# Patient Record
Sex: MALE | Race: WHITE | NOT HISPANIC OR LATINO
[De-identification: names, ages, dates, MRNs, and addresses within clinical notes are randomized per-mention and may not be internally consistent; named-entity substitution may affect disease eponyms.]

---

## 2017-01-23 ENCOUNTER — RECORD ABSTRACTING (OUTPATIENT)
Age: 77
End: 2017-01-23

## 2017-01-23 DIAGNOSIS — Z92.89 PERSONAL HISTORY OF OTHER MEDICAL TREATMENT: ICD-10-CM

## 2017-01-23 DIAGNOSIS — Z86.79 PERSONAL HISTORY OF OTHER DISEASES OF THE CIRCULATORY SYSTEM: ICD-10-CM

## 2017-01-23 DIAGNOSIS — Z95.810 PRESENCE OF AUTOMATIC (IMPLANTABLE) CARDIAC DEFIBRILLATOR: ICD-10-CM

## 2017-01-23 RX ORDER — GLIPIZIDE 10 MG/1
10 TABLET, FILM COATED, EXTENDED RELEASE ORAL
Refills: 0 | Status: ACTIVE | COMMUNITY

## 2017-01-23 RX ORDER — FUROSEMIDE 40 MG/1
40 TABLET ORAL
Refills: 0 | Status: ACTIVE | COMMUNITY

## 2017-01-23 RX ORDER — METOPROLOL TARTRATE 50 MG/1
50 TABLET, FILM COATED ORAL
Refills: 0 | Status: ACTIVE | COMMUNITY

## 2017-01-23 RX ORDER — DOFETILIDE 0.5 MG/1
500 CAPSULE ORAL TWICE DAILY
Refills: 0 | Status: ACTIVE | COMMUNITY

## 2017-01-23 RX ORDER — LISINOPRIL AND HYDROCHLOROTHIAZIDE TABLETS 20; 25 MG/1; MG/1
20-25 TABLET ORAL
Refills: 0 | Status: ACTIVE | COMMUNITY

## 2017-03-21 ENCOUNTER — OUTPATIENT (OUTPATIENT)
Dept: OUTPATIENT SERVICES | Facility: HOSPITAL | Age: 77
LOS: 1 days | Discharge: HOME | End: 2017-03-21

## 2017-03-30 ENCOUNTER — APPOINTMENT (OUTPATIENT)
Dept: UROLOGY | Facility: CLINIC | Age: 77
End: 2017-03-30

## 2017-03-31 ENCOUNTER — APPOINTMENT (OUTPATIENT)
Dept: HEMATOLOGY ONCOLOGY | Facility: CLINIC | Age: 77
End: 2017-03-31

## 2017-03-31 VITALS
HEIGHT: 69 IN | SYSTOLIC BLOOD PRESSURE: 195 MMHG | HEART RATE: 61 BPM | DIASTOLIC BLOOD PRESSURE: 81 MMHG | RESPIRATION RATE: 12 BRPM | BODY MASS INDEX: 33.18 KG/M2 | TEMPERATURE: 96.1 F | WEIGHT: 224 LBS

## 2017-03-31 DIAGNOSIS — Z87.891 PERSONAL HISTORY OF NICOTINE DEPENDENCE: ICD-10-CM

## 2017-03-31 DIAGNOSIS — E08.29 DIABETES MELLITUS DUE TO UNDERLYING CONDITION WITH OTHER DIABETIC KIDNEY COMPLICATION: ICD-10-CM

## 2017-03-31 DIAGNOSIS — Z86.39 PERSONAL HISTORY OF OTHER ENDOCRINE, NUTRITIONAL AND METABOLIC DISEASE: ICD-10-CM

## 2017-03-31 DIAGNOSIS — Z83.3 FAMILY HISTORY OF DIABETES MELLITUS: ICD-10-CM

## 2017-03-31 DIAGNOSIS — I48.91 UNSPECIFIED ATRIAL FIBRILLATION: ICD-10-CM

## 2017-03-31 DIAGNOSIS — R80.9 DIABETES MELLITUS DUE TO UNDERLYING CONDITION WITH OTHER DIABETIC KIDNEY COMPLICATION: ICD-10-CM

## 2017-03-31 DIAGNOSIS — N40.0 BENIGN PROSTATIC HYPERPLASIA WITHOUT LOWER URINARY TRACT SYMPMS: ICD-10-CM

## 2017-03-31 DIAGNOSIS — Z80.1 FAMILY HISTORY OF MALIGNANT NEOPLASM OF TRACHEA, BRONCHUS AND LUNG: ICD-10-CM

## 2017-04-04 PROBLEM — Z87.891 FORMER SMOKER: Status: ACTIVE | Noted: 2017-04-04

## 2017-04-04 PROBLEM — Z80.1 FAMILY HISTORY OF LUNG CANCER: Status: ACTIVE | Noted: 2017-04-04

## 2017-04-04 PROBLEM — N40.0 ENLARGED PROSTATE: Status: RESOLVED | Noted: 2017-04-04 | Resolved: 2017-04-04

## 2017-04-04 PROBLEM — I48.91 ATRIAL FIBRILLATION, UNSPECIFIED TYPE: Status: RESOLVED | Noted: 2017-04-04 | Resolved: 2017-04-04

## 2017-04-04 PROBLEM — Z83.3 FAMILY HISTORY OF DIABETES MELLITUS: Status: ACTIVE | Noted: 2017-04-04

## 2017-04-04 PROBLEM — Z86.39 HISTORY OF HYPERLIPIDEMIA: Status: RESOLVED | Noted: 2017-04-04 | Resolved: 2017-04-04

## 2017-04-04 PROBLEM — E08.29 DIABETES MELLITUS DUE TO UNDERLYING CONDITION WITH MICROALBUMINURIA, WITHOUT LONG-TERM CURRENT USE OF INSULIN: Status: RESOLVED | Noted: 2017-04-04 | Resolved: 2017-04-04

## 2017-04-04 RX ORDER — TADALAFIL 5 MG/1
5 TABLET, FILM COATED ORAL
Refills: 0 | Status: COMPLETED | COMMUNITY
End: 2017-04-04

## 2017-04-06 ENCOUNTER — APPOINTMENT (OUTPATIENT)
Dept: HEMATOLOGY ONCOLOGY | Facility: CLINIC | Age: 77
End: 2017-04-06

## 2017-04-06 ENCOUNTER — RECORD ABSTRACTING (OUTPATIENT)
Age: 77
End: 2017-04-06

## 2017-04-11 LAB
FERRITIN SERPL-MCNC: 103 NG/ML
FOLATE SERPL-MCNC: 3.9 NG/ML
VIT B12 SERPL-MCNC: 162 PG/ML

## 2017-04-14 ENCOUNTER — APPOINTMENT (OUTPATIENT)
Dept: INFUSION THERAPY | Facility: CLINIC | Age: 77
End: 2017-04-14

## 2017-04-14 ENCOUNTER — APPOINTMENT (OUTPATIENT)
Dept: HEMATOLOGY ONCOLOGY | Facility: CLINIC | Age: 77
End: 2017-04-14

## 2017-04-14 VITALS
HEIGHT: 69 IN | DIASTOLIC BLOOD PRESSURE: 69 MMHG | WEIGHT: 224 LBS | HEART RATE: 60 BPM | SYSTOLIC BLOOD PRESSURE: 154 MMHG | TEMPERATURE: 97.1 F | BODY MASS INDEX: 33.18 KG/M2 | RESPIRATION RATE: 12 BRPM

## 2017-04-17 LAB — HCYS SERPL-MCNC: 22.9 UMOL/L

## 2017-04-19 LAB
IF AB SER QL: POSITIVE
METHYLMALONATE SERPL-SCNC: 483 NMOL/L
PCA AB SER QL: NEGATIVE

## 2017-04-24 ENCOUNTER — APPOINTMENT (OUTPATIENT)
Dept: INFUSION THERAPY | Facility: CLINIC | Age: 77
End: 2017-04-24

## 2017-04-28 ENCOUNTER — APPOINTMENT (OUTPATIENT)
Dept: INFUSION THERAPY | Facility: CLINIC | Age: 77
End: 2017-04-28

## 2017-04-28 ENCOUNTER — OUTPATIENT (OUTPATIENT)
Dept: OUTPATIENT SERVICES | Facility: HOSPITAL | Age: 77
LOS: 1 days | Discharge: HOME | End: 2017-04-28

## 2017-05-01 ENCOUNTER — APPOINTMENT (OUTPATIENT)
Dept: UROLOGY | Facility: HOSPITAL | Age: 77
End: 2017-05-01

## 2017-05-02 ENCOUNTER — APPOINTMENT (OUTPATIENT)
Dept: UROLOGY | Facility: CLINIC | Age: 77
End: 2017-05-02

## 2017-05-02 LAB — SEND OUT TEST (NORTH): NORMAL

## 2017-05-09 ENCOUNTER — APPOINTMENT (OUTPATIENT)
Dept: UROLOGY | Facility: CLINIC | Age: 77
End: 2017-05-09

## 2017-05-12 ENCOUNTER — APPOINTMENT (OUTPATIENT)
Dept: INFUSION THERAPY | Facility: CLINIC | Age: 77
End: 2017-05-12

## 2017-05-12 ENCOUNTER — APPOINTMENT (OUTPATIENT)
Dept: HEMATOLOGY ONCOLOGY | Facility: CLINIC | Age: 77
End: 2017-05-12

## 2017-05-16 ENCOUNTER — APPOINTMENT (OUTPATIENT)
Dept: UROLOGY | Facility: CLINIC | Age: 77
End: 2017-05-16

## 2017-05-16 VITALS
HEART RATE: 53 BPM | BODY MASS INDEX: 29.62 KG/M2 | HEIGHT: 69 IN | WEIGHT: 200 LBS | DIASTOLIC BLOOD PRESSURE: 52 MMHG | SYSTOLIC BLOOD PRESSURE: 93 MMHG

## 2017-05-20 ENCOUNTER — INPATIENT (INPATIENT)
Facility: HOSPITAL | Age: 77
LOS: 4 days | Discharge: HOME HEALTH | End: 2017-05-25
Attending: HOSPITALIST

## 2017-05-30 ENCOUNTER — APPOINTMENT (OUTPATIENT)
Dept: UROLOGY | Facility: CLINIC | Age: 77
End: 2017-05-30

## 2017-05-30 VITALS
BODY MASS INDEX: 29.62 KG/M2 | WEIGHT: 200 LBS | HEIGHT: 69 IN | DIASTOLIC BLOOD PRESSURE: 58 MMHG | SYSTOLIC BLOOD PRESSURE: 129 MMHG | HEART RATE: 62 BPM

## 2017-05-30 DIAGNOSIS — R79.1 ABNORMAL COAGULATION PROFILE: ICD-10-CM

## 2017-05-30 DIAGNOSIS — D52.9 FOLATE DEFICIENCY ANEMIA, UNSPECIFIED: ICD-10-CM

## 2017-06-13 ENCOUNTER — APPOINTMENT (OUTPATIENT)
Dept: UROLOGY | Facility: CLINIC | Age: 77
End: 2017-06-13

## 2017-06-13 VITALS
DIASTOLIC BLOOD PRESSURE: 57 MMHG | HEIGHT: 69 IN | BODY MASS INDEX: 30.36 KG/M2 | WEIGHT: 205 LBS | HEART RATE: 54 BPM | SYSTOLIC BLOOD PRESSURE: 110 MMHG

## 2017-06-27 ENCOUNTER — APPOINTMENT (OUTPATIENT)
Dept: UROLOGY | Facility: CLINIC | Age: 77
End: 2017-06-27

## 2017-06-27 VITALS
BODY MASS INDEX: 30.36 KG/M2 | HEIGHT: 69 IN | WEIGHT: 205 LBS | SYSTOLIC BLOOD PRESSURE: 147 MMHG | DIASTOLIC BLOOD PRESSURE: 67 MMHG | HEART RATE: 56 BPM

## 2017-06-28 DIAGNOSIS — H40.9 UNSPECIFIED GLAUCOMA: ICD-10-CM

## 2017-06-28 DIAGNOSIS — E11.9 TYPE 2 DIABETES MELLITUS WITHOUT COMPLICATIONS: ICD-10-CM

## 2017-06-28 DIAGNOSIS — R31.0 GROSS HEMATURIA: ICD-10-CM

## 2017-06-28 DIAGNOSIS — N28.9 DISORDER OF KIDNEY AND URETER, UNSPECIFIED: ICD-10-CM

## 2017-06-28 DIAGNOSIS — I11.0 HYPERTENSIVE HEART DISEASE WITH HEART FAILURE: ICD-10-CM

## 2017-06-28 DIAGNOSIS — R32 UNSPECIFIED URINARY INCONTINENCE: ICD-10-CM

## 2017-06-28 DIAGNOSIS — Z79.84 LONG TERM (CURRENT) USE OF ORAL HYPOGLYCEMIC DRUGS: ICD-10-CM

## 2017-06-28 DIAGNOSIS — R31.9 HEMATURIA, UNSPECIFIED: ICD-10-CM

## 2017-06-28 DIAGNOSIS — D68.32 HEMORRHAGIC DISORDER DUE TO EXTRINSIC CIRCULATING ANTICOAGULANTS: ICD-10-CM

## 2017-06-28 DIAGNOSIS — I50.9 HEART FAILURE, UNSPECIFIED: ICD-10-CM

## 2017-06-28 DIAGNOSIS — N13.9 OBSTRUCTIVE AND REFLUX UROPATHY, UNSPECIFIED: ICD-10-CM
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
## 2017-06-28 DIAGNOSIS — Z95.0 PRESENCE OF CARDIAC PACEMAKER: ICD-10-CM

## 2017-06-28 DIAGNOSIS — Z90.79 ACQUIRED ABSENCE OF OTHER GENITAL ORGAN(S): ICD-10-CM

## 2017-06-28 DIAGNOSIS — T81.4XXA INFECTION FOLLOWING A PROCEDURE, INITIAL ENCOUNTER: ICD-10-CM

## 2017-06-28 DIAGNOSIS — E83.42 HYPOMAGNESEMIA: ICD-10-CM
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
## 2017-06-28 DIAGNOSIS — T45.515A ADVERSE EFFECT OF ANTICOAGULANTS, INITIAL ENCOUNTER: ICD-10-CM

## 2017-06-28 DIAGNOSIS — I48.91 UNSPECIFIED ATRIAL FIBRILLATION: ICD-10-CM

## 2017-06-28 DIAGNOSIS — D64.9 ANEMIA, UNSPECIFIED: ICD-10-CM

## 2017-07-07 DIAGNOSIS — N40.1 BENIGN PROSTATIC HYPERPLASIA WITH LOWER URINARY TRACT SYMPTOMS: ICD-10-CM

## 2017-07-07 DIAGNOSIS — Z01.818 ENCOUNTER FOR OTHER PREPROCEDURAL EXAMINATION: ICD-10-CM

## 2017-07-07 DIAGNOSIS — R33.9 RETENTION OF URINE, UNSPECIFIED: ICD-10-CM

## 2017-07-12 ENCOUNTER — APPOINTMENT (OUTPATIENT)
Dept: UROLOGY | Facility: CLINIC | Age: 77
End: 2017-07-12

## 2017-07-12 VITALS
HEART RATE: 57 BPM | DIASTOLIC BLOOD PRESSURE: 59 MMHG | BODY MASS INDEX: 30.81 KG/M2 | SYSTOLIC BLOOD PRESSURE: 113 MMHG | WEIGHT: 208 LBS | HEIGHT: 69 IN

## 2017-07-12 DIAGNOSIS — T81.4XXD INFECTION FOLLOWING A PROCEDURE, SUBSEQUENT ENCOUNTER: ICD-10-CM

## 2017-08-24 ENCOUNTER — APPOINTMENT (OUTPATIENT)
Dept: UROLOGY | Facility: CLINIC | Age: 77
End: 2017-08-24
Payer: MEDICARE

## 2017-08-24 VITALS
HEART RATE: 51 BPM | BODY MASS INDEX: 31.84 KG/M2 | WEIGHT: 215 LBS | HEIGHT: 69 IN | DIASTOLIC BLOOD PRESSURE: 60 MMHG | SYSTOLIC BLOOD PRESSURE: 138 MMHG

## 2017-08-24 DIAGNOSIS — Z87.898 PERSONAL HISTORY OF OTHER SPECIFIED CONDITIONS: ICD-10-CM

## 2017-08-24 LAB
BILIRUB UR QL STRIP: NORMAL
CLARITY UR: CLEAR
COLLECTION METHOD: NORMAL
GLUCOSE UR-MCNC: 500
HCG UR QL: NORMAL EU/DL
HGB UR QL STRIP.AUTO: 10
KETONES UR-MCNC: NORMAL
LEUKOCYTE ESTERASE UR QL STRIP: 75
NITRITE UR QL STRIP: NORMAL
PH UR STRIP: 5
PROT UR STRIP-MCNC: 30
SP GR UR STRIP: 1.01

## 2017-08-24 PROCEDURE — 81003 URINALYSIS AUTO W/O SCOPE: CPT | Mod: QW

## 2017-08-24 PROCEDURE — 99213 OFFICE O/P EST LOW 20 MIN: CPT

## 2017-08-29 ENCOUNTER — MESSAGE (OUTPATIENT)
Age: 77
End: 2017-08-29

## 2017-11-02 ENCOUNTER — RESULT REVIEW (OUTPATIENT)
Age: 77
End: 2017-11-02

## 2017-11-02 ENCOUNTER — OUTPATIENT (OUTPATIENT)
Dept: OUTPATIENT SERVICES | Facility: HOSPITAL | Age: 77
LOS: 1 days | Discharge: HOME | End: 2017-11-02

## 2017-11-02 ENCOUNTER — APPOINTMENT (OUTPATIENT)
Dept: UROLOGY | Facility: CLINIC | Age: 77
End: 2017-11-02
Payer: MEDICARE

## 2017-11-02 VITALS
HEART RATE: 96 BPM | BODY MASS INDEX: 31.84 KG/M2 | HEIGHT: 69 IN | SYSTOLIC BLOOD PRESSURE: 129 MMHG | DIASTOLIC BLOOD PRESSURE: 73 MMHG | WEIGHT: 215 LBS

## 2017-11-02 DIAGNOSIS — R33.9 RETENTION OF URINE, UNSPECIFIED: ICD-10-CM

## 2017-11-02 DIAGNOSIS — N39.0 URINARY TRACT INFECTION, SITE NOT SPECIFIED: ICD-10-CM

## 2017-11-02 DIAGNOSIS — N40.0 BENIGN PROSTATIC HYPERPLASIA WITHOUT LOWER URINARY TRACT SYMPTOMS: ICD-10-CM

## 2017-11-02 DIAGNOSIS — R31.9 HEMATURIA, UNSPECIFIED: ICD-10-CM

## 2017-11-02 DIAGNOSIS — N17.9 ACUTE KIDNEY FAILURE, UNSPECIFIED: ICD-10-CM

## 2017-11-02 DIAGNOSIS — N40.1 BENIGN PROSTATIC HYPERPLASIA WITH LOWER URINARY TRACT SYMPTOMS: ICD-10-CM

## 2017-11-02 LAB
BILIRUB UR QL STRIP: NORMAL
CLARITY UR: CLEAR
COLLECTION METHOD: NORMAL
GLUCOSE UR-MCNC: 500
HCG UR QL: NORMAL EU/DL
HGB UR QL STRIP.AUTO: 250
KETONES UR-MCNC: NORMAL
LEUKOCYTE ESTERASE UR QL STRIP: 500
NITRITE UR QL STRIP: NORMAL
PH UR STRIP: 6
PROT UR STRIP-MCNC: 100
SP GR UR STRIP: 1.01

## 2017-11-02 PROCEDURE — 99213 OFFICE O/P EST LOW 20 MIN: CPT

## 2017-11-02 PROCEDURE — 81003 URINALYSIS AUTO W/O SCOPE: CPT | Mod: QW

## 2017-11-03 LAB
APPEARANCE UR: NORMAL
BACTERIA URNS QL MICRO: ABNORMAL
BILIRUB UR QL STRIP: NEGATIVE
COLOR UR: YELLOW
GLUCOSE UR STRIP-MCNC: >=1000 MG/DL
HGB UR QL STRIP: ABNORMAL
KETONES UR STRIP-MCNC: NEGATIVE MG/DL
NITRITE UR QL STRIP: POSITIVE
PH UR STRIP: 6
PROT UR STRIP-MCNC: >=300 MG/DL
RBC #/AREA URNS HPF: ABNORMAL P/HPF
SP GR UR STRIP: >= 1.03
UROBILINOGEN UR STRIP-MCNC: 0.2 MG/DL
WBC URNS QL MICRO: ABNORMAL
WBC URNS QL MICRO: ABNORMAL P/HPF
YEAST URNS QL MICRO: ABNORMAL

## 2017-11-07 ENCOUNTER — APPOINTMENT (OUTPATIENT)
Dept: UROLOGY | Facility: CLINIC | Age: 77
End: 2017-11-07

## 2017-11-07 ENCOUNTER — MESSAGE (OUTPATIENT)
Age: 77
End: 2017-11-07

## 2017-11-08 LAB — BACTERIA UR CULT: NORMAL

## 2017-11-22 ENCOUNTER — RX RENEWAL (OUTPATIENT)
Age: 77
End: 2017-11-22

## 2018-01-11 ENCOUNTER — APPOINTMENT (OUTPATIENT)
Dept: UROLOGY | Facility: CLINIC | Age: 78
End: 2018-01-11
Payer: MEDICARE

## 2018-01-11 ENCOUNTER — RESULT CHARGE (OUTPATIENT)
Age: 78
End: 2018-01-11

## 2018-01-11 LAB
BILIRUB UR QL STRIP: NORMAL
CLARITY UR: NORMAL
COLLECTION METHOD: NORMAL
GLUCOSE UR-MCNC: NORMAL
HCG UR QL: NORMAL EU/DL
HGB UR QL STRIP.AUTO: 250
KETONES UR-MCNC: NORMAL
LEUKOCYTE ESTERASE UR QL STRIP: 500
NITRITE UR QL STRIP: NORMAL
PH UR STRIP: 6
PROT UR STRIP-MCNC: 100
SP GR UR STRIP: 1.01

## 2018-01-11 PROCEDURE — 99213 OFFICE O/P EST LOW 20 MIN: CPT

## 2018-01-11 RX ORDER — NYSTATIN 100000 [USP'U]/G
100000 POWDER TOPICAL
Qty: 60 | Refills: 1 | Status: COMPLETED | COMMUNITY
Start: 2017-11-22 | End: 2018-01-11

## 2018-01-11 RX ORDER — DABIGATRAN ETEXILATE MESYLATE 150 MG/1
150 CAPSULE ORAL TWICE DAILY
Refills: 0 | Status: COMPLETED | COMMUNITY
End: 2018-01-11

## 2018-01-11 RX ORDER — OXYBUTYNIN CHLORIDE 10 MG/1
10 TABLET, EXTENDED RELEASE ORAL
Qty: 3 | Refills: 5 | Status: COMPLETED | COMMUNITY
Start: 2017-08-29 | End: 2018-01-11

## 2018-01-11 RX ORDER — SULFAMETHOXAZOLE AND TRIMETHOPRIM 800; 160 MG/1; MG/1
800-160 TABLET ORAL
Qty: 14 | Refills: 0 | Status: COMPLETED | COMMUNITY
Start: 2017-11-07 | End: 2018-01-11

## 2018-01-18 ENCOUNTER — INPATIENT (INPATIENT)
Facility: HOSPITAL | Age: 78
LOS: 8 days | Discharge: HOME HEALTH | End: 2018-01-27
Attending: HOSPITALIST

## 2018-01-30 DIAGNOSIS — B96.20 UNSPECIFIED ESCHERICHIA COLI [E. COLI] AS THE CAUSE OF DISEASES CLASSIFIED ELSEWHERE: ICD-10-CM

## 2018-01-30 DIAGNOSIS — A41.9 SEPSIS, UNSPECIFIED ORGANISM: ICD-10-CM

## 2018-01-30 DIAGNOSIS — R65.20 SEVERE SEPSIS WITHOUT SEPTIC SHOCK: ICD-10-CM

## 2018-01-30 DIAGNOSIS — N18.3 CHRONIC KIDNEY DISEASE, STAGE 3 (MODERATE): ICD-10-CM

## 2018-01-30 DIAGNOSIS — E11.10 TYPE 2 DIABETES MELLITUS WITH KETOACIDOSIS WITHOUT COMA: ICD-10-CM

## 2018-01-30 DIAGNOSIS — E83.42 HYPOMAGNESEMIA: ICD-10-CM

## 2018-01-30 DIAGNOSIS — Z91.013 ALLERGY TO SEAFOOD: ICD-10-CM

## 2018-01-30 DIAGNOSIS — E87.2 ACIDOSIS: ICD-10-CM

## 2018-01-30 DIAGNOSIS — E78.5 HYPERLIPIDEMIA, UNSPECIFIED: ICD-10-CM

## 2018-01-30 DIAGNOSIS — Z79.01 LONG TERM (CURRENT) USE OF ANTICOAGULANTS: ICD-10-CM

## 2018-01-30 DIAGNOSIS — F03.90 UNSPECIFIED DEMENTIA WITHOUT BEHAVIORAL DISTURBANCE: ICD-10-CM

## 2018-01-30 DIAGNOSIS — J96.01 ACUTE RESPIRATORY FAILURE WITH HYPOXIA: ICD-10-CM

## 2018-01-30 DIAGNOSIS — N39.0 URINARY TRACT INFECTION, SITE NOT SPECIFIED: ICD-10-CM

## 2018-01-30 DIAGNOSIS — N40.0 BENIGN PROSTATIC HYPERPLASIA WITHOUT LOWER URINARY TRACT SYMPTOMS: ICD-10-CM

## 2018-01-30 DIAGNOSIS — I13.0 HYPERTENSIVE HEART AND CHRONIC KIDNEY DISEASE WITH HEART FAILURE AND STAGE 1 THROUGH STAGE 4 CHRONIC KIDNEY DISEASE, OR UNSPECIFIED CHRONIC KIDNEY DISEASE: ICD-10-CM

## 2018-01-30 DIAGNOSIS — E11.59 TYPE 2 DIABETES MELLITUS WITH OTHER CIRCULATORY COMPLICATIONS: ICD-10-CM

## 2018-01-30 DIAGNOSIS — I21.A1 MYOCARDIAL INFARCTION TYPE 2: ICD-10-CM

## 2018-01-30 DIAGNOSIS — Z95.810 PRESENCE OF AUTOMATIC (IMPLANTABLE) CARDIAC DEFIBRILLATOR: ICD-10-CM

## 2018-01-30 DIAGNOSIS — B95.2 ENTEROCOCCUS AS THE CAUSE OF DISEASES CLASSIFIED ELSEWHERE: ICD-10-CM

## 2018-01-30 DIAGNOSIS — R13.10 DYSPHAGIA, UNSPECIFIED: ICD-10-CM

## 2018-01-30 DIAGNOSIS — I48.91 UNSPECIFIED ATRIAL FIBRILLATION: ICD-10-CM

## 2018-01-30 DIAGNOSIS — E87.6 HYPOKALEMIA: ICD-10-CM

## 2018-01-30 DIAGNOSIS — K81.0 ACUTE CHOLECYSTITIS: ICD-10-CM

## 2018-01-30 DIAGNOSIS — I50.22 CHRONIC SYSTOLIC (CONGESTIVE) HEART FAILURE: ICD-10-CM

## 2018-01-30 DIAGNOSIS — R91.1 SOLITARY PULMONARY NODULE: ICD-10-CM

## 2018-01-30 DIAGNOSIS — Z79.84 LONG TERM (CURRENT) USE OF ORAL HYPOGLYCEMIC DRUGS: ICD-10-CM

## 2018-01-30 DIAGNOSIS — E87.1 HYPO-OSMOLALITY AND HYPONATREMIA: ICD-10-CM

## 2018-01-30 DIAGNOSIS — E11.22 TYPE 2 DIABETES MELLITUS WITH DIABETIC CHRONIC KIDNEY DISEASE: ICD-10-CM

## 2018-01-30 DIAGNOSIS — K81.9 CHOLECYSTITIS, UNSPECIFIED: ICD-10-CM

## 2018-02-04 DIAGNOSIS — E78.5 HYPERLIPIDEMIA, UNSPECIFIED: ICD-10-CM

## 2018-02-04 DIAGNOSIS — H40.9 UNSPECIFIED GLAUCOMA: ICD-10-CM

## 2018-02-04 DIAGNOSIS — N40.0 BENIGN PROSTATIC HYPERPLASIA WITHOUT LOWER URINARY TRACT SYMPTOMS: ICD-10-CM

## 2018-02-04 DIAGNOSIS — K81.0 ACUTE CHOLECYSTITIS: ICD-10-CM

## 2018-02-04 DIAGNOSIS — E11.9 TYPE 2 DIABETES MELLITUS WITHOUT COMPLICATIONS: ICD-10-CM

## 2018-02-04 DIAGNOSIS — I48.91 UNSPECIFIED ATRIAL FIBRILLATION: ICD-10-CM

## 2018-02-04 DIAGNOSIS — I10 ESSENTIAL (PRIMARY) HYPERTENSION: ICD-10-CM

## 2018-02-04 DIAGNOSIS — E83.42 HYPOMAGNESEMIA: ICD-10-CM

## 2018-02-10 ENCOUNTER — EMERGENCY (EMERGENCY)
Facility: HOSPITAL | Age: 78
LOS: 0 days | Discharge: LEFT BEFORE DISCHARGE | End: 2018-02-10
Attending: EMERGENCY MEDICINE | Admitting: INTERNAL MEDICINE

## 2018-02-10 VITALS
OXYGEN SATURATION: 98 % | RESPIRATION RATE: 18 BRPM | TEMPERATURE: 98 F | DIASTOLIC BLOOD PRESSURE: 70 MMHG | SYSTOLIC BLOOD PRESSURE: 155 MMHG | HEART RATE: 55 BPM

## 2018-02-10 DIAGNOSIS — Z03.89 ENCOUNTER FOR OBSERVATION FOR OTHER SUSPECTED DISEASES AND CONDITIONS RULED OUT: ICD-10-CM

## 2018-02-10 NOTE — ED PROVIDER NOTE - PROGRESS NOTE DETAILS
unable to locate pt in assigned ED location nurse also has been unable to locate pt in assigned ED location.  another pt says pt had left.  will call contact info left a voicemail with the contact number.

## 2018-02-26 ENCOUNTER — APPOINTMENT (OUTPATIENT)
Dept: SURGERY | Facility: CLINIC | Age: 78
End: 2018-02-26
Payer: MEDICARE

## 2018-02-26 VITALS
BODY MASS INDEX: 31.1 KG/M2 | HEIGHT: 69 IN | WEIGHT: 210 LBS | SYSTOLIC BLOOD PRESSURE: 110 MMHG | DIASTOLIC BLOOD PRESSURE: 70 MMHG

## 2018-02-26 DIAGNOSIS — Z78.9 OTHER SPECIFIED HEALTH STATUS: ICD-10-CM

## 2018-02-26 PROCEDURE — 99213 OFFICE O/P EST LOW 20 MIN: CPT

## 2018-02-26 RX ORDER — CANAGLIFLOZIN 300 MG/1
300 TABLET, FILM COATED ORAL
Refills: 0 | Status: COMPLETED | COMMUNITY
End: 2018-02-26

## 2018-02-26 RX ORDER — METFORMIN HYDROCHLORIDE 500 MG/1
500 TABLET, COATED ORAL TWICE DAILY
Refills: 0 | Status: COMPLETED | COMMUNITY
End: 2018-02-26

## 2018-02-26 RX ORDER — DILTIAZEM HYDROCHLORIDE 120 MG/1
120 CAPSULE, COATED, EXTENDED RELEASE ORAL
Refills: 0 | Status: COMPLETED | COMMUNITY
End: 2018-02-26

## 2018-02-26 RX ORDER — SIMVASTATIN 40 MG/1
40 TABLET, FILM COATED ORAL
Refills: 0 | Status: COMPLETED | COMMUNITY
End: 2018-02-26

## 2018-02-26 RX ORDER — OXYBUTYNIN CHLORIDE 15 MG/1
15 TABLET, EXTENDED RELEASE ORAL
Qty: 30 | Refills: 5 | Status: COMPLETED | COMMUNITY
Start: 2017-11-02 | End: 2018-02-26

## 2018-02-26 RX ORDER — POTASSIUM CHLORIDE 1500 MG/1
TABLET, EXTENDED RELEASE ORAL TWICE DAILY
Refills: 0 | Status: COMPLETED | COMMUNITY
End: 2018-02-26

## 2018-02-26 RX ORDER — MIRABEGRON 50 MG/1
50 TABLET, FILM COATED, EXTENDED RELEASE ORAL
Qty: 30 | Refills: 5 | Status: COMPLETED | COMMUNITY
Start: 2017-11-02 | End: 2018-02-26

## 2018-02-26 RX ORDER — NYSTATIN 100000 1/G
100000 POWDER TOPICAL
Qty: 15 | Refills: 1 | Status: COMPLETED | COMMUNITY
Start: 2017-11-02 | End: 2018-02-26

## 2018-02-26 RX ORDER — SOLIFENACIN SUCCINATE 5 MG/1
5 TABLET, FILM COATED ORAL
Qty: 30 | Refills: 5 | Status: COMPLETED | COMMUNITY
Start: 2017-08-24 | End: 2018-02-26

## 2018-03-12 ENCOUNTER — OUTPATIENT (OUTPATIENT)
Dept: OUTPATIENT SERVICES | Facility: HOSPITAL | Age: 78
LOS: 1 days | Discharge: HOME | End: 2018-03-12

## 2018-03-19 ENCOUNTER — APPOINTMENT (OUTPATIENT)
Dept: SURGERY | Facility: CLINIC | Age: 78
End: 2018-03-19
Payer: MEDICARE

## 2018-03-19 VITALS
HEIGHT: 69 IN | WEIGHT: 211 LBS | SYSTOLIC BLOOD PRESSURE: 160 MMHG | BODY MASS INDEX: 31.25 KG/M2 | DIASTOLIC BLOOD PRESSURE: 80 MMHG

## 2018-03-19 DIAGNOSIS — T85.518D: ICD-10-CM

## 2018-03-19 PROCEDURE — 99213 OFFICE O/P EST LOW 20 MIN: CPT

## 2018-03-20 DIAGNOSIS — Z48.03 ENCOUNTER FOR CHANGE OR REMOVAL OF DRAINS: ICD-10-CM

## 2018-04-11 ENCOUNTER — INPATIENT (INPATIENT)
Facility: HOSPITAL | Age: 78
LOS: 2 days | Discharge: HOME | End: 2018-04-14
Attending: INTERNAL MEDICINE

## 2018-04-11 VITALS
DIASTOLIC BLOOD PRESSURE: 96 MMHG | OXYGEN SATURATION: 96 % | SYSTOLIC BLOOD PRESSURE: 194 MMHG | RESPIRATION RATE: 24 BRPM | HEIGHT: 69 IN | HEART RATE: 92 BPM | WEIGHT: 211.64 LBS

## 2018-04-11 RX ORDER — SODIUM CHLORIDE 9 MG/ML
1000 INJECTION, SOLUTION INTRAVENOUS
Qty: 0 | Refills: 0 | Status: DISCONTINUED | OUTPATIENT
Start: 2018-04-11 | End: 2018-04-14

## 2018-04-11 RX ORDER — DEXTROSE 50 % IN WATER 50 %
12.5 SYRINGE (ML) INTRAVENOUS ONCE
Qty: 0 | Refills: 0 | Status: DISCONTINUED | OUTPATIENT
Start: 2018-04-11 | End: 2018-04-14

## 2018-04-11 RX ORDER — HEPARIN SODIUM 5000 [USP'U]/ML
800 INJECTION INTRAVENOUS; SUBCUTANEOUS
Qty: 25000 | Refills: 0 | Status: DISCONTINUED | OUTPATIENT
Start: 2018-04-11 | End: 2018-04-11

## 2018-04-11 RX ORDER — DEXTROSE 50 % IN WATER 50 %
25 SYRINGE (ML) INTRAVENOUS ONCE
Qty: 0 | Refills: 0 | Status: DISCONTINUED | OUTPATIENT
Start: 2018-04-11 | End: 2018-04-14

## 2018-04-11 RX ORDER — METOPROLOL TARTRATE 50 MG
50 TABLET ORAL
Qty: 0 | Refills: 0 | Status: DISCONTINUED | OUTPATIENT
Start: 2018-04-11 | End: 2018-04-13

## 2018-04-11 RX ORDER — DOFETILIDE 0.25 MG/1
500 CAPSULE ORAL EVERY 12 HOURS
Qty: 0 | Refills: 0 | Status: DISCONTINUED | OUTPATIENT
Start: 2018-04-11 | End: 2018-04-14

## 2018-04-11 RX ORDER — FUROSEMIDE 40 MG
40 TABLET ORAL DAILY
Qty: 0 | Refills: 0 | Status: DISCONTINUED | OUTPATIENT
Start: 2018-04-11 | End: 2018-04-14

## 2018-04-11 RX ORDER — NITROGLYCERIN 6.5 MG
10 CAPSULE, EXTENDED RELEASE ORAL
Qty: 50 | Refills: 0 | Status: DISCONTINUED | OUTPATIENT
Start: 2018-04-11 | End: 2018-04-14

## 2018-04-11 RX ORDER — LISINOPRIL 2.5 MG/1
10 TABLET ORAL DAILY
Qty: 0 | Refills: 0 | Status: DISCONTINUED | OUTPATIENT
Start: 2018-04-11 | End: 2018-04-13

## 2018-04-11 RX ORDER — INSULIN LISPRO 100/ML
VIAL (ML) SUBCUTANEOUS
Qty: 0 | Refills: 0 | Status: DISCONTINUED | OUTPATIENT
Start: 2018-04-11 | End: 2018-04-14

## 2018-04-11 RX ORDER — POTASSIUM CHLORIDE 20 MEQ
20 PACKET (EA) ORAL DAILY
Qty: 0 | Refills: 0 | Status: DISCONTINUED | OUTPATIENT
Start: 2018-04-11 | End: 2018-04-14

## 2018-04-11 RX ORDER — ATORVASTATIN CALCIUM 80 MG/1
40 TABLET, FILM COATED ORAL AT BEDTIME
Qty: 0 | Refills: 0 | Status: DISCONTINUED | OUTPATIENT
Start: 2018-04-11 | End: 2018-04-14

## 2018-04-11 RX ORDER — ASPIRIN/CALCIUM CARB/MAGNESIUM 324 MG
81 TABLET ORAL DAILY
Qty: 0 | Refills: 0 | Status: DISCONTINUED | OUTPATIENT
Start: 2018-04-11 | End: 2018-04-14

## 2018-04-11 RX ORDER — METOPROLOL TARTRATE 50 MG
5 TABLET ORAL ONCE
Qty: 0 | Refills: 0 | Status: COMPLETED | OUTPATIENT
Start: 2018-04-11 | End: 2018-04-11

## 2018-04-11 RX ORDER — GLUCAGON INJECTION, SOLUTION 0.5 MG/.1ML
1 INJECTION, SOLUTION SUBCUTANEOUS ONCE
Qty: 0 | Refills: 0 | Status: DISCONTINUED | OUTPATIENT
Start: 2018-04-11 | End: 2018-04-14

## 2018-04-11 RX ORDER — DEXTROSE 50 % IN WATER 50 %
1 SYRINGE (ML) INTRAVENOUS ONCE
Qty: 0 | Refills: 0 | Status: DISCONTINUED | OUTPATIENT
Start: 2018-04-11 | End: 2018-04-14

## 2018-04-11 RX ORDER — HEPARIN SODIUM 5000 [USP'U]/ML
800 INJECTION INTRAVENOUS; SUBCUTANEOUS
Qty: 25000 | Refills: 0 | Status: DISCONTINUED | OUTPATIENT
Start: 2018-04-11 | End: 2018-04-12

## 2018-04-11 RX ORDER — TEMAZEPAM 15 MG/1
15 CAPSULE ORAL ONCE
Qty: 0 | Refills: 0 | Status: DISCONTINUED | OUTPATIENT
Start: 2018-04-11 | End: 2018-04-11

## 2018-04-11 RX ADMIN — Medication 4: at 23:13

## 2018-04-11 RX ADMIN — HEPARIN SODIUM 8 UNIT(S)/HR: 5000 INJECTION INTRAVENOUS; SUBCUTANEOUS at 20:05

## 2018-04-11 RX ADMIN — Medication 5 MILLIGRAM(S): at 19:08

## 2018-04-11 RX ADMIN — ATORVASTATIN CALCIUM 40 MILLIGRAM(S): 80 TABLET, FILM COATED ORAL at 22:30

## 2018-04-11 RX ADMIN — DOFETILIDE 500 MICROGRAM(S): 0.25 CAPSULE ORAL at 22:29

## 2018-04-11 RX ADMIN — Medication 3 MICROGRAM(S)/MIN: at 20:01

## 2018-04-11 RX ADMIN — TEMAZEPAM 15 MILLIGRAM(S): 15 CAPSULE ORAL at 23:01

## 2018-04-11 NOTE — CONSULT NOTE ADULT - SUBJECTIVE AND OBJECTIVE BOX
Surgeon: /Perry/ Kiko    Consult requesting by: Dr. Carr  Cardiologist: Dr. Arteaga  PMD: Dr. Cabrera  Urology: Dr. Agrawal  Gen Surgery: Dr. Montoya    HISTORY OF PRESENT ILLNESS:  HPI:  78y Male here for elective cardiac catheterization. The patient had an abnormal stress test, recently diagnosed of new CHF.  EF on TTE in cardiologist office is said to be 20%.  Subsequent elective cardiac catheterization revealed significant 3vCAD.    PMHx:  A-fib on Eliquis and s/p PPM/ICD (Vizibility)  DM on orals  HTN  Dyslipidemia  Cataract  Glaucoma  Dementia  Cholelithiasis - recent admission for sepsis secondary to cholecystitis    PSxHx:   Prostate Surgery      Social history:  [ x ]ex- smoker quit 50 years ago    Physical Exam:    General: NAD  Neurology: A&Ox3, nonfocal, BLANK x 4  Eyes: PERRLA/ EOMI.  ENT/Neck: No JVD.  Respiratory: CTA B/L, No wheezing, rales, rhonchi  CV: S1S2, no added sounds   Abdominal: Soft, NT, ND +BS.  Extremities: Mild LE edema b/l, + peripheral pulses B/L regular pulse. Modified Haile's test of right radial artery was wnl  Skin: No Rashes, Hematoma, Ecchymosis    Labs: Reviewed,  no significant abnormalities noted (11 Apr 2018 13:50)      NYHA functional class    [ ] Class I (no limitation) [ ] Class II (slight limitation) [ x] Class III (marked limitation) [ ] Class IV (symptoms at rest)      MEDICATIONS  :  Home Medications:  aspirin 81 mg oral tablet: 1 tab(s) orally once a day (11 Apr 2018 13:22)  atorvastatin 40 mg oral tablet: 1 tab(s) orally once a day (11 Apr 2018 13:19)  Eliquis 5 mg oral tablet: 1 tab(s) orally 2 times a day (11 Apr 2018 13:21)  glipiZIDE 10 mg oral tablet, extended release: 1 tab(s) orally once a day (11 Apr 2018 13:17)  Invokana 100 mg oral tablet: 1 tab(s) orally once a day (11 Apr 2018 13:22)  Klor-Con M20 oral tablet, extended release: 1 tab(s) orally 2 times a day (11 Apr 2018 13:21)  Lasix 40 mg oral tablet: 1 tab(s) orally once a day (11 Apr 2018 13:20)  lisinopril 10 mg oral tablet: 1 tab(s) orally once a day (11 Apr 2018 13:17)  metoprolol tartrate 50 mg oral tablet: 1 tab(s) orally 2 times a day (11 Apr 2018 13:15)  simvastatin 40 mg oral tablet: 1 tab(s) orally once a day (at bedtime) (11 Apr 2018 13:18)  Tikosyn 500 mcg oral capsule: 1 cap(s) orally 2 times a day (11 Apr 2018 13:19)        Allergies    Drug Allergies Not Recorded  shellfish (Other (U))    SOCIAL HISTORY:  Smoker: [x ] Yes  [ ] No        PACK YEARS: 1 pack per week                        WHEN QUIT? 50 years ago  ETOH use: [ ] Yes  [ x]No              FREQUENCY / QUANTITY:  Ilicit Drug use:  [ ] Yes  [ x] No  Occupation: retired banker  Lives with: wife  Assisted device use: cane  5 meter walk test: unable to complete at this time        Review of Systems  CONSTITUTIONAL:  Fevers[ ] chills[ ] sweats[ ] fatigue[ ] weight loss[ ] weight gain [ ]                                    NEGATIVE [X ]   NEURO:  parathesias[ ] seizures [ ]  syncope [ ]  confusion [ ]                                                                      NEGATIVE[ X]   EYES: glasses[ ]  blurry vision[ ]  discharge[ ] pain[ ] glaucoma [ ]                                                                NEGATIVE[X ]   ENMT:  difficulty hearing [ ]  vertigo[ ]  dysphagia[ ] epistaxis[ ] recent dental work [ ]                                   NEGATIVE[ X]   CV:  chest pain[ ] palpitations[ ] GREENE [ x] diaphoresis [ ]                                                                                  RESPIRATORY:  wheezing[ ] SOB[x ] cough [ ] sputum[ ] hemoptysis[ ]                                                             GI:  nausea[ ]  vommiting [ ]  diarrhea[ ] constipation [ ] melena [ ]                                                              NEGATIVE[ X]   : hematuria[ ]  dysuria[ ] urgency[ ] incontinence[ ]                                                                                 NEGATIVE[ X]   MUSKULOSKELETAL:  arthritis[ ]  joint swelling [ ] muscle weakness [ ] Hx vein stripping [ ]                             NEGATIVE[X ]   SKIN/BREAST:  rash[ ] itching [ ]  hair loss[ ] masses[ ]                                                                                 NEGATIVE[ X]   PSYCH:  dementia [ ] depresion [ ] anxiety[ ]                                                                                               NEGATIVE[X ]   HEME/LYMPH:  bruises easily[ ] enlarged lymph nodes[ ] tender lymph nodes[ ]                                          NEGATIVE[ X]   ENDOCRINE:  cold intolerance[ ] heat intolerance[ ] polydipsia[ ]                                                                NEGATIVE[ X]     PHYSICAL EXAM  Right arm bp: Left arm bp; unable to assess at this time due to radial cath  VS: WNL  CONSTITUTIONAL:                                                                          WNL[ x]   Neuro: WNL[x ] Normal exam oriented to person/place & time with no focal motor or sensory  deficits. Other                     Eyes: WNL[x ]   Normal exam of conjunctiva & lids, pupils equally reactive. Other     ENT: WNL[x ]    Normal exam of nasal/oral mucosa with absence of cyanosis. Other  Neck: WNL[x ]  Normal exam of jugular veins, trachea & thyroid. Other  Chest: WNL[x ] Normal lung exam with good air movement absence of wheezes, rales, or rhonchi: Other                                                                                CV:  Auscultation: normal [x ] S3[ ] S4[ ] Irregular [ ] Rub[ ] Clicks[ ]    Murmurs none:[x ]systolic [ ]  diastolic [ ] holosystolic [ ]  Carotids: No Bruits[x ] Other                 Abdominal Aorta: normal [ ] nonpalpable[ ]Other                                                                                      GI:           WNL[x ] Normal exam of abdomen, liver & spleen with no noted masses or tenderness. Other                                                                                                        Extremities: WNL[ ] Normal no evidence of cyanosis or deformity Edema: none[ ]trace[x ]1+[ ]2+[ ]3+[ ]4+[ ]  Lower Extremity Pulses: Right[x ] Left[x ]Varicosities[ ]  SKIN :WNL[x ] Normal exam to inspection & palation. Other:                                                          LABS:    CBC: W - 8;   H/H - 13.6/41.6;    P - 307  BMP: Na/K - 143/3.7;    Cl/CO2 - 104/24;    BUN/Cr - 17/0.8;    G - 135  INR/PTT - 1.0/29      Cardiac Cath: 3vCAD      Recommendation: (Procedures/Evaluations)  CT HEAD Nonn-Contrast:[  ]  CT Chest with /without contrast [ ]  Carotid Duplex :[ x]  TTE: [ x]  NELLY/PVR: [ ]  PFT : Simple PFT [ x]  Full [ ]  Renal Consult [ ]  Pulmonary Consult: [ ]   Vascular Consult [ ]    Dental Consult [ ]   Hem-Onc Consult [ ]   GI Consult [ ]   Other Consultations :    STS Score:   Procedure: CAB Only    Risk of Mortality: 1.9%    Morbidity or Mortality: 15.933%    Long Length of Stay: 7.062%    Short Length of Stay: 32.438%    Permanent Stroke: 1.482%    Prolonged Ventilation: 10.504%    DSW Infection: 0.541%    Renal Failure: 2.795%    Reoperation: 6.175%    Impression:    CAD [x ]  Valvular  disease [ ]   Aortic Disease [ ]   ISRRAEL: Yes[ ] No [ ]   CKD Stage I [ ] , Stage II [ ] , Stage III [ ], Stage IV [ ]   Anemia: Yes [ ], No [ ]  Diabetes :Yes [ x], No [ ]  Acute MI: Yes [ ], No [ ]   Heart Failure: Yes [x ] , No [ ] HFpEF [ ], HFrEF [x ]        Assessment/ Plan:    1. Admit to CTU  2. Pre-op workup for possible CABG

## 2018-04-11 NOTE — PROGRESS NOTE ADULT - SUBJECTIVE AND OBJECTIVE BOX
I have personally seen and examined the patient.  I agree with the history and physical which I have reviewed and noted any changes below.  04-11-18 @ 13:55    PRE-OP DIAGNOSIS: cad    PROCEDURE:    Physician: chidi  Assistant:    ANESTHESIA TYPE:  [  ]General Anesthesia  [  ] Sedation  [  ] Local/Regional    ESTIMATED BLOOD LOSS:       mL    CONDITION  [  ] Critical  [  ] Serious  [  ]Fair  [  ]Good      SPECIMENS REMOVED (IF APPLICABLE):      IV CONTRAST:             mL      IMPLANTS (IF APPLICABLE)      FINDINGS    Left Heart Catheterization:  LVEF%:  LVEDP:  [ ] Normal Coronary Arteries  [ ] Luminal Irregularities  [ ] Non-obstructive CAD      LEFT HEART CATHERIZATION                                    Left main     LAD:                        Diag:    Left Circumflex:  OM:    Right Cornary Artery:  RPDA  RPL    Ramus Intermed:    INTERVENTION  IMPLANTS:    RIGHT HEART CATHERIZATION  PA:  PCW:  CO/CI:      POST-OP DIAGNOSIS          PLAN OF CARE  [ ] D/C Home today  [ ]  D/C in AM  [ ] Return to In-patient bed  [ ] Admit for observation  [ ] Return for staged procedure:  [ ] CT Surgery consult called  [ ]  Continue DAPT, B-blocker & Statin therapy I have personally seen and examined the patient.  I agree with the history and physical which I have reviewed and noted any changes below.  04-11-18 @ 13:55    PRE-OP DIAGNOSIS: cad    PROCEDURE: lhc/sca    Physician: chidi  Assistant: leonid    ANESTHESIA TYPE:  [  ]General Anesthesia  [x  ] Sedation  [ x ] Local/Regional    ESTIMATED BLOOD LOSS: 10       mL    CONDITION  [  ] Critical  [  ] Serious  [x  ]Fair  [  ]Good      SPECIMENS REMOVED (IF APPLICABLE):      IV CONTRAST:      50       mL      IMPLANTS (IF APPLICABLE)      FINDINGS    Left Heart Catheterization:  LVEF%: 20  LVEDP: 10 mmHg  [ ] Normal Coronary Arteries  [ ] Luminal Irregularities  [x ] 3v CAD          POST-OP DIAGNOSIS    cad      PLAN OF CARE  [ ] D/C Home today  [ ]  D/C in AM  [ ] Return to In-patient bed  [ ] Admit for observation  [ ] Return for staged procedure:  [ x] CT Surgery consult called  [x ]  Continue DAPT, B-blocker & Statin therapy

## 2018-04-11 NOTE — PROGRESS NOTE ADULT - SUBJECTIVE AND OBJECTIVE BOX
Preliminary Cardiac Catherization Post-Procedure Report:04-11-18 @ 13:57    Procedure Performed:  [x ] Left Heart Catheterization  [ ] Right Heart Catheterization  [ ] Percutaneous Coronary Intervention    Primary Physician: Dr. Arteaga  Assistant(s): Dr. Kelly    Preliminary Procedure Summary (Official full report to follow)  Left Heart Catheterization:  EF%:  [ ] Normal Coronary Arteries  [ ] Luminal Irregularities  [ ] ** vessel coronary artery disease                                                                           Intervention  CATH SUMMARY                            LM:       LAD:                        Diag:   Cx:  OM:  RCA:  RPL:  R/LPDA:    [ ] LHC w/ grafts  LIMA to LAD patent  SVG to  SVG to  Other    Right Heart Catheterization:  PA:  PCW:  CO/CI:    [ ] AS  [ ] AI  [ ] MR  [ ] MS    Post Procedure Diagnosis/Impression:    Estimated Blood Loss:   [ ] Less than 10 cc [ ] Less than 50 cc    Complications:   [ ] None    Specimens Removed:  [ ] None    Plan of Care:  [ ] D/C Home today  [ ] Return to In-patient bed  [ ] Admit to:  [ ] Return for staged procedure:  [ ] CT Surgery consult called  [ ] ASA dose:  [ ] Plavix:             [ ] Effient:              [ ] Brillinta: Preliminary Cardiac Catherization Post-Procedure Report:04-11-18 @ 13:57    Procedure Performed:  [x ] Left Heart Catheterization  [ ] Right Heart Catheterization  [ ] Percutaneous Coronary Intervention    Primary Physician: Dr. Carr  Assistant(s): Dr. Kelyl    Preliminary Procedure Summary (Official full report to follow)  Left Heart Catheterization:    [ ] Normal Coronary Arteries  [ ] Luminal Irregularities  [x ] 3 vessel coronary artery disease                                                                           Intervention  CATH SUMMARY                            LM:     wbl  LAD:        lesion in proximal LAD                Diag:   Cx:   OM: lesion in OM  RCA: multiple lesions in disease  RPL:  R/LPDA:    Post Procedure Diagnosis/Impression:    Estimated Blood Loss:   [x ] Less than 10 cc [ ] Less than 50 cc    Complications:   [x ] None    Specimens Removed:  [x ] None    Plan of Care:  [ ] D/C Home today  [ ] Return to In-patient bed  [ ] Admit to:  [ ] Return for staged procedure:  [ x] CT Surgery consult called Preliminary Cardiac Catherization Post-Procedure Report:04-11-18 @ 13:57    Procedure Performed:  [x ] Left Heart Catheterization  [ ] Right Heart Catheterization  [ ] Percutaneous Coronary Intervention    Primary Physician: Dr. Carr  Assistant(s): Dr. Kelly    Preliminary Procedure Summary (Official full report to follow)  Left Heart Catheterization:    [ ] Normal Coronary Arteries  [ ] Luminal Irregularities  [x ] 3 vessel coronary artery disease                                                                           Intervention  CATH SUMMARY                            LM:     wbl  LAD:       severe lesion in proximal LAD                Diag:   Cx:   OM: mod to severe lesion in OM  RCA: severe lesions in prox/mid segments  RPL:  R/LPDA:    Post Procedure Diagnosis/Impression:    Estimated Blood Loss:   [x ] Less than 10 cc [ ] Less than 50 cc    Complications:   [x ] None    Specimens Removed:  [x ] None    Plan of Care:  [ ] D/C Home today  [ ] Return to In-patient bed  [ ] Admit to:  [ ] Return for staged procedure:  [ x] CT Surgery consult called

## 2018-04-11 NOTE — H&P PST ADULT - HISTORY OF PRESENT ILLNESS
78y Male here for elective cardiac catheterization. The patient had an abnormal stress test, recently diagnosed of new CHF  Patient has had no prior cardiac catheterization 04-11-18 @ 13:51    relevant PMHx:  a.fib  DM on orals  HTN    Social history:  [  ] smoker  [x ] non-smoker  [  ] Etoh  [  ] recreational drug usage    Physical Exam:    General: NAD  Neurology: A&Ox3, nonfocal, BLANK x 4  Eyes: PERRLA/ EOMI.  ENT/Neck: No JVD.  Respiratory: CTA B/L, No wheezing, rales, rhonchi  CV: S1S2, no added sounds   Abdominal: Soft, NT, ND +BS.  Extremities: No edema, + peripheral pulses B/L regular pulse. Modified Haile's test of right radial artery was wnl  Skin: No Rashes, Hematoma, Ecchymosis    Labs: Reviewed,  no significant abnormalities noted

## 2018-04-11 NOTE — CONSULT NOTE ADULT - ATTENDING COMMENTS
78y Male with history of afib (on eliquis), s/p PPM/AICD, DM, htn, hld, cataract, early dementia, presented for elective cath 2' to abnormal stress test and dropped EF. [facility] revealed TVDa  CT surgery was asked to evaluate patient for surgical revascularization  patient was evaluated and appears to have poor functional status (walks only around the house, with walker)  After extensive discussion with pt's primary cardiologist we decided to proceed with PCI as a better option for this frail patient.    I appreciate the opportunity to participate in this patient's care  if you have any questions feel free to contact CT surgery

## 2018-04-12 LAB
ALBUMIN SERPL ELPH-MCNC: 3 G/DL — LOW (ref 3.5–5.2)
ALP SERPL-CCNC: 63 U/L — SIGNIFICANT CHANGE UP (ref 30–115)
ALT FLD-CCNC: 8 U/L — SIGNIFICANT CHANGE UP (ref 0–41)
ANION GAP SERPL CALC-SCNC: 15 MMOL/L — HIGH (ref 7–14)
APTT BLD: 35.3 SEC — SIGNIFICANT CHANGE UP (ref 27–39.2)
APTT BLD: 36.2 SEC — SIGNIFICANT CHANGE UP (ref 27–39.2)
APTT BLD: 36.9 SEC — SIGNIFICANT CHANGE UP (ref 27–39.2)
AST SERPL-CCNC: 10 U/L — SIGNIFICANT CHANGE UP (ref 0–41)
BILIRUB DIRECT SERPL-MCNC: <0.2 MG/DL — SIGNIFICANT CHANGE UP (ref 0–0.2)
BILIRUB INDIRECT FLD-MCNC: >0.1 MG/DL — LOW (ref 0.2–1.2)
BILIRUB SERPL-MCNC: 0.3 MG/DL — SIGNIFICANT CHANGE UP (ref 0.2–1.2)
BUN SERPL-MCNC: 19 MG/DL — SIGNIFICANT CHANGE UP (ref 10–20)
CALCIUM SERPL-MCNC: 8.3 MG/DL — LOW (ref 8.5–10.1)
CHLORIDE SERPL-SCNC: 106 MMOL/L — SIGNIFICANT CHANGE UP (ref 98–110)
CHOLEST SERPL-MCNC: 212 MG/DL — HIGH (ref 100–200)
CO2 SERPL-SCNC: 21 MMOL/L — SIGNIFICANT CHANGE UP (ref 17–32)
CREAT SERPL-MCNC: 1 MG/DL — SIGNIFICANT CHANGE UP (ref 0.7–1.5)
ESTIMATED AVERAGE GLUCOSE: 140 MG/DL — HIGH (ref 68–114)
GLUCOSE SERPL-MCNC: 123 MG/DL — HIGH (ref 70–99)
HBA1C BLD-MCNC: 6.5 % — HIGH (ref 4–5.6)
HCT VFR BLD CALC: 34.3 % — LOW (ref 42–52)
HDLC SERPL-MCNC: 33 MG/DL — LOW (ref 40–125)
HGB BLD-MCNC: 11.5 G/DL — LOW (ref 14–18)
LIPID PNL WITH DIRECT LDL SERPL: 119 MG/DL — SIGNIFICANT CHANGE UP (ref 4–129)
MCHC RBC-ENTMCNC: 31.6 PG — HIGH (ref 27–31)
MCHC RBC-ENTMCNC: 33.5 G/DL — SIGNIFICANT CHANGE UP (ref 32–37)
MCV RBC AUTO: 94.2 FL — HIGH (ref 80–94)
NRBC # BLD: 0 /100 WBCS — SIGNIFICANT CHANGE UP (ref 0–0)
NT-PROBNP SERPL-SCNC: 1391 PG/ML — HIGH (ref 0–300)
PLATELET # BLD AUTO: 210 K/UL — SIGNIFICANT CHANGE UP (ref 130–400)
POTASSIUM SERPL-MCNC: 3.7 MMOL/L — SIGNIFICANT CHANGE UP (ref 3.5–5)
POTASSIUM SERPL-SCNC: 3.7 MMOL/L — SIGNIFICANT CHANGE UP (ref 3.5–5)
PROT SERPL-MCNC: 5.5 G/DL — LOW (ref 6–8)
RBC # BLD: 3.64 M/UL — LOW (ref 4.7–6.1)
RBC # FLD: 13.6 % — SIGNIFICANT CHANGE UP (ref 11.5–14.5)
SODIUM SERPL-SCNC: 142 MMOL/L — SIGNIFICANT CHANGE UP (ref 135–146)
TOTAL CHOLESTEROL/HDL RATIO MEASUREMENT: 6.4 RATIO — HIGH (ref 4–5.5)
TRIGL SERPL-MCNC: 391 MG/DL — HIGH (ref 10–149)
WBC # BLD: 8.4 K/UL — SIGNIFICANT CHANGE UP (ref 4.8–10.8)
WBC # FLD AUTO: 8.4 K/UL — SIGNIFICANT CHANGE UP (ref 4.8–10.8)

## 2018-04-12 RX ORDER — FAMOTIDINE 10 MG/ML
20 INJECTION INTRAVENOUS
Qty: 0 | Refills: 0 | Status: DISCONTINUED | OUTPATIENT
Start: 2018-04-12 | End: 2018-04-14

## 2018-04-12 RX ORDER — HEPARIN SODIUM 5000 [USP'U]/ML
1000 INJECTION INTRAVENOUS; SUBCUTANEOUS
Qty: 25000 | Refills: 0 | Status: DISCONTINUED | OUTPATIENT
Start: 2018-04-12 | End: 2018-04-12

## 2018-04-12 RX ORDER — LANOLIN ALCOHOL/MO/W.PET/CERES
3 CREAM (GRAM) TOPICAL ONCE
Qty: 0 | Refills: 0 | Status: COMPLETED | OUTPATIENT
Start: 2018-04-12 | End: 2018-04-12

## 2018-04-12 RX ORDER — IPRATROPIUM/ALBUTEROL SULFATE 18-103MCG
3 AEROSOL WITH ADAPTER (GRAM) INHALATION EVERY 6 HOURS
Qty: 0 | Refills: 0 | Status: DISCONTINUED | OUTPATIENT
Start: 2018-04-12 | End: 2018-04-13

## 2018-04-12 RX ORDER — HEPARIN SODIUM 5000 [USP'U]/ML
1100 INJECTION INTRAVENOUS; SUBCUTANEOUS
Qty: 25000 | Refills: 0 | Status: DISCONTINUED | OUTPATIENT
Start: 2018-04-12 | End: 2018-04-13

## 2018-04-12 RX ADMIN — DOFETILIDE 500 MICROGRAM(S): 0.25 CAPSULE ORAL at 18:12

## 2018-04-12 RX ADMIN — DOFETILIDE 500 MICROGRAM(S): 0.25 CAPSULE ORAL at 06:04

## 2018-04-12 RX ADMIN — Medication 600 MILLIGRAM(S): at 18:13

## 2018-04-12 RX ADMIN — Medication 3 MILLIGRAM(S): at 22:03

## 2018-04-12 RX ADMIN — Medication 20 MILLIEQUIVALENT(S): at 11:47

## 2018-04-12 RX ADMIN — Medication 50 MILLIGRAM(S): at 18:39

## 2018-04-12 RX ADMIN — Medication 4: at 08:13

## 2018-04-12 RX ADMIN — Medication 40 MILLIGRAM(S): at 06:04

## 2018-04-12 RX ADMIN — Medication 4: at 11:44

## 2018-04-12 RX ADMIN — Medication 50 MILLIGRAM(S): at 06:04

## 2018-04-12 RX ADMIN — ATORVASTATIN CALCIUM 40 MILLIGRAM(S): 80 TABLET, FILM COATED ORAL at 21:04

## 2018-04-12 RX ADMIN — Medication 3 MICROGRAM(S)/MIN: at 21:07

## 2018-04-12 RX ADMIN — Medication 3 MICROGRAM(S)/MIN: at 08:13

## 2018-04-12 RX ADMIN — Medication 4: at 17:29

## 2018-04-12 RX ADMIN — Medication 81 MILLIGRAM(S): at 11:45

## 2018-04-12 RX ADMIN — FAMOTIDINE 20 MILLIGRAM(S): 10 INJECTION INTRAVENOUS at 18:39

## 2018-04-12 RX ADMIN — Medication 10 MILLIGRAM(S): at 08:24

## 2018-04-12 RX ADMIN — LISINOPRIL 10 MILLIGRAM(S): 2.5 TABLET ORAL at 06:04

## 2018-04-12 NOTE — PROGRESS NOTE ADULT - SUBJECTIVE AND OBJECTIVE BOX
OPERATIVE PROCEDURE(s):                POD #                       78yMale  SURGEON(s): DOV Arteaga  SUBJECTIVE ASSESSMENT:   Vital Signs Last 24 Hrs  T(F): 97.5 (12 Apr 2018 04:00), Max: 98.4 (11 Apr 2018 19:30)  HR: 72 (12 Apr 2018 07:00) (68 - 92)  BP: 148/58 (12 Apr 2018 07:00) (126/53 - 194/96)  BP(mean): 91 (12 Apr 2018 07:00) (73 - 152)  ABP: --  ABP(mean): --  RR: 11 (12 Apr 2018 07:00) (10 - 34)  SpO2: 95% (12 Apr 2018 07:00) (93% - 97%)  CVP(mm Hg): --  CVP(cm H2O): --  CO: --  CI: --  PA: --  SVR: --    I&O's Detail    11 Apr 2018 07:01  -  12 Apr 2018 07:00  --------------------------------------------------------  IN:    heparin Infusion: 90 mL    nitroglycerin  Infusion: 430 mL    Oral Fluid: 600 mL  Total IN: 1120 mL    OUT:    Voided: 750 mL  Total OUT: 750 mL        Net:   I&O's Detail    11 Apr 2018 07:01  -  12 Apr 2018 07:00  --------------------------------------------------------  Total NET: 370 mL        CAPILLARY BLOOD GLUCOSE        Physical Exam:  General: NAD; A&Ox3/Patient is intubated and sedated  Cardiac: S1/S2, RRR, no murmur, no rubs  Lungs: unlabored respirations, CTA b/l, no wheeze, no rales, no crackles  Abdomen: Soft/NT/ND; positive bowel sounds x 4  Sternum: Intact, no click, incision healing well with no drainage  Incisions: Incisions clean/dry/intact  Extremities: No edema b/l lower extremities; good capillary refill; no cyanosis; palpable 1+ pedal pulses b/l    Central Venous Catheter: Yes[]  No[] , If Yes indication:           Day #  Mccarthy Catheter: Yes  [] , No  [] , If yes indication:                      Day #  NGT: Yes [] No [] ,    If Yes Placement:                                     Day #  EPICARDIAL WIRES:  [] YES [] NO                                              Day #  BOWEL MOVEMENT:  [] YES [] NO, If No, Timing since last BM:      Day #  CHEST TUBE(Left/Right):  [] YES [] NO, If yes -  AIR LEAKS:  [] YES [] NO        LABS:                        11.5<L>  8.40  )-----------( 210      ( 12 Apr 2018 04:03 )             34.3<L>    04-12    142  |  106  |  19  ----------------------------<  123<H>  3.7   |  21  |  1.0    Ca    8.3<L>      12 Apr 2018 04:03    TPro  5.5<L> [6.0 - 8.0]  /  Alb  3.0<L> [3.5 - 5.2]  /  TBili  0.3 [0.2 - 1.2]  /  DBili  <0.2 [0.0 - 0.2]  /  AST  10 [0 - 41]  /  ALT  8 [0 - 41]  /  AlkPhos  63 [30 - 115]  04-12    PTT - ( 12 Apr 2018 04:03 )  PTT:36.2 sec      RADIOLOGY & ADDITIONAL TESTS:  CXR:  EKG:  MEDICATIONS  (STANDING):  aspirin  chewable 81 milliGRAM(s) Oral daily  atorvastatin 40 milliGRAM(s) Oral at bedtime  dextrose 5%. 1000 milliLiter(s) (50 mL/Hr) IV Continuous <Continuous>  dextrose 50% Injectable 12.5 Gram(s) IV Push once  dextrose 50% Injectable 25 Gram(s) IV Push once  dextrose 50% Injectable 25 Gram(s) IV Push once  dofetilide 500 MICROGram(s) Oral every 12 hours  furosemide    Tablet 40 milliGRAM(s) Oral daily  glipiZIDE XL. 10 milliGRAM(s) Oral with breakfast  heparin  Infusion 800 Unit(s)/Hr (8 mL/Hr) IV Continuous <Continuous>  insulin lispro (HumaLOG) corrective regimen sliding scale   SubCutaneous three times a day before meals  lisinopril 10 milliGRAM(s) Oral daily  metoprolol tartrate 50 milliGRAM(s) Oral two times a day  nitroglycerin  Infusion 10 MICROgram(s)/Min (3 mL/Hr) IV Continuous <Continuous>  potassium chloride    Tablet ER 20 milliEquivalent(s) Oral daily    MEDICATIONS  (PRN):  dextrose Gel 1 Dose(s) Oral once PRN Blood Glucose LESS THAN 70 milliGRAM(s)/deciliter  glucagon  Injectable 1 milliGRAM(s) IntraMuscular once PRN Glucose LESS THAN 70 milligrams/deciliter    HEPARIN:  [] YES [] NO  Dose: XX UNITS/HR UNITS Q8H  LOVENOX:[] YES [] NO  Dose: XX mg Q24H  COUMADIN: []  YES [] NO  Dose: XX mg  Q24H  SCD's: YES b/l  GI Prophylaxis: Protonix [], Pepcid [], None [], (Contra-indication:.....)    Post-Op Beta-Blockers: Yes [], No[], If No, then contraindication:  Post-Op Aspirin: Yes [],  No [], If No, then contraindication:  Post-Op Statin: Yes [], No[], If No, then contraindication:  Allergies    No Known Drug Allergies  shellfish (Other (U))    Intolerances      Ambulation/Activity Status:    Assessment/Plan:  78y Male status-post .....  - Case and plan discussed with CTU Intensivist and CT Surgeon - Dr. Lewis/Kiko/Perry   - Continue CTU supportive care    - Continue DVT/GI prophylaxis  - Incentive Spirometry 10 times an hour  - Continue to advance physical activity as tolerated and continue PT/OT as directed  1. CAD: Continue ASA, statin, BB  2. HTN:   3. A. Fib:   4. COPD/Hypoxia:   5. DM/Glucose Control:     Social Service Disposition: OPERATIVE PROCEDURE(s):                POD #                       78yMale  SURGEON(s): DOV Arteaga  SUBJECTIVE ASSESSMENT:   Vital Signs Last 24 Hrs  T(F): 97.5 (12 Apr 2018 04:00), Max: 98.4 (11 Apr 2018 19:30)  HR: 72 (12 Apr 2018 07:00) (68 - 92)  BP: 148/58 (12 Apr 2018 07:00) (126/53 - 194/96)  BP(mean): 91 (12 Apr 2018 07:00) (73 - 152)  RR: 11 (12 Apr 2018 07:00) (10 - 34)  SpO2: 95% (12 Apr 2018 07:00) (93% - 97%)      I&O's Detail    11 Apr 2018 07:01  -  12 Apr 2018 07:00  --------------------------------------------------------  IN:    heparin Infusion: 90 mL    nitroglycerin  Infusion: 430 mL    Oral Fluid: 600 mL  Total IN: 1120 mL    OUT:    Voided: 750 mL  Total OUT: 750 mL        Net:   I&O's Detail    11 Apr 2018 07:01  -  12 Apr 2018 07:00  --------------------------------------------------------  Total NET: 370 mL        CAPILLARY BLOOD GLUCOSE        Physical Exam:  General: NAD; A&Ox3  Cardiac: S1/S2, RRR, no murmur, no rubs  Lungs: unlabored respirations, CTA b/l, no wheeze, no rales, no crackles  Abdomen: Soft/NT/ND; positive bowel sounds x 4  Sternum: Intact, no click, incision healing well with no drainage  Incisions: Incisions clean/dry/intact  Extremities: No edema b/l lower extremities; good capillary refill; no cyanosis; palpable 1+ pedal pulses b/l    Central Venous Catheter: Yes[]  No[] , If Yes indication:           Day #  Mccarthy Catheter: Yes  [] , No  [] , If yes indication:                      Day #  NGT: Yes [] No [] ,    If Yes Placement:                                     Day #  EPICARDIAL WIRES:  [] YES [] NO                                              Day #  BOWEL MOVEMENT:  [] YES [] NO, If No, Timing since last BM:      Day #  CHEST TUBE(Left/Right):  [] YES [] NO, If yes -  AIR LEAKS:  [] YES [] NO        LABS:                        11.5<L>  8.40  )-----------( 210      ( 12 Apr 2018 04:03 )             34.3<L>    04-12    142  |  106  |  19  ----------------------------<  123<H>  3.7   |  21  |  1.0    Ca    8.3<L>      12 Apr 2018 04:03    TPro  5.5<L> [6.0 - 8.0]  /  Alb  3.0<L> [3.5 - 5.2]  /  TBili  0.3 [0.2 - 1.2]  /  DBili  <0.2 [0.0 - 0.2]  /  AST  10 [0 - 41]  /  ALT  8 [0 - 41]  /  AlkPhos  63 [30 - 115]  04-12    PTT - ( 12 Apr 2018 04:03 )  PTT:36.2 sec      RADIOLOGY & ADDITIONAL TESTS:  CXR:  EKG:  MEDICATIONS  (STANDING):  aspirin  chewable 81 milliGRAM(s) Oral daily  atorvastatin 40 milliGRAM(s) Oral at bedtime  dextrose 5%. 1000 milliLiter(s) (50 mL/Hr) IV Continuous <Continuous>  dextrose 50% Injectable 12.5 Gram(s) IV Push once  dextrose 50% Injectable 25 Gram(s) IV Push once  dextrose 50% Injectable 25 Gram(s) IV Push once  dofetilide 500 MICROGram(s) Oral every 12 hours  furosemide    Tablet 40 milliGRAM(s) Oral daily  glipiZIDE XL. 10 milliGRAM(s) Oral with breakfast  heparin  Infusion 800 Unit(s)/Hr (8 mL/Hr) IV Continuous <Continuous>  insulin lispro (HumaLOG) corrective regimen sliding scale   SubCutaneous three times a day before meals  lisinopril 10 milliGRAM(s) Oral daily  metoprolol tartrate 50 milliGRAM(s) Oral two times a day  nitroglycerin  Infusion 10 MICROgram(s)/Min (3 mL/Hr) IV Continuous <Continuous>  potassium chloride    Tablet ER 20 milliEquivalent(s) Oral daily    MEDICATIONS  (PRN):  dextrose Gel 1 Dose(s) Oral once PRN Blood Glucose LESS THAN 70 milliGRAM(s)/deciliter  glucagon  Injectable 1 milliGRAM(s) IntraMuscular once PRN Glucose LESS THAN 70 milligrams/deciliter    HEPARIN:  [] YES [] NO  Dose: XX UNITS/HR UNITS Q8H  LOVENOX:[] YES [] NO  Dose: XX mg Q24H  COUMADIN: []  YES [] NO  Dose: XX mg  Q24H  SCD's: YES b/l  GI Prophylaxis: Protonix [], Pepcid [], None [], (Contra-indication:.....)    Post-Op Beta-Blockers: Yes [], No[], If No, then contraindication:  Post-Op Aspirin: Yes [],  No [], If No, then contraindication:  Post-Op Statin: Yes [], No[], If No, then contraindication:  Allergies    No Known Drug Allergies  shellfish (Other (U))    Intolerances      Ambulation/Activity Status:    Assessment/Plan:  78y Male status-post .....  - Case and plan discussed with CTU Intensivist and CT Surgeon - Dr. Lewis/Kiko/Perry   - Continue CTU supportive care    - Continue DVT/GI prophylaxis  - Incentive Spirometry 10 times an hour  - Continue to advance physical activity as tolerated and continue PT/OT as directed  1. CAD: Continue ASA, statin, BB  2. HTN:   3. A. Fib:   4. COPD/Hypoxia:   5. DM/Glucose Control:     Social Service Disposition: OPERATIVE PROCEDURE(s):   pre-op cabg             POD #                       78yMale  SURGEON(s): DOV Arteaga  SUBJECTIVE ASSESSMENT: pt seen and examined. no acute complaints   Vital Signs Last 24 Hrs  T(F): 97.5 (12 Apr 2018 04:00), Max: 98.4 (11 Apr 2018 19:30)  HR: 72 (12 Apr 2018 07:00) (68 - 92)  BP: 148/58 (12 Apr 2018 07:00) (126/53 - 194/96)  BP(mean): 91 (12 Apr 2018 07:00) (73 - 152)  RR: 11 (12 Apr 2018 07:00) (10 - 34)  SpO2: 95% (12 Apr 2018 07:00) (93% - 97%)      I&O's Detail    11 Apr 2018 07:01  -  12 Apr 2018 07:00  --------------------------------------------------------  IN:    heparin Infusion: 90 mL    nitroglycerin  Infusion: 430 mL    Oral Fluid: 600 mL  Total IN: 1120 mL    OUT:    Voided: 750 mL  Total OUT: 750 mL        Net:   I&O's Detail    11 Apr 2018 07:01  -  12 Apr 2018 07:00  --------------------------------------------------------  Total NET: 370 mL    CAPILLARY BLOOD GLUCOSE    Physical Exam:  General: NAD; A&Ox3  Cardiac: S1/S2, RRR, no murmur, no rubs  Lungs: unlabored respirations, CTA b/l, no wheeze, no rales, no crackles  Abdomen: Soft/NT/ND; positive bowel sounds x 4  Extremities: No edema b/l lower extremities; good capillary refill; no cyanosis; palpable 1+ pedal pulses b/l    Central Venous Catheter: Yes[]  No[x] , If Yes indication:           Day #  Mccarthy Catheter: Yes  [] , No  [x] , If yes indication:                      Day #  NGT: Yes [] No [x] ,    If Yes Placement:                                     Day #  EPICARDIAL WIRES:  [] YES [x] NO                                              Day #  BOWEL MOVEMENT:  [] YES [x] NO, If No, Timing since last BM:      Day #  CHEST TUBE(Left/Right):  [] YES [x] NO, If yes -  AIR LEAKS:  [] YES [] NO        LABS:                        11.5<L>  8.40  )-----------( 210      ( 12 Apr 2018 04:03 )             34.3<L>    04-12    142  |  106  |  19  ----------------------------<  123<H>  3.7   |  21  |  1.0    Ca    8.3<L>      12 Apr 2018 04:03    TPro  5.5<L> [6.0 - 8.0]  /  Alb  3.0<L> [3.5 - 5.2]  /  TBili  0.3 [0.2 - 1.2]  /  DBili  <0.2 [0.0 - 0.2]  /  AST  10 [0 - 41]  /  ALT  8 [0 - 41]  /  AlkPhos  63 [30 - 115]  04-12    PTT - ( 12 Apr 2018 04:03 )  PTT:36.2 sec      RADIOLOGY & ADDITIONAL TESTS:  CXR:   EKG:  MEDICATIONS  (STANDING):  aspirin  chewable 81 milliGRAM(s) Oral daily  atorvastatin 40 milliGRAM(s) Oral at bedtime  dextrose 5%. 1000 milliLiter(s) (50 mL/Hr) IV Continuous <Continuous>  dextrose 50% Injectable 12.5 Gram(s) IV Push once  dextrose 50% Injectable 25 Gram(s) IV Push once  dextrose 50% Injectable 25 Gram(s) IV Push once  dofetilide 500 MICROGram(s) Oral every 12 hours  furosemide    Tablet 40 milliGRAM(s) Oral daily  glipiZIDE XL. 10 milliGRAM(s) Oral with breakfast  heparin  Infusion 800 Unit(s)/Hr (8 mL/Hr) IV Continuous <Continuous>  insulin lispro (HumaLOG) corrective regimen sliding scale   SubCutaneous three times a day before meals  lisinopril 10 milliGRAM(s) Oral daily  metoprolol tartrate 50 milliGRAM(s) Oral two times a day  nitroglycerin  Infusion 10 MICROgram(s)/Min (3 mL/Hr) IV Continuous <Continuous>  potassium chloride    Tablet ER 20 milliEquivalent(s) Oral daily    MEDICATIONS  (PRN):  dextrose Gel 1 Dose(s) Oral once PRN Blood Glucose LESS THAN 70 milliGRAM(s)/deciliter  glucagon  Injectable 1 milliGRAM(s) IntraMuscular once PRN Glucose LESS THAN 70 milligrams/deciliter    HEPARIN:  [x] YES [] NO  Dose: 1000 UNITS/HR   LOVENOX:[] YES [x] NO  Dose: XX mg Q24H  COUMADIN: []  YES [x] NO  Dose: XX mg  Q24H  SCD's: YES b/l  GI Prophylaxis: Protonix [], Pepcid [x], None [], (Contra-indication:.....)      Allergies    No Known Drug Allergies  shellfish (Other (U))    Intolerances      Ambulation/Activity Status:oob/ambulate     Assessment/Plan:  78y Male pre-op cabg  - Case and plan discussed with CTU Intensivist and CT Surgeon - Dr. Lewis/Kiko/Perry   - Continue CTU supportive care    - Continue DVT/GI prophylaxis  - Incentive Spirometry 10 times an hour  - Continue to advance physical activity as tolerated and continue PT/OT as directed  1. CAD: Continue ASA, statin, BB, heparin gtt  2. HTN: norvasc, bb, nitro gtt  3. A. Fib: hep gtt  -pre-op work in progress

## 2018-04-12 NOTE — PATIENT PROFILE ADULT. - VISION (WITH CORRECTIVE LENSES IF THE PATIENT USUALLY WEARS THEM):
Hx of R Eye w/drooping eyelid & poor vision/Partially impaired: cannot see medication labels or newsprint, but can see obstacles in path, and the surrounding layout; can count fingers at arm's length

## 2018-04-13 LAB
ANION GAP SERPL CALC-SCNC: 15 MMOL/L — HIGH (ref 7–14)
APTT BLD: 43.2 SEC — HIGH (ref 27–39.2)
BASOPHILS # BLD AUTO: 0.03 K/UL — SIGNIFICANT CHANGE UP (ref 0–0.2)
BASOPHILS NFR BLD AUTO: 0.4 % — SIGNIFICANT CHANGE UP (ref 0–1)
BUN SERPL-MCNC: 15 MG/DL — SIGNIFICANT CHANGE UP (ref 10–20)
CALCIUM SERPL-MCNC: 8.2 MG/DL — LOW (ref 8.5–10.1)
CHLORIDE SERPL-SCNC: 103 MMOL/L — SIGNIFICANT CHANGE UP (ref 98–110)
CO2 SERPL-SCNC: 22 MMOL/L — SIGNIFICANT CHANGE UP (ref 17–32)
CREAT SERPL-MCNC: 1 MG/DL — SIGNIFICANT CHANGE UP (ref 0.7–1.5)
EOSINOPHIL # BLD AUTO: 0.32 K/UL — SIGNIFICANT CHANGE UP (ref 0–0.7)
EOSINOPHIL NFR BLD AUTO: 4.2 % — SIGNIFICANT CHANGE UP (ref 0–8)
GLUCOSE SERPL-MCNC: 181 MG/DL — HIGH (ref 70–99)
HCT VFR BLD CALC: 34 % — LOW (ref 42–52)
HGB BLD-MCNC: 11.6 G/DL — LOW (ref 14–18)
IMM GRANULOCYTES NFR BLD AUTO: 0.4 % — HIGH (ref 0.1–0.3)
INR BLD: 1.04 RATIO — SIGNIFICANT CHANGE UP (ref 0.65–1.3)
LYMPHOCYTES # BLD AUTO: 1.62 K/UL — SIGNIFICANT CHANGE UP (ref 1.2–3.4)
LYMPHOCYTES # BLD AUTO: 21.2 % — SIGNIFICANT CHANGE UP (ref 20.5–51.1)
MAGNESIUM SERPL-MCNC: 1.8 MG/DL — SIGNIFICANT CHANGE UP (ref 1.8–2.4)
MCHC RBC-ENTMCNC: 31.8 PG — HIGH (ref 27–31)
MCHC RBC-ENTMCNC: 34.1 G/DL — SIGNIFICANT CHANGE UP (ref 32–37)
MCV RBC AUTO: 93.2 FL — SIGNIFICANT CHANGE UP (ref 80–94)
MONOCYTES # BLD AUTO: 0.83 K/UL — HIGH (ref 0.1–0.6)
MONOCYTES NFR BLD AUTO: 10.8 % — HIGH (ref 1.7–9.3)
NEUTROPHILS # BLD AUTO: 4.82 K/UL — SIGNIFICANT CHANGE UP (ref 1.4–6.5)
NEUTROPHILS NFR BLD AUTO: 63 % — SIGNIFICANT CHANGE UP (ref 42.2–75.2)
NRBC # BLD: 0 /100 WBCS — SIGNIFICANT CHANGE UP (ref 0–0)
PLATELET # BLD AUTO: 184 K/UL — SIGNIFICANT CHANGE UP (ref 130–400)
POTASSIUM SERPL-MCNC: 3.5 MMOL/L — SIGNIFICANT CHANGE UP (ref 3.5–5)
POTASSIUM SERPL-SCNC: 3.5 MMOL/L — SIGNIFICANT CHANGE UP (ref 3.5–5)
PROTHROM AB SERPL-ACNC: 11.2 SEC — SIGNIFICANT CHANGE UP (ref 9.95–12.87)
RBC # BLD: 3.65 M/UL — LOW (ref 4.7–6.1)
RBC # FLD: 13.5 % — SIGNIFICANT CHANGE UP (ref 11.5–14.5)
SODIUM SERPL-SCNC: 140 MMOL/L — SIGNIFICANT CHANGE UP (ref 135–146)
WBC # BLD: 7.65 K/UL — SIGNIFICANT CHANGE UP (ref 4.8–10.8)
WBC # FLD AUTO: 7.65 K/UL — SIGNIFICANT CHANGE UP (ref 4.8–10.8)

## 2018-04-13 RX ORDER — CLOPIDOGREL BISULFATE 75 MG/1
75 TABLET, FILM COATED ORAL DAILY
Qty: 0 | Refills: 0 | Status: DISCONTINUED | OUTPATIENT
Start: 2018-04-13 | End: 2018-04-14

## 2018-04-13 RX ORDER — HEPARIN SODIUM 5000 [USP'U]/ML
1300 INJECTION INTRAVENOUS; SUBCUTANEOUS
Qty: 25000 | Refills: 0 | Status: DISCONTINUED | OUTPATIENT
Start: 2018-04-13 | End: 2018-04-13

## 2018-04-13 RX ORDER — AMLODIPINE BESYLATE 2.5 MG/1
5 TABLET ORAL DAILY
Qty: 0 | Refills: 0 | Status: DISCONTINUED | OUTPATIENT
Start: 2018-04-13 | End: 2018-04-13

## 2018-04-13 RX ORDER — NITROGLYCERIN 6.5 MG
50 CAPSULE, EXTENDED RELEASE ORAL
Qty: 50 | Refills: 0 | Status: DISCONTINUED | OUTPATIENT
Start: 2018-04-13 | End: 2018-04-14

## 2018-04-13 RX ORDER — LISINOPRIL 2.5 MG/1
20 TABLET ORAL DAILY
Qty: 0 | Refills: 0 | Status: DISCONTINUED | OUTPATIENT
Start: 2018-04-14 | End: 2018-04-14

## 2018-04-13 RX ORDER — CLOPIDOGREL BISULFATE 75 MG/1
300 TABLET, FILM COATED ORAL ONCE
Qty: 0 | Refills: 0 | Status: COMPLETED | OUTPATIENT
Start: 2018-04-13 | End: 2018-04-13

## 2018-04-13 RX ORDER — NIFEDIPINE 30 MG
60 TABLET, EXTENDED RELEASE 24 HR ORAL EVERY 24 HOURS
Qty: 0 | Refills: 0 | Status: DISCONTINUED | OUTPATIENT
Start: 2018-04-13 | End: 2018-04-14

## 2018-04-13 RX ORDER — APIXABAN 2.5 MG/1
2.5 TABLET, FILM COATED ORAL EVERY 12 HOURS
Qty: 0 | Refills: 0 | Status: DISCONTINUED | OUTPATIENT
Start: 2018-04-14 | End: 2018-04-14

## 2018-04-13 RX ORDER — LISINOPRIL 2.5 MG/1
10 TABLET ORAL ONCE
Qty: 0 | Refills: 0 | Status: COMPLETED | OUTPATIENT
Start: 2018-04-13 | End: 2018-04-13

## 2018-04-13 RX ORDER — METOPROLOL TARTRATE 50 MG
75 TABLET ORAL
Qty: 0 | Refills: 0 | Status: DISCONTINUED | OUTPATIENT
Start: 2018-04-13 | End: 2018-04-14

## 2018-04-13 RX ORDER — SODIUM CHLORIDE 9 MG/ML
600 INJECTION INTRAMUSCULAR; INTRAVENOUS; SUBCUTANEOUS
Qty: 0 | Refills: 0 | Status: DISCONTINUED | OUTPATIENT
Start: 2018-04-13 | End: 2018-04-14

## 2018-04-13 RX ORDER — HEPARIN SODIUM 5000 [USP'U]/ML
1400 INJECTION INTRAVENOUS; SUBCUTANEOUS
Qty: 25000 | Refills: 0 | Status: DISCONTINUED | OUTPATIENT
Start: 2018-04-13 | End: 2018-04-13

## 2018-04-13 RX ORDER — HEPARIN SODIUM 5000 [USP'U]/ML
14 INJECTION INTRAVENOUS; SUBCUTANEOUS
Qty: 25000 | Refills: 0 | Status: DISCONTINUED | OUTPATIENT
Start: 2018-04-13 | End: 2018-04-13

## 2018-04-13 RX ADMIN — CLOPIDOGREL BISULFATE 75 MILLIGRAM(S): 75 TABLET, FILM COATED ORAL at 22:47

## 2018-04-13 RX ADMIN — AMLODIPINE BESYLATE 5 MILLIGRAM(S): 2.5 TABLET ORAL at 09:06

## 2018-04-13 RX ADMIN — HEPARIN SODIUM 13 UNIT(S)/HR: 5000 INJECTION INTRAVENOUS; SUBCUTANEOUS at 02:21

## 2018-04-13 RX ADMIN — Medication 50 MILLIGRAM(S): at 06:06

## 2018-04-13 RX ADMIN — FAMOTIDINE 20 MILLIGRAM(S): 10 INJECTION INTRAVENOUS at 18:53

## 2018-04-13 RX ADMIN — DOFETILIDE 500 MICROGRAM(S): 0.25 CAPSULE ORAL at 18:53

## 2018-04-13 RX ADMIN — LISINOPRIL 10 MILLIGRAM(S): 2.5 TABLET ORAL at 06:07

## 2018-04-13 RX ADMIN — DOFETILIDE 500 MICROGRAM(S): 0.25 CAPSULE ORAL at 06:06

## 2018-04-13 RX ADMIN — LISINOPRIL 10 MILLIGRAM(S): 2.5 TABLET ORAL at 09:13

## 2018-04-13 RX ADMIN — FAMOTIDINE 20 MILLIGRAM(S): 10 INJECTION INTRAVENOUS at 06:07

## 2018-04-13 RX ADMIN — ATORVASTATIN CALCIUM 40 MILLIGRAM(S): 80 TABLET, FILM COATED ORAL at 22:47

## 2018-04-13 RX ADMIN — Medication 15 MICROGRAM(S)/MIN: at 19:35

## 2018-04-13 RX ADMIN — Medication 20 MILLIEQUIVALENT(S): at 11:46

## 2018-04-13 RX ADMIN — SODIUM CHLORIDE 75 MILLILITER(S): 9 INJECTION INTRAMUSCULAR; INTRAVENOUS; SUBCUTANEOUS at 16:56

## 2018-04-13 RX ADMIN — CLOPIDOGREL BISULFATE 300 MILLIGRAM(S): 75 TABLET, FILM COATED ORAL at 09:07

## 2018-04-13 RX ADMIN — Medication 600 MILLIGRAM(S): at 06:07

## 2018-04-13 RX ADMIN — Medication 81 MILLIGRAM(S): at 11:46

## 2018-04-13 RX ADMIN — Medication 75 MILLIGRAM(S): at 22:46

## 2018-04-13 NOTE — PROGRESS NOTE ADULT - SUBJECTIVE AND OBJECTIVE BOX
Hospital Day:  2d    Last 24hr Events & Subjective:  Patient transferred to CCU from CTU for monitoring prior to PCI, no events overnight, today doing well denies pain, SOB     Past Medical Hx:   a.fib  cardiomyopathy  AICD placement  DM  HTN    Past Sx:    Allergies:  No Known Allergies    Current Meds:   Standng Meds:  aspirin  chewable 81 milliGRAM(s) Oral daily  atorvastatin 40 milliGRAM(s) Oral at bedtime  clopidogrel Tablet 75 milliGRAM(s) Oral daily  dextrose 5%. 1000 milliLiter(s) (50 mL/Hr) IV Continuous <Continuous>  dextrose 50% Injectable 12.5 Gram(s) IV Push once  dextrose 50% Injectable 25 Gram(s) IV Push once  dextrose 50% Injectable 25 Gram(s) IV Push once  dofetilide 500 MICROGram(s) Oral every 12 hours  famotidine    Tablet 20 milliGRAM(s) Oral two times a day  furosemide    Tablet 40 milliGRAM(s) Oral daily  glipiZIDE XL. 10 milliGRAM(s) Oral with breakfast  guaiFENesin  milliGRAM(s) Oral every 12 hours  insulin lispro (HumaLOG) corrective regimen sliding scale   SubCutaneous three times a day before meals  metoprolol tartrate 75 milliGRAM(s) Oral two times a day  NIFEdipine XL 60 milliGRAM(s) Oral every 24 hours  nitroglycerin  Infusion 10 MICROgram(s)/Min (3 mL/Hr) IV Continuous <Continuous>  potassium chloride    Tablet ER 20 milliEquivalent(s) Oral daily  sodium chloride 0.9%. 600 milliLiter(s) (75 mL/Hr) IV Continuous <Continuous>    PRN Meds:  dextrose Gel 1 Dose(s) Oral once PRN Blood Glucose LESS THAN 70 milliGRAM(s)/deciliter  glucagon  Injectable 1 milliGRAM(s) IntraMuscular once PRN Glucose LESS THAN 70 milligrams/deciliter    Vital Signs:   T(F): 98 (04-13-18 @ 00:00), Max: 98.6 (04-12-18 @ 20:00)  HR: 66 (04-13-18 @ 10:38) (64 - 84)  BP: 176/79 (04-13-18 @ 10:38) (140/83 - 176/79)  RR: 16 (04-13-18 @ 10:38) (15 - 22)  SpO2: 96% (04-13-18 @ 10:38) (92% - 96%)      04-12-18 @ 07:01  -  04-13-18 @ 07:00  --------------------------------------------------------  IN: 1381 mL / OUT: 2900 mL / NET: -1519 mL    04-13-18 @ 07:01  -  04-13-18 @ 16:59  --------------------------------------------------------  IN: 144 mL / OUT: 400 mL / NET: -256 mL      Physical Exam:   GENERAL: NAD  HEENT: NCAT  CHEST/LUNG: CTAB  HEART: Regular rate and rhythm; s1 s2 appreciated, No murmurs, rubs, or gallops  ABDOMEN: Soft, Nontender, Nondistended; Bowel sounds present  EXTREMITIES: No LE edema b/l  NERVOUS SYSTEM:  Alert & Oriented X3      Labs:                         11.6   7.65  )-----------( 184      ( 13 Apr 2018 04:59 )             34.0     Neutophil% 63.0, Lymphocyte% 21.2, Monocyte% 10.8, Bands%  0.4 04-13-18 @ 04:59    13 Apr 2018 04:59    140    |  103    |  15     ----------------------------<  181    3.5     |  22     |  1.0      Ca    8.2        13 Apr 2018 04:59  Mg     1.8       13 Apr 2018 04:59    TPro  5.5    /  Alb  3.0    /  TBili  0.3    /  DBili  <0.2   /  AST  10     /  ALT  8      /  AlkPhos  63     12 Apr 2018 04:03       pTT    43.2             ----< 1.04 INR  (04-13-18 @ 04:59)    11.20        PT,    pTT    35.3             ----< -- INR  (04-12-18 @ 20:51)    --        PT    Hemoglobin A1C, Whole Blood: 6.5 % (04-12-18 @ 04:03)    Imaging and EKG:   EKG 4/13/18- Atrial-sensed ventricular-paced rhythm Biventricular pacemaker detected    CKR 4/12/18- Left-sided ICD with additional ventricular pacing wire, stable position, No consolidation, effusion or pneumothorax.      Assessment and Plan:   77 y/o male admitted for elective cardiac cath found to have triple vessel disease    IMPRESSION: Triple vessel CAD, Chronic CHF, Afib, HTN, DM    NEURO:   -no sedation    PULMONARY:   -stable SpO2 mid 90s on RA    CARDIOVASCULAR: # CAD triple vessel disease, # CHF, # Afib  #s/p cath 4/13/18 with stents placed in proximal LAD and proximal LCX  -c/w ASA, plavix    #c/w lasix, lipitor,     #c/w metoprolol, tikosyn  -restart eliquis tomorrow     #c/w nifedipine, nitroglycerin    GASTROENTEROLOGY:   -c/w pepcid    INFECTIOUS DISEASES:     HEMATOLOGY/ONCOLOGY: # DVT ppx  -SCDs for now,   -restart eliquis tomorrow    GENITOURINARY/RENAL:   -monitor electrolytes  -c/w daily potassium    ENDOCRINE: #DM  -monitor FS  -c/w insulin, glipizide  -check HgbA1c    MUSCULOSKELETAL:     DISPO:   -keep in CCU for now    CODE STATUS:  -Full Code Hospital Day:  2d    Last 24hr Events & Subjective:  Patient transferred to CCU from CTU for monitoring prior to PCI, no events overnight, today doing well denies pain, SOB     Past Medical Hx:   a.fib  cardiomyopathy  AICD placement  DM  HTN    Past Sx:    Allergies:  No Known Allergies    Current Meds:   Standng Meds:  aspirin  chewable 81 milliGRAM(s) Oral daily  atorvastatin 40 milliGRAM(s) Oral at bedtime  clopidogrel Tablet 75 milliGRAM(s) Oral daily  dextrose 5%. 1000 milliLiter(s) (50 mL/Hr) IV Continuous <Continuous>  dextrose 50% Injectable 12.5 Gram(s) IV Push once  dextrose 50% Injectable 25 Gram(s) IV Push once  dextrose 50% Injectable 25 Gram(s) IV Push once  dofetilide 500 MICROGram(s) Oral every 12 hours  famotidine    Tablet 20 milliGRAM(s) Oral two times a day  furosemide    Tablet 40 milliGRAM(s) Oral daily  glipiZIDE XL. 10 milliGRAM(s) Oral with breakfast  guaiFENesin  milliGRAM(s) Oral every 12 hours  insulin lispro (HumaLOG) corrective regimen sliding scale   SubCutaneous three times a day before meals  metoprolol tartrate 75 milliGRAM(s) Oral two times a day  NIFEdipine XL 60 milliGRAM(s) Oral every 24 hours  nitroglycerin  Infusion 10 MICROgram(s)/Min (3 mL/Hr) IV Continuous <Continuous>  potassium chloride    Tablet ER 20 milliEquivalent(s) Oral daily  sodium chloride 0.9%. 600 milliLiter(s) (75 mL/Hr) IV Continuous <Continuous>    PRN Meds:  dextrose Gel 1 Dose(s) Oral once PRN Blood Glucose LESS THAN 70 milliGRAM(s)/deciliter  glucagon  Injectable 1 milliGRAM(s) IntraMuscular once PRN Glucose LESS THAN 70 milligrams/deciliter    Vital Signs:   T(F): 98 (04-13-18 @ 00:00), Max: 98.6 (04-12-18 @ 20:00)  HR: 66 (04-13-18 @ 10:38) (64 - 84)  BP: 176/79 (04-13-18 @ 10:38) (140/83 - 176/79)  RR: 16 (04-13-18 @ 10:38) (15 - 22)  SpO2: 96% (04-13-18 @ 10:38) (92% - 96%)      04-12-18 @ 07:01  -  04-13-18 @ 07:00  --------------------------------------------------------  IN: 1381 mL / OUT: 2900 mL / NET: -1519 mL    04-13-18 @ 07:01  -  04-13-18 @ 16:59  --------------------------------------------------------  IN: 144 mL / OUT: 400 mL / NET: -256 mL      Physical Exam:   GENERAL: NAD  HEENT: NCAT  CHEST/LUNG: CTAB  HEART: Regular rate and rhythm; s1 s2 appreciated, No murmur  ABDOMEN: Obese, Soft, Nontender, Nondistended  EXTREMITIES: No LE edema b/l  NERVOUS SYSTEM:  Alert & Oriented X3      Labs:                         11.6   7.65  )-----------( 184      ( 13 Apr 2018 04:59 )             34.0     Neutophil% 63.0, Lymphocyte% 21.2, Monocyte% 10.8, Bands%  0.4 04-13-18 @ 04:59    13 Apr 2018 04:59    140    |  103    |  15     ----------------------------<  181    3.5     |  22     |  1.0      Ca    8.2        13 Apr 2018 04:59  Mg     1.8       13 Apr 2018 04:59    TPro  5.5    /  Alb  3.0    /  TBili  0.3    /  DBili  <0.2   /  AST  10     /  ALT  8      /  AlkPhos  63     12 Apr 2018 04:03       pTT    43.2             ----< 1.04 INR  (04-13-18 @ 04:59)    11.20        PT,    pTT    35.3             ----< -- INR  (04-12-18 @ 20:51)    --        PT    Hemoglobin A1C, Whole Blood: 6.5 % (04-12-18 @ 04:03)    Imaging and EKG:   EKG 4/13/18- Atrial-sensed ventricular-paced rhythm Biventricular pacemaker detected    CKR 4/12/18- Left-sided ICD with additional ventricular pacing wire, stable position, No consolidation, effusion or pneumothorax.      Assessment and Plan:   79 y/o male admitted for elective cardiac cath found to have triple vessel disease    IMPRESSION: Triple vessel CAD, Chronic CHF, Afib, HTN, DM    NEURO:   -no sedation    PULMONARY:   -stable SpO2 mid 90s on RA    CARDIOVASCULAR: # CAD triple vessel disease, # CHF, # Afib  #s/p cath 4/13/18 with stents placed in proximal LAD and proximal LCX  -c/w ASA, plavix    #c/w lasix, lipitor,     #c/w metoprolol, tikosyn  -restart eliquis tomorrow     #c/w nifedipine  -start lisinopril in AM  -restart nitroglycerin if BP>180    GASTROENTEROLOGY:   -c/w pepcid    INFECTIOUS DISEASES:     HEMATOLOGY/ONCOLOGY: # DVT ppx  -SCDs for now,   -restart eliquis tomorrow    GENITOURINARY/RENAL:   -monitor electrolytes  -c/w daily potassium    ENDOCRINE: #DM  -monitor FS  -c/w insulin, glipizide  -check HgbA1c    MUSCULOSKELETAL:     DISPO:   -keep in CCU for now    CODE STATUS:  -Full Code

## 2018-04-13 NOTE — PROGRESS NOTE ADULT - ASSESSMENT
a/p:  #CAD- severe triple vessel  -initially admitted to CTU with plan for possible CABG And decision made by family due to limited functional status for multivessel pci  -transferred to CCU---s/p cath today 4/13/18 with prox lad and prox lcx stent x2  -continue monitoring in CCU  -cardiology following  -cont asa, statin, plavix, bblocker    #CHF- chronic  -cont lasix 40 mg daily  -monitor i/o, daily weights    #AF--  -rate controlled  -restart eliquis 2.5 mg bid tomorrow    #DM- monitor fs- cont insulin   -check hba1c    #HTN- cont bp control    #DVT/GI ppx    full code---PT eval for dispo planning

## 2018-04-13 NOTE — PROGRESS NOTE ADULT - SUBJECTIVE AND OBJECTIVE BOX
patient seen and examined independently on rounds in pre-op cath area, chart reviewed and discussed with the medicine resident        Patient is a 78y old  Male who presents with a chief complaint of Pre-Op CABG (12 Apr 2018 05:18)    HPI:  78y Male here for elective cardiac catheterization. The patient had an abnormal stress test, recently diagnosed of new CHF  Patient has had no prior cardiac catheterization 04-11-18 @ 13:51    relevant PMHx:  a.fib  DM on orals  HTN    Social history:  [  ] smoker  [x ] non-smoker  [  ] Etoh  [  ] recreational drug usage      Labs: Reviewed,  no significant abnormalities noted (11 Apr 2018 13:50)      PE:  GEN-NAD, AAOx3  HEENT: NCAT, EOMI  CHEST-  fair air entry with decreased bs bilateral bases  CVS- +s1/s2 RRR no murmurs  ABD- soft NT ND +bs obese + umbilical reducible hernia  EXT- +edema      Vital Signs Last 24 Hrs  T(C): 36.7 (13 Apr 2018 00:00), Max: 37 (12 Apr 2018 20:00)  T(F): 98 (13 Apr 2018 00:00), Max: 98.6 (12 Apr 2018 20:00)  HR: 66 (13 Apr 2018 10:38) (64 - 84)  BP: 176/79 (13 Apr 2018 10:38) (140/83 - 176/79)  BP(mean): 121 (13 Apr 2018 10:38) (80 - 121)  RR: 16 (13 Apr 2018 10:38) (15 - 22)  SpO2: 96% (13 Apr 2018 10:38) (92% - 96%)                        11.6   7.65  )-----------( 184      ( 13 Apr 2018 04:59 )             34.0     04-13    140  |  103  |  15  ----------------------------<  181<H>  3.5   |  22  |  1.0    Ca    8.2<L>      13 Apr 2018 04:59  Mg     1.8     04-13    TPro  5.5<L>  /  Alb  3.0<L>  /  TBili  0.3  /  DBili  <0.2  /  AST  10  /  ALT  8   /  AlkPhos  63  04-12              MEDICATIONS  (STANDING):  aspirin  chewable 81 milliGRAM(s) Oral daily  atorvastatin 40 milliGRAM(s) Oral at bedtime  clopidogrel Tablet 75 milliGRAM(s) Oral daily  dextrose 5%. 1000 milliLiter(s) (50 mL/Hr) IV Continuous <Continuous>  dextrose 50% Injectable 12.5 Gram(s) IV Push once  dextrose 50% Injectable 25 Gram(s) IV Push once  dextrose 50% Injectable 25 Gram(s) IV Push once  dofetilide 500 MICROGram(s) Oral every 12 hours  famotidine    Tablet 20 milliGRAM(s) Oral two times a day  furosemide    Tablet 40 milliGRAM(s) Oral daily  glipiZIDE XL. 10 milliGRAM(s) Oral with breakfast  guaiFENesin  milliGRAM(s) Oral every 12 hours  insulin lispro (HumaLOG) corrective regimen sliding scale   SubCutaneous three times a day before meals  metoprolol tartrate 75 milliGRAM(s) Oral two times a day  NIFEdipine XL 60 milliGRAM(s) Oral every 24 hours  nitroglycerin  Infusion 10 MICROgram(s)/Min (3 mL/Hr) IV Continuous <Continuous>  potassium chloride    Tablet ER 20 milliEquivalent(s) Oral daily  sodium chloride 0.9%. 600 milliLiter(s) (75 mL/Hr) IV Continuous <Continuous>

## 2018-04-13 NOTE — PROGRESS NOTE ADULT - SUBJECTIVE AND OBJECTIVE BOX
I have personally seen and examined the patient.  I agree with the history and physical which I have reviewed and noted any changes below.  04-13-18 @ 08:41    PRE-OP DIAGNOSIS: cad, s/p cath - 3v cad with preserved LV fnx, heart team d/w the pt and his family - to proceed with multivessel PCI in the view of poor functional status and dementia     PROCEDURE:    Physician: chidi  Assistant:    ANESTHESIA TYPE:  [  ]General Anesthesia  [ x ] Sedation  [x  ] Local/Regional    ESTIMATED BLOOD LOSS:       mL    CONDITION  [  ] Critical  [  ] Serious  [  ]Fair  [  ]Good      SPECIMENS REMOVED (IF APPLICABLE):      IV CONTRAST:             mL      IMPLANTS (IF APPLICABLE)      FINDINGS    Left Heart Catheterization:  LVEF%:  LVEDP:  [ ] Normal Coronary Arteries  [ ] Luminal Irregularities  [ ] Non-obstructive CAD      LEFT HEART CATHERIZATION                                    Left main     LAD:                        Diag:    Left Circumflex:  OM:    Right Cornary Artery:  RPDA  RPL    Ramus Intermed:    INTERVENTION  IMPLANTS:    RIGHT HEART CATHERIZATION  PA:  PCW:  CO/CI:      POST-OP DIAGNOSIS          PLAN OF CARE  [ ] D/C Home today  [ ]  D/C in AM  [ ] Return to In-patient bed  [ ] Admit for observation  [ ] Return for staged procedure:  [ ] CT Surgery consult called  [ ]  Continue DAPT, B-blocker & Statin therapy I have personally seen and examined the patient.  I agree with the history and physical which I have reviewed and noted any changes below.  04-13-18 @ 08:41    PRE-OP DIAGNOSIS: cad, s/p cath - 3v cad with preserved LV fnx, heart team d/w the pt and his family - to proceed with multivessel PCI in the view of poor functional status and dementia     PROCEDURE: PCI of LAD/LCX    Physician: chidi  Assistant: leonid    ANESTHESIA TYPE:  [  ]General Anesthesia  [ x ] Sedation  [x  ] Local/Regional    ESTIMATED BLOOD LOSS:       mL    CONDITION  [  ] Critical  [  ] Serious  [x  ]Fair  [  ]Good      SPECIMENS REMOVED (IF APPLICABLE):      IV CONTRAST:       150      mL      IMPLANTS (IF APPLICABLE)  stents X2    FINDINGS    Left Heart Catheterization:  LVEF%:  LVEDP:  [ ] Normal Coronary Arteries  [ ] Luminal Irregularities  [x ] 3v CAD        INTERVENTION: Prox lad and Prox lcx  IMPLANTS: stents X2      POST-OP DIAGNOSIS  cad        PLAN OF CARE  [ ] D/C Home today  [ ]  D/C in AM  [x ] Return to In-patient bed  [ ] Admit for observation  [ ] Return for staged procedure:  [ ] CT Surgery consult called  [x ]  Continue DAPT, B-blocker & Statin therapy, resume eliquis 2.5 bid tomorrow, bp mx

## 2018-04-14 ENCOUNTER — TRANSCRIPTION ENCOUNTER (OUTPATIENT)
Age: 78
End: 2018-04-14

## 2018-04-14 VITALS — WEIGHT: 201.28 LBS

## 2018-04-14 LAB
ANION GAP SERPL CALC-SCNC: 13 MMOL/L — SIGNIFICANT CHANGE UP (ref 7–14)
ANION GAP SERPL CALC-SCNC: 17 MMOL/L — HIGH (ref 7–14)
BASOPHILS # BLD AUTO: 0.02 K/UL — SIGNIFICANT CHANGE UP (ref 0–0.2)
BASOPHILS # BLD AUTO: 0.02 K/UL — SIGNIFICANT CHANGE UP (ref 0–0.2)
BASOPHILS NFR BLD AUTO: 0.2 % — SIGNIFICANT CHANGE UP (ref 0–1)
BASOPHILS NFR BLD AUTO: 0.3 % — SIGNIFICANT CHANGE UP (ref 0–1)
BUN SERPL-MCNC: 12 MG/DL — SIGNIFICANT CHANGE UP (ref 10–20)
BUN SERPL-MCNC: 15 MG/DL — SIGNIFICANT CHANGE UP (ref 10–20)
CALCIUM SERPL-MCNC: 8.3 MG/DL — LOW (ref 8.5–10.1)
CALCIUM SERPL-MCNC: 8.6 MG/DL — SIGNIFICANT CHANGE UP (ref 8.5–10.1)
CHLORIDE SERPL-SCNC: 103 MMOL/L — SIGNIFICANT CHANGE UP (ref 98–110)
CHLORIDE SERPL-SCNC: 107 MMOL/L — SIGNIFICANT CHANGE UP (ref 98–110)
CO2 SERPL-SCNC: 20 MMOL/L — SIGNIFICANT CHANGE UP (ref 17–32)
CO2 SERPL-SCNC: 22 MMOL/L — SIGNIFICANT CHANGE UP (ref 17–32)
CREAT SERPL-MCNC: 1 MG/DL — SIGNIFICANT CHANGE UP (ref 0.7–1.5)
CREAT SERPL-MCNC: 1.2 MG/DL — SIGNIFICANT CHANGE UP (ref 0.7–1.5)
EOSINOPHIL # BLD AUTO: 0.4 K/UL — SIGNIFICANT CHANGE UP (ref 0–0.7)
EOSINOPHIL # BLD AUTO: 0.48 K/UL — SIGNIFICANT CHANGE UP (ref 0–0.7)
EOSINOPHIL NFR BLD AUTO: 5.8 % — SIGNIFICANT CHANGE UP (ref 0–8)
EOSINOPHIL NFR BLD AUTO: 6.1 % — SIGNIFICANT CHANGE UP (ref 0–8)
GLUCOSE SERPL-MCNC: 165 MG/DL — HIGH (ref 70–99)
GLUCOSE SERPL-MCNC: 219 MG/DL — HIGH (ref 70–99)
HCT VFR BLD CALC: 37.9 % — LOW (ref 42–52)
HCT VFR BLD CALC: 39.6 % — LOW (ref 42–52)
HGB BLD-MCNC: 12.7 G/DL — LOW (ref 14–18)
HGB BLD-MCNC: 13.2 G/DL — LOW (ref 14–18)
IMM GRANULOCYTES NFR BLD AUTO: 0.3 % — SIGNIFICANT CHANGE UP (ref 0.1–0.3)
IMM GRANULOCYTES NFR BLD AUTO: 0.4 % — HIGH (ref 0.1–0.3)
LYMPHOCYTES # BLD AUTO: 1.08 K/UL — LOW (ref 1.2–3.4)
LYMPHOCYTES # BLD AUTO: 1.51 K/UL — SIGNIFICANT CHANGE UP (ref 1.2–3.4)
LYMPHOCYTES # BLD AUTO: 16.3 % — LOW (ref 20.5–51.1)
LYMPHOCYTES # BLD AUTO: 18.2 % — LOW (ref 20.5–51.1)
MAGNESIUM SERPL-MCNC: 1.9 MG/DL — SIGNIFICANT CHANGE UP (ref 1.8–2.4)
MCHC RBC-ENTMCNC: 31.1 PG — HIGH (ref 27–31)
MCHC RBC-ENTMCNC: 31.2 PG — HIGH (ref 27–31)
MCHC RBC-ENTMCNC: 33.3 G/DL — SIGNIFICANT CHANGE UP (ref 32–37)
MCHC RBC-ENTMCNC: 33.5 G/DL — SIGNIFICANT CHANGE UP (ref 32–37)
MCV RBC AUTO: 93.1 FL — SIGNIFICANT CHANGE UP (ref 80–94)
MCV RBC AUTO: 93.4 FL — SIGNIFICANT CHANGE UP (ref 80–94)
MONOCYTES # BLD AUTO: 0.64 K/UL — HIGH (ref 0.1–0.6)
MONOCYTES # BLD AUTO: 0.9 K/UL — HIGH (ref 0.1–0.6)
MONOCYTES NFR BLD AUTO: 10.8 % — HIGH (ref 1.7–9.3)
MONOCYTES NFR BLD AUTO: 9.7 % — HIGH (ref 1.7–9.3)
NEUTROPHILS # BLD AUTO: 4.45 K/UL — SIGNIFICANT CHANGE UP (ref 1.4–6.5)
NEUTROPHILS # BLD AUTO: 5.37 K/UL — SIGNIFICANT CHANGE UP (ref 1.4–6.5)
NEUTROPHILS NFR BLD AUTO: 64.6 % — SIGNIFICANT CHANGE UP (ref 42.2–75.2)
NEUTROPHILS NFR BLD AUTO: 67.3 % — SIGNIFICANT CHANGE UP (ref 42.2–75.2)
NRBC # BLD: 0 /100 WBCS — SIGNIFICANT CHANGE UP (ref 0–0)
PLATELET # BLD AUTO: 195 K/UL — SIGNIFICANT CHANGE UP (ref 130–400)
PLATELET # BLD AUTO: 216 K/UL — SIGNIFICANT CHANGE UP (ref 130–400)
POTASSIUM SERPL-MCNC: 4 MMOL/L — SIGNIFICANT CHANGE UP (ref 3.5–5)
POTASSIUM SERPL-MCNC: 4.1 MMOL/L — SIGNIFICANT CHANGE UP (ref 3.5–5)
POTASSIUM SERPL-SCNC: 4 MMOL/L — SIGNIFICANT CHANGE UP (ref 3.5–5)
POTASSIUM SERPL-SCNC: 4.1 MMOL/L — SIGNIFICANT CHANGE UP (ref 3.5–5)
RBC # BLD: 4.07 M/UL — LOW (ref 4.7–6.1)
RBC # BLD: 4.24 M/UL — LOW (ref 4.7–6.1)
RBC # FLD: 13.6 % — SIGNIFICANT CHANGE UP (ref 11.5–14.5)
RBC # FLD: 13.8 % — SIGNIFICANT CHANGE UP (ref 11.5–14.5)
SODIUM SERPL-SCNC: 140 MMOL/L — SIGNIFICANT CHANGE UP (ref 135–146)
SODIUM SERPL-SCNC: 142 MMOL/L — SIGNIFICANT CHANGE UP (ref 135–146)
WBC # BLD: 6.61 K/UL — SIGNIFICANT CHANGE UP (ref 4.8–10.8)
WBC # BLD: 8.31 K/UL — SIGNIFICANT CHANGE UP (ref 4.8–10.8)
WBC # FLD AUTO: 6.61 K/UL — SIGNIFICANT CHANGE UP (ref 4.8–10.8)
WBC # FLD AUTO: 8.31 K/UL — SIGNIFICANT CHANGE UP (ref 4.8–10.8)

## 2018-04-14 RX ORDER — SIMVASTATIN 20 MG/1
1 TABLET, FILM COATED ORAL
Qty: 0 | Refills: 0 | COMMUNITY

## 2018-04-14 RX ORDER — NIFEDIPINE 30 MG
30 TABLET, EXTENDED RELEASE 24 HR ORAL ONCE
Qty: 0 | Refills: 0 | Status: COMPLETED | OUTPATIENT
Start: 2018-04-14 | End: 2018-04-14

## 2018-04-14 RX ORDER — APIXABAN 2.5 MG/1
1 TABLET, FILM COATED ORAL
Qty: 0 | Refills: 0 | COMMUNITY

## 2018-04-14 RX ORDER — POTASSIUM CHLORIDE 20 MEQ
1 PACKET (EA) ORAL
Qty: 0 | Refills: 0 | COMMUNITY

## 2018-04-14 RX ORDER — METOPROLOL TARTRATE 50 MG
1 TABLET ORAL
Qty: 60 | Refills: 0
Start: 2018-04-14

## 2018-04-14 RX ORDER — NIFEDIPINE 30 MG
1 TABLET, EXTENDED RELEASE 24 HR ORAL
Qty: 0 | Refills: 0 | COMMUNITY
Start: 2018-04-14

## 2018-04-14 RX ORDER — LISINOPRIL 2.5 MG/1
1 TABLET ORAL
Qty: 0 | Refills: 0 | COMMUNITY

## 2018-04-14 RX ORDER — LISINOPRIL 2.5 MG/1
1 TABLET ORAL
Qty: 30 | Refills: 0 | OUTPATIENT
Start: 2018-04-14

## 2018-04-14 RX ORDER — CLOPIDOGREL BISULFATE 75 MG/1
1 TABLET, FILM COATED ORAL
Qty: 30 | Refills: 0
Start: 2018-04-14

## 2018-04-14 RX ORDER — APIXABAN 2.5 MG/1
1 TABLET, FILM COATED ORAL
Qty: 60 | Refills: 0 | OUTPATIENT
Start: 2018-04-14

## 2018-04-14 RX ORDER — NIFEDIPINE 30 MG
1 TABLET, EXTENDED RELEASE 24 HR ORAL
Qty: 30 | Refills: 0 | OUTPATIENT
Start: 2018-04-14

## 2018-04-14 RX ORDER — METOPROLOL TARTRATE 50 MG
1 TABLET ORAL
Qty: 0 | Refills: 0 | COMMUNITY

## 2018-04-14 RX ADMIN — Medication 3 MICROGRAM(S)/MIN: at 15:58

## 2018-04-14 RX ADMIN — Medication 75 MILLIGRAM(S): at 06:10

## 2018-04-14 RX ADMIN — Medication 40 MILLIGRAM(S): at 06:11

## 2018-04-14 RX ADMIN — APIXABAN 2.5 MILLIGRAM(S): 2.5 TABLET, FILM COATED ORAL at 10:23

## 2018-04-14 RX ADMIN — Medication 81 MILLIGRAM(S): at 11:41

## 2018-04-14 RX ADMIN — Medication 4: at 12:04

## 2018-04-14 RX ADMIN — DOFETILIDE 500 MICROGRAM(S): 0.25 CAPSULE ORAL at 06:10

## 2018-04-14 RX ADMIN — LISINOPRIL 20 MILLIGRAM(S): 2.5 TABLET ORAL at 06:10

## 2018-04-14 RX ADMIN — Medication 600 MILLIGRAM(S): at 06:09

## 2018-04-14 RX ADMIN — Medication 60 MILLIGRAM(S): at 08:11

## 2018-04-14 RX ADMIN — Medication 20 MILLIEQUIVALENT(S): at 11:41

## 2018-04-14 RX ADMIN — FAMOTIDINE 20 MILLIGRAM(S): 10 INJECTION INTRAVENOUS at 06:09

## 2018-04-14 RX ADMIN — Medication 2: at 08:13

## 2018-04-14 RX ADMIN — Medication 10 MILLIGRAM(S): at 08:03

## 2018-04-14 RX ADMIN — Medication 30 MILLIGRAM(S): at 10:49

## 2018-04-14 RX ADMIN — CLOPIDOGREL BISULFATE 75 MILLIGRAM(S): 75 TABLET, FILM COATED ORAL at 11:41

## 2018-04-14 NOTE — DISCHARGE NOTE ADULT - CARE PROVIDERS DIRECT ADDRESSES
,DirectAddress_Unknown,DirectAddress_Unknown,[email or web address]

## 2018-04-14 NOTE — DISCHARGE NOTE ADULT - MEDICATION SUMMARY - MEDICATIONS TO TAKE
I will START or STAY ON the medications listed below when I get home from the hospital:    aspirin 81 mg oral tablet  -- 1 tab(s) by mouth once a day  -- Indication: For cad    lisinopril 20 mg oral tablet  -- 1 tab(s) by mouth once a day  -- Indication: For htn    Tikosyn 500 mcg oral capsule  -- 1 cap(s) by mouth 2 times a day  -- Indication: For cad    apixaban 2.5 mg oral tablet  -- 1 tab(s) by mouth every 12 hours  -- Indication: For afib    Invokana 100 mg oral tablet  -- 1 tab(s) by mouth once a day  -- Indication: For dm    glipiZIDE 10 mg oral tablet, extended release  -- 1 tab(s) by mouth once a day  -- Indication: For dm    atorvastatin 40 mg oral tablet  -- 1 tab(s) by mouth once a day  -- Indication: For dld     clopidogrel 75 mg oral tablet  -- 1 tab(s) by mouth once a day  -- Indication: For cad    metoprolol tartrate 75 mg oral tablet  -- 1 tab(s) by mouth 2 times a day  -- Indication: For afib    NIFEdipine 90 mg oral tablet, extended release  -- 1 tab(s) by mouth once a day  -- Indication: For htn    Lasix 40 mg oral tablet  -- 1 tab(s) by mouth once a day  -- Indication: For htn    guaiFENesin 600 mg oral tablet, extended release  -- 1 tab(s) by mouth every 12 hours  -- Indication: For cough

## 2018-04-14 NOTE — DISCHARGE NOTE ADULT - CARE PROVIDER_API CALL
Nimesh Moran), Internal Medicine  [address] [place] [address]  Phone: [phone]  Fax: [phone]    Howard Arteaga), Cardiovascular Disease  [address] [place] [address]  Phone: [phone]  Fax: [phone]    Bala Carr), Cardiovascular Disease; Internal Medicine; Interventional Cardiology; Nuclear Cardiology  [address] [place] [address]  Phone: [phone]  Fax: [phone]

## 2018-04-14 NOTE — DISCHARGE NOTE ADULT - MEDICATION SUMMARY - MEDICATIONS TO CHANGE
I will SWITCH the dose or number of times a day I take the medications listed below when I get home from the hospital:    metoprolol tartrate 50 mg oral tablet  -- 1 tab(s) by mouth 2 times a day    lisinopril 10 mg oral tablet  -- 1 tab(s) by mouth once a day    Eliquis 5 mg oral tablet  -- 1 tab(s) by mouth 2 times a day

## 2018-04-14 NOTE — DISCHARGE NOTE ADULT - PLAN OF CARE
medical therapy c/w DUAPT statin BB f/u with cardiology in 1 week c/w oral meds f/uy with PMD c/w eliquis 2.5 BID f.u with cardiology and c/w BB c/w old meds and new meds f/u with cardiology

## 2018-04-14 NOTE — PROGRESS NOTE ADULT - ASSESSMENT
a/p:  #CAD- severe triple vessel  -initially admitted to CTU with plan for possible CABG And decision made by family due to limited functional status for multivessel pci  -transferred to CCU---s/p cath 4/13/18 with prox lad and prox lcx stent x2  -continue monitoring in CCU  -cardiology following  -cont asa, statin, plavix, bblocker (also restarted on eliquis 2.5 mg bid---concern for bleeding risk patient on DAPT and NOAC---f/u with cardiology consideration for transitioning off triple therapy)    #HTN- uncontrolled (hypertensive urgency overnight)  -was started on NTG drip which has since been stopped  -bp now 125/80  -cont current managment (nifedipine 90 mg daily, toprol xl 150 mg daily and lisinopril 20 mg daily)  -f/u with cardiology--    #CHF- chronic  -cont lasix 40 mg daily  -monitor i/o, daily weights    #AF--  -rate controlled  -restarted on eliquis 2.5 mg bid today  -concern for bleeding risk    #DM- monitor fs- cont insulin     #DVT/GI ppx    full code---he lives at home alone with wife---will get PT eval for dispo planning (patient says he was ambulating at home with cane pta but has only been oob in chair and high bleeding risk with fall (on dual antiplatelet and eliquis)---patient very eager to be discharged home today- if bp remains stable plan for discharge home---case managment to see about VNS/Home care as patient started on new bp meds and dose adjusted    plan for f/u with pcp and cardiology within 1-2 weeks

## 2018-04-14 NOTE — DISCHARGE NOTE ADULT - MEDICATION SUMMARY - MEDICATIONS TO STOP TAKING
I will STOP taking the medications listed below when I get home from the hospital:    atorvastatin 40 mg oral tablet  -- 1 tab(s) by mouth once a day

## 2018-04-14 NOTE — DISCHARGE NOTE ADULT - VISION (WITH CORRECTIVE LENSES IF THE PATIENT USUALLY WEARS THEM):
Partially impaired: cannot see medication labels or newsprint, but can see obstacles in path, and the surrounding layout; can count fingers at arm's length/Hx of R Eye w/drooping eyelid & poor vision

## 2018-04-14 NOTE — DISCHARGE NOTE ADULT - PATIENT PORTAL LINK FT
You can access the [facility] Portal, offered by [facility], by registering with the following website: [email or web address]

## 2018-04-14 NOTE — PROGRESS NOTE ADULT - ASSESSMENT
cad: post cath pci stents, stable  cardiomyopathy, chronic systolic lv dysfunction, euvolemic  htn: stable    current therapy  d/c home today, out patient f/u with dr wise at  in 2 weeks  patient is on asa, plavix and Eliquis needs close observation for bleeding and ASAP one of the 3 anti coagulant be stopped as out patient

## 2018-04-14 NOTE — PROGRESS NOTE ADULT - SUBJECTIVE AND OBJECTIVE BOX
patient seen and examined independently on rounds in ccu, chart reviewed and discussed with the medicine ccu resident        Patient is a 78y old  Male who presents with a chief complaint of Pre-Op CABG (12 Apr 2018 05:18)    HPI:  78y Male here for elective cardiac catheterization. The patient had an abnormal stress test, recently diagnosed of new CHF  Patient has had no prior cardiac catheterization 04-11-18 @ 13:51    relevant PMHx:  a.fib  DM on orals  HTN    Social history:  [  ] smoker  [x ] non-smoker  [  ] Etoh  [  ] recreational drug usage      Labs: Reviewed,  no significant abnormalities noted (11 Apr 2018 13:50)      PE:  GEN-NAD, AAOx3, OOB in chair- family bedside  HEENT: NCAT, EOMI  CHEST-  fair air entry with decreased bs bilateral bases  CVS- +s1/s2 irreg irreg   ABD- soft NT ND +bs obese + umbilical reducible hernia  EXT- +edema (trace)                  MEDICATIONS  (STANDING):  aspirin  chewable 81 milliGRAM(s) Oral daily  atorvastatin 40 milliGRAM(s) Oral at bedtime  clopidogrel Tablet 75 milliGRAM(s) Oral daily  dextrose 5%. 1000 milliLiter(s) (50 mL/Hr) IV Continuous <Continuous>  dextrose 50% Injectable 12.5 Gram(s) IV Push once  dextrose 50% Injectable 25 Gram(s) IV Push once  dextrose 50% Injectable 25 Gram(s) IV Push once  dofetilide 500 MICROGram(s) Oral every 12 hours  famotidine    Tablet 20 milliGRAM(s) Oral two times a day  furosemide    Tablet 40 milliGRAM(s) Oral daily  glipiZIDE XL. 10 milliGRAM(s) Oral with breakfast  guaiFENesin  milliGRAM(s) Oral every 12 hours  insulin lispro (HumaLOG) corrective regimen sliding scale   SubCutaneous three times a day before meals  metoprolol tartrate 75 milliGRAM(s) Oral two times a day  NIFEdipine XL 60 milliGRAM(s) Oral every 24 hours  nitroglycerin  Infusion 10 MICROgram(s)/Min (3 mL/Hr) IV Continuous <Continuous>  potassium chloride    Tablet ER 20 milliEquivalent(s) Oral daily  sodium chloride 0.9%. 600 milliLiter(s) (75 mL/Hr) IV Continuous <Continuous>

## 2018-04-14 NOTE — DISCHARGE NOTE ADULT - CARE PLAN
Principal Discharge DX:	Ischemic heart disease due to coronary artery obstruction  Goal:	medical therapy  Assessment and plan of treatment:	c/w DUAPT statin BB f/u with cardiology in 1 week  Secondary Diagnosis:	Type 2 diabetes mellitus without complication, unspecified long term insulin use status  Goal:	medical therapy  Assessment and plan of treatment:	c/w oral meds f/uy with PMD  Secondary Diagnosis:	Atrial fibrillation, unspecified type  Goal:	medical therapy  Assessment and plan of treatment:	c/w eliquis 2.5 BID f.u with cardiology and c/w BB  Secondary Diagnosis:	Essential hypertension  Goal:	medical therapy  Assessment and plan of treatment:	c/w old meds and new meds f/u with cardiology

## 2018-04-14 NOTE — DISCHARGE NOTE ADULT - SECONDARY DIAGNOSIS.
Type 2 diabetes mellitus without complication, unspecified long term insulin use status Atrial fibrillation, unspecified type Essential hypertension

## 2018-04-22 DIAGNOSIS — I25.2 OLD MYOCARDIAL INFARCTION: ICD-10-CM

## 2018-04-22 DIAGNOSIS — Z87.891 PERSONAL HISTORY OF NICOTINE DEPENDENCE: ICD-10-CM

## 2018-04-22 DIAGNOSIS — H40.9 UNSPECIFIED GLAUCOMA: ICD-10-CM

## 2018-04-22 DIAGNOSIS — Z79.82 LONG TERM (CURRENT) USE OF ASPIRIN: ICD-10-CM

## 2018-04-22 DIAGNOSIS — Z79.01 LONG TERM (CURRENT) USE OF ANTICOAGULANTS: ICD-10-CM

## 2018-04-22 DIAGNOSIS — E78.00 PURE HYPERCHOLESTEROLEMIA, UNSPECIFIED: ICD-10-CM

## 2018-04-22 DIAGNOSIS — E11.9 TYPE 2 DIABETES MELLITUS WITHOUT COMPLICATIONS: ICD-10-CM

## 2018-04-22 DIAGNOSIS — I50.9 HEART FAILURE, UNSPECIFIED: ICD-10-CM

## 2018-04-22 DIAGNOSIS — I11.0 HYPERTENSIVE HEART DISEASE WITH HEART FAILURE: ICD-10-CM

## 2018-04-22 DIAGNOSIS — I10 ESSENTIAL (PRIMARY) HYPERTENSION: ICD-10-CM

## 2018-04-22 DIAGNOSIS — I42.9 CARDIOMYOPATHY, UNSPECIFIED: ICD-10-CM

## 2018-04-22 DIAGNOSIS — R07.89 OTHER CHEST PAIN: ICD-10-CM

## 2018-04-22 DIAGNOSIS — Z79.84 LONG TERM (CURRENT) USE OF ORAL HYPOGLYCEMIC DRUGS: ICD-10-CM

## 2018-04-22 DIAGNOSIS — Z95.810 PRESENCE OF AUTOMATIC (IMPLANTABLE) CARDIAC DEFIBRILLATOR: ICD-10-CM

## 2018-04-22 DIAGNOSIS — F03.90 UNSPECIFIED DEMENTIA WITHOUT BEHAVIORAL DISTURBANCE: ICD-10-CM

## 2018-04-22 DIAGNOSIS — I48.91 UNSPECIFIED ATRIAL FIBRILLATION: ICD-10-CM

## 2018-04-22 DIAGNOSIS — I25.110 ATHEROSCLEROTIC HEART DISEASE OF NATIVE CORONARY ARTERY WITH UNSTABLE ANGINA PECTORIS: ICD-10-CM

## 2018-04-30 ENCOUNTER — APPOINTMENT (OUTPATIENT)
Dept: SURGERY | Facility: CLINIC | Age: 78
End: 2018-04-30

## 2018-07-10 ENCOUNTER — OUTPATIENT (OUTPATIENT)
Dept: OUTPATIENT SERVICES | Facility: HOSPITAL | Age: 78
LOS: 1 days | Discharge: HOME | End: 2018-07-10

## 2018-07-10 DIAGNOSIS — I10 ESSENTIAL (PRIMARY) HYPERTENSION: ICD-10-CM

## 2018-07-10 DIAGNOSIS — E11.9 TYPE 2 DIABETES MELLITUS WITHOUT COMPLICATIONS: ICD-10-CM

## 2018-07-17 ENCOUNTER — LABORATORY RESULT (OUTPATIENT)
Age: 78
End: 2018-07-17

## 2018-07-17 ENCOUNTER — APPOINTMENT (OUTPATIENT)
Dept: UROLOGY | Facility: CLINIC | Age: 78
End: 2018-07-17
Payer: MEDICARE

## 2018-07-17 VITALS
WEIGHT: 210 LBS | DIASTOLIC BLOOD PRESSURE: 64 MMHG | BODY MASS INDEX: 30.4 KG/M2 | HEIGHT: 69.5 IN | HEART RATE: 50 BPM | SYSTOLIC BLOOD PRESSURE: 130 MMHG

## 2018-07-17 DIAGNOSIS — B37.2 CANDIDIASIS OF SKIN AND NAIL: ICD-10-CM

## 2018-07-17 LAB
BILIRUB UR QL STRIP: NORMAL
BILIRUB UR QL STRIP: NORMAL
CLARITY UR: CLEAR
CLARITY UR: NORMAL
COLLECTION METHOD: NORMAL
COLLECTION METHOD: NORMAL
GLUCOSE UR-MCNC: 500
GLUCOSE UR-MCNC: >=500
HCG UR QL: NORMAL EU/DL
HCG UR QL: NORMAL EU/DL
HGB UR QL STRIP.AUTO: 250
HGB UR QL STRIP.AUTO: 250
KETONES UR-MCNC: NORMAL
KETONES UR-MCNC: NORMAL
LEUKOCYTE ESTERASE UR QL STRIP: 500
LEUKOCYTE ESTERASE UR QL STRIP: 500
NITRITE UR QL STRIP: NORMAL
NITRITE UR QL STRIP: NORMAL
PH UR STRIP: 6
PH UR STRIP: 6
PROT UR STRIP-MCNC: 500
PROT UR STRIP-MCNC: 500
SP GR UR STRIP: 1.01
SP GR UR STRIP: 1015

## 2018-07-17 PROCEDURE — 81003 URINALYSIS AUTO W/O SCOPE: CPT | Mod: QW

## 2018-07-17 PROCEDURE — 99213 OFFICE O/P EST LOW 20 MIN: CPT

## 2018-07-17 PROCEDURE — 51798 US URINE CAPACITY MEASURE: CPT

## 2018-07-17 RX ORDER — METFORMIN HYDROCHLORIDE 500 MG/1
500 TABLET, COATED ORAL
Refills: 0 | Status: ACTIVE | COMMUNITY

## 2018-07-17 RX ORDER — ASPIRIN 81 MG/1
81 TABLET, CHEWABLE ORAL
Refills: 0 | Status: ACTIVE | COMMUNITY

## 2018-07-17 RX ORDER — OXYBUTYNIN CHLORIDE 15 MG/1
15 TABLET, EXTENDED RELEASE ORAL
Qty: 90 | Refills: 3 | Status: ACTIVE | COMMUNITY
Start: 2018-07-17 | End: 1900-01-01

## 2018-07-17 RX ORDER — BRIMONIDINE TARTRATE 2 MG/MG
0.2 SOLUTION/ DROPS OPHTHALMIC
Refills: 0 | Status: ACTIVE | COMMUNITY

## 2018-07-19 LAB — BACTERIA UR CULT: NORMAL

## 2018-07-20 ENCOUNTER — OUTPATIENT (OUTPATIENT)
Dept: OUTPATIENT SERVICES | Facility: HOSPITAL | Age: 78
LOS: 1 days | Discharge: HOME | End: 2018-07-20

## 2018-07-20 DIAGNOSIS — R80.8 OTHER PROTEINURIA: ICD-10-CM

## 2018-07-27 ENCOUNTER — OUTPATIENT (OUTPATIENT)
Dept: OUTPATIENT SERVICES | Facility: HOSPITAL | Age: 78
LOS: 1 days | Discharge: HOME | End: 2018-07-27

## 2018-07-27 DIAGNOSIS — E11.9 TYPE 2 DIABETES MELLITUS WITHOUT COMPLICATIONS: ICD-10-CM

## 2018-07-27 DIAGNOSIS — G45.9 TRANSIENT CEREBRAL ISCHEMIC ATTACK, UNSPECIFIED: ICD-10-CM

## 2018-08-01 ENCOUNTER — MESSAGE (OUTPATIENT)
Age: 78
End: 2018-08-01

## 2018-08-01 DIAGNOSIS — N28.1 CYST OF KIDNEY, ACQUIRED: ICD-10-CM

## 2018-08-10 ENCOUNTER — OUTPATIENT (OUTPATIENT)
Dept: OUTPATIENT SERVICES | Facility: HOSPITAL | Age: 78
LOS: 1 days | Discharge: HOME | End: 2018-08-10

## 2018-08-10 DIAGNOSIS — N28.1 CYST OF KIDNEY, ACQUIRED: ICD-10-CM

## 2018-08-17 ENCOUNTER — MESSAGE (OUTPATIENT)
Age: 78
End: 2018-08-17

## 2018-08-22 ENCOUNTER — RX RENEWAL (OUTPATIENT)
Age: 78
End: 2018-08-22

## 2018-08-23 ENCOUNTER — APPOINTMENT (OUTPATIENT)
Dept: UROLOGY | Facility: CLINIC | Age: 78
End: 2018-08-23

## 2018-08-23 ENCOUNTER — MESSAGE (OUTPATIENT)
Age: 78
End: 2018-08-23

## 2018-09-05 ENCOUNTER — OUTPATIENT (OUTPATIENT)
Dept: OUTPATIENT SERVICES | Facility: HOSPITAL | Age: 78
LOS: 1 days | Discharge: HOME | End: 2018-09-05

## 2018-09-05 DIAGNOSIS — N45.2 ORCHITIS: ICD-10-CM

## 2018-09-11 ENCOUNTER — APPOINTMENT (OUTPATIENT)
Dept: UROLOGY | Facility: CLINIC | Age: 78
End: 2018-09-11
Payer: MEDICARE

## 2018-09-11 VITALS
DIASTOLIC BLOOD PRESSURE: 62 MMHG | HEIGHT: 69.5 IN | HEART RATE: 49 BPM | WEIGHT: 200 LBS | BODY MASS INDEX: 28.96 KG/M2 | SYSTOLIC BLOOD PRESSURE: 149 MMHG

## 2018-09-11 PROCEDURE — 99214 OFFICE O/P EST MOD 30 MIN: CPT

## 2018-09-11 RX ORDER — CLOPIDOGREL 75 MG/1
75 TABLET, FILM COATED ORAL
Refills: 0 | Status: ACTIVE | COMMUNITY

## 2018-09-11 RX ORDER — APIXABAN 2.5 MG/1
2.5 TABLET, FILM COATED ORAL
Refills: 0 | Status: ACTIVE | COMMUNITY

## 2018-09-11 RX ORDER — ASPIRIN 81 MG/1
81 TABLET, COATED ORAL
Refills: 0 | Status: ACTIVE | COMMUNITY

## 2018-09-13 ENCOUNTER — OUTPATIENT (OUTPATIENT)
Dept: OUTPATIENT SERVICES | Facility: HOSPITAL | Age: 78
LOS: 1 days | Discharge: HOME | End: 2018-09-13

## 2018-09-13 VITALS
DIASTOLIC BLOOD PRESSURE: 65 MMHG | TEMPERATURE: 98 F | SYSTOLIC BLOOD PRESSURE: 140 MMHG | RESPIRATION RATE: 17 BRPM | WEIGHT: 197.53 LBS | HEART RATE: 50 BPM | HEIGHT: 69 IN | OXYGEN SATURATION: 98 %

## 2018-09-13 DIAGNOSIS — Z01.818 ENCOUNTER FOR OTHER PREPROCEDURAL EXAMINATION: ICD-10-CM

## 2018-09-13 DIAGNOSIS — N30.10 INTERSTITIAL CYSTITIS (CHRONIC) WITHOUT HEMATURIA: ICD-10-CM

## 2018-09-13 DIAGNOSIS — Z98.61 CORONARY ANGIOPLASTY STATUS: Chronic | ICD-10-CM

## 2018-09-13 DIAGNOSIS — N32.9 BLADDER DISORDER, UNSPECIFIED: ICD-10-CM

## 2018-09-13 DIAGNOSIS — Z98.41 CATARACT EXTRACTION STATUS, RIGHT EYE: Chronic | ICD-10-CM

## 2018-09-13 DIAGNOSIS — Z98.890 OTHER SPECIFIED POSTPROCEDURAL STATES: Chronic | ICD-10-CM

## 2018-09-13 DIAGNOSIS — Z46.82 ENCOUNTER FOR FITTING AND ADJUSTMENT OF NON-VASCULAR CATHETER: Chronic | ICD-10-CM

## 2018-09-13 DIAGNOSIS — Z95.810 PRESENCE OF AUTOMATIC (IMPLANTABLE) CARDIAC DEFIBRILLATOR: Chronic | ICD-10-CM

## 2018-09-13 LAB
ALBUMIN SERPL ELPH-MCNC: 3.5 G/DL — SIGNIFICANT CHANGE UP (ref 3.5–5.2)
ALP SERPL-CCNC: 177 U/L — HIGH (ref 30–115)
ALT FLD-CCNC: 8 U/L — SIGNIFICANT CHANGE UP (ref 0–41)
ANION GAP SERPL CALC-SCNC: 15 MMOL/L — HIGH (ref 7–14)
APPEARANCE UR: ABNORMAL
APTT BLD: 38.9 SEC — SIGNIFICANT CHANGE UP (ref 27–39.2)
AST SERPL-CCNC: 8 U/L — SIGNIFICANT CHANGE UP (ref 0–41)
BACTERIA # UR AUTO: ABNORMAL /HPF
BASOPHILS # BLD AUTO: 0.04 K/UL — SIGNIFICANT CHANGE UP (ref 0–0.2)
BASOPHILS NFR BLD AUTO: 0.4 % — SIGNIFICANT CHANGE UP (ref 0–1)
BILIRUB SERPL-MCNC: 0.3 MG/DL — SIGNIFICANT CHANGE UP (ref 0.2–1.2)
BILIRUB UR-MCNC: NEGATIVE — SIGNIFICANT CHANGE UP
BUN SERPL-MCNC: 20 MG/DL — SIGNIFICANT CHANGE UP (ref 10–20)
CALCIUM SERPL-MCNC: 9 MG/DL — SIGNIFICANT CHANGE UP (ref 8.5–10.1)
CHLORIDE SERPL-SCNC: 103 MMOL/L — SIGNIFICANT CHANGE UP (ref 98–110)
CO2 SERPL-SCNC: 20 MMOL/L — SIGNIFICANT CHANGE UP (ref 17–32)
COLOR SPEC: ABNORMAL
CREAT SERPL-MCNC: 1.2 MG/DL — SIGNIFICANT CHANGE UP (ref 0.7–1.5)
DIFF PNL FLD: ABNORMAL
EOSINOPHIL # BLD AUTO: 0.86 K/UL — HIGH (ref 0–0.7)
EOSINOPHIL NFR BLD AUTO: 7.6 % — SIGNIFICANT CHANGE UP (ref 0–8)
ESTIMATED AVERAGE GLUCOSE: 163 MG/DL — HIGH (ref 68–114)
GLUCOSE SERPL-MCNC: 133 MG/DL — HIGH (ref 70–99)
GLUCOSE UR QL: 250
HBA1C BLD-MCNC: 7.3 % — HIGH (ref 4–5.6)
HCT VFR BLD CALC: 33.7 % — LOW (ref 42–52)
HGB BLD-MCNC: 10.8 G/DL — LOW (ref 14–18)
IMM GRANULOCYTES NFR BLD AUTO: 0.8 % — HIGH (ref 0.1–0.3)
INR BLD: 1.42 RATIO — HIGH (ref 0.65–1.3)
KETONES UR-MCNC: ABNORMAL
LEUKOCYTE ESTERASE UR-ACNC: ABNORMAL
LYMPHOCYTES # BLD AUTO: 2.36 K/UL — SIGNIFICANT CHANGE UP (ref 1.2–3.4)
LYMPHOCYTES # BLD AUTO: 20.8 % — SIGNIFICANT CHANGE UP (ref 20.5–51.1)
MCHC RBC-ENTMCNC: 30.7 PG — SIGNIFICANT CHANGE UP (ref 27–31)
MCHC RBC-ENTMCNC: 32 G/DL — SIGNIFICANT CHANGE UP (ref 32–37)
MCV RBC AUTO: 95.7 FL — HIGH (ref 80–94)
MONOCYTES # BLD AUTO: 1 K/UL — HIGH (ref 0.1–0.6)
MONOCYTES NFR BLD AUTO: 8.8 % — SIGNIFICANT CHANGE UP (ref 1.7–9.3)
NEUTROPHILS # BLD AUTO: 6.98 K/UL — HIGH (ref 1.4–6.5)
NEUTROPHILS NFR BLD AUTO: 61.6 % — SIGNIFICANT CHANGE UP (ref 42.2–75.2)
NITRITE UR-MCNC: NEGATIVE — SIGNIFICANT CHANGE UP
NRBC # BLD: 0 /100 WBCS — SIGNIFICANT CHANGE UP (ref 0–0)
PH UR: 6 — SIGNIFICANT CHANGE UP (ref 5–8)
PLATELET # BLD AUTO: 417 K/UL — HIGH (ref 130–400)
POTASSIUM SERPL-MCNC: 5.4 MMOL/L — HIGH (ref 3.5–5)
POTASSIUM SERPL-SCNC: 5.4 MMOL/L — HIGH (ref 3.5–5)
PROT SERPL-MCNC: 7.2 G/DL — SIGNIFICANT CHANGE UP (ref 6–8)
PROT UR-MCNC: >=300
PROTHROM AB SERPL-ACNC: 15.3 SEC — HIGH (ref 9.95–12.87)
RBC # BLD: 3.52 M/UL — LOW (ref 4.7–6.1)
RBC # FLD: 13.2 % — SIGNIFICANT CHANGE UP (ref 11.5–14.5)
RBC CASTS # UR COMP ASSIST: >50 /HPF
SODIUM SERPL-SCNC: 138 MMOL/L — SIGNIFICANT CHANGE UP (ref 135–146)
SP GR SPEC: >=1.03 — SIGNIFICANT CHANGE UP (ref 1.01–1.03)
UROBILINOGEN FLD QL: 0.2 — SIGNIFICANT CHANGE UP (ref 0.2–0.2)
WBC # BLD: 11.33 K/UL — HIGH (ref 4.8–10.8)
WBC # FLD AUTO: 11.33 K/UL — HIGH (ref 4.8–10.8)
WBC UR QL: >50 /HPF

## 2018-09-13 RX ORDER — ATORVASTATIN CALCIUM 80 MG/1
1 TABLET, FILM COATED ORAL
Qty: 0 | Refills: 0 | COMMUNITY

## 2018-09-13 NOTE — H&P PST ADULT - VISION (WITH CORRECTIVE LENSES IF THE PATIENT USUALLY WEARS THEM):
DIPLOPIA RIGHT EYE/Partially impaired: cannot see medication labels or newsprint, but can see obstacles in path, and the surrounding layout; can count fingers at arm's length

## 2018-09-13 NOTE — H&P PST ADULT - PMH
Afib    BPH (benign prostatic hyperplasia)    CAD (coronary artery disease)  CARD STENT X2 4/2018  CHF (congestive heart failure)  COMBINED SYSTOLIC & DIASTOLIC  CKD (chronic kidney disease)  UNKNOWN STAGE  Dementia    Diplopia  RIGHT EYE-WEARS PATCH S/P TIA  DM (diabetes mellitus)    Glaucoma    Heart attack  1/2018  HTN (hypertension)    Hydrocele in adult    Hypercholesteremia    Sepsis  1/18 FROM INFECTED GALLBLADDER  TIA (transient ischemic attack)  X2 JULY 2018 Afib    BPH (benign prostatic hyperplasia)    CAD (coronary artery disease)  CARD STENT X2 4/2018  CHF (congestive heart failure)  COMBINED SYSTOLIC & DIASTOLIC  Cholelithiasis    CKD (chronic kidney disease)  UNKNOWN STAGE  Dementia    Diplopia  RIGHT EYE-WEARS PATCH S/P TIA  DM (diabetes mellitus)    Glaucoma    Heart attack  1/2018  HTN (hypertension)    Hydrocele in adult    Hypercholesteremia    Sepsis  1/18 FROM INFECTED GALLBLADDER  TIA (transient ischemic attack)  X2 JULY 2018 Afib    BPH (benign prostatic hyperplasia)    CAD (coronary artery disease)  CARD STENT X2 4/2018  Cardiomyopathy    CHF (congestive heart failure)  COMBINED SYSTOLIC & DIASTOLIC  Cholelithiasis    CKD (chronic kidney disease)  UNKNOWN STAGE  Dementia    Diplopia  RIGHT EYE-WEARS PATCH S/P TIA  DM (diabetes mellitus)    Glaucoma    Heart attack  1/2018  HTN (hypertension)    Hydrocele in adult    Hypercholesteremia    Sepsis  1/18 FROM INFECTED GALLBLADDER  TIA (transient ischemic attack)  X2 JULY 2018 Afib    BPH (benign prostatic hyperplasia)    CAD (coronary artery disease)  CARD STENT X2 4/2018  Cardiomyopathy    CHF (congestive heart failure)  COMBINED SYSTOLIC & DIASTOLIC  Cholelithiasis    Dementia    Diplopia  RIGHT EYE-WEARS PATCH S/P TIA  DM (diabetes mellitus)    Glaucoma    Heart attack  1/2018  HTN (hypertension)    Hydrocele in adult    Hypercholesteremia    Renal insufficiency    Sepsis  1/18 FROM INFECTED GALLBLADDER  TIA (transient ischemic attack)  X2 JULY 2018

## 2018-09-13 NOTE — H&P PST ADULT - NSANTHOSAYNRD_GEN_A_CORE
No. MARCIA screening performed.  STOP BANG Legend: 0-2 = LOW Risk; 3-4 = INTERMEDIATE Risk; 5-8 = HIGH Risk

## 2018-09-13 NOTE — H&P PST ADULT - PSH
AICD (automatic cardioverter/defibrillator) present  [employer]  Encounter for biliary drainage tube placement  1/2018  H/O bilateral cataract extraction  LEFT- 1/18 RIGHT 6 YRS AGO  H/O coronary angioplasty  WITH STENT X2 (4/2018)  History of prostate surgery  5/17

## 2018-09-13 NOTE — H&P PST ADULT - HISTORY OF PRESENT ILLNESS
PATIENT DENIES CHEST PAIN, SHORTNESS OF BREATH, PALPITATIONS, COUGHING, FEVER, DYSURIA.  CAN WALK UP 1 FLIGHT OF STEPS WITHOUT SOB, AND REPORTS GREENE.

## 2018-09-13 NOTE — H&P PST ADULT - REASON FOR ADMISSION
77 Y/O MALE HERE FOR PRE-ADMISSION SURGICAL TESTING WITH WIFE. PATIENT REPORTS H/O BPH. SONOGRAM SHOWED A SUSPICIOUS LESION ON KIDNEY. FURTHER W/ AND CT SCAN REVEALED IT WAS A BLOCKAGE BETWEEN THE KIDNEY AND THE BLADDER.   NOW FOR SCHEDULED PROCEDURE.

## 2018-09-14 LAB
CULTURE RESULTS: NO GROWTH — SIGNIFICANT CHANGE UP
SPECIMEN SOURCE: SIGNIFICANT CHANGE UP

## 2018-09-18 PROBLEM — A41.9 SEPSIS, UNSPECIFIED ORGANISM: Chronic | Status: ACTIVE | Noted: 2018-09-13

## 2018-09-18 PROBLEM — I25.10 ATHEROSCLEROTIC HEART DISEASE OF NATIVE CORONARY ARTERY WITHOUT ANGINA PECTORIS: Chronic | Status: ACTIVE | Noted: 2018-09-13

## 2018-09-18 PROBLEM — G45.9 TRANSIENT CEREBRAL ISCHEMIC ATTACK, UNSPECIFIED: Chronic | Status: ACTIVE | Noted: 2018-09-13

## 2018-09-18 PROBLEM — N43.3 HYDROCELE, UNSPECIFIED: Chronic | Status: ACTIVE | Noted: 2018-09-13

## 2018-09-18 PROBLEM — K80.20 CALCULUS OF GALLBLADDER WITHOUT CHOLECYSTITIS WITHOUT OBSTRUCTION: Chronic | Status: ACTIVE | Noted: 2018-09-13

## 2018-09-18 PROBLEM — E78.00 PURE HYPERCHOLESTEROLEMIA, UNSPECIFIED: Chronic | Status: ACTIVE | Noted: 2018-09-13

## 2018-09-18 PROBLEM — F03.90 UNSPECIFIED DEMENTIA WITHOUT BEHAVIORAL DISTURBANCE: Chronic | Status: ACTIVE | Noted: 2018-09-13

## 2018-09-18 PROBLEM — I21.9 ACUTE MYOCARDIAL INFARCTION, UNSPECIFIED: Chronic | Status: ACTIVE | Noted: 2018-09-13

## 2018-09-18 PROBLEM — E11.9 TYPE 2 DIABETES MELLITUS WITHOUT COMPLICATIONS: Chronic | Status: ACTIVE | Noted: 2018-09-13

## 2018-09-18 PROBLEM — I48.91 UNSPECIFIED ATRIAL FIBRILLATION: Chronic | Status: ACTIVE | Noted: 2018-09-13

## 2018-09-18 PROBLEM — H40.9 UNSPECIFIED GLAUCOMA: Chronic | Status: ACTIVE | Noted: 2018-09-13

## 2018-09-18 PROBLEM — I42.9 CARDIOMYOPATHY, UNSPECIFIED: Chronic | Status: ACTIVE | Noted: 2018-09-13

## 2018-09-18 PROBLEM — I10 ESSENTIAL (PRIMARY) HYPERTENSION: Chronic | Status: ACTIVE | Noted: 2018-09-13

## 2018-09-18 PROBLEM — I50.9 HEART FAILURE, UNSPECIFIED: Chronic | Status: ACTIVE | Noted: 2018-09-13

## 2018-09-18 PROBLEM — N40.0 BENIGN PROSTATIC HYPERPLASIA WITHOUT LOWER URINARY TRACT SYMPTOMS: Chronic | Status: ACTIVE | Noted: 2018-09-13

## 2018-09-21 NOTE — ASU PATIENT PROFILE, ADULT - PSH
AICD (automatic cardioverter/defibrillator) present  [employer]  Encounter for biliary drainage tube placement  1/2018  H/O bilateral cataract extraction  LEFT- 1/18 RIGHT 6 YRS AGO  H/O coronary angioplasty  WITH STENT X2 (4/2018)  H/O flexible sigmoidoscopy  2015  History of prostate surgery  5/17

## 2018-09-21 NOTE — ASU PATIENT PROFILE, ADULT - PMH
Afib    BPH (benign prostatic hyperplasia)    CAD (coronary artery disease)  CARD STENT X2 4/2018  Cardiomyopathy    CHF (congestive heart failure)  COMBINED SYSTOLIC & DIASTOLIC  Cholelithiasis    Dementia    Diabetes    Diplopia  RIGHT EYE-WEARS PATCH S/P TIA  DM (diabetes mellitus)    Glaucoma    Heart attack  1/2018  HTN (hypertension)    Hydrocele in adult    Hypercholesteremia    Renal insufficiency    Sepsis  1/18 FROM INFECTED GALLBLADDER  TIA (transient ischemic attack)  X2 JULY 2018

## 2018-09-24 ENCOUNTER — APPOINTMENT (OUTPATIENT)
Dept: UROLOGY | Facility: HOSPITAL | Age: 78
End: 2018-09-24

## 2018-09-24 ENCOUNTER — INPATIENT (INPATIENT)
Facility: HOSPITAL | Age: 78
LOS: 3 days | Discharge: HOME | End: 2018-09-28
Attending: UROLOGY | Admitting: UROLOGY
Payer: MEDICARE

## 2018-09-24 VITALS
DIASTOLIC BLOOD PRESSURE: 71 MMHG | OXYGEN SATURATION: 97 % | SYSTOLIC BLOOD PRESSURE: 161 MMHG | WEIGHT: 190.04 LBS | TEMPERATURE: 97 F | HEIGHT: 70 IN | HEART RATE: 50 BPM | RESPIRATION RATE: 15 BRPM

## 2018-09-24 DIAGNOSIS — Z98.61 CORONARY ANGIOPLASTY STATUS: Chronic | ICD-10-CM

## 2018-09-24 DIAGNOSIS — Z46.82 ENCOUNTER FOR FITTING AND ADJUSTMENT OF NON-VASCULAR CATHETER: Chronic | ICD-10-CM

## 2018-09-24 DIAGNOSIS — N32.81 OVERACTIVE BLADDER: ICD-10-CM

## 2018-09-24 DIAGNOSIS — N13.30 UNSPECIFIED HYDRONEPHROSIS: ICD-10-CM

## 2018-09-24 DIAGNOSIS — Z98.890 OTHER SPECIFIED POSTPROCEDURAL STATES: Chronic | ICD-10-CM

## 2018-09-24 DIAGNOSIS — Z98.41 CATARACT EXTRACTION STATUS, RIGHT EYE: Chronic | ICD-10-CM

## 2018-09-24 DIAGNOSIS — Z95.810 PRESENCE OF AUTOMATIC (IMPLANTABLE) CARDIAC DEFIBRILLATOR: Chronic | ICD-10-CM

## 2018-09-24 LAB
GLUCOSE BLDC GLUCOMTR-MCNC: 153 MG/DL — HIGH (ref 70–99)
GLUCOSE BLDC GLUCOMTR-MCNC: 327 MG/DL — HIGH (ref 70–99)

## 2018-09-24 PROCEDURE — 52224 CYSTOSCOPY AND TREATMENT: CPT | Mod: 59

## 2018-09-24 PROCEDURE — 52214 CYSTOSCOPY AND TREATMENT: CPT

## 2018-09-24 RX ORDER — LISINOPRIL 2.5 MG/1
20 TABLET ORAL DAILY
Qty: 0 | Refills: 0 | Status: DISCONTINUED | OUTPATIENT
Start: 2018-09-24 | End: 2018-09-28

## 2018-09-24 RX ORDER — OXYBUTYNIN CHLORIDE 5 MG
10 TABLET ORAL
Qty: 0 | Refills: 0 | Status: DISCONTINUED | OUTPATIENT
Start: 2018-09-24 | End: 2018-09-28

## 2018-09-24 RX ORDER — METFORMIN HYDROCHLORIDE 850 MG/1
2000 TABLET ORAL
Qty: 0 | Refills: 0 | Status: DISCONTINUED | OUTPATIENT
Start: 2018-09-24 | End: 2018-09-24

## 2018-09-24 RX ORDER — PHENAZOPYRIDINE HCL 100 MG
100 TABLET ORAL THREE TIMES A DAY
Qty: 0 | Refills: 0 | Status: DISCONTINUED | OUTPATIENT
Start: 2018-09-24 | End: 2018-09-28

## 2018-09-24 RX ORDER — METFORMIN HYDROCHLORIDE 850 MG/1
2000 TABLET ORAL DAILY
Qty: 0 | Refills: 0 | Status: DISCONTINUED | OUTPATIENT
Start: 2018-09-25 | End: 2018-09-25

## 2018-09-24 RX ORDER — NIFEDIPINE 30 MG
90 TABLET, EXTENDED RELEASE 24 HR ORAL DAILY
Qty: 0 | Refills: 0 | Status: DISCONTINUED | OUTPATIENT
Start: 2018-09-24 | End: 2018-09-28

## 2018-09-24 RX ORDER — ONDANSETRON 8 MG/1
4 TABLET, FILM COATED ORAL ONCE
Qty: 0 | Refills: 0 | Status: DISCONTINUED | OUTPATIENT
Start: 2018-09-24 | End: 2018-09-28

## 2018-09-24 RX ORDER — LATANOPROST 0.05 MG/ML
1 SOLUTION/ DROPS OPHTHALMIC; TOPICAL DAILY
Qty: 0 | Refills: 0 | Status: DISCONTINUED | OUTPATIENT
Start: 2018-09-24 | End: 2018-09-28

## 2018-09-24 RX ORDER — POTASSIUM CHLORIDE 20 MEQ
20 PACKET (EA) ORAL DAILY
Qty: 0 | Refills: 0 | Status: DISCONTINUED | OUTPATIENT
Start: 2018-09-24 | End: 2018-09-28

## 2018-09-24 RX ORDER — DOCUSATE SODIUM 100 MG
100 CAPSULE ORAL THREE TIMES A DAY
Qty: 0 | Refills: 0 | Status: DISCONTINUED | OUTPATIENT
Start: 2018-09-24 | End: 2018-09-28

## 2018-09-24 RX ORDER — METFORMIN HYDROCHLORIDE 850 MG/1
2000 TABLET ORAL DAILY
Qty: 0 | Refills: 0 | Status: DISCONTINUED | OUTPATIENT
Start: 2018-09-24 | End: 2018-09-24

## 2018-09-24 RX ORDER — SODIUM CHLORIDE 9 MG/ML
1000 INJECTION, SOLUTION INTRAVENOUS
Qty: 0 | Refills: 0 | Status: DISCONTINUED | OUTPATIENT
Start: 2018-09-24 | End: 2018-09-25

## 2018-09-24 RX ORDER — DOFETILIDE 0.25 MG/1
500 CAPSULE ORAL
Qty: 0 | Refills: 0 | Status: DISCONTINUED | OUTPATIENT
Start: 2018-09-24 | End: 2018-09-28

## 2018-09-24 RX ORDER — MORPHINE SULFATE 50 MG/1
2 CAPSULE, EXTENDED RELEASE ORAL
Qty: 0 | Refills: 0 | Status: DISCONTINUED | OUTPATIENT
Start: 2018-09-24 | End: 2018-09-24

## 2018-09-24 RX ORDER — METOPROLOL TARTRATE 50 MG
50 TABLET ORAL DAILY
Qty: 0 | Refills: 0 | Status: DISCONTINUED | OUTPATIENT
Start: 2018-09-24 | End: 2018-09-28

## 2018-09-24 RX ORDER — ATORVASTATIN CALCIUM 80 MG/1
40 TABLET, FILM COATED ORAL AT BEDTIME
Qty: 0 | Refills: 0 | Status: DISCONTINUED | OUTPATIENT
Start: 2018-09-24 | End: 2018-09-28

## 2018-09-24 RX ORDER — MORPHINE SULFATE 50 MG/1
2 CAPSULE, EXTENDED RELEASE ORAL ONCE
Qty: 0 | Refills: 0 | Status: COMPLETED | OUTPATIENT
Start: 2018-09-24 | End: 2018-09-24

## 2018-09-24 RX ORDER — BRIMONIDINE TARTRATE 2 MG/MG
1 SOLUTION/ DROPS OPHTHALMIC AT BEDTIME
Qty: 0 | Refills: 0 | Status: DISCONTINUED | OUTPATIENT
Start: 2018-09-24 | End: 2018-09-28

## 2018-09-24 RX ORDER — MORPHINE SULFATE 50 MG/1
4 CAPSULE, EXTENDED RELEASE ORAL EVERY 4 HOURS
Qty: 0 | Refills: 0 | Status: DISCONTINUED | OUTPATIENT
Start: 2018-09-24 | End: 2018-09-28

## 2018-09-24 RX ORDER — FUROSEMIDE 40 MG
40 TABLET ORAL DAILY
Qty: 0 | Refills: 0 | Status: DISCONTINUED | OUTPATIENT
Start: 2018-09-24 | End: 2018-09-28

## 2018-09-24 RX ADMIN — MORPHINE SULFATE 4 MILLIGRAM(S): 50 CAPSULE, EXTENDED RELEASE ORAL at 18:25

## 2018-09-24 RX ADMIN — ATORVASTATIN CALCIUM 40 MILLIGRAM(S): 80 TABLET, FILM COATED ORAL at 21:07

## 2018-09-24 RX ADMIN — SODIUM CHLORIDE 75 MILLILITER(S): 9 INJECTION, SOLUTION INTRAVENOUS at 16:23

## 2018-09-24 RX ADMIN — DOFETILIDE 500 MICROGRAM(S): 0.25 CAPSULE ORAL at 18:25

## 2018-09-24 RX ADMIN — MORPHINE SULFATE 2 MILLIGRAM(S): 50 CAPSULE, EXTENDED RELEASE ORAL at 16:22

## 2018-09-24 RX ADMIN — BRIMONIDINE TARTRATE 1 DROP(S): 2 SOLUTION/ DROPS OPHTHALMIC at 21:07

## 2018-09-24 RX ADMIN — Medication 100 MILLIGRAM(S): at 21:07

## 2018-09-24 RX ADMIN — LATANOPROST 1 DROP(S): 0.05 SOLUTION/ DROPS OPHTHALMIC; TOPICAL at 19:01

## 2018-09-24 RX ADMIN — MORPHINE SULFATE 2 MILLIGRAM(S): 50 CAPSULE, EXTENDED RELEASE ORAL at 14:52

## 2018-09-24 RX ADMIN — MORPHINE SULFATE 2 MILLIGRAM(S): 50 CAPSULE, EXTENDED RELEASE ORAL at 14:25

## 2018-09-24 RX ADMIN — SODIUM CHLORIDE 100 MILLILITER(S): 9 INJECTION, SOLUTION INTRAVENOUS at 14:26

## 2018-09-24 RX ADMIN — Medication 10 MILLIGRAM(S): at 18:27

## 2018-09-24 NOTE — BRIEF OPERATIVE NOTE - POST-OP DX
Cystitis with hematuria  09/24/2018    Active  Pablo LUEVANO  Hydronephrosis of left kidney  09/24/2018    Active  Pablo LUEVANO

## 2018-09-24 NOTE — BRIEF OPERATIVE NOTE - PROCEDURE
<<-----Click on this checkbox to enter Procedure Cystoscopy and cauterization of bladder  09/24/2018    Active  BARBARA

## 2018-09-24 NOTE — BRIEF OPERATIVE NOTE - PRE-OP DX
Hematuria, gross  09/24/2018    Active  Pablo LUEVANO  Hydronephrosis of left kidney  09/24/2018  thick distal left ureter  Active  Pablo LUEVANO

## 2018-09-25 PROBLEM — N32.81 DETRUSOR INSTABILITY: Status: ACTIVE | Noted: 2017-08-24

## 2018-09-25 PROBLEM — N13.30 HYDRONEPHROSIS, LEFT: Status: ACTIVE | Noted: 2018-09-11

## 2018-09-25 LAB
ANION GAP SERPL CALC-SCNC: 15 MMOL/L — HIGH (ref 7–14)
BASOPHILS # BLD AUTO: 0.04 K/UL — SIGNIFICANT CHANGE UP (ref 0–0.2)
BASOPHILS NFR BLD AUTO: 0.3 % — SIGNIFICANT CHANGE UP (ref 0–1)
BUN SERPL-MCNC: 26 MG/DL — HIGH (ref 10–20)
CALCIUM SERPL-MCNC: 7.9 MG/DL — LOW (ref 8.5–10.1)
CHLORIDE SERPL-SCNC: 104 MMOL/L — SIGNIFICANT CHANGE UP (ref 98–110)
CO2 SERPL-SCNC: 19 MMOL/L — SIGNIFICANT CHANGE UP (ref 17–32)
CREAT SERPL-MCNC: 1.4 MG/DL — SIGNIFICANT CHANGE UP (ref 0.7–1.5)
EOSINOPHIL # BLD AUTO: 0.26 K/UL — SIGNIFICANT CHANGE UP (ref 0–0.7)
EOSINOPHIL NFR BLD AUTO: 1.8 % — SIGNIFICANT CHANGE UP (ref 0–8)
GLUCOSE BLDC GLUCOMTR-MCNC: 169 MG/DL — HIGH (ref 70–99)
GLUCOSE BLDC GLUCOMTR-MCNC: 202 MG/DL — HIGH (ref 70–99)
GLUCOSE BLDC GLUCOMTR-MCNC: 239 MG/DL — HIGH (ref 70–99)
GLUCOSE BLDC GLUCOMTR-MCNC: 305 MG/DL — HIGH (ref 70–99)
GLUCOSE SERPL-MCNC: 165 MG/DL — HIGH (ref 70–99)
HCT VFR BLD CALC: 27 % — LOW (ref 42–52)
HGB BLD-MCNC: 8.7 G/DL — LOW (ref 14–18)
IMM GRANULOCYTES NFR BLD AUTO: 1 % — HIGH (ref 0.1–0.3)
LYMPHOCYTES # BLD AUTO: 1.9 K/UL — SIGNIFICANT CHANGE UP (ref 1.2–3.4)
LYMPHOCYTES # BLD AUTO: 13.4 % — LOW (ref 20.5–51.1)
MCHC RBC-ENTMCNC: 30.7 PG — SIGNIFICANT CHANGE UP (ref 27–31)
MCHC RBC-ENTMCNC: 32.2 G/DL — SIGNIFICANT CHANGE UP (ref 32–37)
MCV RBC AUTO: 95.4 FL — HIGH (ref 80–94)
MONOCYTES # BLD AUTO: 1.13 K/UL — HIGH (ref 0.1–0.6)
MONOCYTES NFR BLD AUTO: 8 % — SIGNIFICANT CHANGE UP (ref 1.7–9.3)
NEUTROPHILS # BLD AUTO: 10.7 K/UL — HIGH (ref 1.4–6.5)
NEUTROPHILS NFR BLD AUTO: 75.5 % — HIGH (ref 42.2–75.2)
NRBC # BLD: 0 /100 WBCS — SIGNIFICANT CHANGE UP (ref 0–0)
PLATELET # BLD AUTO: 348 K/UL — SIGNIFICANT CHANGE UP (ref 130–400)
POTASSIUM SERPL-MCNC: 5.1 MMOL/L — HIGH (ref 3.5–5)
POTASSIUM SERPL-SCNC: 5.1 MMOL/L — HIGH (ref 3.5–5)
RBC # BLD: 2.83 M/UL — LOW (ref 4.7–6.1)
RBC # FLD: 13.6 % — SIGNIFICANT CHANGE UP (ref 11.5–14.5)
SODIUM SERPL-SCNC: 138 MMOL/L — SIGNIFICANT CHANGE UP (ref 135–146)
WBC # BLD: 14.17 K/UL — HIGH (ref 4.8–10.8)
WBC # FLD AUTO: 14.17 K/UL — HIGH (ref 4.8–10.8)

## 2018-09-25 RX ORDER — ACETAMINOPHEN 500 MG
650 TABLET ORAL EVERY 6 HOURS
Qty: 0 | Refills: 0 | Status: DISCONTINUED | OUTPATIENT
Start: 2018-09-25 | End: 2018-09-25

## 2018-09-25 RX ORDER — ACETAMINOPHEN 500 MG
650 TABLET ORAL EVERY 6 HOURS
Qty: 0 | Refills: 0 | Status: DISCONTINUED | OUTPATIENT
Start: 2018-09-25 | End: 2018-09-28

## 2018-09-25 RX ORDER — METFORMIN HYDROCHLORIDE 850 MG/1
1000 TABLET ORAL
Qty: 0 | Refills: 0 | Status: DISCONTINUED | OUTPATIENT
Start: 2018-09-25 | End: 2018-09-28

## 2018-09-25 RX ORDER — TEMAZEPAM 15 MG/1
15 CAPSULE ORAL AT BEDTIME
Qty: 0 | Refills: 0 | Status: DISCONTINUED | OUTPATIENT
Start: 2018-09-25 | End: 2018-09-28

## 2018-09-25 RX ADMIN — Medication 100 MILLIGRAM(S): at 21:20

## 2018-09-25 RX ADMIN — Medication 10 MILLIGRAM(S): at 05:27

## 2018-09-25 RX ADMIN — DOFETILIDE 500 MICROGRAM(S): 0.25 CAPSULE ORAL at 05:27

## 2018-09-25 RX ADMIN — Medication 10 MILLIGRAM(S): at 11:31

## 2018-09-25 RX ADMIN — Medication 10 MILLIGRAM(S): at 17:02

## 2018-09-25 RX ADMIN — Medication 50 MILLIGRAM(S): at 05:27

## 2018-09-25 RX ADMIN — MORPHINE SULFATE 4 MILLIGRAM(S): 50 CAPSULE, EXTENDED RELEASE ORAL at 14:28

## 2018-09-25 RX ADMIN — Medication 100 MILLIGRAM(S): at 05:27

## 2018-09-25 RX ADMIN — Medication 20 MILLIEQUIVALENT(S): at 11:31

## 2018-09-25 RX ADMIN — Medication 650 MILLIGRAM(S): at 16:26

## 2018-09-25 RX ADMIN — Medication 90 MILLIGRAM(S): at 05:27

## 2018-09-25 RX ADMIN — LISINOPRIL 20 MILLIGRAM(S): 2.5 TABLET ORAL at 05:27

## 2018-09-25 RX ADMIN — DOFETILIDE 500 MICROGRAM(S): 0.25 CAPSULE ORAL at 17:03

## 2018-09-25 RX ADMIN — Medication 40 MILLIGRAM(S): at 05:27

## 2018-09-25 RX ADMIN — METFORMIN HYDROCHLORIDE 2000 MILLIGRAM(S): 850 TABLET ORAL at 11:31

## 2018-09-25 RX ADMIN — TEMAZEPAM 15 MILLIGRAM(S): 15 CAPSULE ORAL at 21:20

## 2018-09-25 RX ADMIN — BRIMONIDINE TARTRATE 1 DROP(S): 2 SOLUTION/ DROPS OPHTHALMIC at 21:20

## 2018-09-25 RX ADMIN — Medication 100 MILLIGRAM(S): at 14:07

## 2018-09-25 RX ADMIN — METFORMIN HYDROCHLORIDE 1000 MILLIGRAM(S): 850 TABLET ORAL at 16:37

## 2018-09-25 RX ADMIN — Medication 650 MILLIGRAM(S): at 14:50

## 2018-09-25 RX ADMIN — ATORVASTATIN CALCIUM 40 MILLIGRAM(S): 80 TABLET, FILM COATED ORAL at 21:20

## 2018-09-25 NOTE — PROGRESS NOTE ADULT - SUBJECTIVE AND OBJECTIVE BOX
Hospital  DAY  #1    Vital Signs Last 24 Hrs  T(C): 37.1 (25 Sep 2018 06:00), Max: 38 (24 Sep 2018 22:10)  T(F): 98.7 (25 Sep 2018 06:00), Max: 100.4 (24 Sep 2018 22:10)  HR: 67 (25 Sep 2018 06:00) (50 - 67)  BP: 140/65 (25 Sep 2018 06:00) (140/65 - 214/97)  BP(mean): --  RR: 18 (25 Sep 2018 06:00) (15 - 18)  SpO2: 98% (24 Sep 2018 15:30) (94% - 100%)      SUBJECTIVE: Pt seen and examined .  77 y/o male admitted for hematuria ---     Alert :  [x ] YES [ ] NO // awake :  [x ] YES [ ] NO // cooperative :  [x ] YES [ ] NO  Pain: [ ] YES x[ ] NO  Pain (0-10):               Appetite [x ] yes  [ ] no/ Nausea: [ ] YES [x ] NO / Vomiting: [ ] YES [x ] NO  Neuro : Oriented  [x ] YES [ ] NO  SOB: [ ]YES [x ] NO  Skin : dry  [x ] YES [ ] NO // moist :  [ ] YES [ ] NO // warm  :[x ] YES [ ] NO  Abdomen : soft [x ]  yes [ ] no / distented : [ ] yes  [x ] no /  global :[x ] yes  [ ] no // flat :  [ ] YES [ ] NO  Back : CVAT  [ ] yes  [x ] no  EXT : edema  [ ] YES [x ] NO  // ROM :  [ ] YES [ ] NO  Urethral cath : 14 F // 16 F // 18 F // 22 F ++  //clear : [x ] YES [ ] NO // bloody  :  [ ] YES [ ] NO  BM :   [ ] YES [x ] NO  Wound : dry  [ ] YES [ ] NO // infection [ ] YES [ ] NO // erythema  [ ] YES [ ] NO          LABS:                        8.7    14.17 )-----------( 348      ( 25 Sep 2018 06:55 )             27.0     09-25    138  |  104  |  26<H>  ----------------------------<  165<H>  5.1<H>   |  19  |  1.4    Ca    7.9<L>      25 Sep 2018 06:55          RADIOLOGY & ADDITIONAL STUDIES:    Assessment :          Plan :

## 2018-09-25 NOTE — CHART NOTE - NSCHARTNOTEFT_GEN_A_CORE
On 9/24 at approx 8:30pm; I was notified by RN for the CBI input backing up with blood tinged fluid however rondon output flowing with deep red output.  I deflated the balloon of its approx 60cc and repositioned rondon catheter by pulling out the tube approx 4cm and advancing it back 4cm.  At this time the input began to flow once again and after a approx 2-3 minutes the output was flowing with occ clots and eventually pinkish output.  Pt tolerated the adjust well.   At 1:15am I visited with pt and the output remains flowing and is pinkisk in color.  Pt is sleeping.

## 2018-09-25 NOTE — CONSULT NOTE ADULT - SUBJECTIVE AND OBJECTIVE BOX
CARDIOLOGY CONSULT NOTE     CHIEF COMPLAINT/REASON FOR CONSULT:    HPI: See below. He had cystoscopy yesterday. No complaints now      PAST MEDICAL & SURGICAL HISTORY:  Diabetes  Renal insufficiency  Cardiomyopathy  Cholelithiasis  Hydrocele in adult  Glaucoma  BPH (benign prostatic hyperplasia)  CAD (coronary artery disease): CARD STENT X2 4/2018  Diplopia: RIGHT EYE-WEARS PATCH S/P TIA  TIA (transient ischemic attack): X2 JULY 2018  Dementia  CHF (congestive heart failure): COMBINED SYSTOLIC &amp; DIASTOLIC  Afib  Heart attack: 1/2018  Sepsis: 1/18 FROM INFECTED GALLBLADDER  Hypercholesteremia  HTN (hypertension)  DM (diabetes mellitus)  H/O flexible sigmoidoscopy: 2015  H/O bilateral cataract extraction: LEFT- 1/18 RIGHT 6 YRS AGO  Encounter for biliary drainage tube placement: 1/2018  H/O coronary angioplasty: WITH STENT X2 (4/2018)  History of prostate surgery: 5/17  AICD (automatic cardioverter/defibrillator) present: [employer]      Cardiac Risks:   [ x]HTN, [x ] DM, [ ] Smoking, [x ] FH,  [x ] Lipids        MEDICATIONS:  MEDICATIONS  (STANDING):  atorvastatin 40 milliGRAM(s) Oral at bedtime  brimonidine 0.2% Ophthalmic Solution 1 Drop(s) Both EYES at bedtime  docusate sodium 100 milliGRAM(s) Oral three times a day  dofetilide 500 MICROGram(s) Oral two times a day  furosemide    Tablet 40 milliGRAM(s) Oral daily  glipiZIDE XL 10 milliGRAM(s) Oral daily  INVOKANA 100 milliGRAM(s) 100 milliGRAM(s) Oral daily  latanoprost 0.005% Ophthalmic Solution 1 Drop(s) Both EYES daily  lisinopril 20 milliGRAM(s) Oral daily  metFORMIN Extended-Release 2000 milliGRAM(s) Oral daily  metoprolol succinate ER 50 milliGRAM(s) Oral daily  NIFEdipine XL 90 milliGRAM(s) Oral daily  oxybutynin 10 milliGRAM(s) Oral two times a day  potassium chloride    Tablet ER 20 milliEquivalent(s) Oral daily      FAMILY HISTORY:      SOCIAL HISTORY:      [ ] Marital status    Allergies    sulfa drugs (Other)    Intolerances    	    REVIEW OF SYSTEMS:  CONSTITUTIONAL: No fever, weight loss, or fatigue  EYES: No eye pain, visual disturbances, or discharge  ENMT:  No difficulty hearing, tinnitus, vertigo; No sinus or throat pain  NECK: No pain or stiffness  RESPIRATORY: No cough, wheezing, chills or hemoptysis; No Shortness of Breath  CARDIOVASCULAR: No chest pain, palpitations, passing out, dizziness, or leg swelling  GASTROINTESTINAL: No abdominal or epigastric pain. No nausea, vomiting, or hematemesis; No diarrhea or constipation. No melena or hematochezia.  GENITOURINARY: No dysuria, frequency, hematuria, or incontinence  NEUROLOGICAL: No headaches, memory loss, loss of strength, numbness, or tremors  SKIN: No itching, burning, rashes, or lesions   	    [ ] All others negative	  [ ] Unable to obtain    PHYSICAL EXAM:  T(C): 37.1 (09-25-18 @ 06:00), Max: 38 (09-24-18 @ 22:10)  HR: 67 (09-25-18 @ 06:00) (50 - 67)  BP: 140/65 (09-25-18 @ 06:00) (140/65 - 214/97)  RR: 18 (09-25-18 @ 06:00) (15 - 18)  SpO2: 98% (09-24-18 @ 15:30) (94% - 100%)  Wt(kg): --  I&O's Summary    24 Sep 2018 07:01  -  25 Sep 2018 07:00  --------------------------------------------------------  IN: 54115 mL / OUT: 84861 mL / NET: -740 mL    25 Sep 2018 07:01  -  25 Sep 2018 10:07  --------------------------------------------------------  IN: 0 mL / OUT: 3500 mL / NET: -3500 mL        Appearance: Normal	  Psychiatry: A & O x 3, Mood & affect appropriate  HEENT:   Normal oral mucosa, PERRL, EOMI	  Lymphatic: No lymphadenopathy  Cardiovascular: Normal S1 S2,RRR, No JVD, No murmurs  Respiratory: Lungs clear to auscultation	  Gastrointestinal:  Soft, Non-tender, + BS	  Skin: No rashes, No ecchymoses, No cyanosis	  Neurologic: Non-focal  Extremities: Normal range of motion, No clubbing, cyanosis or edema  Vascular: Peripheral pulses palpable 2+ bilaterally      ECG:  	< from: 12 Lead ECG (09.13.18 @ 07:17) >  Diagnosis Line AV dual-paced rhythm  Biventricular pacemaker detected  Abnormal ECG    Confirmed by Bala Carr ([id number]) on 9/13/2018 3:44:36 PM    < end of copied text >      	  LABS:	 	    CARDIAC MARKERS:                                    8.7    14.17 )-----------( 348      ( 25 Sep 2018 06:55 )             27.0     09-25    138  |  104  |  26<H>  ----------------------------<  165<H>  5.1<H>   |  19  |  1.4    Ca    7.9<L>      25 Sep 2018 06:55

## 2018-09-26 LAB
GLUCOSE BLDC GLUCOMTR-MCNC: 133 MG/DL — HIGH (ref 70–99)
GLUCOSE BLDC GLUCOMTR-MCNC: 152 MG/DL — HIGH (ref 70–99)
GLUCOSE BLDC GLUCOMTR-MCNC: 193 MG/DL — HIGH (ref 70–99)
GLUCOSE BLDC GLUCOMTR-MCNC: 230 MG/DL — HIGH (ref 70–99)
GLUCOSE BLDC GLUCOMTR-MCNC: 236 MG/DL — HIGH (ref 70–99)
HCT VFR BLD CALC: 24.9 % — LOW (ref 42–52)
HGB BLD-MCNC: 8.1 G/DL — LOW (ref 14–18)
MCHC RBC-ENTMCNC: 30.2 PG — SIGNIFICANT CHANGE UP (ref 27–31)
MCHC RBC-ENTMCNC: 32.5 G/DL — SIGNIFICANT CHANGE UP (ref 32–37)
MCV RBC AUTO: 92.9 FL — SIGNIFICANT CHANGE UP (ref 80–94)
NRBC # BLD: 0 /100 WBCS — SIGNIFICANT CHANGE UP (ref 0–0)
PLATELET # BLD AUTO: 308 K/UL — SIGNIFICANT CHANGE UP (ref 130–400)
RBC # BLD: 2.68 M/UL — LOW (ref 4.7–6.1)
RBC # FLD: 13.8 % — SIGNIFICANT CHANGE UP (ref 11.5–14.5)
WBC # BLD: 13.8 K/UL — HIGH (ref 4.8–10.8)
WBC # FLD AUTO: 13.8 K/UL — HIGH (ref 4.8–10.8)

## 2018-09-26 RX ADMIN — ATORVASTATIN CALCIUM 40 MILLIGRAM(S): 80 TABLET, FILM COATED ORAL at 21:31

## 2018-09-26 RX ADMIN — METFORMIN HYDROCHLORIDE 1000 MILLIGRAM(S): 850 TABLET ORAL at 17:29

## 2018-09-26 RX ADMIN — Medication 100 MILLIGRAM(S): at 13:53

## 2018-09-26 RX ADMIN — Medication 10 MILLIGRAM(S): at 17:29

## 2018-09-26 RX ADMIN — METFORMIN HYDROCHLORIDE 1000 MILLIGRAM(S): 850 TABLET ORAL at 08:48

## 2018-09-26 RX ADMIN — Medication 40 MILLIGRAM(S): at 05:20

## 2018-09-26 RX ADMIN — Medication 10 MILLIGRAM(S): at 05:20

## 2018-09-26 RX ADMIN — Medication 50 MILLIGRAM(S): at 05:20

## 2018-09-26 RX ADMIN — Medication 10 MILLIGRAM(S): at 11:09

## 2018-09-26 RX ADMIN — BRIMONIDINE TARTRATE 1 DROP(S): 2 SOLUTION/ DROPS OPHTHALMIC at 21:31

## 2018-09-26 RX ADMIN — DOFETILIDE 500 MICROGRAM(S): 0.25 CAPSULE ORAL at 05:20

## 2018-09-26 RX ADMIN — LISINOPRIL 20 MILLIGRAM(S): 2.5 TABLET ORAL at 05:20

## 2018-09-26 RX ADMIN — Medication 20 MILLIEQUIVALENT(S): at 11:09

## 2018-09-26 RX ADMIN — Medication 90 MILLIGRAM(S): at 05:20

## 2018-09-26 RX ADMIN — Medication 100 MILLIGRAM(S): at 05:20

## 2018-09-26 RX ADMIN — DOFETILIDE 500 MICROGRAM(S): 0.25 CAPSULE ORAL at 17:29

## 2018-09-26 RX ADMIN — LATANOPROST 1 DROP(S): 0.05 SOLUTION/ DROPS OPHTHALMIC; TOPICAL at 11:09

## 2018-09-26 NOTE — CHART NOTE - NSCHARTNOTEFT_GEN_A_CORE
Case D/W Dr. Agrawal earlier: pt may restart his anticoagulants on 9/27 (eliquis & plavix) . Will confirm doses with patient in AM and order accordingly

## 2018-09-26 NOTE — CHART NOTE - NSCHARTNOTEFT_GEN_A_CORE
Labs today:                           8.1    13.80 )-----------( 308      ( 26 Sep 2018 11:47 )             24.9       Hemoglobin: 8.1 g/dL (09-26 @ 11:47)  Hemoglobin: 8.7 g/dL (09-25 @ 06:55)    09-25    138  |  104  |  26<H>  ----------------------------<  165<H>  5.1<H>   |  19  |  1.4    Ca    7.9<L>      25 Sep 2018 06:55        Rondon patent all day, translucent red in tubing. Pt reports less pain/spasms today.  Case D/W Dr. Agrawal: recommend to D/C rondon today and restart aspirin and plavix in AM. (pt has some element of chronic hematuria due to these meds).  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 Pt informed about rondon removal - he is concerned about his incontinence, he would rather have rondon removed just prior to discharge.  Dr. Agrawal informed - Ok to leave rondon for now and to be further evaluated in AM for discharge.  Will also order rehab consult for help with ambulation.

## 2018-09-26 NOTE — PROGRESS NOTE ADULT - SUBJECTIVE AND OBJECTIVE BOX
S; Pt doing OK, only having occasional bladder spasms, still  with mild suprapubic tenderness. CBI has been turned off since 6 pm yesterday  O; Vital Signs Last 24 Hrs  T(C): 36.6 (26 Sep 2018 06:07), Max: 37 (25 Sep 2018 22:25)  T(F): 97.9 (26 Sep 2018 06:07), Max: 98.6 (25 Sep 2018 22:25)  HR: 102 (26 Sep 2018 06:07) (59 - 102)  BP: 128/76 (26 Sep 2018 06:07) (128/76 - 164/67)  BP(mean): --  RR: 16 (26 Sep 2018 06:07) (16 - 18)      I&O's Detail    25 Sep 2018 07:01  -  26 Sep 2018 07:00  --------------------------------------------------------  IN:    Continuous Bladder Irrigation: 1250 mL    Oral Fluid: 600 mL  Total IN: 1850 mL    OUT:    Continuous Bladder Irrigation: 5550 mL    Voided: 2175 mL  Total OUT: 7725 mL    Total NET: -5875 mL      EXAM:  lungs: CTA  CVS: s1s2  abd: soft, mild tenderness lower abdomen, no fullness appreciated. Mccarthy in place (translucent red urine in tubing)      Labs:  PENDING

## 2018-09-27 LAB
ANION GAP SERPL CALC-SCNC: 16 MMOL/L — HIGH (ref 7–14)
BUN SERPL-MCNC: 20 MG/DL — SIGNIFICANT CHANGE UP (ref 10–20)
CALCIUM SERPL-MCNC: 8.3 MG/DL — LOW (ref 8.5–10.1)
CHLORIDE SERPL-SCNC: 103 MMOL/L — SIGNIFICANT CHANGE UP (ref 98–110)
CO2 SERPL-SCNC: 20 MMOL/L — SIGNIFICANT CHANGE UP (ref 17–32)
CREAT SERPL-MCNC: 1.3 MG/DL — SIGNIFICANT CHANGE UP (ref 0.7–1.5)
GLUCOSE BLDC GLUCOMTR-MCNC: 149 MG/DL — HIGH (ref 70–99)
GLUCOSE BLDC GLUCOMTR-MCNC: 231 MG/DL — HIGH (ref 70–99)
GLUCOSE BLDC GLUCOMTR-MCNC: 235 MG/DL — HIGH (ref 70–99)
GLUCOSE BLDC GLUCOMTR-MCNC: 246 MG/DL — HIGH (ref 70–99)
GLUCOSE SERPL-MCNC: 146 MG/DL — HIGH (ref 70–99)
HCT VFR BLD CALC: 28.4 % — LOW (ref 42–52)
HGB BLD-MCNC: 9 G/DL — LOW (ref 14–18)
MCHC RBC-ENTMCNC: 30.3 PG — SIGNIFICANT CHANGE UP (ref 27–31)
MCHC RBC-ENTMCNC: 31.7 G/DL — LOW (ref 32–37)
MCV RBC AUTO: 95.6 FL — HIGH (ref 80–94)
NRBC # BLD: 0 /100 WBCS — SIGNIFICANT CHANGE UP (ref 0–0)
PLATELET # BLD AUTO: 355 K/UL — SIGNIFICANT CHANGE UP (ref 130–400)
POTASSIUM SERPL-MCNC: 4.6 MMOL/L — SIGNIFICANT CHANGE UP (ref 3.5–5)
POTASSIUM SERPL-SCNC: 4.6 MMOL/L — SIGNIFICANT CHANGE UP (ref 3.5–5)
RBC # BLD: 2.97 M/UL — LOW (ref 4.7–6.1)
RBC # FLD: 13.6 % — SIGNIFICANT CHANGE UP (ref 11.5–14.5)
SODIUM SERPL-SCNC: 139 MMOL/L — SIGNIFICANT CHANGE UP (ref 135–146)
TYPE + AB SCN PNL BLD: SIGNIFICANT CHANGE UP
WBC # BLD: 12.46 K/UL — HIGH (ref 4.8–10.8)
WBC # FLD AUTO: 12.46 K/UL — HIGH (ref 4.8–10.8)

## 2018-09-27 RX ADMIN — Medication 10 MILLIGRAM(S): at 05:27

## 2018-09-27 RX ADMIN — TEMAZEPAM 15 MILLIGRAM(S): 15 CAPSULE ORAL at 00:35

## 2018-09-27 RX ADMIN — Medication 650 MILLIGRAM(S): at 22:37

## 2018-09-27 RX ADMIN — Medication 90 MILLIGRAM(S): at 05:27

## 2018-09-27 RX ADMIN — Medication 10 MILLIGRAM(S): at 11:05

## 2018-09-27 RX ADMIN — Medication 650 MILLIGRAM(S): at 23:10

## 2018-09-27 RX ADMIN — LATANOPROST 1 DROP(S): 0.05 SOLUTION/ DROPS OPHTHALMIC; TOPICAL at 11:06

## 2018-09-27 RX ADMIN — LISINOPRIL 20 MILLIGRAM(S): 2.5 TABLET ORAL at 05:27

## 2018-09-27 RX ADMIN — DOFETILIDE 500 MICROGRAM(S): 0.25 CAPSULE ORAL at 05:29

## 2018-09-27 RX ADMIN — Medication 650 MILLIGRAM(S): at 08:54

## 2018-09-27 RX ADMIN — METFORMIN HYDROCHLORIDE 1000 MILLIGRAM(S): 850 TABLET ORAL at 08:50

## 2018-09-27 RX ADMIN — TEMAZEPAM 15 MILLIGRAM(S): 15 CAPSULE ORAL at 22:37

## 2018-09-27 RX ADMIN — Medication 40 MILLIGRAM(S): at 05:27

## 2018-09-27 RX ADMIN — Medication 10 MILLIGRAM(S): at 17:23

## 2018-09-27 RX ADMIN — Medication 650 MILLIGRAM(S): at 16:16

## 2018-09-27 RX ADMIN — Medication 650 MILLIGRAM(S): at 09:50

## 2018-09-27 RX ADMIN — Medication 650 MILLIGRAM(S): at 00:35

## 2018-09-27 RX ADMIN — Medication 650 MILLIGRAM(S): at 01:13

## 2018-09-27 RX ADMIN — METFORMIN HYDROCHLORIDE 1000 MILLIGRAM(S): 850 TABLET ORAL at 17:22

## 2018-09-27 RX ADMIN — DOFETILIDE 500 MICROGRAM(S): 0.25 CAPSULE ORAL at 17:22

## 2018-09-27 RX ADMIN — ATORVASTATIN CALCIUM 40 MILLIGRAM(S): 80 TABLET, FILM COATED ORAL at 21:11

## 2018-09-27 RX ADMIN — Medication 50 MILLIGRAM(S): at 05:27

## 2018-09-27 RX ADMIN — Medication 20 MILLIEQUIVALENT(S): at 11:06

## 2018-09-27 RX ADMIN — BRIMONIDINE TARTRATE 1 DROP(S): 2 SOLUTION/ DROPS OPHTHALMIC at 21:11

## 2018-09-27 NOTE — PROGRESS NOTE ADULT - SUBJECTIVE AND OBJECTIVE BOX
Hospital  DAY #3    Vital Signs Last 24 Hrs  T(C): 35.6 (27 Sep 2018 06:12), Max: 38.2 (26 Sep 2018 22:25)  T(F): 96.1 (27 Sep 2018 06:12), Max: 100.7 (26 Sep 2018 22:25)  HR: 72 (27 Sep 2018 06:12) (72 - 91)  BP: 126/60 (27 Sep 2018 06:12) (126/60 - 163/70)  BP(mean): --  RR: 16 (27 Sep 2018 06:12) (16 - 16)  SpO2: --      SUBJECTIVE: Pt seen and examined . 77 y/o male admitted for hematuria post cystoscopy    Alert :  [x ] YES [ ] NO // awake :  [x ] YES [ ] NO // cooperative :  [x ] YES [ ] NO  Pain: [ ] YES [x ] NO  Pain (0-10):               Apetite x[ ] yes  [ ] no/ Nausea: [ ] YES [ ] NO / Vomiting: [ ] YES [x ] NO  Neuro : Oriented  [x ] YES [ ] NO  SOB: [ ]YES [x ] NO  Skin : dry  [x ] YES [ ] NO // moist :  [ ] YES [ ] NO // warm  :[x ] YES [ ] NO  Abdomen : soft [x ]  yes [ ] no / distented : [ ] yes  [ ] no /  global :[x ] yes  [ ] no // flat :  [ ] YES [ ] NO  Back : CVAT  [ ] yes  [ x] no  EXT : edema  [ ] YES [x ] NO  // ROM :  [x ] YES [ ] NO  Urethral cath : 14 F // 16 F // 18 F // 22 F  //clear : [ ] YES [ ] NO // bloody  :  [x ] YES [ ] NO  BM :   [ ] YES [ ] NO  Wound : dry  [ ] YES [ ] NO // infection [ ] YES [ ] NO // erythema  [ ] YES [ ] NO      09-26-18 @ 07:01  -  09-27-18 @ 07:00  --------------------------------------------------------  OUT: 4550 mL        LABS:                        8.1    13.80 )-----------( 308      ( 26 Sep 2018 11:47 )             24.9               RADIOLOGY & ADDITIONAL STUDIES:    Assessment :          Plan :

## 2018-09-27 NOTE — CONSULT NOTE ADULT - SUBJECTIVE AND OBJECTIVE BOX
HPI:  79 y/o  m  ptn admitted  to [facility]  for  gu  eval and  small  urology  pocedur  ptn referred to rehab   for eval and tx   PTN  REFERRED TO ACUTE  REHAB  FOR  EVAL AND  TX   PAST MEDICAL & SURGICAL HISTORY:  Diabetes  Renal insufficiency  Cardiomyopathy  Cholelithiasis  Hydrocele in adult  Glaucoma  BPH (benign prostatic hyperplasia)  CAD (coronary artery disease): CARD STENT X2 4/2018  Diplopia: RIGHT EYE-WEARS PATCH S/P TIA  TIA (transient ischemic attack): X2 JULY 2018  Dementia  CHF (congestive heart failure): COMBINED SYSTOLIC &amp; DIASTOLIC  Afib  Heart attack: 1/2018  Sepsis: 1/18 FROM INFECTED GALLBLADDER  Hypercholesteremia  HTN (hypertension)  DM (diabetes mellitus)  H/O flexible sigmoidoscopy: 2015  H/O bilateral cataract extraction: LEFT- 1/18 RIGHT 6 YRS AGO  Encounter for biliary drainage tube placement: 1/2018  H/O coronary angioplasty: WITH STENT X2 (4/2018)  History of prostate surgery: 5/17  AICD (automatic cardioverter/defibrillator) present: [facility] Course:    TODAY'S SUBJECTIVE & REVIEW OF SYMPTOMS:     Constitutional WNL   Cardio WNL   Resp WNL   GI WNL  Heme WNL  Endo WNL  Skin WNL  MSK WNL  Neuro WNL  Cognitive WNL  Psych WNL      MEDICATIONS  (STANDING):  atorvastatin 40 milliGRAM(s) Oral at bedtime  brimonidine 0.2% Ophthalmic Solution 1 Drop(s) Both EYES at bedtime  docusate sodium 100 milliGRAM(s) Oral three times a day  dofetilide 500 MICROGram(s) Oral two times a day  furosemide    Tablet 40 milliGRAM(s) Oral daily  glipiZIDE XL 10 milliGRAM(s) Oral daily  INVOKANA 100 milliGRAM(s) 100 milliGRAM(s) Oral daily  latanoprost 0.005% Ophthalmic Solution 1 Drop(s) Both EYES daily  lisinopril 20 milliGRAM(s) Oral daily  metFORMIN Extended-Release 1000 milliGRAM(s) Oral <User Schedule>  metoprolol succinate ER 50 milliGRAM(s) Oral daily  NIFEdipine XL 90 milliGRAM(s) Oral daily  oxybutynin 10 milliGRAM(s) Oral two times a day  potassium chloride    Tablet ER 20 milliEquivalent(s) Oral daily    MEDICATIONS  (PRN):  acetaminophen   Tablet .. 650 milliGRAM(s) Oral every 6 hours PRN Mild Pain (1 - 3)  morphine  - Injectable 4 milliGRAM(s) IV Push every 4 hours PRN Moderate Pain (4 - 6)  ondansetron Injectable 4 milliGRAM(s) IV Push once PRN Nausea and/or Vomiting  phenazopyridine 100 milliGRAM(s) Oral three times a day PRN dysuria  temazepam 15 milliGRAM(s) Oral at bedtime PRN Insomnia      FAMILY HISTORY:      Allergies    sulfa drugs (Other)    Intolerances        SOCIAL HISTORY:    [  ] Etoh  [  ] Smoking  [  ] Substance abuse     Home Environment:  [  ] Home Alone  [  xx] Lives with Family wife   [  ] Home Health Aid    Dwelling:  [  ] Apartment  [ x ] Private House  [  ] Adult Home  [  ] Skilled Nursing Facility      [  ] Short Term  [  ] Long Term  [ x ] Stairs   chair  lifting      Elevator [  ]    FUNCTIONAL STATUS PTA: (Check all that apply)  Ambulation: [  x ]Independent    [  ] Dependent     [  ] Non-Ambulatory  Assistive Device: [  ] SA Cane  [  ]  Q Cane  [ x ] Walker  [  ]  Wheelchair  ADL : [ x ] Independent  [  ]  Dependent       Vital Signs Last 24 Hrs  T(C): 35.6 (27 Sep 2018 06:12), Max: 38.2 (26 Sep 2018 22:25)  T(F): 96.1 (27 Sep 2018 06:12), Max: 100.7 (26 Sep 2018 22:25)  HR: 72 (27 Sep 2018 06:12) (72 - 91)  BP: 126/60 (27 Sep 2018 06:12) (126/60 - 163/70)  BP(mean): --  RR: 16 (27 Sep 2018 06:12) (16 - 16)  SpO2: --      PHYSICAL EXAM: Alert & Oriented X3  GENERAL: NAD, well-groomed, well-developed  HEAD:  Atraumatic, Normocephalic  EYES: EOMI, PERRLA, conjunctiva and sclera clear  NECK: Supple, No JVD, Normal thyroid  CHEST/LUNG: Clear to percussion bilaterally; No rales, rhonchi, wheezing, or rubs  HEART: Regular rate and rhythm; No murmurs, rubs, or gallops  ABDOMEN: Soft, Nontender, Nondistended; Bowel sounds present  EXTREMITIES:  2+ Peripheral Pulses, No clubbing, cyanosis, or edema    NERVOUS SYSTEM:  Cranial Nerves 2-12 intact [  ] Abnormal  [  ]  ROM: WFL all extremities [  ]  Abnormal [  ]  Motor Strength: WFL all extremities  [  ]  Abnormal [  ]  Sensation: intact to light touch [  ] Abnormal [  ]  Reflexes: Symmetric [  ]  Abnormal [  ]    FUNCTIONAL STATUS:  Bed Mobility: Independent [  ]  Supervision [ x ]  Needs Assistance [  ]  N/A [  ]  Transfers: Independent [  ]  Supervision [ x ]  Needs Assistance [  ]  N/A [  ]   Ambulation: Independent [  ]  Supervision [x  ]  Needs Assistance [  ]  N/A [  ]  ADL: Independent [ x ] Requires Assistance [  ] N/A [  ]  see  PT IE NOTES    LABS:                        9.0    12.46 )-----------( 355      ( 27 Sep 2018 09:55 )             28.4     09-27    139  |  103  |  20  ----------------------------<  146<H>  4.6   |  20  |  1.3    Ca    8.3<L>      27 Sep 2018 09:55            RADIOLOGY & ADDITIONAL STUDIES:    Assesment:

## 2018-09-27 NOTE — CONSULT NOTE ADULT - ASSESSMENT
IMPRESSION: Rehab of 77 y/o  m rehab  for debility      PRECAUTIONS: [  ] Cardiac  [  ] Respiratory  [  ] Seizures [  ] Contact Isolation  [  ] Droplet Isolation  [ rondon  fall  ] Other    Weight Bearing Status:     RECOMMENDATION:    Out of Bed to Chair     DVT/Decubiti Prophylaxis    REHAB PLAN:     [ x  ] Bedside P/T 3-5 times a week   [   ]   Bedside O/T  2-3 times a week             [   ] No Rehab Therapy Indicated                   [   ]  Speech Therapy   Conditioning/ROM                                    ADL  Bed Mobility                                               Conditioning/ROM  Transfers                                                     Bed Mobility  Sitting /Standing Balance                         Transfers                                        Gait Training                                               Sitting/Standing Balance  Stair Training [   ]Applicable                    Home equipment Eval                                                                        Splinting  [   ] Only      GOALS:   ADL   [ x  ]   Independent                    Transfers  [ x  ] Independent                          Ambulation  [ x ] Independent     [x    ] With device                            [   ]  CG                                                         [   ]  CG                                                                  [   ] CG                            [    ] Min A                                                   [   ] Min A                                                              [   ] Min  A          DISCHARGE PLAN:   [   ]  Good candidate for Intensive Rehabilitation/Hospital based-[facility]A [facility]                                             Will tolerate 3hrs Intensive Rehab Daily                                       [    ]  Short Term Rehab in Skilled Nursing Facility                                       [  xx  ]  Home with Outpatient or VN services                                         [    ]  Possible Candidate for Intensive Hospital based Rehab

## 2018-09-28 ENCOUNTER — TRANSCRIPTION ENCOUNTER (OUTPATIENT)
Age: 78
End: 2018-09-28

## 2018-09-28 VITALS — WEIGHT: 199.3 LBS

## 2018-09-28 LAB
GLUCOSE BLDC GLUCOMTR-MCNC: 111 MG/DL — HIGH (ref 70–99)
GLUCOSE BLDC GLUCOMTR-MCNC: 220 MG/DL — HIGH (ref 70–99)
HCT VFR BLD CALC: 30.4 % — LOW (ref 42–52)
HGB BLD-MCNC: 10.1 G/DL — LOW (ref 14–18)
MCHC RBC-ENTMCNC: 29.8 PG — SIGNIFICANT CHANGE UP (ref 27–31)
MCHC RBC-ENTMCNC: 33.2 G/DL — SIGNIFICANT CHANGE UP (ref 32–37)
MCV RBC AUTO: 89.7 FL — SIGNIFICANT CHANGE UP (ref 80–94)
NRBC # BLD: 0 /100 WBCS — SIGNIFICANT CHANGE UP (ref 0–0)
PLATELET # BLD AUTO: 401 K/UL — HIGH (ref 130–400)
RBC # BLD: 3.39 M/UL — LOW (ref 4.7–6.1)
RBC # FLD: 14 % — SIGNIFICANT CHANGE UP (ref 11.5–14.5)
WBC # BLD: 13.8 K/UL — HIGH (ref 4.8–10.8)
WBC # FLD AUTO: 13.8 K/UL — HIGH (ref 4.8–10.8)

## 2018-09-28 RX ORDER — POTASSIUM CHLORIDE 20 MEQ
1 PACKET (EA) ORAL
Qty: 0 | Refills: 0 | COMMUNITY

## 2018-09-28 RX ORDER — METFORMIN HYDROCHLORIDE 850 MG/1
1 TABLET ORAL
Qty: 0 | Refills: 0 | COMMUNITY
Start: 2018-09-28

## 2018-09-28 RX ORDER — CANAGLIFLOZIN 100 MG/1
1 TABLET, FILM COATED ORAL
Qty: 0 | Refills: 0 | COMMUNITY

## 2018-09-28 RX ORDER — LISINOPRIL 2.5 MG/1
1 TABLET ORAL
Qty: 0 | Refills: 0 | COMMUNITY
Start: 2018-09-28

## 2018-09-28 RX ORDER — FUROSEMIDE 40 MG
1 TABLET ORAL
Qty: 0 | Refills: 0 | COMMUNITY

## 2018-09-28 RX ORDER — DOFETILIDE 0.25 MG/1
1 CAPSULE ORAL
Qty: 0 | Refills: 0 | DISCHARGE
Start: 2018-09-28

## 2018-09-28 RX ORDER — METFORMIN HYDROCHLORIDE 850 MG/1
4 TABLET ORAL
Qty: 0 | Refills: 0 | COMMUNITY

## 2018-09-28 RX ORDER — DOFETILIDE 0.25 MG/1
1 CAPSULE ORAL
Qty: 0 | Refills: 0 | COMMUNITY

## 2018-09-28 RX ORDER — FUROSEMIDE 40 MG
1 TABLET ORAL
Qty: 0 | Refills: 0 | COMMUNITY
Start: 2018-09-28

## 2018-09-28 RX ORDER — ATORVASTATIN CALCIUM 80 MG/1
1 TABLET, FILM COATED ORAL
Qty: 0 | Refills: 0 | COMMUNITY

## 2018-09-28 RX ORDER — NIFEDIPINE 30 MG
1 TABLET, EXTENDED RELEASE 24 HR ORAL
Qty: 0 | Refills: 0 | DISCHARGE
Start: 2018-09-28

## 2018-09-28 RX ORDER — ACETAMINOPHEN 500 MG
2 TABLET ORAL
Qty: 0 | Refills: 0 | COMMUNITY

## 2018-09-28 RX ORDER — ASPIRIN/CALCIUM CARB/MAGNESIUM 324 MG
1 TABLET ORAL
Qty: 0 | Refills: 0 | COMMUNITY

## 2018-09-28 RX ORDER — POTASSIUM CHLORIDE 20 MEQ
1 PACKET (EA) ORAL
Qty: 0 | Refills: 0 | COMMUNITY
Start: 2018-09-28

## 2018-09-28 RX ORDER — ATORVASTATIN CALCIUM 80 MG/1
1 TABLET, FILM COATED ORAL
Qty: 0 | Refills: 0 | DISCHARGE
Start: 2018-09-28

## 2018-09-28 RX ADMIN — LATANOPROST 1 DROP(S): 0.05 SOLUTION/ DROPS OPHTHALMIC; TOPICAL at 11:26

## 2018-09-28 RX ADMIN — Medication 650 MILLIGRAM(S): at 05:20

## 2018-09-28 RX ADMIN — DOFETILIDE 500 MICROGRAM(S): 0.25 CAPSULE ORAL at 05:20

## 2018-09-28 RX ADMIN — Medication 10 MILLIGRAM(S): at 11:27

## 2018-09-28 RX ADMIN — Medication 40 MILLIGRAM(S): at 05:22

## 2018-09-28 RX ADMIN — Medication 20 MILLIEQUIVALENT(S): at 11:26

## 2018-09-28 RX ADMIN — Medication 10 MILLIGRAM(S): at 05:22

## 2018-09-28 RX ADMIN — LISINOPRIL 20 MILLIGRAM(S): 2.5 TABLET ORAL at 05:22

## 2018-09-28 RX ADMIN — Medication 50 MILLIGRAM(S): at 05:22

## 2018-09-28 RX ADMIN — Medication 90 MILLIGRAM(S): at 05:22

## 2018-09-28 NOTE — PROGRESS NOTE ADULT - ATTENDING COMMENTS
I saw , examined , evaluated patient ,and agree with PA findings /  plan
case discussed and reviewed  with PA  and plans organized . Spoke with PA  multiple times :   hold cbi today   Will remove cath tomorrow for discharge . Bladder spasms will cease when cath is removed -- pt is well aware -- from previous dealings with catheters
see above
I saw , examined , evaluated patient ,and agree with PA findings /  plan

## 2018-09-28 NOTE — DISCHARGE NOTE ADULT - MEDICATION SUMMARY - MEDICATIONS TO TAKE
I will START or STAY ON the medications listed below when I get home from the hospital:    freetext medication  -  -- 100 milligram(s) by mouth once a day  -- Indication: For Diabetes    lisinopril 20 mg oral tablet  -- 1 tab(s) by mouth once a day  -- Indication: For Htn    dofetilide 500 mcg oral capsule  -- 1 cap(s) by mouth 2 times a day  -- Indication: For arrythmia    apixaban 2.5 mg oral tablet  -- 1 tab(s) by mouth every 12 hours  -- Indication: For arrythmia    glipiZIDE 10 mg oral tablet, extended release  -- 1 tab(s) by mouth once a day  -- Indication: For Diabetes    metFORMIN 1000 mg oral tablet, extended release  -- 1 tab(s) by mouth   -- Indication: For Diabetes    atorvastatin 40 mg oral tablet  -- 1 tab(s) by mouth once a day (at bedtime)  -- Indication: For Diabetes    clopidogrel 75 mg oral tablet  -- 1 tab(s) by mouth once a day  -- Indication: For cad    metoprolol succinate 50 mg oral tablet, extended release  -- 1 tab(s) by mouth once a day  -- Indication: For Htn    NIFEdipine 90 mg oral tablet, extended release  -- 1 tab(s) by mouth once a day  -- Indication: For Htn    ciclopirox 0.77% topical cream  -- Apply on skin to affected area 2 times a day  -- Indication: For rash    furosemide 40 mg oral tablet  -- 1 tab(s) by mouth once a day  -- Indication: For Htn    Cranberry oral capsule  -- 1 cap(s) by mouth once a day  -- Indication: For urine    potassium chloride 20 mEq oral tablet, extended release  -- 1 tab(s) by mouth once a day  -- Indication: For Htn    Alphagan P 0.1% ophthalmic solution  -- 1 drop(s) to each affected eye once a day (at bedtime)  -- Indication: For glaucoma    latanoprost 0.005% ophthalmic solution  -- 1 drop(s) to each affected eye once a day (in the evening) ou  -- Indication: For glaucoma    oxybutynin 15 mg/24 hr oral tablet, extended release  -- 1 tab(s) by mouth once a day  -- Indication: For incontinence

## 2018-09-28 NOTE — PROGRESS NOTE ADULT - ASSESSMENT
Assessment : Hematuria -- clear now -- stable                    : Anemia , post procedure                     :Hemorrhagic  Cystitis                    : BPH     Plan  stop cbi today  ambulate-- walking   repeat   cbc -- if medical team ok w/ HCT/Hb , no need for  transfusion.   anticipate discharge tomorrow  if labs ok
Assessment : hematuria post cystoscopy -- resolved -- urine color denotes old blood                    :Hemorrhagic cystitis related to anticoagulates                    : BPH                     : post op anemia    plan :   consider trans fusion // consider discharge with outpt f/u  d/c cath   please provide incontinence pads  Pt aware
POD #2 s/p cystoscopy/cauterization of bladder     - COntinue rondon to gravity   - F/U CBC today   - OOB & ambulating   - - Will D/W attending re: discharge plan
1. Hematuria post cysto -- clear   2. Anemia -- corrected -- received1 unit rbc   3. BN incompetence    plan :   check cbc  discharge todaY  continue Oxybutynin XL 15 daily   continue all other meds   no ABT  f/u  appt 2 weeks

## 2018-09-28 NOTE — DISCHARGE NOTE ADULT - CARE PROVIDER_API CALL
Pablo Agrawal), Urology  [address] [place] [address]  Phone: [phone]  Fax: [phone]

## 2018-09-28 NOTE — PROGRESS NOTE ADULT - SUBJECTIVE AND OBJECTIVE BOX
Hospital  DAY  # 4    Vital Signs Last 24 Hrs  T(C): 35.7 (28 Sep 2018 06:12), Max: 36.2 (27 Sep 2018 14:34)  T(F): 96.3 (28 Sep 2018 06:12), Max: 97.2 (27 Sep 2018 14:34)  HR: 86 (28 Sep 2018 06:12) (61 - 86)  BP: 141/63 (28 Sep 2018 06:12) (125/60 - 142/65)  BP(mean): --  RR: 16 (28 Sep 2018 06:12) (16 - 16)  SpO2: --      SUBJECTIVE: Pt seen and examined .  79 y/o male post cysto hematuria .Wife at bedside . Had bout of  diarrhea.    Alert :  [x ] YES [ ] NO // awake :  [x ] YES [ ] NO // cooperative :  x[ ] YES [ ] NO  Pain: [ ] YES [x ] NO  Pain (0-10):               Appetite x[ ] yes  [ ] no/ Nausea: [ ] YES [x ] NO / Vomiting: [ ] YES [ ] NO  Neuro : Oriented  [ ] YES [ ] NO  SOB: [ ]YES [x ] NO  Skin : dry  [x ] YES [ ] NO // moist :  [ ] YES [ ] NO // warm  :[x ] YES [ ] NO  Abdomen : soft [x ]  yes [ ] no / distented : [ ] yes  [x ] no /  global :[x ] yes  [ ] no // flat :  [ ] YES [ ] NO  Back : CVAT  [ ] yes  [ x] no  EXT : edema  [ ] YES x[ ] NO  // ROM :  [ ] YES [ ] NO  Urethral cath : 14 F // 16 F // 18 F // 22 F  //clear : [ ] YES [x ] NO // bloody  :  [ ] YES [ ] NO  BM :   x[ ] YES [ ] NO  Wound : dry  [ ] YES [ ] NO // infection [ ] YES [ ] NO // erythema  [ ] YES [ ] NO      09-27-18 @ 07:01  -  09-28-18 @ 07:00  --------------------------------------------------------  OUT: 300 mL        LABS:                        10.1   13.80 )-----------( 401      ( 28 Sep 2018 09:17 )             30.4     09-27    139  |  103  |  20  ----------------------------<  146<H>  4.6   |  20  |  1.3    Ca    8.3<L>      27 Sep 2018 09:55          RADIOLOGY & ADDITIONAL STUDIES

## 2018-09-28 NOTE — DISCHARGE NOTE ADULT - CARE PLAN
Principal Discharge DX:	S/P cystoscopy  Goal:	stop bleeding and monitor for further bleeding  Assessment and plan of treatment:	follow up with DR Agrawal in 2 weeks  follow up with primary care doctor in 1 week  call doctor if excessive blood in urine or abdominal pain or fevers

## 2018-09-28 NOTE — DISCHARGE NOTE ADULT - INSTRUCTIONS
ada diet Rectal temp 103. No localizing signs or symptoms of infection. No leukocytosis. Received 1st dose of Chemo today. Likely adverse effect of Chemo  - Check urine and blood cultures  - Monitor for signs or symptoms of infection Rectal temp 103. No localizing signs or symptoms of infection. No leukocytosis. Received 1st dose of Chemo today. Likely adverse effect of Chemo. UA benign  - Check urine and blood cultures  - Monitor for signs or symptoms of infection  - Monitor off antibiotics for now Rectal temp 103. No localizing signs or symptoms of infection. No leukocytosis. Received 1st dose of Chemo today. Likely adverse effect of Chemo. UA benign  - Check urine and blood cultures  - Monitor for signs or symptoms of infection  - Initially to monitor off antibiotics, but since patient is immunocompromised, will start Zosyn and Vancomycin (renally dosed)  - consider ID consult in the [facility]

## 2018-09-28 NOTE — DISCHARGE NOTE ADULT - NS AS ACTIVITY OBS
Walking-Indoors allowed/No Heavy lifting/straining/Walking-Outdoors allowed/Showering allowed/Stairs allowed

## 2018-09-28 NOTE — DISCHARGE NOTE ADULT - PATIENT PORTAL LINK FT
You can access the [facility] Portal, offered by [facility], by registering with the following website: [email or web address]

## 2018-09-28 NOTE — DISCHARGE NOTE ADULT - HOSPITAL COURSE
pt admitted underwent cysto and fulguration with complications, anticoagulation was held, hematuria improved, pt recieved 1 unit of prbc d/c hgb was 10.3 and so pt dc home

## 2018-09-28 NOTE — DISCHARGE NOTE ADULT - PLAN OF CARE
stop bleeding and monitor for further bleeding follow up with DR Agrawal in 2 weeks  follow up with primary care doctor in 1 week  call doctor if excessive blood in urine or abdominal pain or fevers

## 2018-09-30 ENCOUNTER — INPATIENT (INPATIENT)
Facility: HOSPITAL | Age: 78
LOS: 3 days | Discharge: NURSING, REHAB OR LONG-TERM CARE | End: 2018-10-04
Attending: HOSPITALIST | Admitting: HOSPITALIST
Payer: MEDICARE

## 2018-09-30 VITALS
DIASTOLIC BLOOD PRESSURE: 91 MMHG | RESPIRATION RATE: 20 BRPM | HEART RATE: 109 BPM | WEIGHT: 167.99 LBS | TEMPERATURE: 101 F | SYSTOLIC BLOOD PRESSURE: 196 MMHG | HEIGHT: 69 IN | OXYGEN SATURATION: 98 %
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
## 2018-09-30 DIAGNOSIS — E11.22 TYPE 2 DIABETES MELLITUS WITH DIABETIC CHRONIC KIDNEY DISEASE: ICD-10-CM

## 2018-09-30 DIAGNOSIS — I13.0 HYPERTENSIVE HEART AND CHRONIC KIDNEY DISEASE WITH HEART FAILURE AND STAGE 1 THROUGH STAGE 4 CHRONIC KIDNEY DISEASE, OR UNSPECIFIED CHRONIC KIDNEY DISEASE: ICD-10-CM

## 2018-09-30 DIAGNOSIS — F03.90 UNSPECIFIED DEMENTIA WITHOUT BEHAVIORAL DISTURBANCE: ICD-10-CM

## 2018-09-30 DIAGNOSIS — N30.01 ACUTE CYSTITIS WITH HEMATURIA: ICD-10-CM

## 2018-09-30 DIAGNOSIS — H40.9 UNSPECIFIED GLAUCOMA: ICD-10-CM

## 2018-09-30 DIAGNOSIS — S22.43XA MULTIPLE FRACTURES OF RIBS, BILATERAL, INITIAL ENCOUNTER FOR CLOSED FRACTURE: ICD-10-CM

## 2018-09-30 DIAGNOSIS — R53.1 WEAKNESS: ICD-10-CM

## 2018-09-30 DIAGNOSIS — N17.9 ACUTE KIDNEY FAILURE, UNSPECIFIED: ICD-10-CM

## 2018-09-30 DIAGNOSIS — Z98.890 OTHER SPECIFIED POSTPROCEDURAL STATES: Chronic | ICD-10-CM

## 2018-09-30 DIAGNOSIS — Z86.74 PERSONAL HISTORY OF SUDDEN CARDIAC ARREST: ICD-10-CM

## 2018-09-30 DIAGNOSIS — Z79.02 LONG TERM (CURRENT) USE OF ANTITHROMBOTICS/ANTIPLATELETS: ICD-10-CM

## 2018-09-30 DIAGNOSIS — N39.0 URINARY TRACT INFECTION, SITE NOT SPECIFIED: ICD-10-CM

## 2018-09-30 DIAGNOSIS — E87.2 ACIDOSIS: ICD-10-CM

## 2018-09-30 DIAGNOSIS — I25.10 ATHEROSCLEROTIC HEART DISEASE OF NATIVE CORONARY ARTERY WITHOUT ANGINA PECTORIS: ICD-10-CM

## 2018-09-30 DIAGNOSIS — Z95.810 PRESENCE OF AUTOMATIC (IMPLANTABLE) CARDIAC DEFIBRILLATOR: Chronic | ICD-10-CM

## 2018-09-30 DIAGNOSIS — Z95.5 PRESENCE OF CORONARY ANGIOPLASTY IMPLANT AND GRAFT: ICD-10-CM

## 2018-09-30 DIAGNOSIS — Z88.2 ALLERGY STATUS TO SULFONAMIDES: ICD-10-CM

## 2018-09-30 DIAGNOSIS — Z79.84 LONG TERM (CURRENT) USE OF ORAL HYPOGLYCEMIC DRUGS: ICD-10-CM

## 2018-09-30 DIAGNOSIS — N99.89 OTHER POSTPROCEDURAL COMPLICATIONS AND DISORDERS OF GENITOURINARY SYSTEM: ICD-10-CM

## 2018-09-30 DIAGNOSIS — Z98.61 CORONARY ANGIOPLASTY STATUS: Chronic | ICD-10-CM

## 2018-09-30 DIAGNOSIS — Z46.82 ENCOUNTER FOR FITTING AND ADJUSTMENT OF NON-VASCULAR CATHETER: Chronic | ICD-10-CM

## 2018-09-30 DIAGNOSIS — Z86.73 PERSONAL HISTORY OF TRANSIENT ISCHEMIC ATTACK (TIA), AND CEREBRAL INFARCTION WITHOUT RESIDUAL DEFICITS: ICD-10-CM

## 2018-09-30 DIAGNOSIS — I42.9 CARDIOMYOPATHY, UNSPECIFIED: ICD-10-CM

## 2018-09-30 DIAGNOSIS — Z95.810 PRESENCE OF AUTOMATIC (IMPLANTABLE) CARDIAC DEFIBRILLATOR: ICD-10-CM
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
## 2018-09-30 DIAGNOSIS — R32 UNSPECIFIED URINARY INCONTINENCE: ICD-10-CM

## 2018-09-30 DIAGNOSIS — E11.40 TYPE 2 DIABETES MELLITUS WITH DIABETIC NEUROPATHY, UNSPECIFIED: ICD-10-CM

## 2018-09-30 DIAGNOSIS — Z79.01 LONG TERM (CURRENT) USE OF ANTICOAGULANTS: ICD-10-CM

## 2018-09-30 DIAGNOSIS — E78.00 PURE HYPERCHOLESTEROLEMIA, UNSPECIFIED: ICD-10-CM

## 2018-09-30 DIAGNOSIS — N13.6 PYONEPHROSIS: ICD-10-CM

## 2018-09-30 DIAGNOSIS — I50.40 UNSPECIFIED COMBINED SYSTOLIC (CONGESTIVE) AND DIASTOLIC (CONGESTIVE) HEART FAILURE: ICD-10-CM

## 2018-09-30 DIAGNOSIS — N40.0 BENIGN PROSTATIC HYPERPLASIA WITHOUT LOWER URINARY TRACT SYMPTOMS: ICD-10-CM

## 2018-09-30 DIAGNOSIS — I48.2 CHRONIC ATRIAL FIBRILLATION: ICD-10-CM

## 2018-09-30 DIAGNOSIS — H53.2 DIPLOPIA: ICD-10-CM

## 2018-09-30 DIAGNOSIS — Z98.41 CATARACT EXTRACTION STATUS, RIGHT EYE: Chronic | ICD-10-CM

## 2018-09-30 LAB
ALBUMIN SERPL ELPH-MCNC: 2.8 G/DL — LOW (ref 3.5–5.2)
ALP SERPL-CCNC: 127 U/L — HIGH (ref 30–115)
ALT FLD-CCNC: 7 U/L — SIGNIFICANT CHANGE UP (ref 0–41)
ANION GAP SERPL CALC-SCNC: 23 MMOL/L — HIGH (ref 7–14)
APPEARANCE UR: ABNORMAL
APTT BLD: 32.6 SEC — SIGNIFICANT CHANGE UP (ref 27–39.2)
AST SERPL-CCNC: 11 U/L — SIGNIFICANT CHANGE UP (ref 0–41)
BACTERIA # UR AUTO: ABNORMAL
BASE EXCESS BLDV CALC-SCNC: -5.1 MMOL/L — LOW (ref -2–2)
BASOPHILS # BLD AUTO: 0.01 K/UL — SIGNIFICANT CHANGE UP (ref 0–0.2)
BASOPHILS NFR BLD AUTO: 0.1 % — SIGNIFICANT CHANGE UP (ref 0–1)
BILIRUB SERPL-MCNC: <0.2 MG/DL — SIGNIFICANT CHANGE UP (ref 0.2–1.2)
BILIRUB UR-MCNC: ABNORMAL
BUN SERPL-MCNC: 39 MG/DL — HIGH (ref 10–20)
CA-I SERPL-SCNC: 1.25 MMOL/L — SIGNIFICANT CHANGE UP (ref 1.12–1.3)
CALCIUM SERPL-MCNC: 9.4 MG/DL — SIGNIFICANT CHANGE UP (ref 8.5–10.1)
CHLORIDE SERPL-SCNC: 93 MMOL/L — LOW (ref 98–110)
CO2 SERPL-SCNC: 15 MMOL/L — LOW (ref 17–32)
COD CRY URNS QL: NEGATIVE — SIGNIFICANT CHANGE UP
COLOR SPEC: ABNORMAL
CREAT SERPL-MCNC: 2.5 MG/DL — HIGH (ref 0.7–1.5)
DIFF PNL FLD: ABNORMAL
EOSINOPHIL # BLD AUTO: 0 K/UL — SIGNIFICANT CHANGE UP (ref 0–0.7)
EOSINOPHIL NFR BLD AUTO: 0 % — SIGNIFICANT CHANGE UP (ref 0–8)
EPI CELLS # UR: NEGATIVE — SIGNIFICANT CHANGE UP
GAS PNL BLDV: 129 MMOL/L — LOW (ref 136–145)
GAS PNL BLDV: SIGNIFICANT CHANGE UP
GLUCOSE SERPL-MCNC: 244 MG/DL — HIGH (ref 70–99)
GLUCOSE UR QL: 500 MG/DL
GRAN CASTS # UR COMP ASSIST: NEGATIVE — SIGNIFICANT CHANGE UP
HCO3 BLDV-SCNC: 18 MMOL/L — LOW (ref 22–29)
HCT VFR BLD CALC: 30.6 % — LOW (ref 42–52)
HCT VFR BLDA CALC: 50.6 % — HIGH (ref 34–44)
HGB BLD CALC-MCNC: 16.5 G/DL — SIGNIFICANT CHANGE UP (ref 14–18)
HGB BLD-MCNC: 10 G/DL — LOW (ref 14–18)
HOROWITZ INDEX BLDV+IHG-RTO: 21 — SIGNIFICANT CHANGE UP
HYALINE CASTS # UR AUTO: NEGATIVE — SIGNIFICANT CHANGE UP
IMM GRANULOCYTES NFR BLD AUTO: 0.7 % — HIGH (ref 0.1–0.3)
INR BLD: 1.23 RATIO — SIGNIFICANT CHANGE UP (ref 0.65–1.3)
KETONES UR-MCNC: 40
LACTATE BLDV-MCNC: 0.8 MMOL/L — SIGNIFICANT CHANGE UP (ref 0.5–1.6)
LACTATE SERPL-SCNC: 0.9 MMOL/L — SIGNIFICANT CHANGE UP (ref 0.5–2.2)
LEUKOCYTE ESTERASE UR-ACNC: SIGNIFICANT CHANGE UP
LYMPHOCYTES # BLD AUTO: 0.91 K/UL — LOW (ref 1.2–3.4)
LYMPHOCYTES # BLD AUTO: 12 % — LOW (ref 20.5–51.1)
MCHC RBC-ENTMCNC: 29.8 PG — SIGNIFICANT CHANGE UP (ref 27–31)
MCHC RBC-ENTMCNC: 32.7 G/DL — SIGNIFICANT CHANGE UP (ref 32–37)
MCV RBC AUTO: 91.1 FL — SIGNIFICANT CHANGE UP (ref 80–94)
MONOCYTES # BLD AUTO: 0.61 K/UL — HIGH (ref 0.1–0.6)
MONOCYTES NFR BLD AUTO: 8 % — SIGNIFICANT CHANGE UP (ref 1.7–9.3)
NEUTROPHILS # BLD AUTO: 6 K/UL — SIGNIFICANT CHANGE UP (ref 1.4–6.5)
NEUTROPHILS NFR BLD AUTO: 79.2 % — HIGH (ref 42.2–75.2)
NITRITE UR-MCNC: NEGATIVE — SIGNIFICANT CHANGE UP
NRBC # BLD: 0 /100 WBCS — SIGNIFICANT CHANGE UP (ref 0–0)
PCO2 BLDV: 28 MMHG — LOW (ref 41–51)
PH BLDV: 7.41 — SIGNIFICANT CHANGE UP (ref 7.26–7.43)
PH UR: 6.5 — SIGNIFICANT CHANGE UP (ref 5–8)
PLATELET # BLD AUTO: 400 K/UL — SIGNIFICANT CHANGE UP (ref 130–400)
PO2 BLDV: 47 MMHG — HIGH (ref 20–40)
POTASSIUM BLDV-SCNC: 5.2 MMOL/L — SIGNIFICANT CHANGE UP (ref 3.3–5.6)
POTASSIUM SERPL-MCNC: 5.1 MMOL/L — HIGH (ref 3.5–5)
POTASSIUM SERPL-SCNC: 5.1 MMOL/L — HIGH (ref 3.5–5)
PROT SERPL-MCNC: 6.6 G/DL — SIGNIFICANT CHANGE UP (ref 6–8)
PROT UR-MCNC: >=300 MG/DL — SIGNIFICANT CHANGE UP
PROTHROM AB SERPL-ACNC: 13.3 SEC — HIGH (ref 9.95–12.87)
RBC # BLD: 3.36 M/UL — LOW (ref 4.7–6.1)
RBC # FLD: 13.8 % — SIGNIFICANT CHANGE UP (ref 11.5–14.5)
RBC CASTS # UR COMP ASSIST: ABNORMAL /HPF
SAO2 % BLDV: 83 % — SIGNIFICANT CHANGE UP
SODIUM SERPL-SCNC: 131 MMOL/L — LOW (ref 135–146)
SP GR SPEC: 1.02 — SIGNIFICANT CHANGE UP (ref 1.01–1.03)
TRI-PHOS CRY UR QL COMP ASSIST: NEGATIVE — SIGNIFICANT CHANGE UP
URATE CRY FLD QL MICRO: NEGATIVE — SIGNIFICANT CHANGE UP
UROBILINOGEN FLD QL: 0.2 — SIGNIFICANT CHANGE UP (ref 0.2–0.2)
WBC # BLD: 7.58 K/UL — SIGNIFICANT CHANGE UP (ref 4.8–10.8)
WBC # FLD AUTO: 7.58 K/UL — SIGNIFICANT CHANGE UP (ref 4.8–10.8)
WBC UR QL: ABNORMAL /HPF

## 2018-09-30 RX ORDER — CEFEPIME 1 G/1
1000 INJECTION, POWDER, FOR SOLUTION INTRAMUSCULAR; INTRAVENOUS ONCE
Qty: 0 | Refills: 0 | Status: COMPLETED | OUTPATIENT
Start: 2018-09-30 | End: 2018-09-30

## 2018-09-30 RX ADMIN — CEFEPIME 100 MILLIGRAM(S): 1 INJECTION, POWDER, FOR SOLUTION INTRAMUSCULAR; INTRAVENOUS at 22:55

## 2018-09-30 NOTE — ED PROVIDER NOTE - MEDICAL DECISION MAKING DETAILS
patient found to have uti, tx with cefepmie, found to be in cristino, held off on fluid, rondon in place. urology consulted and dr. tejada will fu.

## 2018-09-30 NOTE — ED PROVIDER NOTE - CARE PLAN
Principal Discharge DX:	UTI (urinary tract infection)  Secondary Diagnosis:	Sepsis  Secondary Diagnosis:	Acute kidney injury

## 2018-09-30 NOTE — ED PROVIDER NOTE - OBJECTIVE STATEMENT
79 yo with afib, cad on eliqius and plavix who presents with weakness that is worsening for 2 day. he had cystoscopy done inpatient 2 days ago and found to be anemic needing blood transfusion. he was so weak today that he fell on his left hand and suffered abrasion. he dnies fever but found to be febrile here. he is incontinent. he is coughing. nov omiting and no abdominal pain. 79 yo with afib, cad on eliqius and plavix who presents with weakness that is worsening for 2 day. he had cystoscopy done inpatient 2 days ago and found to be anemic needing blood transfusion. he was so weak today that he fell on his left hand and suffered abrasion. he dnies fever but found to be febrile here. he is incontinent. he is coughing. nov omiting and no abdominal pain. patient had a witness fall on to bed after trying to get up this monring without any head trauma or injury.

## 2018-09-30 NOTE — H&P ADULT - NSHPLABSRESULTS_GEN_ALL_CORE
10.0   7.58  )-----------( 400      ( 30 Sep 2018 17:32 )             30.6         131<L>  |  93<L>  |  39<H>  ----------------------------<  244<H>  5.1<H>   |  15<L>  |  2.5<H>    Ca    9.4      30 Sep 2018 17:32    TPro  6.6  /  Alb  2.8<L>  /  TBili  <0.2  /  DBili  x   /  AST  11  /  ALT  7   /  AlkPhos  127<H>            Urinalysis Basic - ( 30 Sep 2018 20:36 )    Color: Red / Appearance: Cloudy / S.020 / pH: x  Gluc: x / Ketone: 40  / Bili: Small / Urobili: 0.2   Blood: x / Protein: >=300 mg/dL / Nitrite: Negative   Leuk Esterase: Large / RBC: 10-25 /HPF / WBC 26-50 /HPF   Sq Epi: x / Non Sq Epi: Negative / Bacteria: Many      PT/INR - ( 30 Sep 2018 17:32 )   PT: 13.30 sec;   INR: 1.23 ratio         PTT - ( 30 Sep 2018 17:32 )  PTT:32.6 sec  Lactate Trend   @ 17:32 Lactate:0.9         CAPILLARY BLOOD GLUCOSE        Culture Results:   No growth ( @ 09:17)

## 2018-09-30 NOTE — ED PROVIDER NOTE - NS ED ROS FT
Constitutional:  + fevers, + chills, + malaise  Eyes:  No visual changes  ENMT: No neck pain or stiffness, no nasal congestion, no ear pain, no throat pain  Cardiac:  No chest pain  Respiratory:  + cough  GI:  No nausea, vomiting, diarrhea or abdominal pain.  :  kendra has dsyuria  MS:  No back pain, no joint pain.  Neuro:  No headache, no dizziness, no change in mental status  Skin:  No skin rash  Except as documented in the HPI,  all other systems are negative

## 2018-09-30 NOTE — H&P ADULT - PSH
AICD (automatic cardioverter/defibrillator) present  StandardNine  Encounter for biliary drainage tube placement  1/2018  H/O bilateral cataract extraction  LEFT- 1/18 RIGHT 6 YRS AGO  H/O coronary angioplasty  WITH STENT X2 (4/2018)  H/O flexible sigmoidoscopy  2015  History of prostate surgery  5/17

## 2018-09-30 NOTE — ED PROVIDER NOTE - PROGRESS NOTE DETAILS
mdm: recent instrumentation/ hx of anemia with fever now, plan is to get UA, evaluate for sepsis, check hgb. will consutl urology.

## 2018-09-30 NOTE — ED ADULT NURSE NOTE - PSH
AICD (automatic cardioverter/defibrillator) present  [employer]  Encounter for biliary drainage tube placement  1/2018  H/O bilateral cataract extraction  LEFT- 1/18 RIGHT 6 YRS AGO  H/O coronary angioplasty  WITH STENT X2 (4/2018)  H/O flexible sigmoidoscopy  2015  History of prostate surgery  5/17

## 2018-10-01 DIAGNOSIS — A41.9 SEPSIS, UNSPECIFIED ORGANISM: ICD-10-CM

## 2018-10-01 DIAGNOSIS — I48.2 CHRONIC ATRIAL FIBRILLATION: ICD-10-CM

## 2018-10-01 DIAGNOSIS — N17.9 ACUTE KIDNEY FAILURE, UNSPECIFIED: ICD-10-CM

## 2018-10-01 DIAGNOSIS — F03.90 UNSPECIFIED DEMENTIA WITHOUT BEHAVIORAL DISTURBANCE: ICD-10-CM

## 2018-10-01 DIAGNOSIS — E11.9 TYPE 2 DIABETES MELLITUS WITHOUT COMPLICATIONS: ICD-10-CM

## 2018-10-01 DIAGNOSIS — I10 ESSENTIAL (PRIMARY) HYPERTENSION: ICD-10-CM

## 2018-10-01 DIAGNOSIS — N13.6 PYONEPHROSIS: ICD-10-CM

## 2018-10-01 DIAGNOSIS — I25.10 ATHEROSCLEROTIC HEART DISEASE OF NATIVE CORONARY ARTERY WITHOUT ANGINA PECTORIS: ICD-10-CM

## 2018-10-01 DIAGNOSIS — E78.00 PURE HYPERCHOLESTEROLEMIA, UNSPECIFIED: ICD-10-CM

## 2018-10-01 DIAGNOSIS — N39.0 URINARY TRACT INFECTION, SITE NOT SPECIFIED: ICD-10-CM

## 2018-10-01 DIAGNOSIS — S22.43XA MULTIPLE FRACTURES OF RIBS, BILATERAL, INITIAL ENCOUNTER FOR CLOSED FRACTURE: ICD-10-CM

## 2018-10-01 PROBLEM — N28.9 DISORDER OF KIDNEY AND URETER, UNSPECIFIED: Chronic | Status: ACTIVE | Noted: 2018-09-20

## 2018-10-01 LAB
ANION GAP SERPL CALC-SCNC: 19 MMOL/L — HIGH (ref 7–14)
BUN SERPL-MCNC: 38 MG/DL — HIGH (ref 10–20)
CALCIUM SERPL-MCNC: 8.7 MG/DL — SIGNIFICANT CHANGE UP (ref 8.5–10.1)
CHLORIDE SERPL-SCNC: 101 MMOL/L — SIGNIFICANT CHANGE UP (ref 98–110)
CO2 SERPL-SCNC: 16 MMOL/L — LOW (ref 17–32)
CREAT SERPL-MCNC: 1.8 MG/DL — HIGH (ref 0.7–1.5)
ESTIMATED AVERAGE GLUCOSE: 140 MG/DL — HIGH (ref 68–114)
GLUCOSE BLDC GLUCOMTR-MCNC: 153 MG/DL — HIGH (ref 70–99)
GLUCOSE BLDC GLUCOMTR-MCNC: 177 MG/DL — HIGH (ref 70–99)
GLUCOSE BLDC GLUCOMTR-MCNC: 89 MG/DL — SIGNIFICANT CHANGE UP (ref 70–99)
GLUCOSE SERPL-MCNC: 92 MG/DL — SIGNIFICANT CHANGE UP (ref 70–99)
HBA1C BLD-MCNC: 6.5 % — HIGH (ref 4–5.6)
HCT VFR BLD CALC: 29.4 % — LOW (ref 42–52)
HGB BLD-MCNC: 9.4 G/DL — LOW (ref 14–18)
MCHC RBC-ENTMCNC: 29.5 PG — SIGNIFICANT CHANGE UP (ref 27–31)
MCHC RBC-ENTMCNC: 32 G/DL — SIGNIFICANT CHANGE UP (ref 32–37)
MCV RBC AUTO: 92.2 FL — SIGNIFICANT CHANGE UP (ref 80–94)
NRBC # BLD: 0 /100 WBCS — SIGNIFICANT CHANGE UP (ref 0–0)
PLATELET # BLD AUTO: 363 K/UL — SIGNIFICANT CHANGE UP (ref 130–400)
POTASSIUM SERPL-MCNC: 4.4 MMOL/L — SIGNIFICANT CHANGE UP (ref 3.5–5)
POTASSIUM SERPL-SCNC: 4.4 MMOL/L — SIGNIFICANT CHANGE UP (ref 3.5–5)
RBC # BLD: 3.19 M/UL — LOW (ref 4.7–6.1)
RBC # FLD: 13.8 % — SIGNIFICANT CHANGE UP (ref 11.5–14.5)
SODIUM SERPL-SCNC: 136 MMOL/L — SIGNIFICANT CHANGE UP (ref 135–146)
WBC # BLD: 7.5 K/UL — SIGNIFICANT CHANGE UP (ref 4.8–10.8)
WBC # FLD AUTO: 7.5 K/UL — SIGNIFICANT CHANGE UP (ref 4.8–10.8)

## 2018-10-01 PROCEDURE — 99231 SBSQ HOSP IP/OBS SF/LOW 25: CPT

## 2018-10-01 RX ORDER — FAMOTIDINE 10 MG/ML
20 INJECTION INTRAVENOUS
Qty: 0 | Refills: 0 | Status: DISCONTINUED | OUTPATIENT
Start: 2018-10-01 | End: 2018-10-01

## 2018-10-01 RX ORDER — BENZOCAINE AND MENTHOL 5; 1 G/100ML; G/100ML
1 LIQUID ORAL
Qty: 0 | Refills: 0 | Status: DISCONTINUED | OUTPATIENT
Start: 2018-10-01 | End: 2018-10-04

## 2018-10-01 RX ORDER — FUROSEMIDE 40 MG
40 TABLET ORAL DAILY
Qty: 0 | Refills: 0 | Status: DISCONTINUED | OUTPATIENT
Start: 2018-10-01 | End: 2018-10-01

## 2018-10-01 RX ORDER — ACETAMINOPHEN 500 MG
650 TABLET ORAL EVERY 6 HOURS
Qty: 0 | Refills: 0 | Status: DISCONTINUED | OUTPATIENT
Start: 2018-10-01 | End: 2018-10-04

## 2018-10-01 RX ORDER — PANTOPRAZOLE SODIUM 20 MG/1
40 TABLET, DELAYED RELEASE ORAL
Qty: 0 | Refills: 0 | Status: DISCONTINUED | OUTPATIENT
Start: 2018-10-01 | End: 2018-10-01

## 2018-10-01 RX ORDER — DOFETILIDE 0.25 MG/1
500 CAPSULE ORAL
Qty: 0 | Refills: 0 | Status: DISCONTINUED | OUTPATIENT
Start: 2018-09-30 | End: 2018-10-04

## 2018-10-01 RX ORDER — LATANOPROST 0.05 MG/ML
1 SOLUTION/ DROPS OPHTHALMIC; TOPICAL AT BEDTIME
Qty: 0 | Refills: 0 | Status: DISCONTINUED | OUTPATIENT
Start: 2018-10-01 | End: 2018-10-04

## 2018-10-01 RX ORDER — SODIUM CHLORIDE 9 MG/ML
1000 INJECTION INTRAMUSCULAR; INTRAVENOUS; SUBCUTANEOUS
Qty: 0 | Refills: 0 | Status: DISCONTINUED | OUTPATIENT
Start: 2018-10-01 | End: 2018-10-01

## 2018-10-01 RX ORDER — BENZOCAINE AND MENTHOL 5; 1 G/100ML; G/100ML
1 LIQUID ORAL
Qty: 0 | Refills: 0 | Status: DISCONTINUED | OUTPATIENT
Start: 2018-10-01 | End: 2018-10-01

## 2018-10-01 RX ORDER — ATORVASTATIN CALCIUM 80 MG/1
40 TABLET, FILM COATED ORAL AT BEDTIME
Qty: 0 | Refills: 0 | Status: DISCONTINUED | OUTPATIENT
Start: 2018-10-01 | End: 2018-10-04

## 2018-10-01 RX ORDER — DOCUSATE SODIUM 100 MG
100 CAPSULE ORAL
Qty: 0 | Refills: 0 | Status: DISCONTINUED | OUTPATIENT
Start: 2018-10-01 | End: 2018-10-04

## 2018-10-01 RX ORDER — LANOLIN ALCOHOL/MO/W.PET/CERES
5 CREAM (GRAM) TOPICAL AT BEDTIME
Qty: 0 | Refills: 0 | Status: DISCONTINUED | OUTPATIENT
Start: 2018-10-01 | End: 2018-10-04

## 2018-10-01 RX ORDER — METFORMIN HYDROCHLORIDE 850 MG/1
1000 TABLET ORAL
Qty: 0 | Refills: 0 | Status: DISCONTINUED | OUTPATIENT
Start: 2018-10-01 | End: 2018-10-01

## 2018-10-01 RX ORDER — SODIUM CHLORIDE 9 MG/ML
1000 INJECTION INTRAMUSCULAR; INTRAVENOUS; SUBCUTANEOUS
Qty: 0 | Refills: 0 | Status: DISCONTINUED | OUTPATIENT
Start: 2018-10-01 | End: 2018-10-04

## 2018-10-01 RX ORDER — FAMOTIDINE 10 MG/ML
20 INJECTION INTRAVENOUS DAILY
Qty: 0 | Refills: 0 | Status: DISCONTINUED | OUTPATIENT
Start: 2018-10-01 | End: 2018-10-03

## 2018-10-01 RX ORDER — BRIMONIDINE TARTRATE 2 MG/MG
1 SOLUTION/ DROPS OPHTHALMIC
Qty: 0 | Refills: 0 | Status: DISCONTINUED | OUTPATIENT
Start: 2018-10-01 | End: 2018-10-04

## 2018-10-01 RX ORDER — DOCUSATE SODIUM 100 MG
100 CAPSULE ORAL
Qty: 0 | Refills: 0 | Status: DISCONTINUED | OUTPATIENT
Start: 2018-10-01 | End: 2018-10-01

## 2018-10-01 RX ORDER — APIXABAN 2.5 MG/1
2.5 TABLET, FILM COATED ORAL EVERY 12 HOURS
Qty: 0 | Refills: 0 | Status: DISCONTINUED | OUTPATIENT
Start: 2018-09-30 | End: 2018-10-03

## 2018-10-01 RX ORDER — CEFEPIME 1 G/1
1000 INJECTION, POWDER, FOR SOLUTION INTRAMUSCULAR; INTRAVENOUS EVERY 12 HOURS
Qty: 0 | Refills: 0 | Status: DISCONTINUED | OUTPATIENT
Start: 2018-10-01 | End: 2018-10-03

## 2018-10-01 RX ORDER — OXYBUTYNIN CHLORIDE 5 MG
15 TABLET ORAL ONCE
Qty: 0 | Refills: 0 | Status: DISCONTINUED | OUTPATIENT
Start: 2018-10-01 | End: 2018-10-02

## 2018-10-01 RX ORDER — LISINOPRIL 2.5 MG/1
20 TABLET ORAL DAILY
Qty: 0 | Refills: 0 | Status: DISCONTINUED | OUTPATIENT
Start: 2018-09-30 | End: 2018-10-01

## 2018-10-01 RX ORDER — POTASSIUM CHLORIDE 20 MEQ
20 PACKET (EA) ORAL DAILY
Qty: 0 | Refills: 0 | Status: DISCONTINUED | OUTPATIENT
Start: 2018-10-01 | End: 2018-10-01

## 2018-10-01 RX ORDER — METOPROLOL TARTRATE 50 MG
50 TABLET ORAL DAILY
Qty: 0 | Refills: 0 | Status: DISCONTINUED | OUTPATIENT
Start: 2018-10-01 | End: 2018-10-04

## 2018-10-01 RX ORDER — CLOPIDOGREL BISULFATE 75 MG/1
75 TABLET, FILM COATED ORAL DAILY
Qty: 0 | Refills: 0 | Status: DISCONTINUED | OUTPATIENT
Start: 2018-10-01 | End: 2018-10-04

## 2018-10-01 RX ORDER — INFLUENZA VIRUS VACCINE 15; 15; 15; 15 UG/.5ML; UG/.5ML; UG/.5ML; UG/.5ML
0.5 SUSPENSION INTRAMUSCULAR ONCE
Qty: 0 | Refills: 0 | Status: COMPLETED | OUTPATIENT
Start: 2018-10-01 | End: 2018-10-01

## 2018-10-01 RX ORDER — NIFEDIPINE 30 MG
90 TABLET, EXTENDED RELEASE 24 HR ORAL DAILY
Qty: 0 | Refills: 0 | Status: DISCONTINUED | OUTPATIENT
Start: 2018-10-01 | End: 2018-10-04

## 2018-10-01 RX ADMIN — ATORVASTATIN CALCIUM 40 MILLIGRAM(S): 80 TABLET, FILM COATED ORAL at 21:43

## 2018-10-01 RX ADMIN — APIXABAN 2.5 MILLIGRAM(S): 2.5 TABLET, FILM COATED ORAL at 06:28

## 2018-10-01 RX ADMIN — BRIMONIDINE TARTRATE 1 DROP(S): 2 SOLUTION/ DROPS OPHTHALMIC at 18:43

## 2018-10-01 RX ADMIN — CLOPIDOGREL BISULFATE 75 MILLIGRAM(S): 75 TABLET, FILM COATED ORAL at 12:58

## 2018-10-01 RX ADMIN — FAMOTIDINE 20 MILLIGRAM(S): 10 INJECTION INTRAVENOUS at 18:42

## 2018-10-01 RX ADMIN — LISINOPRIL 20 MILLIGRAM(S): 2.5 TABLET ORAL at 06:28

## 2018-10-01 RX ADMIN — BENZOCAINE AND MENTHOL 1 LOZENGE: 5; 1 LIQUID ORAL at 13:58

## 2018-10-01 RX ADMIN — Medication 5 MILLIGRAM(S): at 21:54

## 2018-10-01 RX ADMIN — Medication 100 MILLIGRAM(S): at 06:28

## 2018-10-01 RX ADMIN — BENZOCAINE AND MENTHOL 1 LOZENGE: 5; 1 LIQUID ORAL at 16:55

## 2018-10-01 RX ADMIN — Medication 40 MILLIGRAM(S): at 06:28

## 2018-10-01 RX ADMIN — Medication 650 MILLIGRAM(S): at 21:42

## 2018-10-01 RX ADMIN — CEFEPIME 100 MILLIGRAM(S): 1 INJECTION, POWDER, FOR SOLUTION INTRAMUSCULAR; INTRAVENOUS at 06:28

## 2018-10-01 RX ADMIN — Medication 30 MILLILITER(S): at 09:08

## 2018-10-01 RX ADMIN — SODIUM CHLORIDE 100 MILLILITER(S): 9 INJECTION INTRAMUSCULAR; INTRAVENOUS; SUBCUTANEOUS at 06:28

## 2018-10-01 RX ADMIN — BENZOCAINE AND MENTHOL 1 LOZENGE: 5; 1 LIQUID ORAL at 17:53

## 2018-10-01 RX ADMIN — DOFETILIDE 500 MICROGRAM(S): 0.25 CAPSULE ORAL at 06:28

## 2018-10-01 RX ADMIN — BENZOCAINE AND MENTHOL 1 LOZENGE: 5; 1 LIQUID ORAL at 21:43

## 2018-10-01 RX ADMIN — PANTOPRAZOLE SODIUM 40 MILLIGRAM(S): 20 TABLET, DELAYED RELEASE ORAL at 06:28

## 2018-10-01 RX ADMIN — Medication 90 MILLIGRAM(S): at 06:28

## 2018-10-01 RX ADMIN — Medication 50 MILLIGRAM(S): at 06:28

## 2018-10-01 RX ADMIN — APIXABAN 2.5 MILLIGRAM(S): 2.5 TABLET, FILM COATED ORAL at 18:42

## 2018-10-01 RX ADMIN — CEFEPIME 100 MILLIGRAM(S): 1 INJECTION, POWDER, FOR SOLUTION INTRAMUSCULAR; INTRAVENOUS at 18:45

## 2018-10-01 RX ADMIN — SODIUM CHLORIDE 75 MILLILITER(S): 9 INJECTION INTRAMUSCULAR; INTRAVENOUS; SUBCUTANEOUS at 12:59

## 2018-10-01 RX ADMIN — LATANOPROST 1 DROP(S): 0.05 SOLUTION/ DROPS OPHTHALMIC; TOPICAL at 21:44

## 2018-10-01 RX ADMIN — DOFETILIDE 500 MICROGRAM(S): 0.25 CAPSULE ORAL at 21:46

## 2018-10-01 NOTE — PROGRESS NOTE ADULT - SUBJECTIVE AND OBJECTIVE BOX
ROCIO NATION  78y  Male      Patient is a 78y old  Male who presents with a chief complaint of generalized  weakness---progressive  > 48 hrs duration (01 Oct 2018 01:18)      INTERVAL HPI/OVERNIGHT EVENTS:  Mccarthy placed in ER  Pt complains of pain with swallowing      REVIEW OF SYSTEMS:  CONSTITUTIONAL: c/o fever and fatigue  EYES: No eye pain, visual disturbances, or discharge  ENMT:  No difficulty hearing, tinnitus, vertigo; No sinus or throat pain  NECK: No pain or stiffness  BREASTS: No pain, masses, or nipple discharge  RESPIRATORY: No cough, wheezing, chills or hemoptysis; No shortness of breath  CARDIOVASCULAR: No chest pain, palpitations, dizziness, or leg swelling  GASTROINTESTINAL: c/o pain with swallowing  GENITOURINARY: No dysuria, frequency, hematuria, or incontinence  NEUROLOGICAL: No headaches, memory loss, loss of strength, numbness, or tremors  SKIN: No itching, burning, rashes, or lesions   LYMPH NODES: No enlarged glands  ENDOCRINE: No heat or cold intolerance; No hair loss  MUSCULOSKELETAL: No joint pain or swelling; No muscle, back, or extremity pain  PSYCHIATRIC: No depression, anxiety, mood swings, or difficulty sleeping  HEME/LYMPH: No easy bruising, or bleeding gums  ALLERY AND IMMUNOLOGIC: No hives or eczema    Vital Signs Last 24 Hrs  T(C): 37.1 (01 Oct 2018 10:52), Max: 38.1 (30 Sep 2018 16:53)  T(F): 98.7 (01 Oct 2018 10:52), Max: 100.5 (30 Sep 2018 16:53)  HR: 77 (01 Oct 2018 10:52) (77 - 109)  BP: 139/65 (01 Oct 2018 10:52) (139/65 - 196/91)  BP(mean): --  RR: 16 (01 Oct 2018 10:52) (16 - 20)  SpO2: 96% (01 Oct 2018 10:52) (96% - 99%) on room air      PHYSICAL EXAM:  GENERAL: NAD, well-groomed, well-developed  HEAD:  Atraumatic, Normocephalic  EYES: EOMI, PERRLA, conjunctiva and sclera clear  ENMT: No tonsillar erythema, exudates, or enlargement; Moist mucous membranes, Good dentition, No lesions  NECK: Supple, No JVD, Normal thyroid  NERVOUS SYSTEM:  Alert & Oriented X3, Good concentration; Motor Strength 5/5 B/L upper and lower extremities; DTRs 2+ intact and symmetric  CHEST/LUNG: Clear to percussion bilaterally; No rales, rhonchi, wheezing, or rubs  HEART: Regular rate and rhythm; No murmurs, rubs, or gallops  ABDOMEN: Soft, Nontender, Nondistended; Bowel sounds present  EXTREMITIES:  2+ Peripheral Pulses, No clubbing, cyanosis, or edema  LYMPH: No lymphadenopathy noted  SKIN: No rashes or lesions    Consultant(s) Notes Reviewed:  [x ] YES  [ ] NO  Care Discussed with Consultants/Other Providers [ x] YES  [ ] NO    LAB:  CBC Full  -  ( 01 Oct 2018 07:20 )  WBC Count : 7.50 K/uL  Hemoglobin : 9.4 g/dL  Hematocrit : 29.4 %  Platelet Count - Automated : 363 K/uL  Mean Cell Volume : 92.2 fL  Mean Cell Hemoglobin : 29.5 pg  Mean Cell Hemoglobin Concentration : 32.0 g/dL  Auto Neutrophil # : x  Auto Lymphocyte # : x  Auto Monocyte # : x  Auto Eosinophil # : x  Auto Basophil # : x  Auto Neutrophil % : x  Auto Lymphocyte % : x  Auto Monocyte % : x  Auto Eosinophil % : x  Auto Basophil % : x    10-01    136  |  101  |  38<H>  ----------------------------<  92  4.4   |  16<L>  |  1.8<H>    Ca    8.7      01 Oct 2018 07:20    TPro  6.6  /  Alb  2.8<L>  /  TBili  <0.2  /  DBili  x   /  AST  11  /  ALT  7   /  AlkPhos  127<H>  09-30                              9.4    7.50  )-----------( 363      ( 01 Oct 2018 07:20 )             29.4     Daily Height in cm: 177.8 (01 Oct 2018 00:59)    Daily   Drug Dosing Weight  Height (cm): 177.8 (01 Oct 2018 00:59)  Weight (kg): 90 (01 Oct 2018 00:59)  BMI (kg/m2): 28.5 (01 Oct 2018 00:59)  BSA (m2): 2.08 (01 Oct 2018 00:59)  CAPILLARY BLOOD GLUCOSE      POCT Blood Glucose.: 89 mg/dL (01 Oct 2018 07:29)    I&O's Summary    30 Sep 2018 07:  -  01 Oct 2018 07:00  --------------------------------------------------------  IN: 0 mL / OUT: 1400 mL / NET: -1400 mL    01 Oct 2018 07:  -  01 Oct 2018 11:10  --------------------------------------------------------  IN: 240 mL / OUT: 0 mL / NET: 240 mL      Urinalysis Basic - ( 30 Sep 2018 20:36 )    Color: Red / Appearance: Cloudy / S.020 / pH: x  Gluc: x / Ketone: 40  / Bili: Small / Urobili: 0.2   Blood: x / Protein: >=300 mg/dL / Nitrite: Negative   Leuk Esterase: Large / RBC: 10-25 /HPF / WBC 26-50 /HPF   Sq Epi: x / Non Sq Epi: Negative / Bacteria: Many      LIVER FUNCTIONS - ( 30 Sep 2018 17:32 )  Alb: 2.8 g/dL / Pro: 6.6 g/dL / ALK PHOS: 127 U/L / ALT: 7 U/L / AST: 11 U/L / GGT: x               RADIOLOGY & ADDITIONAL TESTS:    Imaging Personally Reviewed:  [ ] YES  [ ] NO    HEALTH ISSUES - PROBLEM Dx:  Urinary tract infection without hematuria, site unspecified: Urinary tract infection without hematuria, site unspecified  Dementia: Dementia  CAD (coronary artery disease): CAD (coronary artery disease)  Diabetes: Diabetes  Sepsis: Sepsis  UTI (urinary tract infection): UTI (urinary tract infection) ROCIO NATION  78y  Male      Patient is a 78y old Male who presents with a chief complaint of generalized weakness and two falls at home  > 48 hrs duration (01 Oct 2018 01:18)      INTERVAL HPI/OVERNIGHT EVENTS:  Rondon placed in ER  Pt complains of pain with swallowing for 24hrs      REVIEW OF SYSTEMS:  CONSTITUTIONAL: c/o fever and fatigue  EYES: No eye pain, visual disturbances, or discharge  ENMT:  No difficulty hearing, tinnitus, vertigo; No sinus or throat pain  NECK: No pain or stiffness  BREASTS: No pain, masses, or nipple discharge  RESPIRATORY: No cough, wheezing, chills or hemoptysis; No shortness of breath  CARDIOVASCULAR: No chest pain, palpitations, dizziness, or leg swelling  GASTROINTESTINAL: c/o pain with swallowing  GENITOURINARY: No dysuria, frequency, hematuria, or incontinence  NEUROLOGICAL: No headaches, memory loss, loss of strength, numbness, or tremors  SKIN: No itching, burning, rashes, or lesions   LYMPH NODES: No enlarged glands  ENDOCRINE: No heat or cold intolerance; No hair loss  MUSCULOSKELETAL: s/p fall at home  PSYCHIATRIC: No depression, anxiety, mood swings, or difficulty sleeping  HEME/LYMPH: No easy bruising, or bleeding gums  ALLERY AND IMMUNOLOGIC: No hives or eczema    Vital Signs Last 24 Hrs  T(C): 37.1 (01 Oct 2018 10:52), Max: 38.1 (30 Sep 2018 16:53)  T(F): 98.7 (01 Oct 2018 10:52), Max: 100.5 (30 Sep 2018 16:53)  HR: 77 (01 Oct 2018 10:52) (77 - 109)  BP: 139/65 (01 Oct 2018 10:52) (139/65 - 196/91)  BP(mean): --  RR: 16 (01 Oct 2018 10:52) (16 - 20)  SpO2: 96% (01 Oct 2018 10:52) (96% - 99%) on room air      PHYSICAL EXAM:  GENERAL: NAD, well-groomed, well-developed  HEAD:  Atraumatic, Normocephalic  EYES: EOMI, PERRLA, conjunctiva and sclera clear  ENMT: No tonsillar erythema, exudates, or enlargement; Moist mucous membranes, Good dentition, No lesions  NECK: Supple, No JVD, Normal thyroid  NERVOUS SYSTEM:  Alert & Oriented X3, Good concentration; Motor Strength 5/5 B/L upper and lower extremities; DTRs 2+ intact and symmetric  CHEST/LUNG: Clear to percussion bilaterally; No rales, rhonchi, wheezing, or rubs  HEART: Regular rate and rhythm; No murmurs, rubs, or gallops  ABDOMEN: Soft, Nontender, Nondistended; Bowel sounds present, rondon present  EXTREMITIES:  2+ Peripheral Pulses, No clubbing, cyanosis, or edema  LYMPH: No lymphadenopathy noted  SKIN: No rashes or lesions    Consultant(s) Notes Reviewed:  [x ] YES  [ ] NO  Care Discussed with Consultants/Other Providers [ x] YES  [ ] NO    LABS:      10-01    136  |  101  |  38<H>  ----------------------------<  92  4.4   |  16<L>  |  1.8<H>    Ca    8.7      01 Oct 2018 07:20    TPro  6.6  /  Alb  2.8<L>  /  TBili  <0.2  /  DBili  x   /  AST  11  /  ALT  7   /  AlkPhos  127<H>                            9.4    7.50  )-----------( 363      ( 01 Oct 2018 07:20 )             29.4     Daily Height in cm: 177.8 (01 Oct 2018 00:59)    Daily   Drug Dosing Weight  Height (cm): 177.8 (01 Oct 2018 00:59)  Weight (kg): 90 (01 Oct 2018 00:59)  BMI (kg/m2): 28.5 (01 Oct 2018 00:59)  BSA (m2): 2.08 (01 Oct 2018 00:59)  CAPILLARY BLOOD GLUCOSE      POCT Blood Glucose.: 89 mg/dL (01 Oct 2018 07:29)    I&O's Summary    30 Sep 2018 07:  -  01 Oct 2018 07:00  --------------------------------------------------------  IN: 0 mL / OUT: 1400 mL / NET: -1400 mL    01 Oct 2018 07:01  -  01 Oct 2018 11:10  --------------------------------------------------------  IN: 240 mL / OUT: 0 mL / NET: 240 mL      Urinalysis Basic - ( 30 Sep 2018 20:36 )    Color: Red / Appearance: Cloudy / S.020 / pH: x  Gluc: x / Ketone: 40  / Bili: Small / Urobili: 0.2   Blood: x / Protein: >=300 mg/dL / Nitrite: Negative   Leuk Esterase: Large / RBC: 10-25 /HPF / WBC 26-50 /HPF   Sq Epi: x / Non Sq Epi: Negative / Bacteria: Many      LIVER FUNCTIONS - ( 30 Sep 2018 17:32 )  Alb: 2.8 g/dL / Pro: 6.6 g/dL / ALK PHOS: 127 U/L / ALT: 7 U/L / AST: 11 U/L / GGT: x               RADIOLOGY & ADDITIONAL TESTS:    Imaging Personally Reviewed:  [x] YES  [ ] NO    HEALTH ISSUES - PROBLEM Dx:  Urinary tract infection without hematuria, site unspecified: Urinary tract infection without hematuria, site unspecified  Dementia: Dementia  CAD (coronary artery disease): CAD (coronary artery disease)  Diabetes: Diabetes  Sepsis: Sepsis  UTI (urinary tract infection): UTI (urinary tract infection)    MEDICATIONS  (STANDING):  apixaban 2.5 milliGRAM(s) Oral every 12 hours  atorvastatin 40 milliGRAM(s) Oral at bedtime  cefepime   IVPB 1000 milliGRAM(s) IV Intermittent every 12 hours  clopidogrel Tablet 75 milliGRAM(s) Oral daily  docusate sodium 100 milliGRAM(s) Oral two times a day  dofetilide 500 MICROGram(s) Oral two times a day  influenza   Vaccine 0.5 milliLiter(s) IntraMuscular once  latanoprost 0.005% Ophthalmic Solution 1 Drop(s) Both EYES at bedtime  metoprolol succinate ER 50 milliGRAM(s) Oral daily  NIFEdipine XL 90 milliGRAM(s) Oral daily  pantoprazole    Tablet 40 milliGRAM(s) Oral before breakfast  sodium chloride 0.9%. 1000 milliLiter(s) (75 mL/Hr) IV Continuous <Continuous>    MEDICATIONS  (PRN):  aluminum hydroxide/magnesium hydroxide/simethicone Suspension 30 milliLiter(s) Oral every 4 hours PRN Dyspepsia

## 2018-10-01 NOTE — CONSULT NOTE ADULT - ASSESSMENT
uti uti- not documented uti- not documented  Generalized Weakness  --etiology  ??  ISRRAEL on CKD  ?  Post 0pen Prostatectomy 2016  Post cysto -- open bladder neck

## 2018-10-01 NOTE — CONSULT NOTE ADULT - REASON FOR ADMISSION
gneral  weakness---progressive  > 48 hrs duration general  weakness---progressive  > 48 hrs duration

## 2018-10-01 NOTE — CONSULT NOTE ADULT - PROBLEM SELECTOR RECOMMENDATION 9
IVF  IV ABX ROCEPHIN  PAIN MANAGEMENT IVF  IV ABX cefepime  PAIN MANAGEMENT IVF  IV ABX cefepime  PAIN MANAGEMENT  uti  not  documented--  no urologic intervention   fu with  Dr Agrawal as  out patient  upon discharge

## 2018-10-01 NOTE — PHARMACOTHERAPY INTERVENTION NOTE - COMMENTS
Order for IR oxybutynin 15mg x 1 dose.  Dr. Beltre states patient is on Extended Release 15mg Oxybutynin at home.   Recommend order be changed to Oxybutyning ER 15mg x 1 dose.
Sr. Cr. 2.5mg/dl; recommend discontinuation of Metformin.
Chg from IVPB to IVP

## 2018-10-01 NOTE — CONSULT NOTE ADULT - SUBJECTIVE AND OBJECTIVE BOX
· Chief Complaint: The patient is a 78y Male complaining of weakness.	  · HPI Objective Statement: 77 yo with afib, cad on eliqius and plavix who presents with weakness that is worsening for 2 day. he had cystoscopy done inpatient 2 days ago , found to be anemic needing blood transfusion. he was so weak today that he fell on his left hand and suffered abrasion. he dnies fever but found to be febrile here. he is incontinent. he is coughing. no vomiting and no abdominal pain.	    PAST MEDICAL/SURGICAL/FAMILY/SOCIAL HISTORY:    Past Medical History:  Afib    BPH (benign prostatic hyperplasia)    CAD (coronary artery disease)  CARD STENT X2 4/2018  Cardiomyopathy    CHF (congestive heart failure)  COMBINED SYSTOLIC & DIASTOLIC  Cholelithiasis    Dementia    Diabetes    Diplopia  RIGHT EYE-WEARS PATCH S/P TIA  DM (diabetes mellitus)    Glaucoma    Heart attack  1/2018  HTN (hypertension)    Hydrocele in adult    Hypercholesteremia    Renal insufficiency    Sepsis  1/18 FROM INFECTED GALLBLADDER  TIA (transient ischemic attack)  X2 JULY 2018.     Past Surgical History:  AICD (automatic cardioverter/defibrillator) present  [employer]  Encounter for biliary drainage tube placement  1/2018  H/O bilateral cataract extraction  LEFT- 1/18 RIGHT 6 YRS AGO  H/O coronary angioplasty  WITH STENT X2 (4/2018)  H/O flexible sigmoidoscopy  2015  History of prostate surgery  5/17.     Family History:  No pertinent family history in first degree relatives.     Tobacco Usage:  · Tobacco Usage	Unknown if ever smoked	    ALLERGIES AND HOME MEDICATIONS:   Allergies:        Allergies:  	sulfa drugs: Drug Category, Other, RENAL FAILURE    Home Medications:   * Patient Currently Takes Medications as of 28-Sep-2018 13:05 documented in Structured Notes  · 	NIFEdipine 90 mg oral tablet, extended release: 1 tab(s) orally once a day  · 	lisinopril 20 mg oral tablet: 1 tab(s) orally once a day  · 	dofetilide 500 mcg oral capsule: 1 cap(s) orally 2 times a day  · 	metFORMIN 1000 mg oral tablet, extended release: 1 tab(s) orally   · 	atorvastatin 40 mg oral tablet: 1 tab(s) orally once a day (at bedtime)  · 	furosemide 40 mg oral tablet: 1 tab(s) orally once a day  · 	potassium chloride 20 mEq oral tablet, extended release: 1 tab(s) orally once a day  · 	freetext medication  -: 100 milligram(s) orally once a day  · 	apixaban 2.5 mg oral tablet: 1 tab(s) orally every 12 hours  · 	clopidogrel 75 mg oral tablet: 1 tab(s) orally once a day  · 	metoprolol succinate 50 mg oral tablet, extended release: 1 tab(s) orally once a day  · 	oxybutynin 15 mg/24 hr oral tablet, extended release: 1 tab(s) orally once a day  · 	latanoprost 0.005% ophthalmic solution: 1 drop(s) to each affected eye once a day (in the evening) ou  · 	Cranberry oral capsule: 1 cap(s) orally once a day  · 	Alphagan P 0.1% ophthalmic solution: 1 drop(s) to each affected eye once a day (at bedtime)  · 	ciclopirox 0.77% topical cream: Apply topically to affected area 2 times a day  · 	glipiZIDE 10 mg oral tablet, extended release: 1 tab(s) orally once a day    REVIEW OF SYSTEMS:    Review of Systems:  PHYSICAL EXAM:   · Physical Examination: CONSTITUTIONAL: Well-developed; well-nourished; in no acute distress, nontoxic appearing  SKIN: skin exam is warm and dry,  HEAD: Normocephalic; atraumatic.  EYES: PERRL, 3 mm bilateral, no nystagmus, EOM intact; conjunctiva and sclera clear.  ENT: MMM, no nasal congestion  NECK: Supple; non tender.  ROM intact.  CARD: S1, S2 normal, no murmur  RESP: No wheezes, rales or rhonchi. Good air movement bilaterally  ABD: soft; non-distended; non-tender. No Rebound, No guarding  EXT: Normal ROM. No cyanosis or edema. Dp Pulses intact.   NEURO: awake, alert, following commands, oriented, grossly unremarkable. No Focal deficits. GCS 15.   PSYCH: Cooperative, appropriate.  SKIN: small skin tear to left wrist.	    CURRENT ORDERS/:   · 	Intake and Output,   Frequency:  every 2 hours, 30-Sep-2018, Active, Standard  · 	Vital Signs,   Frequency:  See Frequency Below  	  Every: 15 minute(s)   For: 1 hour(s)  	  Every: 30 minute(s)   For: 2 hour(s)  	  Every: 1 hour(s)   For: 4 hour(s)  	  Every: 4 hour(s) until discharge, 30-Sep-2018, Active, Standard  · 	Cardiac Monitor Bedside,   Time/Priority:  STAT, 30-Sep-2018, Active, Standard  · 	Indwelling Urethral Catheter,   Connect To:  Leg Bag  	  Indication:  Urologic Procedure,  Surgery  	  Additional Instructions:  STRAIGHT CATHETER, 30-Sep-2018, Active, Standard  · 	Pulse Oximetry,   Frequency: <Continuous>  	  Additional Instructions:  NOTIFY Provider if oxygen saturation is LESS THAN 90%, 30-Sep-2018, Active, Standard    RESULTS:   · EKG Date/Time: 30-Sep-2018 18:21	  · Rate: 108	  · Interpretation: abnormal  sinus tach, paced atrial	        DISPOSITION:    Care Plan - Instructions:  Principal Discharge DX:	UTI (urinary tract infection)  Secondary Diagnosis:	Sepsis  Secondary Diagnosis:	Acute kidney injury. · Chief Complaint: The patient is a 78y Male complaining of weakness.	  · HPI Objective Statement: 77 yo with afib, cad on eliqius and plavix who presents with weakness that is worsening for 2 day. he had cystoscopy done inpatient 2 days ago , found to be anemic needing blood transfusion. he was so weak today that he fell on his left hand and suffered abrasion. he dnies fever but found to be febrile here. he is incontinent. he is coughing. no vomiting and no abdominal pain.	    PAST MEDICAL/SURGICAL/FAMILY/SOCIAL HISTORY:    Past Medical History:  Afib    BPH (benign prostatic hyperplasia)    CAD (coronary artery disease)  CARD STENT X2 2018  Cardiomyopathy    CHF (congestive heart failure)  COMBINED SYSTOLIC & DIASTOLIC  Cholelithiasis    Dementia    Diabetes    Diplopia  RIGHT EYE-WEARS PATCH S/P TIA  DM (diabetes mellitus)    Glaucoma    Heart attack  2018  HTN (hypertension)    Hydrocele in adult    Hypercholesteremia    Renal insufficiency    Sepsis   FROM INFECTED GALLBLADDER  TIA (transient ischemic attack)  X2 2018.     Past Surgical History:  AICD (automatic cardioverter/defibrillator) present  [employer]  Encounter for biliary drainage tube placement  2018  H/O bilateral cataract extraction  LEFT-  RIGHT 6 YRS AGO  H/O coronary angioplasty  WITH STENT X2 (2018)  H/O flexible sigmoidoscopy    History of prostate surgery  .     Family History:  No pertinent family history in first degree relatives.     Tobacco Usage:  · Tobacco Usage	Unknown if ever smoked	    ALLERGIES AND HOME MEDICATIONS:   Allergies:        Allergies:  	sulfa drugs: Drug Category, Other, RENAL FAILURE    Home Medications:   * Patient Currently Takes Medications as of 28-Sep-2018 13:05 documented in Structured Notes  · 	NIFEdipine 90 mg oral tablet, extended release: 1 tab(s) orally once a day  · 	lisinopril 20 mg oral tablet: 1 tab(s) orally once a day  · 	dofetilide 500 mcg oral capsule: 1 cap(s) orally 2 times a day  · 	metFORMIN 1000 mg oral tablet, extended release: 1 tab(s) orally   · 	atorvastatin 40 mg oral tablet: 1 tab(s) orally once a day (at bedtime)  · 	furosemide 40 mg oral tablet: 1 tab(s) orally once a day  · 	potassium chloride 20 mEq oral tablet, extended release: 1 tab(s) orally once a day  · 	freetext medication  -: 100 milligram(s) orally once a day  · 	apixaban 2.5 mg oral tablet: 1 tab(s) orally every 12 hours  · 	clopidogrel 75 mg oral tablet: 1 tab(s) orally once a day  · 	metoprolol succinate 50 mg oral tablet, extended release: 1 tab(s) orally once a day  · 	oxybutynin 15 mg/24 hr oral tablet, extended release: 1 tab(s) orally once a day  · 	latanoprost 0.005% ophthalmic solution: 1 drop(s) to each affected eye once a day (in the evening) ou  · 	Cranberry oral capsule: 1 cap(s) orally once a day  · 	Alphagan P 0.1% ophthalmic solution: 1 drop(s) to each affected eye once a day (at bedtime)  · 	ciclopirox 0.77% topical cream: Apply topically to affected area 2 times a day  · 	glipiZIDE 10 mg oral tablet, extended release: 1 tab(s) orally once a day         PHYSICAL EXAM:   · Physical Examination: CONSTITUTIONAL: Well-developed; well-nourished; in no acute distress, nontoxic appearing  SKIN: skin exam is warm and dry,  HEAD: Normocephalic; atraumatic.  EYES: PERRL, 3 mm bilateral, no nystagmus, EOM intact; conjunctiva and sclera clear.  ENT: MMM, no nasal congestion  NECK: Supple; non tender.  ROM intact.  CARD: S1, S2 normal, no murmur  RESP: No wheezes, rales or rhonchi. Good air movement bilaterally  ABD: soft; non-distended; non-tender. No Rebound, No guarding  EXT: Normal ROM. No cyanosis or edema. Dp Pulses intact.   NEURO: awake, alert, following commands, oriented, grossly unremarkable. No Focal deficits. GCS 15.   PSYCH: Cooperative, appropriate.  SKIN: small skin tear to left wrist.                       10.0  7.58  )-----------( 400      ( 30 Sep 2018 17:32 )            30.6  09-30  131<L>  |  93<L>  |  39<H> ----------------------------<  244<H> 5.1<H>   |  15<L>  |  2.5<H>  Ca    9.4      30 Sep 2018 17:32  TPro  6.6  /  Alb  2.8<L>  /  TBili  <0.2  /  DBili  x   /  AST  11  /  ALT  7   /  AlkPhos  127<H>    Urinalysis Basic - ( 30 Sep 2018 20:36 )  Color: Red / Appearance: Cloudy / S.020 / pH: x Gluc: x / Ketone: 40  / Bili: Small / Urobili: 0.2  Blood: x / Protein: >=300 mg/dL / Nitrite: Negative  Leuk Esterase: Large / RBC: 10-25 /HPF / WBC 26-50 /HPF  Sq Epi: x / Non Sq Epi: Negative / Bacteria: Many   	      · 	Intake and Output,   Frequency:  every 2 hours, 30-Sep-2018, Active, Standard  · 	Vital Signs,   Frequency:  See Frequency Below  	  Every: 15 minute(s)   For: 1 hour(s)  	  Every: 30 minute(s)   For: 2 hour(s)  	  Every: 1 hour(s)   For: 4 hour(s)  	  Every: 4 hour(s) until discharge, 30-Sep-2018, Active, Standard  · 	Cardiac Monitor Bedside,   Time/Priority:  STAT, 30-Sep-2018, Active, Standard  · 	Indwelling Urethral Catheter,   Connect To:  Leg Bag  	  Indication:  Urologic Procedure,  Surgery  	  Additional Instructions:  STRAIGHT CATHETER, 30-Sep-2018, Active, Standard  · 	Pulse Oximetry,   Frequency: <Continuous>  	  Additional Instructions:  NOTIFY Provider if oxygen saturation is LESS THAN 90%, 30-Sep-2018, Active, Standard      RESULTS:   · EKG Date/Time: 30-Sep-2018 18:21	  · Rate: 108	  · Interpretation: abnormal  sinus tach, paced atrial	        DISPOSITION:    Care Plan - Instructions:  Principal Discharge DX:	UTI (urinary tract infection)  Secondary Diagnosis:	Sepsis  Secondary Diagnosis:	Acute kidney injury.

## 2018-10-01 NOTE — CONSULT NOTE ADULT - ATTENDING COMMENTS
I saw , examined , evaluated patient ,and agree with PA findings /  plan  Wife at bedside  d/c cath whenever not needed  no other intervention

## 2018-10-02 DIAGNOSIS — E78.00 PURE HYPERCHOLESTEROLEMIA, UNSPECIFIED: ICD-10-CM

## 2018-10-02 DIAGNOSIS — I25.2 OLD MYOCARDIAL INFARCTION: ICD-10-CM

## 2018-10-02 DIAGNOSIS — H53.2 DIPLOPIA: ICD-10-CM

## 2018-10-02 DIAGNOSIS — N30.81 OTHER CYSTITIS WITH HEMATURIA: ICD-10-CM

## 2018-10-02 DIAGNOSIS — I42.9 CARDIOMYOPATHY, UNSPECIFIED: ICD-10-CM

## 2018-10-02 DIAGNOSIS — I25.10 ATHEROSCLEROTIC HEART DISEASE OF NATIVE CORONARY ARTERY WITHOUT ANGINA PECTORIS: ICD-10-CM

## 2018-10-02 DIAGNOSIS — N28.9 DISORDER OF KIDNEY AND URETER, UNSPECIFIED: ICD-10-CM

## 2018-10-02 DIAGNOSIS — Z95.810 PRESENCE OF AUTOMATIC (IMPLANTABLE) CARDIAC DEFIBRILLATOR: ICD-10-CM

## 2018-10-02 DIAGNOSIS — E11.9 TYPE 2 DIABETES MELLITUS WITHOUT COMPLICATIONS: ICD-10-CM

## 2018-10-02 DIAGNOSIS — H40.9 UNSPECIFIED GLAUCOMA: ICD-10-CM

## 2018-10-02 DIAGNOSIS — Z95.5 PRESENCE OF CORONARY ANGIOPLASTY IMPLANT AND GRAFT: ICD-10-CM

## 2018-10-02 DIAGNOSIS — Z88.2 ALLERGY STATUS TO SULFONAMIDES: ICD-10-CM

## 2018-10-02 DIAGNOSIS — R31.9 HEMATURIA, UNSPECIFIED: ICD-10-CM

## 2018-10-02 DIAGNOSIS — Z86.73 PERSONAL HISTORY OF TRANSIENT ISCHEMIC ATTACK (TIA), AND CEREBRAL INFARCTION WITHOUT RESIDUAL DEFICITS: ICD-10-CM

## 2018-10-02 DIAGNOSIS — I48.91 UNSPECIFIED ATRIAL FIBRILLATION: ICD-10-CM

## 2018-10-02 DIAGNOSIS — F03.90 UNSPECIFIED DEMENTIA WITHOUT BEHAVIORAL DISTURBANCE: ICD-10-CM

## 2018-10-02 DIAGNOSIS — N13.30 UNSPECIFIED HYDRONEPHROSIS: ICD-10-CM

## 2018-10-02 DIAGNOSIS — I50.42 CHRONIC COMBINED SYSTOLIC (CONGESTIVE) AND DIASTOLIC (CONGESTIVE) HEART FAILURE: ICD-10-CM

## 2018-10-02 DIAGNOSIS — N40.1 BENIGN PROSTATIC HYPERPLASIA WITH LOWER URINARY TRACT SYMPTOMS: ICD-10-CM

## 2018-10-02 LAB
ALBUMIN SERPL ELPH-MCNC: 2.4 G/DL — LOW (ref 3.5–5.2)
ALP SERPL-CCNC: 100 U/L — SIGNIFICANT CHANGE UP (ref 30–115)
ALT FLD-CCNC: 9 U/L — SIGNIFICANT CHANGE UP (ref 0–41)
ANION GAP SERPL CALC-SCNC: 17 MMOL/L — HIGH (ref 7–14)
AST SERPL-CCNC: 15 U/L — SIGNIFICANT CHANGE UP (ref 0–41)
BILIRUB SERPL-MCNC: <0.2 MG/DL — SIGNIFICANT CHANGE UP (ref 0.2–1.2)
BUN SERPL-MCNC: 29 MG/DL — HIGH (ref 10–20)
CALCIUM SERPL-MCNC: 8.3 MG/DL — LOW (ref 8.5–10.1)
CHLORIDE SERPL-SCNC: 105 MMOL/L — SIGNIFICANT CHANGE UP (ref 98–110)
CO2 SERPL-SCNC: 19 MMOL/L — SIGNIFICANT CHANGE UP (ref 17–32)
CREAT SERPL-MCNC: 1.4 MG/DL — SIGNIFICANT CHANGE UP (ref 0.7–1.5)
GLUCOSE BLDC GLUCOMTR-MCNC: 134 MG/DL — HIGH (ref 70–99)
GLUCOSE BLDC GLUCOMTR-MCNC: 173 MG/DL — HIGH (ref 70–99)
GLUCOSE BLDC GLUCOMTR-MCNC: 246 MG/DL — HIGH (ref 70–99)
GLUCOSE BLDC GLUCOMTR-MCNC: 249 MG/DL — HIGH (ref 70–99)
GLUCOSE SERPL-MCNC: 129 MG/DL — HIGH (ref 70–99)
HCT VFR BLD CALC: 29.2 % — LOW (ref 42–52)
HGB BLD-MCNC: 9.2 G/DL — LOW (ref 14–18)
INR BLD: 1.38 RATIO — HIGH (ref 0.65–1.3)
MCHC RBC-ENTMCNC: 29.2 PG — SIGNIFICANT CHANGE UP (ref 27–31)
MCHC RBC-ENTMCNC: 31.5 G/DL — LOW (ref 32–37)
MCV RBC AUTO: 92.7 FL — SIGNIFICANT CHANGE UP (ref 80–94)
NRBC # BLD: 0 /100 WBCS — SIGNIFICANT CHANGE UP (ref 0–0)
PLATELET # BLD AUTO: 353 K/UL — SIGNIFICANT CHANGE UP (ref 130–400)
POTASSIUM SERPL-MCNC: 4.4 MMOL/L — SIGNIFICANT CHANGE UP (ref 3.5–5)
POTASSIUM SERPL-SCNC: 4.4 MMOL/L — SIGNIFICANT CHANGE UP (ref 3.5–5)
PROT SERPL-MCNC: 5.7 G/DL — LOW (ref 6–8)
PROTHROM AB SERPL-ACNC: 15 SEC — HIGH (ref 9.95–12.87)
RBC # BLD: 3.15 M/UL — LOW (ref 4.7–6.1)
RBC # FLD: 14 % — SIGNIFICANT CHANGE UP (ref 11.5–14.5)
SODIUM SERPL-SCNC: 141 MMOL/L — SIGNIFICANT CHANGE UP (ref 135–146)
WBC # BLD: 7.26 K/UL — SIGNIFICANT CHANGE UP (ref 4.8–10.8)
WBC # FLD AUTO: 7.26 K/UL — SIGNIFICANT CHANGE UP (ref 4.8–10.8)

## 2018-10-02 RX ORDER — CHLORHEXIDINE GLUCONATE 213 G/1000ML
1 SOLUTION TOPICAL
Qty: 0 | Refills: 0 | Status: DISCONTINUED | OUTPATIENT
Start: 2018-10-02 | End: 2018-10-04

## 2018-10-02 RX ORDER — OXYBUTYNIN CHLORIDE 5 MG
15 TABLET ORAL DAILY
Qty: 0 | Refills: 0 | Status: DISCONTINUED | OUTPATIENT
Start: 2018-10-02 | End: 2018-10-04

## 2018-10-02 RX ADMIN — BRIMONIDINE TARTRATE 1 DROP(S): 2 SOLUTION/ DROPS OPHTHALMIC at 06:08

## 2018-10-02 RX ADMIN — Medication 100 MILLIGRAM(S): at 06:09

## 2018-10-02 RX ADMIN — Medication 15 MILLIGRAM(S): at 22:02

## 2018-10-02 RX ADMIN — APIXABAN 2.5 MILLIGRAM(S): 2.5 TABLET, FILM COATED ORAL at 06:08

## 2018-10-02 RX ADMIN — ATORVASTATIN CALCIUM 40 MILLIGRAM(S): 80 TABLET, FILM COATED ORAL at 22:01

## 2018-10-02 RX ADMIN — DOFETILIDE 500 MICROGRAM(S): 0.25 CAPSULE ORAL at 17:43

## 2018-10-02 RX ADMIN — DOFETILIDE 500 MICROGRAM(S): 0.25 CAPSULE ORAL at 06:09

## 2018-10-02 RX ADMIN — Medication 50 MILLIGRAM(S): at 06:09

## 2018-10-02 RX ADMIN — BRIMONIDINE TARTRATE 1 DROP(S): 2 SOLUTION/ DROPS OPHTHALMIC at 17:44

## 2018-10-02 RX ADMIN — CEFEPIME 100 MILLIGRAM(S): 1 INJECTION, POWDER, FOR SOLUTION INTRAMUSCULAR; INTRAVENOUS at 06:10

## 2018-10-02 RX ADMIN — CLOPIDOGREL BISULFATE 75 MILLIGRAM(S): 75 TABLET, FILM COATED ORAL at 13:32

## 2018-10-02 RX ADMIN — LATANOPROST 1 DROP(S): 0.05 SOLUTION/ DROPS OPHTHALMIC; TOPICAL at 22:08

## 2018-10-02 RX ADMIN — CEFEPIME 100 MILLIGRAM(S): 1 INJECTION, POWDER, FOR SOLUTION INTRAMUSCULAR; INTRAVENOUS at 17:44

## 2018-10-02 RX ADMIN — Medication 90 MILLIGRAM(S): at 06:11

## 2018-10-02 RX ADMIN — APIXABAN 2.5 MILLIGRAM(S): 2.5 TABLET, FILM COATED ORAL at 17:43

## 2018-10-02 RX ADMIN — Medication 650 MILLIGRAM(S): at 02:03

## 2018-10-02 RX ADMIN — FAMOTIDINE 20 MILLIGRAM(S): 10 INJECTION INTRAVENOUS at 13:30

## 2018-10-02 NOTE — PROGRESS NOTE ADULT - SUBJECTIVE AND OBJECTIVE BOX
CHAPIS ROCIO  78y  Male      Patient is a 78y old Male who presents with a chief complaint of generalized weakness and two falls at home  > 48 hrs duration (01 Oct 2018 01:18)      INTERVAL HPI/OVERNIGHT EVENTS:  pt feels better  asking about rondon coming out      REVIEW OF SYSTEMS:  CONSTITUTIONAL: no fever and fatigue  EYES: No eye pain, visual disturbances, or discharge  ENMT:  No difficulty hearing, tinnitus, vertigo; No sinus or throat pain  NECK: No pain or stiffness  BREASTS: No pain, masses, or nipple discharge  RESPIRATORY: No cough, wheezing, chills or hemoptysis; No shortness of breath  CARDIOVASCULAR: No chest pain, palpitations, dizziness, or leg swelling  GASTROINTESTINAL: c/o pain with swallowing  GENITOURINARY: No dysuria, frequency, hematuria, or incontinence  NEUROLOGICAL: No headaches, memory loss, loss of strength, numbness, or tremors  SKIN: No itching, burning, rashes, or lesions   LYMPH NODES: No enlarged glands  ENDOCRINE: No heat or cold intolerance; No hair loss  MUSCULOSKELETAL: s/p fall at home  PSYCHIATRIC: No depression, anxiety, mood swings, or difficulty sleeping  HEME/LYMPH: No easy bruising, or bleeding gums  ALLERY AND IMMUNOLOGIC: No hives or eczema    Vital Signs Last 24 Hrs  T(C): 36.6 (02 Oct 2018 14:03), Max: 37.2 (01 Oct 2018 21:30)  T(F): 97.9 (02 Oct 2018 14:03), Max: 98.9 (01 Oct 2018 21:30)  HR: 72 (02 Oct 2018 14:03) (71 - 73)  BP: 138/65 (02 Oct 2018 14:03) (138/65 - 157/72)  BP(mean): --  RR: 16 (02 Oct 2018 14:03) (16 - 17)    PHYSICAL EXAM:  GENERAL: NAD, well-groomed, well-developed  HEAD:  Atraumatic, Normocephalic  EYES: EOMI, PERRLA, conjunctiva and sclera clear  ENMT: No tonsillar erythema, exudates, or enlargement; Moist mucous membranes, Good dentition, No lesions  NECK: Supple, No JVD, Normal thyroid  NERVOUS SYSTEM:  Alert & Oriented X3, Good concentration; Motor Strength 5/5 B/L upper and lower extremities; DTRs 2+ intact and symmetric  CHEST/LUNG: Clear to percussion bilaterally; No rales, rhonchi, wheezing, or rubs  HEART: Regular rate and rhythm; No murmurs, rubs, or gallops  ABDOMEN: Soft, Nontender, Nondistended; Bowel sounds present, rondon present  EXTREMITIES:  2+ Peripheral Pulses, No clubbing, cyanosis, or edema  LYMPH: No lymphadenopathy noted  SKIN: No rashes or lesions    Consultant(s) Notes Reviewed:  [x ] YES  [ ] NO  Care Discussed with Consultants/Other Providers [ x] YES  [ ] NO    LABS:                          9.2    7.26  )-----------( 353      ( 02 Oct 2018 06:07 )             29.2     10-    141  |  105  |  29<H>  ----------------------------<  129<H>  4.4   |  19  |  1.4    Ca    8.3<L>      02 Oct 2018 06:07    TPro  5.7<L>  /  Alb  2.4<L>  /  TBili  <0.2  /  DBili  x   /  AST  15  /  ALT  9   /  AlkPhos  100  10-02      Daily Height in cm: 177.8 (01 Oct 2018 00:59)    Daily   Drug Dosing Weight  Height (cm): 177.8 (01 Oct 2018 00:59)  Weight (kg): 90 (01 Oct 2018 00:59)  BMI (kg/m2): 28.5 (01 Oct 2018 00:59)  BSA (m2): 2.08 (01 Oct 2018 00:59)  CAPILLARY BLOOD GLUCOSE      POCT Blood Glucose.: 89 mg/dL (01 Oct 2018 07:29)    I&O's Summary    30 Sep 2018 07:  -  01 Oct 2018 07:00  --------------------------------------------------------  IN: 0 mL / OUT: 1400 mL / NET: -1400 mL    01 Oct 2018 07:  -  01 Oct 2018 11:10  --------------------------------------------------------  IN: 240 mL / OUT: 0 mL / NET: 240 mL      Urinalysis Basic - ( 30 Sep 2018 20:36 )    Color: Red / Appearance: Cloudy / S.020 / pH: x  Gluc: x / Ketone: 40  / Bili: Small / Urobili: 0.2   Blood: x / Protein: >=300 mg/dL / Nitrite: Negative   Leuk Esterase: Large / RBC: 10-25 /HPF / WBC 26-50 /HPF   Sq Epi: x / Non Sq Epi: Negative / Bacteria: Many      LIVER FUNCTIONS - ( 30 Sep 2018 17:32 )  Alb: 2.8 g/dL / Pro: 6.6 g/dL / ALK PHOS: 127 U/L / ALT: 7 U/L / AST: 11 U/L / GGT: x               RADIOLOGY & ADDITIONAL TESTS:    Imaging Personally Reviewed:  [x] YES  [ ] NO    HEALTH ISSUES - PROBLEM Dx:  Urinary tract infection without hematuria, site unspecified: Urinary tract infection without hematuria, site unspecified  Dementia: Dementia  CAD (coronary artery disease): CAD (coronary artery disease)  Diabetes: Diabetes  Sepsis: Sepsis  UTI (urinary tract infection): UTI (urinary tract infection)    MEDICATIONS  (STANDING):  apixaban 2.5 milliGRAM(s) Oral every 12 hours  atorvastatin 40 milliGRAM(s) Oral at bedtime  brimonidine 0.2% Ophthalmic Solution 1 Drop(s) Both EYES two times a day  cefepime   IVPB 1000 milliGRAM(s) IV Intermittent every 12 hours  clopidogrel Tablet 75 milliGRAM(s) Oral daily  docusate sodium 100 milliGRAM(s) Oral two times a day  dofetilide 500 MICROGram(s) Oral two times a day  famotidine Injectable 20 milliGRAM(s) IV Push daily  influenza   Vaccine 0.5 milliLiter(s) IntraMuscular once  latanoprost 0.005% Ophthalmic Solution 1 Drop(s) Both EYES at bedtime  metoprolol succinate ER 50 milliGRAM(s) Oral daily  NIFEdipine XL 90 milliGRAM(s) Oral daily  oxybutynin XL 15 milliGRAM(s) Oral daily  sodium chloride 0.9%. 1000 milliLiter(s) (75 mL/Hr) IV Continuous <Continuous>    MEDICATIONS  (PRN):  acetaminophen   Tablet .. 650 milliGRAM(s) Oral every 6 hours PRN Mild Pain (1 - 3)  aluminum hydroxide/magnesium hydroxide/simethicone Suspension 30 milliLiter(s) Oral every 4 hours PRN Dyspepsia  benzocaine 15 mG/menthol 3.6 mG Lozenge 1 Lozenge Oral every 1 hour PRN Sore Throat  chlorhexidine 4% Liquid 1 Application(s) Topical <User Schedule> PRN infection control  melatonin 5 milliGRAM(s) Oral at bedtime PRN Insomnia

## 2018-10-02 NOTE — PHYSICAL THERAPY INITIAL EVALUATION ADULT - SITTING BALANCE: DYNAMIC
After Visit Summary   4/3/2018    Patricia Hudson    MRN: [id number]           Patient Information     Date Of Birth          1965        Visit Information        Provider Department      4/3/2018 12:30 PM Rosalee Latham MD [facility]        Today's Diagnoses     Arthralgia, unspecified joint    -  1    Scalp psoriasis        Psoriasis        Milia        Solar lentiginosis          Care Instructions    Preventive Care:    Breast Cancer Screening: During our visit today, we discussed that it is recommended you receive breast cancer screening. Please call or make an appointment with your primary care provider to discuss this with them. You may also call the [facility] scheduling line ([phone]) to set up a mammography appointment at the Breast Center within the [facility].    Over the counter Differin for millia, may use once daily     See rheumatology for evaluation of psoriatic arthritis, referral placed           Follow-ups after your visit        Additional Services     RHEUMATOLOGY REFERRAL       Your provider has referred you to: [facility]: [facility] [phone]   [email or web address]  [facility]: [facility] [phone]   [email or web address]    Please be aware that coverage of these services is subject to the terms and limitations of your health insurance plan.  Call member services at your health plan with any benefit or coverage questions.      Please bring the following with you to your appointment:    (1) Any X-Rays, CTs or MRIs which have been performed.  Contact the facility where they were done to arrange for  prior to your scheduled appointment.    (2) List of current medications   (3) This referral request   (4) Any documents/labs given to you for this referral                  Follow-up notes from your care team      Return in about 6 weeks (around 5/15/2018) for Psoriasis, Il-K nails.      Who to contact     If you have questions or need follow up information about today's clinic visit or your schedule please contact [facility] directly at [phone].  Normal or non-critical lab and imaging results will be communicated to you by MyChart, letter or phone within 4 business days after the clinic has received the results. If you do not hear from us within 7 days, please contact the clinic through [employer] or phone. If you have a critical or abnormal lab result, we will notify you by phone as soon as possible.  Submit refill requests through [employer] or call your pharmacy and they will forward the refill request to us. Please allow 3 business days for your refill to be completed.          Additional Information About Your Visit        [employer] Information     [employer] gives you secure access to your electronic health record. If you see a primary care provider, you can also send messages to your care team and make appointments. If you have questions, please call your primary care clinic.  If you do not have a primary care provider, please call [phone] and they will assist you.      [employer] is an electronic gateway that provides easy, online access to your medical records. With [employer], you can request a clinic appointment, read your test results, renew a prescription or communicate with your care team.     To access your existing account, please contact your [facility] or call [phone] for assistance.        Care EveryWhere ID     This is your Care EveryWhere ID. This could be used by other organizations to access your [place] medical records  [id number]         Blood Pressure from Last 3 Encounters:   08/17/16 126/86   11/16/10 114/73    Weight from Last 3 Encounters:   08/17/16 104.3 kg (230 lb)   11/16/10 96.2 kg (212 lb)              We Performed the Following      INJECTION INTO SKIN LESIONS >7     RHEUMATOLOGY REFERRAL          Today's Medication Changes          These changes are accurate as of 4/3/18  1:02 PM.  If you have any questions, ask your nurse or doctor.               Stop taking these medicines if you haven't already. Please contact your care team if you have questions.     desonide 0.05 % ointment   Commonly known as:  DESOWEN   Stopped by:  Rosalee Latham MD                Where to get your medicines      These medications were sent to [facility] - [place], [place] - [address]  [address], [place] [address]     Phone:  [phone]     clobetasol 0.05 % external solution    hydrocortisone 2.5 % cream    ketoconazole 2 % shampoo                Primary Care Provider Office Phone # Fax #    Laurel De Paz [phone]       [facility] [address] [place] [address]        Equal Access to Services     [facility]: Hadii aad ku hadasho Soomaali, waaxda luqadaha, qaybta kaalmada adeegyada, waxay lisain hayaan thomas mathews . So [facility] [phone].    ATENCIÓN: Si habla español, tiene a overton disposición servicios gratuitos de asistencia lingüística. Llame al [phone].    We comply with applicable federal civil rights laws and [place] laws. We do not discriminate on the basis of race, color, national origin, age, disability, sex, sexual orientation, or gender identity.            Thank you!     Thank you for choosing [facility]  for your care. Our goal is always to provide you with excellent care. Hearing back from our patients is one way we can continue to improve our services. Please take a few minutes to complete the written survey that you may receive in the mail after your visit with us. Thank you!             Your Updated Medication List - Protect others around you: Learn how to safely use, store and throw away your medicines at www.disposemymeds.org.          This list is accurate as  of 4/3/18  1:02 PM.  Always use your most recent med list.                   Brand Name Dispense Instructions for use Diagnosis    amitriptyline 10 MG tablet    ELAVIL     Take 10 mg by mouth 3 times daily At bedtime        clobetasol 0.05 % external solution    TEMOVATE    150 mL    Use once  daily for up to 2 weeks in a row on the scalp for flare    Scalp psoriasis       hydrocortisone 2.5 % cream     60 g    For eyelids, upply two times daily for up to 1 week in a row for flares. Take breaks from use    Psoriasis       ketoconazole 2 % shampoo    NIZORAL    100 mL    Use every other day in the shower for scalp    Scalp psoriasis       nortriptyline 10 MG capsule    PAMELOR          sertraline 100 MG tablet    ZOLOFT     daily        SUMAtriptan 100 MG tablet    IMITREX    9 tablet    Take 1 tablet (100 mg) by mouth at onset of headache    Migraine without aura and without status migrainosus, not intractable       ZOLMitriptan 5 MG tablet    ZOMIG             fair plus

## 2018-10-02 NOTE — PHYSICAL THERAPY INITIAL EVALUATION ADULT - IMPAIRMENTS FOUND, PT EVAL
aerobic capacity/endurance/ergonomics and body mechanics/posture/gait, locomotion, and balance/muscle strength

## 2018-10-02 NOTE — PHYSICAL THERAPY INITIAL EVALUATION ADULT - GENERAL OBSERVATIONS, REHAB EVAL
9:15 - 9:40.  Chart reviewed. Patient available to be seen for physical therapy, confirmed with nurse. Patient encountered semi-reclined in bed, +rondon, wife at bedside. Patient denies pain at rest, would like to walk with PT now.

## 2018-10-03 LAB
ALBUMIN SERPL ELPH-MCNC: 2.6 G/DL — LOW (ref 3.5–5.2)
ALP SERPL-CCNC: 106 U/L — SIGNIFICANT CHANGE UP (ref 30–115)
ALT FLD-CCNC: 8 U/L — SIGNIFICANT CHANGE UP (ref 0–41)
ANION GAP SERPL CALC-SCNC: 17 MMOL/L — HIGH (ref 7–14)
AST SERPL-CCNC: 15 U/L — SIGNIFICANT CHANGE UP (ref 0–41)
BILIRUB SERPL-MCNC: 0.2 MG/DL — SIGNIFICANT CHANGE UP (ref 0.2–1.2)
BUN SERPL-MCNC: 20 MG/DL — SIGNIFICANT CHANGE UP (ref 10–20)
CALCIUM SERPL-MCNC: 8 MG/DL — LOW (ref 8.5–10.1)
CHLORIDE SERPL-SCNC: 102 MMOL/L — SIGNIFICANT CHANGE UP (ref 98–110)
CO2 SERPL-SCNC: 15 MMOL/L — LOW (ref 17–32)
CREAT SERPL-MCNC: 1.4 MG/DL — SIGNIFICANT CHANGE UP (ref 0.7–1.5)
GLUCOSE BLDC GLUCOMTR-MCNC: 213 MG/DL — HIGH (ref 70–99)
GLUCOSE BLDC GLUCOMTR-MCNC: 227 MG/DL — HIGH (ref 70–99)
GLUCOSE BLDC GLUCOMTR-MCNC: 231 MG/DL — HIGH (ref 70–99)
GLUCOSE BLDC GLUCOMTR-MCNC: 295 MG/DL — HIGH (ref 70–99)
GLUCOSE BLDC GLUCOMTR-MCNC: 355 MG/DL — HIGH (ref 70–99)
GLUCOSE SERPL-MCNC: 369 MG/DL — HIGH (ref 70–99)
HCT VFR BLD CALC: 32 % — LOW (ref 42–52)
HGB BLD-MCNC: 10.5 G/DL — LOW (ref 14–18)
MCHC RBC-ENTMCNC: 30 PG — SIGNIFICANT CHANGE UP (ref 27–31)
MCHC RBC-ENTMCNC: 32.8 G/DL — SIGNIFICANT CHANGE UP (ref 32–37)
MCV RBC AUTO: 91.4 FL — SIGNIFICANT CHANGE UP (ref 80–94)
NRBC # BLD: 0 /100 WBCS — SIGNIFICANT CHANGE UP (ref 0–0)
PLATELET # BLD AUTO: 405 K/UL — HIGH (ref 130–400)
POTASSIUM SERPL-MCNC: 4.6 MMOL/L — SIGNIFICANT CHANGE UP (ref 3.5–5)
POTASSIUM SERPL-SCNC: 4.6 MMOL/L — SIGNIFICANT CHANGE UP (ref 3.5–5)
PROT SERPL-MCNC: 6.1 G/DL — SIGNIFICANT CHANGE UP (ref 6–8)
RBC # BLD: 3.5 M/UL — LOW (ref 4.7–6.1)
RBC # FLD: 13.6 % — SIGNIFICANT CHANGE UP (ref 11.5–14.5)
SODIUM SERPL-SCNC: 134 MMOL/L — LOW (ref 135–146)
WBC # BLD: 9.31 K/UL — SIGNIFICANT CHANGE UP (ref 4.8–10.8)
WBC # FLD AUTO: 9.31 K/UL — SIGNIFICANT CHANGE UP (ref 4.8–10.8)

## 2018-10-03 RX ORDER — PANTOPRAZOLE SODIUM 20 MG/1
40 TABLET, DELAYED RELEASE ORAL
Qty: 0 | Refills: 0 | Status: DISCONTINUED | OUTPATIENT
Start: 2018-10-03 | End: 2018-10-04

## 2018-10-03 RX ORDER — DEXTROSE 50 % IN WATER 50 %
25 SYRINGE (ML) INTRAVENOUS ONCE
Qty: 0 | Refills: 0 | Status: DISCONTINUED | OUTPATIENT
Start: 2018-10-03 | End: 2018-10-04

## 2018-10-03 RX ORDER — DEXTROSE 50 % IN WATER 50 %
12.5 SYRINGE (ML) INTRAVENOUS ONCE
Qty: 0 | Refills: 0 | Status: DISCONTINUED | OUTPATIENT
Start: 2018-10-03 | End: 2018-10-04

## 2018-10-03 RX ORDER — FLUCONAZOLE 150 MG/1
100 TABLET ORAL EVERY 24 HOURS
Qty: 0 | Refills: 0 | Status: DISCONTINUED | OUTPATIENT
Start: 2018-10-04 | End: 2018-10-04

## 2018-10-03 RX ORDER — FLUCONAZOLE 150 MG/1
100 TABLET ORAL ONCE
Qty: 0 | Refills: 0 | Status: COMPLETED | OUTPATIENT
Start: 2018-10-03 | End: 2018-10-03

## 2018-10-03 RX ORDER — DEXTROSE 50 % IN WATER 50 %
15 SYRINGE (ML) INTRAVENOUS ONCE
Qty: 0 | Refills: 0 | Status: DISCONTINUED | OUTPATIENT
Start: 2018-10-03 | End: 2018-10-04

## 2018-10-03 RX ORDER — SODIUM CHLORIDE 9 MG/ML
1000 INJECTION, SOLUTION INTRAVENOUS
Qty: 0 | Refills: 0 | Status: DISCONTINUED | OUTPATIENT
Start: 2018-10-03 | End: 2018-10-04

## 2018-10-03 RX ORDER — SODIUM BICARBONATE 1 MEQ/ML
650 SYRINGE (ML) INTRAVENOUS EVERY 8 HOURS
Qty: 0 | Refills: 0 | Status: DISCONTINUED | OUTPATIENT
Start: 2018-10-03 | End: 2018-10-04

## 2018-10-03 RX ORDER — GLUCAGON INJECTION, SOLUTION 0.5 MG/.1ML
1 INJECTION, SOLUTION SUBCUTANEOUS ONCE
Qty: 0 | Refills: 0 | Status: DISCONTINUED | OUTPATIENT
Start: 2018-10-03 | End: 2018-10-04

## 2018-10-03 RX ORDER — FLUCONAZOLE 150 MG/1
TABLET ORAL
Qty: 0 | Refills: 0 | Status: DISCONTINUED | OUTPATIENT
Start: 2018-10-03 | End: 2018-10-04

## 2018-10-03 RX ORDER — INSULIN LISPRO 100/ML
3 VIAL (ML) SUBCUTANEOUS
Qty: 0 | Refills: 0 | Status: DISCONTINUED | OUTPATIENT
Start: 2018-10-03 | End: 2018-10-04

## 2018-10-03 RX ORDER — INSULIN GLARGINE 100 [IU]/ML
10 INJECTION, SOLUTION SUBCUTANEOUS AT BEDTIME
Qty: 0 | Refills: 0 | Status: DISCONTINUED | OUTPATIENT
Start: 2018-10-03 | End: 2018-10-04

## 2018-10-03 RX ADMIN — FLUCONAZOLE 50 MILLIGRAM(S): 150 TABLET ORAL at 16:56

## 2018-10-03 RX ADMIN — BRIMONIDINE TARTRATE 1 DROP(S): 2 SOLUTION/ DROPS OPHTHALMIC at 17:03

## 2018-10-03 RX ADMIN — Medication 3 UNIT(S): at 12:48

## 2018-10-03 RX ADMIN — DOFETILIDE 500 MICROGRAM(S): 0.25 CAPSULE ORAL at 17:02

## 2018-10-03 RX ADMIN — Medication 50 MILLIGRAM(S): at 05:23

## 2018-10-03 RX ADMIN — Medication 3 UNIT(S): at 16:56

## 2018-10-03 RX ADMIN — INSULIN GLARGINE 10 UNIT(S): 100 INJECTION, SOLUTION SUBCUTANEOUS at 21:58

## 2018-10-03 RX ADMIN — Medication 650 MILLIGRAM(S): at 16:56

## 2018-10-03 RX ADMIN — DOFETILIDE 500 MICROGRAM(S): 0.25 CAPSULE ORAL at 05:24

## 2018-10-03 RX ADMIN — CEFEPIME 100 MILLIGRAM(S): 1 INJECTION, POWDER, FOR SOLUTION INTRAMUSCULAR; INTRAVENOUS at 05:22

## 2018-10-03 RX ADMIN — PANTOPRAZOLE SODIUM 40 MILLIGRAM(S): 20 TABLET, DELAYED RELEASE ORAL at 21:50

## 2018-10-03 RX ADMIN — Medication 15 MILLIGRAM(S): at 12:50

## 2018-10-03 RX ADMIN — Medication 90 MILLIGRAM(S): at 05:22

## 2018-10-03 RX ADMIN — ATORVASTATIN CALCIUM 40 MILLIGRAM(S): 80 TABLET, FILM COATED ORAL at 21:49

## 2018-10-03 RX ADMIN — BRIMONIDINE TARTRATE 1 DROP(S): 2 SOLUTION/ DROPS OPHTHALMIC at 05:23

## 2018-10-03 RX ADMIN — CLOPIDOGREL BISULFATE 75 MILLIGRAM(S): 75 TABLET, FILM COATED ORAL at 12:49

## 2018-10-03 RX ADMIN — APIXABAN 2.5 MILLIGRAM(S): 2.5 TABLET, FILM COATED ORAL at 05:22

## 2018-10-03 RX ADMIN — LATANOPROST 1 DROP(S): 0.05 SOLUTION/ DROPS OPHTHALMIC; TOPICAL at 21:48

## 2018-10-03 NOTE — PROGRESS NOTE ADULT - SUBJECTIVE AND OBJECTIVE BOX
NATION ROCIO  78y  Male      Patient is a 78y old Male who presents with a chief complaint of generalized weakness and two falls at home  > 48 hrs duration (01 Oct 2018 01:18)      INTERVAL HPI/OVERNIGHT EVENTS:  Patient says he doesn't feel well today.  Sitting in chair, eating lunch.  Says he had significant hematuria to day and is asking for urology follow up today.      REVIEW OF SYSTEMS:  CONSTITUTIONAL: no fever or fatigue  EYES: No eye pain, visual disturbances, or discharge  ENMT:  No difficulty hearing, tinnitus, vertigo; No sinus or throat pain  NECK: No pain or stiffness  BREASTS: No pain, masses, or nipple discharge  RESPIRATORY: No cough, wheezing, chills or hemoptysis; No shortness of breath  CARDIOVASCULAR: No chest pain, palpitations, dizziness, or leg swelling  GASTROINTESTINAL: c/o pain with swallowing  GENITOURINARY: c/o hematuria and occasional dysuria  NEUROLOGICAL: No headaches, memory loss, loss of strength, numbness, or tremors  SKIN: No itching, burning, rashes, or lesions   LYMPH NODES: No enlarged glands  ENDOCRINE: No heat or cold intolerance; No hair loss  MUSCULOSKELETAL: s/p fall at home  PSYCHIATRIC: No depression, anxiety, mood swings, or difficulty sleeping  HEME/LYMPH: No easy bruising, or bleeding gums  ALLERY AND IMMUNOLOGIC: No hives or eczema    Vital Signs Last 24 Hrs  T(C): 36.8 (03 Oct 2018 05:30), Max: 37 (02 Oct 2018 21:34)  T(F): 98.2 (03 Oct 2018 05:30), Max: 98.6 (02 Oct 2018 21:34)  HR: 100 (03 Oct 2018 05:30) (72 - 100)  BP: 156/80 (03 Oct 2018 05:30) (138/65 - 159/70)  BP(mean): --  RR: 16 (03 Oct 2018 05:30) (16 - 16)  SpO2: --      PHYSICAL EXAM:  GENERAL: NAD, well-groomed, well-developed  HEAD:  Atraumatic, Normocephalic  EYES: EOMI, PERRLA, conjunctiva and sclera clear  ENMT: No tonsillar erythema, exudates, or enlargement; Moist mucous membranes, Good dentition, No lesions  NECK: Supple, No JVD, Normal thyroid  NERVOUS SYSTEM:  Alert & Oriented X3, Good concentration; Motor Strength 5/5 B/L upper and lower extremities; DTRs 2+ intact and symmetric  CHEST/LUNG: Clear to percussion bilaterally; No rales, rhonchi, wheezing, or rubs  HEART: Regular rate and rhythm; No murmurs, rubs, or gallops  ABDOMEN: Soft, Nontender, Nondistended; Bowel sounds present,   EXTREMITIES:  2+ Peripheral Pulses, No clubbing, cyanosis, or edema  LYMPH: No lymphadenopathy noted  SKIN: No rashes or lesions    Consultant(s) Notes Reviewed:  [x ] YES  [ ] NO  Care Discussed with Consultants/Other Providers [ x] YES  [ ] NO    LABS:                          10.5   9.31  )-----------( 405      ( 03 Oct 2018 11:03 )             32.0     10-03    134<L>  |  102  |  20  ----------------------------<  369<H>  4.6   |  15<L>  |  1.4    Ca    8.0<L>      03 Oct 2018 11:03    TPro  6.1  /  Alb  2.6<L>  /  TBili  0.2  /  DBili  x   /  AST  15  /  ALT  8   /  AlkPhos  106  10-03                 Daily Height in cm: 177.8 (01 Oct 2018 00:59)    Daily   Drug Dosing Weight  Height (cm): 177.8 (01 Oct 2018 00:59)  Weight (kg): 90 (01 Oct 2018 00:59)  BMI (kg/m2): 28.5 (01 Oct 2018 00:59)  BSA (m2): 2.08 (01 Oct 2018 00:59)  CAPILLARY BLOOD GLUCOSE    CAPILLARY BLOOD GLUCOSE  POCT Blood Glucose.: 355 mg/dL (03 Oct 2018 11:17)  POCT Blood Glucose.: 227 mg/dL (03 Oct 2018 07:16)  POCT Blood Glucose.: 231 mg/dL (03 Oct 2018 02:57)  POCT Blood Glucose.: 249 mg/dL (02 Oct 2018 21:05)        Urinalysis Basic - ( 30 Sep 2018 20:36 )    Color: Red / Appearance: Cloudy / S.020 / pH: x  Gluc: x / Ketone: 40  / Bili: Small / Urobili: 0.2   Blood: x / Protein: >=300 mg/dL / Nitrite: Negative   Leuk Esterase: Large / RBC: 10-25 /HPF / WBC 26-50 /HPF   Sq Epi: x / Non Sq Epi: Negative / Bacteria: Many      LIVER FUNCTIONS - ( 30 Sep 2018 17:32 )  Alb: 2.8 g/dL / Pro: 6.6 g/dL / ALK PHOS: 127 U/L / ALT: 7 U/L / AST: 11 U/L / GGT: x               RADIOLOGY & ADDITIONAL TESTS:    Imaging Personally Reviewed:  [x] YES  [ ] NO    HEALTH ISSUES - PROBLEM Dx:  Urinary tract infection without hematuria, site unspecified: Urinary tract infection without hematuria, site unspecified  Dementia: Dementia  CAD (coronary artery disease): CAD (coronary artery disease)  Diabetes: Diabetes  Sepsis: Sepsis  UTI (urinary tract infection): UTI (urinary tract infection)    MEDICATIONS  (STANDING):  apixaban 2.5 milliGRAM(s) Oral every 12 hours  atorvastatin 40 milliGRAM(s) Oral at bedtime  brimonidine 0.2% Ophthalmic Solution 1 Drop(s) Both EYES two times a day  cefepime   IVPB 1000 milliGRAM(s) IV Intermittent every 12 hours  clopidogrel Tablet 75 milliGRAM(s) Oral daily  dextrose 5%. 1000 milliLiter(s) (50 mL/Hr) IV Continuous <Continuous>  dextrose 50% Injectable 12.5 Gram(s) IV Push once  dextrose 50% Injectable 25 Gram(s) IV Push once  dextrose 50% Injectable 25 Gram(s) IV Push once  docusate sodium 100 milliGRAM(s) Oral two times a day  dofetilide 500 MICROGram(s) Oral two times a day  famotidine Injectable 20 milliGRAM(s) IV Push daily  influenza   Vaccine 0.5 milliLiter(s) IntraMuscular once  insulin glargine Injectable (LANTUS) 10 Unit(s) SubCutaneous at bedtime  insulin lispro Injectable (HumaLOG) 3 Unit(s) SubCutaneous before breakfast  insulin lispro Injectable (HumaLOG) 3 Unit(s) SubCutaneous before lunch  insulin lispro Injectable (HumaLOG) 3 Unit(s) SubCutaneous before dinner  latanoprost 0.005% Ophthalmic Solution 1 Drop(s) Both EYES at bedtime  metoprolol succinate ER 50 milliGRAM(s) Oral daily  NIFEdipine XL 90 milliGRAM(s) Oral daily  oxybutynin XL 15 milliGRAM(s) Oral daily  sodium bicarbonate 650 milliGRAM(s) Oral every 8 hours  sodium chloride 0.9%. 1000 milliLiter(s) (75 mL/Hr) IV Continuous <Continuous>    MEDICATIONS  (PRN):  acetaminophen   Tablet .. 650 milliGRAM(s) Oral every 6 hours PRN Mild Pain (1 - 3)  aluminum hydroxide/magnesium hydroxide/simethicone Suspension 30 milliLiter(s) Oral every 4 hours PRN Dyspepsia  benzocaine 15 mG/menthol 3.6 mG Lozenge 1 Lozenge Oral every 1 hour PRN Sore Throat  chlorhexidine 4% Liquid 1 Application(s) Topical <User Schedule> PRN infection control  dextrose 40% Gel 15 Gram(s) Oral once PRN Blood Glucose LESS THAN 70 milliGRAM(s)/deciliter  glucagon  Injectable 1 milliGRAM(s) IntraMuscular once PRN Glucose LESS THAN 70 milligrams/deciliter  melatonin 5 milliGRAM(s) Oral at bedtime PRN Insomnia

## 2018-10-03 NOTE — CHART NOTE - NSCHARTNOTEFT_GEN_A_CORE
Spoke with Dr Agrawal... He is aware of pts hematuria and states it is expected. There is no further  intervention necessary at this time. Treat UTI and follow up OP

## 2018-10-03 NOTE — CONSULT NOTE ADULT - ASSESSMENT
IMPRESSION: Rehab of 77 y/o  m rehab  for GD  neuropathy    PRECAUTIONS: [  ] Cardiac  [  ] Respiratory  [  ] Seizures [  ] Contact Isolation  [  ] Droplet Isolation  [ fall] Other    Weight Bearing Status:     RECOMMENDATION:    Out of Bed to Chair     DVT/Decubiti Prophylaxis    REHAB PLAN:     [  xx ] Bedside P/T 3-5 times a week   [   ]   Bedside O/T  2-3 times a week             [   ] No Rehab Therapy Indicated                   [   ]  Speech Therapy   Conditioning/ROM                                    ADL  Bed Mobility                                               Conditioning/ROM  Transfers                                                     Bed Mobility  Sitting /Standing Balance                         Transfers                                        Gait Training                                               Sitting/Standing Balance  Stair Training [   ]Applicable                    Home equipment Eval                                                                        Splinting  [   ] Only      GOALS:   ADL   [  x ]   Independent                    Transfers  [x   ] Independent                          Ambulation  [  x ] Independent     [x    ] With device                            [   ]  CG                                                         [   ]  CG                                                                  [   ] CG                            [    ] Min A                                                   [   ] Min A                                                              [   ] Min  A          DISCHARGE PLAN:   [   ]  Good candidate for Intensive Rehabilitation/Hospital based-4A [facility]                                             Will tolerate 3hrs Intensive Rehab Daily                                       [  xxx  ]  Short Term Rehab in Skilled Nursing Facility d/w  ptn and wife                                       [    ]  Home with Outpatient or VN services                                         [    ]  Possible Candidate for Intensive Hospital based Rehab

## 2018-10-03 NOTE — CONSULT NOTE ADULT - SUBJECTIVE AND OBJECTIVE BOX
HPI: 79 y/o  m ptn admitted to [facility]  s  sp multi  fall at  home  weakness  of  le  progress  for last  few  days    PTN  REFERRED TO ACUTE  REHAB  FOR  EVAL AND  TX   PAST MEDICAL & SURGICAL HISTORY:  Diabetes  Renal insufficiency  Cardiomyopathy  Cholelithiasis  Hydrocele in adult  Glaucoma  BPH (benign prostatic hyperplasia)  CAD (coronary artery disease): CARD STENT X2 4/2018  Diplopia: RIGHT EYE-WEARS PATCH S/P TIA  TIA (transient ischemic attack): X2 JULY 2018  Dementia  CHF (congestive heart failure): COMBINED SYSTOLIC &amp; DIASTOLIC  Afib  Heart attack: 1/2018  Sepsis: 1/18 FROM INFECTED GALLBLADDER  Hypercholesteremia  HTN (hypertension)  DM (diabetes mellitus)  H/O flexible sigmoidoscopy: 2015  H/O bilateral cataract extraction: LEFT- 1/18 RIGHT 6 YRS AGO  Encounter for biliary drainage tube placement: 1/2018  H/O coronary angioplasty: WITH STENT X2 (4/2018)  History of prostate surgery: 5/17  AICD (automatic cardioverter/defibrillator) present: [facility] Course:    TODAY'S SUBJECTIVE & REVIEW OF SYMPTOMS:     Constitutional WNL   Cardio WNL   Resp WNL   GI WNL  Heme WNL  Endo WNL  Skin WNL  MSK WNL  Neuro WNL  Cognitive WNL  Psych WNL      MEDICATIONS  (STANDING):  atorvastatin 40 milliGRAM(s) Oral at bedtime  brimonidine 0.2% Ophthalmic Solution 1 Drop(s) Both EYES two times a day  clopidogrel Tablet 75 milliGRAM(s) Oral daily  dextrose 5%. 1000 milliLiter(s) (50 mL/Hr) IV Continuous <Continuous>  dextrose 50% Injectable 12.5 Gram(s) IV Push once  dextrose 50% Injectable 25 Gram(s) IV Push once  dextrose 50% Injectable 25 Gram(s) IV Push once  docusate sodium 100 milliGRAM(s) Oral two times a day  dofetilide 500 MICROGram(s) Oral two times a day  fluconAZOLE IVPB      fluconAZOLE IVPB 100 milliGRAM(s) IV Intermittent once  influenza   Vaccine 0.5 milliLiter(s) IntraMuscular once  insulin glargine Injectable (LANTUS) 10 Unit(s) SubCutaneous at bedtime  insulin lispro Injectable (HumaLOG) 3 Unit(s) SubCutaneous before breakfast  insulin lispro Injectable (HumaLOG) 3 Unit(s) SubCutaneous before lunch  insulin lispro Injectable (HumaLOG) 3 Unit(s) SubCutaneous before dinner  latanoprost 0.005% Ophthalmic Solution 1 Drop(s) Both EYES at bedtime  metoprolol succinate ER 50 milliGRAM(s) Oral daily  NIFEdipine XL 90 milliGRAM(s) Oral daily  oxybutynin XL 15 milliGRAM(s) Oral daily  pantoprazole    Tablet 40 milliGRAM(s) Oral before breakfast  sodium bicarbonate 650 milliGRAM(s) Oral every 8 hours  sodium chloride 0.9%. 1000 milliLiter(s) (75 mL/Hr) IV Continuous <Continuous>    MEDICATIONS  (PRN):  acetaminophen   Tablet .. 650 milliGRAM(s) Oral every 6 hours PRN Mild Pain (1 - 3)  aluminum hydroxide/magnesium hydroxide/simethicone Suspension 30 milliLiter(s) Oral every 4 hours PRN Dyspepsia  benzocaine 15 mG/menthol 3.6 mG Lozenge 1 Lozenge Oral every 1 hour PRN Sore Throat  chlorhexidine 4% Liquid 1 Application(s) Topical <User Schedule> PRN infection control  dextrose 40% Gel 15 Gram(s) Oral once PRN Blood Glucose LESS THAN 70 milliGRAM(s)/deciliter  glucagon  Injectable 1 milliGRAM(s) IntraMuscular once PRN Glucose LESS THAN 70 milligrams/deciliter  melatonin 5 milliGRAM(s) Oral at bedtime PRN Insomnia      FAMILY HISTORY:  No pertinent family history in first degree relatives      Allergies    sulfa drugs (Other)    Intolerances        SOCIAL HISTORY:    [  ] Etoh  [  ] Smoking  [  ] Substance abuse     Home Environment:  [  ] Home Alone  [  x] Lives with Family wife   [  ] Home Health Aid    Dwelling:  [  ] Apartment  [ x  ] Private House  [  ] Adult Home  [  ] Skilled Nursing Facility      [  ] Short Term  [  ] Long Term  [ x ] Stairs       Elevator [  ]    FUNCTIONAL STATUS PTA: (Check all that apply)  Ambulation: [ x  ]Independent    [  ] Dependent     [  ] Non-Ambulatory  Assistive Device: [x  ] SA Cane  [  ]  Q Cane  [  ] Walker  [  ]  Wheelchair  ADL : [ x ] Independent  [  ]  Dependent       Vital Signs Last 24 Hrs  T(C): 36.3 (03 Oct 2018 13:28), Max: 37 (02 Oct 2018 21:34)  T(F): 97.3 (03 Oct 2018 13:28), Max: 98.6 (02 Oct 2018 21:34)  HR: 82 (03 Oct 2018 13:28) (74 - 100)  BP: 161/76 (03 Oct 2018 13:28) (156/80 - 161/76)  BP(mean): --  RR: 16 (03 Oct 2018 13:28) (16 - 16)  SpO2: --      PHYSICAL EXAM: Alert & Oriented X3  GENERAL: NAD, well-groomed, well-developed  HEAD:  Atraumatic, Normocephalic  EYES: EOMI, PERRLA, conjunctiva and sclera clear  NECK: Supple, No JVD, Normal thyroid  CHEST/LUNG: Clear to percussion bilaterally; No rales, rhonchi, wheezing, or rubs  HEART: Regular rate and rhythm; No murmurs, rubs, or gallops  ABDOMEN: Soft, Nontender, Nondistended; Bowel sounds present  EXTREMITIES:  2+ Peripheral Pulses, No clubbing, cyanosis, or edema    NERVOUS SYSTEM:  Cranial Nerves 2-12 intact [ x ] Abnormal  [  ]  ROM: WFL all extremities [  ]  Abnormal [ x ]  Motor Strength: WFL all extremities  [  ]  Abnormal [ x ]  Sensation: intact to light touch [  ] Abnormal [x  ]  Reflexes: Symmetric [ x ]  Abnormal [  ]    FUNCTIONAL STATUS:  Bed Mobility: Independent [  ]  Supervision [  ]  Needs Assistance [ x ]  N/A [  ]  Transfers: Independent [  ]  Supervision [  ]  Needs Assistance [ x]  N/A [  ]   Ambulation: Independent [  ]  Supervision [  ]  Needs Assistance [ x ]  N/A [  ]  ADL: Independent [x  ] Requires Assistance [  ] N/A [  ]      LABS:                        10.5   9.31  )-----------( 405      ( 03 Oct 2018 11:03 )             32.0     10-03    134<L>  |  102  |  20  ----------------------------<  369<H>  4.6   |  15<L>  |  1.4    Ca    8.0<L>      03 Oct 2018 11:03    TPro  6.1  /  Alb  2.6<L>  /  TBili  0.2  /  DBili  x   /  AST  15  /  ALT  8   /  AlkPhos  106  10-03    PT/INR - ( 02 Oct 2018 06:07 )   PT: 15.00 sec;   INR: 1.38 ratio               RADIOLOGY & ADDITIONAL STUDIES:    Assesment:

## 2018-10-04 ENCOUNTER — TRANSCRIPTION ENCOUNTER (OUTPATIENT)
Age: 78
End: 2018-10-04

## 2018-10-04 VITALS
SYSTOLIC BLOOD PRESSURE: 126 MMHG | HEART RATE: 72 BPM | TEMPERATURE: 98 F | DIASTOLIC BLOOD PRESSURE: 59 MMHG | RESPIRATION RATE: 16 BRPM

## 2018-10-04 DIAGNOSIS — E87.2 ACIDOSIS: ICD-10-CM

## 2018-10-04 LAB
ANION GAP SERPL CALC-SCNC: 15 MMOL/L — HIGH (ref 7–14)
BUN SERPL-MCNC: 18 MG/DL — SIGNIFICANT CHANGE UP (ref 10–20)
CALCIUM SERPL-MCNC: 8.4 MG/DL — LOW (ref 8.5–10.1)
CHLORIDE SERPL-SCNC: 102 MMOL/L — SIGNIFICANT CHANGE UP (ref 98–110)
CO2 SERPL-SCNC: 20 MMOL/L — SIGNIFICANT CHANGE UP (ref 17–32)
CREAT SERPL-MCNC: 1.1 MG/DL — SIGNIFICANT CHANGE UP (ref 0.7–1.5)
CULTURE RESULTS: SIGNIFICANT CHANGE UP
GLUCOSE BLDC GLUCOMTR-MCNC: 205 MG/DL — HIGH (ref 70–99)
GLUCOSE BLDC GLUCOMTR-MCNC: 225 MG/DL — HIGH (ref 70–99)
GLUCOSE BLDC GLUCOMTR-MCNC: 278 MG/DL — HIGH (ref 70–99)
GLUCOSE SERPL-MCNC: 215 MG/DL — HIGH (ref 70–99)
HCT VFR BLD CALC: 30.4 % — LOW (ref 42–52)
HGB BLD-MCNC: 9.6 G/DL — LOW (ref 14–18)
MCHC RBC-ENTMCNC: 29.4 PG — SIGNIFICANT CHANGE UP (ref 27–31)
MCHC RBC-ENTMCNC: 31.6 G/DL — LOW (ref 32–37)
MCV RBC AUTO: 93.3 FL — SIGNIFICANT CHANGE UP (ref 80–94)
NRBC # BLD: 0 /100 WBCS — SIGNIFICANT CHANGE UP (ref 0–0)
PLATELET # BLD AUTO: 441 K/UL — HIGH (ref 130–400)
POTASSIUM SERPL-MCNC: 4.8 MMOL/L — SIGNIFICANT CHANGE UP (ref 3.5–5)
POTASSIUM SERPL-SCNC: 4.8 MMOL/L — SIGNIFICANT CHANGE UP (ref 3.5–5)
RBC # BLD: 3.26 M/UL — LOW (ref 4.7–6.1)
RBC # FLD: 13.5 % — SIGNIFICANT CHANGE UP (ref 11.5–14.5)
SODIUM SERPL-SCNC: 137 MMOL/L — SIGNIFICANT CHANGE UP (ref 135–146)
SPECIMEN SOURCE: SIGNIFICANT CHANGE UP
WBC # BLD: 8.26 K/UL — SIGNIFICANT CHANGE UP (ref 4.8–10.8)
WBC # FLD AUTO: 8.26 K/UL — SIGNIFICANT CHANGE UP (ref 4.8–10.8)

## 2018-10-04 RX ORDER — PANTOPRAZOLE SODIUM 20 MG/1
1 TABLET, DELAYED RELEASE ORAL
Qty: 0 | Refills: 0 | DISCHARGE
Start: 2018-10-04

## 2018-10-04 RX ORDER — LISINOPRIL 2.5 MG/1
1 TABLET ORAL
Qty: 0 | Refills: 0 | DISCHARGE
Start: 2018-10-04

## 2018-10-04 RX ORDER — LISINOPRIL 2.5 MG/1
5 TABLET ORAL DAILY
Qty: 0 | Refills: 0 | Status: DISCONTINUED | OUTPATIENT
Start: 2018-10-04 | End: 2018-10-04

## 2018-10-04 RX ORDER — INSULIN GLARGINE 100 [IU]/ML
15 INJECTION, SOLUTION SUBCUTANEOUS
Qty: 0 | Refills: 0 | DISCHARGE
Start: 2018-10-04

## 2018-10-04 RX ORDER — LANOLIN ALCOHOL/MO/W.PET/CERES
1 CREAM (GRAM) TOPICAL
Qty: 0 | Refills: 0 | DISCHARGE
Start: 2018-10-04

## 2018-10-04 RX ORDER — APIXABAN 2.5 MG/1
1 TABLET, FILM COATED ORAL
Qty: 0 | Refills: 0 | DISCHARGE
Start: 2018-10-04

## 2018-10-04 RX ORDER — APIXABAN 2.5 MG/1
2.5 TABLET, FILM COATED ORAL EVERY 12 HOURS
Qty: 0 | Refills: 0 | Status: DISCONTINUED | OUTPATIENT
Start: 2018-10-04 | End: 2018-10-04

## 2018-10-04 RX ORDER — SODIUM BICARBONATE 1 MEQ/ML
1 SYRINGE (ML) INTRAVENOUS
Qty: 0 | Refills: 0 | DISCHARGE
Start: 2018-10-04

## 2018-10-04 RX ORDER — BENZOCAINE AND MENTHOL 5; 1 G/100ML; G/100ML
1 LIQUID ORAL
Qty: 0 | Refills: 0 | DISCHARGE
Start: 2018-10-04

## 2018-10-04 RX ORDER — DOCUSATE SODIUM 100 MG
1 CAPSULE ORAL
Qty: 0 | Refills: 0 | DISCHARGE
Start: 2018-10-04

## 2018-10-04 RX ADMIN — Medication 15 MILLIGRAM(S): at 11:08

## 2018-10-04 RX ADMIN — BRIMONIDINE TARTRATE 1 DROP(S): 2 SOLUTION/ DROPS OPHTHALMIC at 05:17

## 2018-10-04 RX ADMIN — Medication 3 UNIT(S): at 11:10

## 2018-10-04 RX ADMIN — CLOPIDOGREL BISULFATE 75 MILLIGRAM(S): 75 TABLET, FILM COATED ORAL at 11:09

## 2018-10-04 RX ADMIN — Medication 3 UNIT(S): at 16:42

## 2018-10-04 RX ADMIN — FLUCONAZOLE 50 MILLIGRAM(S): 150 TABLET ORAL at 12:55

## 2018-10-04 RX ADMIN — Medication 100 MILLIGRAM(S): at 05:18

## 2018-10-04 RX ADMIN — CHLORHEXIDINE GLUCONATE 1 APPLICATION(S): 213 SOLUTION TOPICAL at 13:35

## 2018-10-04 RX ADMIN — Medication 50 MILLIGRAM(S): at 05:17

## 2018-10-04 RX ADMIN — DOFETILIDE 500 MICROGRAM(S): 0.25 CAPSULE ORAL at 05:17

## 2018-10-04 RX ADMIN — Medication 650 MILLIGRAM(S): at 05:18

## 2018-10-04 RX ADMIN — Medication 650 MILLIGRAM(S): at 13:33

## 2018-10-04 RX ADMIN — Medication 90 MILLIGRAM(S): at 05:17

## 2018-10-04 RX ADMIN — PANTOPRAZOLE SODIUM 40 MILLIGRAM(S): 20 TABLET, DELAYED RELEASE ORAL at 06:48

## 2018-10-04 RX ADMIN — Medication 3 UNIT(S): at 07:56

## 2018-10-04 NOTE — PROGRESS NOTE ADULT - SUBJECTIVE AND OBJECTIVE BOX
ROCIO NATION  78y  Male      Patient is a 78y old Male who presents with a chief complaint of generalized weakness and two falls at home  > 48 hrs duration (01 Oct 2018 01:18)      INTERVAL HPI/OVERNIGHT EVENTS:  Patient says he feels tired today.  Denies any specific complaints  Some hematuria last night and this am.  Dr. Agrawal isn't planning any urological intervention      REVIEW OF SYSTEMS:  CONSTITUTIONAL: no fever or fatigue  EYES: No eye pain, visual disturbances, or discharge  ENMT:  No difficulty hearing, tinnitus, vertigo; No sinus or throat pain  NECK: No pain or stiffness  BREASTS: No pain, masses, or nipple discharge  RESPIRATORY: No cough, wheezing, chills or hemoptysis; No shortness of breath  CARDIOVASCULAR: No chest pain, palpitations, dizziness, or leg swelling  GASTROINTESTINAL: c/o pain with swallowing  GENITOURINARY: c/o hematuria  NEUROLOGICAL: No headaches, memory loss, loss of strength, numbness, or tremors  SKIN: No itching, burning, rashes, or lesions   LYMPH NODES: No enlarged glands  ENDOCRINE: No heat or cold intolerance; No hair loss  MUSCULOSKELETAL: s/p fall at home  PSYCHIATRIC: No depression, anxiety, mood swings, or difficulty sleeping  HEME/LYMPH: No easy bruising, or bleeding gums  ALLERY AND IMMUNOLOGIC: No hives or eczema    Vital Signs Last 24 Hrs  T(C): 36.6 (04 Oct 2018 05:14), Max: 36.7 (03 Oct 2018 21:05)  T(F): 97.9 (04 Oct 2018 05:14), Max: 98 (03 Oct 2018 21:05)  HR: 80 (04 Oct 2018 05:14) (74 - 82)  BP: 135/67 (04 Oct 2018 05:14) (135/67 - 161/76)  BP(mean): --  RR: 16 (04 Oct 2018 05:14) (16 - 16)  SpO2: --      PHYSICAL EXAM:  GENERAL: NAD, well-groomed, well-developed  HEAD:  Atraumatic, Normocephalic  EYES: EOMI, PERRLA, conjunctiva and sclera clear  ENMT: No tonsillar erythema, exudates, or enlargement; Moist mucous membranes, Good dentition, No lesions  NECK: Supple, No JVD, Normal thyroid  NERVOUS SYSTEM:  Alert & Oriented X3, Good concentration; Motor Strength 5/5 B/L upper and lower extremities; DTRs 2+ intact and symmetric  CHEST/LUNG: Clear to percussion bilaterally; No rales, rhonchi, wheezing, or rubs  HEART: Regular rate and rhythm; No murmurs, rubs, or gallops  ABDOMEN: Soft, Nontender, Nondistended; Bowel sounds present,   EXTREMITIES:  2+ Peripheral Pulses, No clubbing, cyanosis, or edema  LYMPH: No lymphadenopathy noted  SKIN: No rashes or lesions    Consultant(s) Notes Reviewed:  [x ] YES  [ ] NO  Care Discussed with Consultants/Other Providers [ x] YES  [ ] NO    LABS: CMP is pending for today                          9.6    8.26  )-----------( 441      ( 04 Oct 2018 07:17 )             30.4     10-03    134<L>  |  102  |  20  ----------------------------<  369<H>  4.6   |  15<L>  |  1.4    Ca    8.0<L>      03 Oct 2018 11:03    TPro  6.1  /  Alb  2.6<L>  /  TBili  0.2  /  DBili  x   /  AST  15  /  ALT  8   /  AlkPhos  106  10-03                   Daily Height in cm: 177.8 (01 Oct 2018 00:59)    Daily   Drug Dosing Weight  Height (cm): 177.8 (01 Oct 2018 00:59)  Weight (kg): 90 (01 Oct 2018 00:59)  BMI (kg/m2): 28.5 (01 Oct 2018 00:59)  BSA (m2): 2.08 (01 Oct 2018 00:59)  CAPILLARY BLOOD GLUCOSE    CAPILLARY BLOOD GLUCOSE  POCT Blood Glucose.: 205 mg/dL (04 Oct 2018 07:31)  POCT Blood Glucose.: 213 mg/dL (03 Oct 2018 21:16)  POCT Blood Glucose.: 295 mg/dL (03 Oct 2018 16:05)  POCT Blood Glucose.: 355 mg/dL (03 Oct 2018 11:17)      Urinalysis Basic - ( 30 Sep 2018 20:36 )    Color: Red / Appearance: Cloudy / S.020 / pH: x  Gluc: x / Ketone: 40  / Bili: Small / Urobili: 0.2   Blood: x / Protein: >=300 mg/dL / Nitrite: Negative   Leuk Esterase: Large / RBC: 10-25 /HPF / WBC 26-50 /HPF   Sq Epi: x / Non Sq Epi: Negative / Bacteria: Many      LIVER FUNCTIONS - ( 30 Sep 2018 17:32 )  Alb: 2.8 g/dL / Pro: 6.6 g/dL / ALK PHOS: 127 U/L / ALT: 7 U/L / AST: 11 U/L / GGT: x               RADIOLOGY & ADDITIONAL TESTS:    Imaging Personally Reviewed:  [x] YES  [ ] NO    HEALTH ISSUES - PROBLEM Dx:  Urinary tract infection without hematuria, site unspecified: Urinary tract infection without hematuria, site unspecified  Dementia: Dementia  CAD (coronary artery disease): CAD (coronary artery disease)  Diabetes: Diabetes  Sepsis: Sepsis  UTI (urinary tract infection): UTI (urinary tract infection)    MEDICATIONS  (STANDING):  atorvastatin 40 milliGRAM(s) Oral at bedtime  brimonidine 0.2% Ophthalmic Solution 1 Drop(s) Both EYES two times a day  clopidogrel Tablet 75 milliGRAM(s) Oral daily  dextrose 5%. 1000 milliLiter(s) (50 mL/Hr) IV Continuous <Continuous>  dextrose 50% Injectable 12.5 Gram(s) IV Push once  dextrose 50% Injectable 25 Gram(s) IV Push once  dextrose 50% Injectable 25 Gram(s) IV Push once  docusate sodium 100 milliGRAM(s) Oral two times a day  dofetilide 500 MICROGram(s) Oral two times a day  fluconAZOLE IVPB      fluconAZOLE IVPB 100 milliGRAM(s) IV Intermittent every 24 hours  influenza   Vaccine 0.5 milliLiter(s) IntraMuscular once  insulin glargine Injectable (LANTUS) 10 Unit(s) SubCutaneous at bedtime  insulin lispro Injectable (HumaLOG) 3 Unit(s) SubCutaneous before breakfast  insulin lispro Injectable (HumaLOG) 3 Unit(s) SubCutaneous before lunch  insulin lispro Injectable (HumaLOG) 3 Unit(s) SubCutaneous before dinner  latanoprost 0.005% Ophthalmic Solution 1 Drop(s) Both EYES at bedtime  metoprolol succinate ER 50 milliGRAM(s) Oral daily  NIFEdipine XL 90 milliGRAM(s) Oral daily  oxybutynin XL 15 milliGRAM(s) Oral daily  pantoprazole    Tablet 40 milliGRAM(s) Oral before breakfast  sodium bicarbonate 650 milliGRAM(s) Oral every 8 hours  sodium chloride 0.9%. 1000 milliLiter(s) (75 mL/Hr) IV Continuous <Continuous>    MEDICATIONS  (PRN):  acetaminophen   Tablet .. 650 milliGRAM(s) Oral every 6 hours PRN Mild Pain (1 - 3)  aluminum hydroxide/magnesium hydroxide/simethicone Suspension 30 milliLiter(s) Oral every 4 hours PRN Dyspepsia  benzocaine 15 mG/menthol 3.6 mG Lozenge 1 Lozenge Oral every 1 hour PRN Sore Throat  chlorhexidine 4% Liquid 1 Application(s) Topical <User Schedule> PRN infection control  dextrose 40% Gel 15 Gram(s) Oral once PRN Blood Glucose LESS THAN 70 milliGRAM(s)/deciliter  glucagon  Injectable 1 milliGRAM(s) IntraMuscular once PRN Glucose LESS THAN 70 milligrams/deciliter  melatonin 5 milliGRAM(s) Oral at bedtime PRN Insomnia

## 2018-10-04 NOTE — DISCHARGE NOTE ADULT - PLAN OF CARE
resolution of infection complete course of antifungals resolved  check BMP in 3 days take meds as prescribed

## 2018-10-04 NOTE — DISCHARGE NOTE ADULT - MEDICATION SUMMARY - MEDICATIONS TO STOP TAKING
I will STOP taking the medications listed below when I get home from the hospital:    glipiZIDE 10 mg oral tablet, extended release  -- 1 tab(s) by mouth once a day    potassium chloride 20 mEq oral tablet, extended release  -- 1 tab(s) by mouth once a day    metFORMIN 1000 mg oral tablet, extended release  -- 1 tab(s) by mouth

## 2018-10-04 NOTE — PROGRESS NOTE ADULT - PROBLEM SELECTOR PROBLEM 5
Acute kidney injury superimposed on chronic kidney disease

## 2018-10-04 NOTE — DISCHARGE NOTE ADULT - CARE PLAN
Principal Discharge DX:	Acute cystitis with hematuria  Goal:	resolution of infection  Assessment and plan of treatment:	complete course of antifungals  Secondary Diagnosis:	Acute kidney injury superimposed on chronic kidney disease  Assessment and plan of treatment:	resolved  check BMP in 3 days  Secondary Diagnosis:	AICD (automatic cardioverter/defibrillator) present  Assessment and plan of treatment:	take meds as prescribed  Secondary Diagnosis:	CAD (coronary artery disease)  Assessment and plan of treatment:	take meds as prescribed  Secondary Diagnosis:	Cardiomyopathy  Assessment and plan of treatment:	take meds as prescribed  Secondary Diagnosis:	Chronic atrial fibrillation  Assessment and plan of treatment:	take meds as prescribed  Secondary Diagnosis:	Chronic combined systolic and diastolic congestive heart failure  Assessment and plan of treatment:	take meds as prescribed

## 2018-10-04 NOTE — PROGRESS NOTE ADULT - PROBLEM SELECTOR PLAN 5
d/c lisinopril and lasix  d/c IVF  with b/l hydro  discontinued rondon   avoid nephrotoxics  urology follow up needed - dr tejada
d/c lisinopril and lasix  IVF for 24hrs  with b/l hydro  discontinue rondon   avoid nephrotoxics  urology follow up needed - dr tejada
d/c lisinopril and lasix  d/c IVF  with b/l hydro  discontinued rondon   avoid nephrotoxics  urology follow up needed - dr tejada
d/c lisinopril and lasix  IVF for 24hrs  continue rondon for 24hrs  avoid nephrotoxics  urology follow up needed - dr tejada

## 2018-10-04 NOTE — DISCHARGE NOTE ADULT - PATIENT PORTAL LINK FT
You can access the [email or web address], offered by [facility], by registering with the following website: [email or web address]

## 2018-10-04 NOTE — PROGRESS NOTE ADULT - PROBLEM SELECTOR PLAN 4
d/c lisinopril due to cristino

## 2018-10-04 NOTE — PROGRESS NOTE ADULT - PROBLEM SELECTOR PROBLEM 8
Hydronephrosis with infection

## 2018-10-04 NOTE — PROGRESS NOTE ADULT - PROBLEM SELECTOR PLAN 8
urology follow up needed  d/c rondon
urology follow up needed  continue rondon for now

## 2018-10-04 NOTE — DISCHARGE NOTE ADULT - MEDICATION SUMMARY - MEDICATIONS TO TAKE
I will START or STAY ON the medications listed below when I get home from the hospital:    freetext medication  -  -- 100 milligram(s) by mouth once a day  -- Indication: For Home med    lisinopril 5 mg oral tablet  -- 1 tab(s) by mouth once a day  -- Indication: For Hypertension    sodium bicarbonate 650 mg oral tablet  -- 1 tab(s) by mouth every 8 hours  -- Indication: For Metabolic acidosis    dofetilide 500 mcg oral capsule  -- 1 cap(s) by mouth 2 times a day  -- Indication: For Atrial fib    apixaban 2.5 mg oral tablet  -- 1 tab(s) by mouth every 12 hours  -- Indication: For Afib    insulin glargine  -- 15 unit(s) subcutaneous once a day (at bedtime)  -- Indication: For Diabetes    insulin aspart 100 units/mL subcutaneous solution  -- 5 unit(s) subcutaneous 3 times a day (before meals)  -- Indication: For Diabetes    fluconazole 100 mg oral tablet  -- 1 tab(s) by mouth once a day for 5 days  -- Indication: For UTI (urinary tract infection)    atorvastatin 40 mg oral tablet  -- 1 tab(s) by mouth once a day (at bedtime)  -- Indication: For Hypercholesteremia    clopidogrel 75 mg oral tablet  -- 1 tab(s) by mouth once a day  -- Indication: For CAD (coronary artery disease)    metoprolol succinate 50 mg oral tablet, extended release  -- 1 tab(s) by mouth once a day  -- Indication: For Hypertension    NIFEdipine 90 mg oral tablet, extended release  -- 1 tab(s) by mouth once a day  -- Indication: For Hypertension    ciclopirox 0.77% topical cream  -- Apply on skin to affected area 2 times a day  -- Indication: For Skin     furosemide 20 mg oral tablet  -- 1 tab(s) by mouth once a day  -- Indication: For Chf    Cranberry oral capsule  -- 1 cap(s) by mouth once a day  -- Indication: For vitamin    docusate sodium 100 mg oral capsule  -- 1 cap(s) by mouth 2 times a day  -- Indication: For Constipation    benzocaine-menthol 15 mg-3.6 mg mucous membrane lozenge  -- 1 dose(s) mucous membrane every 1 to 2 hours, As Needed  -- Indication: For Sore throat    melatonin 5 mg oral tablet  -- 1 tab(s) by mouth once a day (at bedtime), As needed, Insomnia  -- Indication: For insomnia    Alphagan P 0.1% ophthalmic solution  -- 1 drop(s) to each affected eye once a day (at bedtime)  -- Indication: For Eye drops    latanoprost 0.005% ophthalmic solution  -- 1 drop(s) to each affected eye once a day (in the evening) ou  -- Indication: For Eye drops    pantoprazole 40 mg oral delayed release tablet  -- 1 tab(s) by mouth once a day (before a meal)  -- Indication: For gerd    oxybutynin 15 mg/24 hr oral tablet, extended release  -- 1 tab(s) by mouth once a day  -- Indication: For bladder

## 2018-10-04 NOTE — PROGRESS NOTE ADULT - PROBLEM SELECTOR PROBLEM 9
Closed fracture of multiple ribs of both sides, initial encounter

## 2018-10-04 NOTE — PROGRESS NOTE ADULT - PROBLEM SELECTOR PLAN 1
secondary to yeast (culture results)  change IV abx to fluconazole  follow up official cultures - yeast  off IVF
continue IV abx  follow up cultures  ivf
secondary to yeast (culture results)  change IV abx to fluconazole  follow up official cultures  off IVF
continue IV abx  follow up cultures

## 2018-10-04 NOTE — PROGRESS NOTE ADULT - PROBLEM SELECTOR PLAN 7
monitor finger sticks qac and qhs  start insulin
monitor finger sticks
monitor finger sticks qac and qhs  start insulin
monitor finger sticks

## 2018-10-04 NOTE — DISCHARGE NOTE ADULT - SECONDARY DIAGNOSIS.
Acute kidney injury superimposed on chronic kidney disease AICD (automatic cardioverter/defibrillator) present CAD (coronary artery disease) Cardiomyopathy Chronic atrial fibrillation Chronic combined systolic and diastolic congestive heart failure

## 2018-10-04 NOTE — DISCHARGE NOTE ADULT - CARE PROVIDERS DIRECT ADDRESSES
,DirectAddress_Unknown,[email or web address],DirectAddress_Unknown

## 2018-10-04 NOTE — DISCHARGE NOTE ADULT - MEDICATION SUMMARY - MEDICATIONS TO CHANGE
I will SWITCH the dose or number of times a day I take the medications listed below when I get home from the hospital:    furosemide 40 mg oral tablet  -- 1 tab(s) by mouth once a day    lisinopril 20 mg oral tablet  -- 1 tab(s) by mouth once a day

## 2018-10-04 NOTE — DISCHARGE NOTE ADULT - PROVIDER TOKENS
FREE:[LAST:[Primary medical doctor],PHONE:[(   )    -],FAX:[(   )    -]],TOKEN:'[id number]',FREE:[LAST:[cardiologist],PHONE:[(   )    -],FAX:[(   )    -]]

## 2018-10-04 NOTE — DISCHARGE NOTE ADULT - HOSPITAL COURSE
8 year old male with PMH of:     Afib    BPH (benign prostatic hyperplasia)    CAD (coronary artery disease)  CARD STENT X2 4/2018  Cardiomyopathy    CHF (congestive heart failure)  COMBINED SYSTOLIC & DIASTOLIC s/p AICD  Cholelithiasis    Dementia    Diabetes    Diplopia  RIGHT EYE-WEARS PATCH S/P TIA  DM (diabetes mellitus)    Glaucoma    Heart attack  1/2018  HTN (hypertension)    Hydrocele in adult    Hypercholesteremia    Renal insufficiency    Sepsis  1/18 FROM INFECTED GALLBLADDER  TIA (transient ischemic attack)  X2 JULY 2018    Presented s/p two mechanical falls at home.  S/P cystoscopy 2 days ago with Dr. Agrawal. Patient is s/p rondon removal yesterday, complains of hematuria this morning and dysuria for several months s/p prostate sx.  Evaluated by  , Dr. Agrawal - outpt follow up.     Dispo: d/c to SNF when bed available        Problem/Plan - 1:  ·  Problem: Urinary tract infection without hematuria, site unspecified.  Plan: secondary to yeast (culture results)  change IV abx to fluconazole  follow up official cultures - yeast  off IVF.      Problem/Plan - 2:  ·  Problem: CAD (coronary artery disease).  Plan: continue home meds.      Problem/Plan - 3:  ·  Problem: Hypercholesteremia.  Plan: continue atorvastatin.      Problem/Plan - 4:  ·  Problem: Essential hypertension.  Plan: d/c lisinopril due to cristino.      Problem/Plan - 5:  ·  Problem: Acute kidney injury superimposed on chronic kidney disease.  Plan: d/c lisinopril and lasix  d/c IVF  with b/l hydro  discontinued rondon   avoid nephrotoxics  urology follow up needed - dr agrawal.      Problem/Plan - 6:  Problem: Chronic atrial fibrillation. Plan: on apixiban - resume today.     Problem/Plan - 7:  ·  Problem: DM (diabetes mellitus).  Plan: monitor finger sticks qac and qhs  start insulin.      Problem/Plan - 8:  ·  Problem: Hydronephrosis with infection.  Plan: urology follow up needed  d/c rondon.      Problem/Plan - 9:  ·  Problem: Closed fracture of multiple ribs of both sides, initial encounter.  Plan: pain control  incentive spirometer.      Problem/Plan - 10:  Problem: Metabolic acidosis. Plan; on oral bicarb  outpt follow up.    d/c to snf today  d/c planning took over 55 min

## 2018-10-04 NOTE — PROGRESS NOTE ADULT - ASSESSMENT
78 year old male with PMH of:     Afib    BPH (benign prostatic hyperplasia)    CAD (coronary artery disease)  CARD STENT X2 4/2018  Cardiomyopathy    CHF (congestive heart failure)  COMBINED SYSTOLIC & DIASTOLIC s/p AICD  Cholelithiasis    Dementia    Diabetes    Diplopia  RIGHT EYE-WEARS PATCH S/P TIA  DM (diabetes mellitus)    Glaucoma    Heart attack  1/2018  HTN (hypertension)    Hydrocele in adult    Hypercholesteremia    Renal insufficiency    Sepsis  1/18 FROM INFECTED GALLBLADDER  TIA (transient ischemic attack)  X2 JULY 2018    Presented s/p two mechanical falls at home.  S/P cystoscopy 2 days ago with Dr. Agrawal.
78 year old male with PMH of:     Afib    BPH (benign prostatic hyperplasia)    CAD (coronary artery disease)  CARD STENT X2 4/2018  Cardiomyopathy    CHF (congestive heart failure)  COMBINED SYSTOLIC & DIASTOLIC s/p AICD  Cholelithiasis    Dementia    Diabetes    Diplopia  RIGHT EYE-WEARS PATCH S/P TIA  DM (diabetes mellitus)    Glaucoma    Heart attack  1/2018  HTN (hypertension)    Hydrocele in adult    Hypercholesteremia    Renal insufficiency    Sepsis  1/18 FROM INFECTED GALLBLADDER  TIA (transient ischemic attack)  X2 JULY 2018    Presented s/p two mechanical falls at home.  S/P cystoscopy 2 days ago with Dr. Agrawal. Patient is s/p rondon removal yesterday, complains of hematuria this morning and dysuria for several months s/p prostate sx.
78 year old male with PMH of:     Afib    BPH (benign prostatic hyperplasia)    CAD (coronary artery disease)  CARD STENT X2 4/2018  Cardiomyopathy    CHF (congestive heart failure)  COMBINED SYSTOLIC & DIASTOLIC s/p AICD  Cholelithiasis    Dementia    Diabetes    Diplopia  RIGHT EYE-WEARS PATCH S/P TIA  DM (diabetes mellitus)    Glaucoma    Heart attack  1/2018  HTN (hypertension)    Hydrocele in adult    Hypercholesteremia    Renal insufficiency    Sepsis  1/18 FROM INFECTED GALLBLADDER  TIA (transient ischemic attack)  X2 JULY 2018    Presented s/p two mechanical falls at home.  S/P cystoscopy 2 days ago with Dr. Agrawal.
78 year old male with PMH of:     Afib    BPH (benign prostatic hyperplasia)    CAD (coronary artery disease)  CARD STENT X2 4/2018  Cardiomyopathy    CHF (congestive heart failure)  COMBINED SYSTOLIC & DIASTOLIC s/p AICD  Cholelithiasis    Dementia    Diabetes    Diplopia  RIGHT EYE-WEARS PATCH S/P TIA  DM (diabetes mellitus)    Glaucoma    Heart attack  1/2018  HTN (hypertension)    Hydrocele in adult    Hypercholesteremia    Renal insufficiency    Sepsis  1/18 FROM INFECTED GALLBLADDER  TIA (transient ischemic attack)  X2 JULY 2018    Presented s/p two mechanical falls at home.  S/P cystoscopy 2 days ago with Dr. Agrawal. Patient is s/p rondon removal yesterday, complains of hematuria this morning and dysuria for several months s/p prostate sx.  Evaluated by  Dr. Agrawal - outpt follow up.     Dispo: d/c to SNF when bed available

## 2018-10-04 NOTE — PROGRESS NOTE ADULT - PROBLEM SELECTOR PROBLEM 1
Urinary tract infection without hematuria, site unspecified

## 2018-10-04 NOTE — PROGRESS NOTE ADULT - PROBLEM SELECTOR PLAN 9
pain control  incentive spirometer

## 2018-10-04 NOTE — DISCHARGE NOTE ADULT - CARE PROVIDER_API CALL
Primary medical doctor,   Phone: (   )    -  Fax: (   )    -    Pablo Agrawal), Urology  [address] [place] [address]  Phone: [phone]  Fax: [phone]    cardiologist,   Phone: (   )    -  Fax: (   )    -

## 2018-10-05 ENCOUNTER — OUTPATIENT (OUTPATIENT)
Dept: OUTPATIENT SERVICES | Facility: HOSPITAL | Age: 78
LOS: 1 days | Discharge: HOME | End: 2018-10-05

## 2018-10-05 DIAGNOSIS — Z95.810 PRESENCE OF AUTOMATIC (IMPLANTABLE) CARDIAC DEFIBRILLATOR: Chronic | ICD-10-CM

## 2018-10-05 DIAGNOSIS — E55.9 VITAMIN D DEFICIENCY, UNSPECIFIED: ICD-10-CM

## 2018-10-05 DIAGNOSIS — Z98.41 CATARACT EXTRACTION STATUS, RIGHT EYE: Chronic | ICD-10-CM

## 2018-10-05 DIAGNOSIS — Z98.890 OTHER SPECIFIED POSTPROCEDURAL STATES: Chronic | ICD-10-CM

## 2018-10-05 DIAGNOSIS — Z46.82 ENCOUNTER FOR FITTING AND ADJUSTMENT OF NON-VASCULAR CATHETER: Chronic | ICD-10-CM

## 2018-10-05 DIAGNOSIS — Z98.61 CORONARY ANGIOPLASTY STATUS: Chronic | ICD-10-CM

## 2018-10-05 LAB
CULTURE RESULTS: SIGNIFICANT CHANGE UP
CULTURE RESULTS: SIGNIFICANT CHANGE UP
SPECIMEN SOURCE: SIGNIFICANT CHANGE UP
SPECIMEN SOURCE: SIGNIFICANT CHANGE UP

## 2018-10-19 ENCOUNTER — OUTPATIENT (OUTPATIENT)
Dept: OUTPATIENT SERVICES | Facility: HOSPITAL | Age: 78
LOS: 1 days | Discharge: HOME | End: 2018-10-19

## 2018-10-19 DIAGNOSIS — Z98.890 OTHER SPECIFIED POSTPROCEDURAL STATES: Chronic | ICD-10-CM

## 2018-10-19 DIAGNOSIS — Z98.61 CORONARY ANGIOPLASTY STATUS: Chronic | ICD-10-CM

## 2018-10-19 DIAGNOSIS — D64.9 ANEMIA, UNSPECIFIED: ICD-10-CM

## 2018-10-19 DIAGNOSIS — Z46.82 ENCOUNTER FOR FITTING AND ADJUSTMENT OF NON-VASCULAR CATHETER: Chronic | ICD-10-CM

## 2018-10-19 DIAGNOSIS — Z98.41 CATARACT EXTRACTION STATUS, RIGHT EYE: Chronic | ICD-10-CM

## 2018-10-19 DIAGNOSIS — Z95.810 PRESENCE OF AUTOMATIC (IMPLANTABLE) CARDIAC DEFIBRILLATOR: Chronic | ICD-10-CM

## 2018-11-28 ENCOUNTER — APPOINTMENT (OUTPATIENT)
Dept: UROLOGY | Facility: CLINIC | Age: 78
End: 2018-11-28

## 2019-01-01 ENCOUNTER — APPOINTMENT (OUTPATIENT)
Dept: HEMATOLOGY ONCOLOGY | Facility: CLINIC | Age: 79
End: 2019-01-01
Payer: MEDICARE

## 2019-01-01 ENCOUNTER — OUTPATIENT (OUTPATIENT)
Dept: OUTPATIENT SERVICES | Facility: HOSPITAL | Age: 79
LOS: 1 days | Discharge: HOME | End: 2019-01-01

## 2019-01-01 ENCOUNTER — APPOINTMENT (OUTPATIENT)
Dept: INFUSION THERAPY | Facility: CLINIC | Age: 79
End: 2019-01-01
Payer: MEDICARE

## 2019-01-01 ENCOUNTER — LABORATORY RESULT (OUTPATIENT)
Age: 79
End: 2019-01-01

## 2019-01-01 ENCOUNTER — APPOINTMENT (OUTPATIENT)
Dept: INFUSION THERAPY | Facility: CLINIC | Age: 79
End: 2019-01-01

## 2019-01-01 ENCOUNTER — OUTPATIENT (OUTPATIENT)
Dept: OUTPATIENT SERVICES | Facility: HOSPITAL | Age: 79
LOS: 1 days | Discharge: HOME | End: 2019-01-01
Payer: MEDICARE

## 2019-01-01 ENCOUNTER — RX RENEWAL (OUTPATIENT)
Age: 79
End: 2019-01-01

## 2019-01-01 ENCOUNTER — APPOINTMENT (OUTPATIENT)
Dept: HEMATOLOGY ONCOLOGY | Facility: CLINIC | Age: 79
End: 2019-01-01

## 2019-01-01 VITALS
SYSTOLIC BLOOD PRESSURE: 131 MMHG | BODY MASS INDEX: 23.99 KG/M2 | HEART RATE: 50 BPM | HEIGHT: 69 IN | DIASTOLIC BLOOD PRESSURE: 58 MMHG | WEIGHT: 162 LBS | TEMPERATURE: 96.7 F

## 2019-01-01 VITALS
DIASTOLIC BLOOD PRESSURE: 55 MMHG | WEIGHT: 160 LBS | TEMPERATURE: 96.8 F | BODY MASS INDEX: 23.7 KG/M2 | HEART RATE: 49 BPM | SYSTOLIC BLOOD PRESSURE: 130 MMHG | RESPIRATION RATE: 14 BRPM | HEIGHT: 69 IN

## 2019-01-01 VITALS
TEMPERATURE: 97 F | HEIGHT: 69 IN | OXYGEN SATURATION: 99 % | SYSTOLIC BLOOD PRESSURE: 154 MMHG | DIASTOLIC BLOOD PRESSURE: 80 MMHG | WEIGHT: 166.67 LBS | RESPIRATION RATE: 15 BRPM | HEART RATE: 54 BPM

## 2019-01-01 VITALS
WEIGHT: 170 LBS | DIASTOLIC BLOOD PRESSURE: 63 MMHG | HEART RATE: 50 BPM | TEMPERATURE: 96.2 F | SYSTOLIC BLOOD PRESSURE: 137 MMHG | BODY MASS INDEX: 25.18 KG/M2 | HEIGHT: 69 IN

## 2019-01-01 DIAGNOSIS — Z95.810 PRESENCE OF AUTOMATIC (IMPLANTABLE) CARDIAC DEFIBRILLATOR: Chronic | ICD-10-CM

## 2019-01-01 DIAGNOSIS — E11.9 TYPE 2 DIABETES MELLITUS WITHOUT COMPLICATIONS: ICD-10-CM

## 2019-01-01 DIAGNOSIS — Z98.890 OTHER SPECIFIED POSTPROCEDURAL STATES: Chronic | ICD-10-CM

## 2019-01-01 DIAGNOSIS — Z88.2 ALLERGY STATUS TO SULFONAMIDES: ICD-10-CM

## 2019-01-01 DIAGNOSIS — Z98.41 CATARACT EXTRACTION STATUS, RIGHT EYE: Chronic | ICD-10-CM

## 2019-01-01 DIAGNOSIS — Z01.818 ENCOUNTER FOR OTHER PREPROCEDURAL EXAMINATION: ICD-10-CM

## 2019-01-01 DIAGNOSIS — F03.90 UNSPECIFIED DEMENTIA WITHOUT BEHAVIORAL DISTURBANCE: ICD-10-CM

## 2019-01-01 DIAGNOSIS — R32 UNSPECIFIED URINARY INCONTINENCE: ICD-10-CM

## 2019-01-01 DIAGNOSIS — R33.9 RETENTION OF URINE, UNSPECIFIED: ICD-10-CM

## 2019-01-01 DIAGNOSIS — E78.5 HYPERLIPIDEMIA, UNSPECIFIED: ICD-10-CM

## 2019-01-01 DIAGNOSIS — Z46.82 ENCOUNTER FOR FITTING AND ADJUSTMENT OF NON-VASCULAR CATHETER: Chronic | ICD-10-CM

## 2019-01-01 DIAGNOSIS — I42.9 CARDIOMYOPATHY, UNSPECIFIED: ICD-10-CM

## 2019-01-01 DIAGNOSIS — D64.9 ANEMIA, UNSPECIFIED: ICD-10-CM

## 2019-01-01 DIAGNOSIS — Z98.61 CORONARY ANGIOPLASTY STATUS: Chronic | ICD-10-CM

## 2019-01-01 DIAGNOSIS — R31.9 HEMATURIA, UNSPECIFIED: ICD-10-CM

## 2019-01-01 DIAGNOSIS — Z86.73 PERSONAL HISTORY OF TRANSIENT ISCHEMIC ATTACK (TIA), AND CEREBRAL INFARCTION WITHOUT RESIDUAL DEFICITS: ICD-10-CM

## 2019-01-01 DIAGNOSIS — E53.8 DEFICIENCY OF OTHER SPECIFIED B GROUP VITAMINS: ICD-10-CM

## 2019-01-01 DIAGNOSIS — I10 ESSENTIAL (PRIMARY) HYPERTENSION: ICD-10-CM

## 2019-01-01 DIAGNOSIS — I25.10 ATHEROSCLEROTIC HEART DISEASE OF NATIVE CORONARY ARTERY WITHOUT ANGINA PECTORIS: ICD-10-CM

## 2019-01-01 DIAGNOSIS — I48.91 UNSPECIFIED ATRIAL FIBRILLATION: ICD-10-CM

## 2019-01-01 DIAGNOSIS — I50.9 HEART FAILURE, UNSPECIFIED: ICD-10-CM

## 2019-01-01 DIAGNOSIS — Z00.00 ENCOUNTER FOR GENERAL ADULT MEDICAL EXAMINATION W/OUT ABNORMAL FINDINGS: ICD-10-CM

## 2019-01-01 DIAGNOSIS — N40.1 BENIGN PROSTATIC HYPERPLASIA WITH LOWER URINARY TRACT SYMPTOMS: ICD-10-CM

## 2019-01-01 DIAGNOSIS — Z95.810 PRESENCE OF AUTOMATIC (IMPLANTABLE) CARDIAC DEFIBRILLATOR: ICD-10-CM

## 2019-01-01 DIAGNOSIS — Z87.440 PERSONAL HISTORY OF URINARY (TRACT) INFECTIONS: ICD-10-CM

## 2019-01-01 DIAGNOSIS — N13.2 HYDRONEPHROSIS WITH RENAL AND URETERAL CALCULOUS OBSTRUCTION: ICD-10-CM

## 2019-01-01 DIAGNOSIS — I48.92 UNSPECIFIED ATRIAL FLUTTER: ICD-10-CM

## 2019-01-01 DIAGNOSIS — Z79.84 LONG TERM (CURRENT) USE OF ORAL HYPOGLYCEMIC DRUGS: ICD-10-CM

## 2019-01-01 DIAGNOSIS — R10.2 PELVIC AND PERINEAL PAIN: ICD-10-CM

## 2019-01-01 DIAGNOSIS — Z98.61 CORONARY ANGIOPLASTY STATUS: ICD-10-CM

## 2019-01-01 DIAGNOSIS — E78.49 OTHER HYPERLIPIDEMIA: ICD-10-CM

## 2019-01-01 DIAGNOSIS — Q60.2 RENAL AGENESIS, UNSPECIFIED: ICD-10-CM

## 2019-01-01 DIAGNOSIS — Z79.01 LONG TERM (CURRENT) USE OF ANTICOAGULANTS: ICD-10-CM

## 2019-01-01 LAB
ALBUMIN SERPL ELPH-MCNC: 3.7 G/DL
ALBUMIN SERPL ELPH-MCNC: 3.7 G/DL
ALBUMIN SERPL ELPH-MCNC: 4.3 G/DL — SIGNIFICANT CHANGE UP (ref 3.5–5.2)
ALP BLD-CCNC: 111 U/L
ALP BLD-CCNC: 85 U/L
ALP SERPL-CCNC: 93 U/L — SIGNIFICANT CHANGE UP (ref 30–115)
ALT FLD-CCNC: 6 U/L — SIGNIFICANT CHANGE UP (ref 0–41)
ALT SERPL-CCNC: 5 U/L
ALT SERPL-CCNC: <5 U/L
ANION GAP SERPL CALC-SCNC: 10 MMOL/L
ANION GAP SERPL CALC-SCNC: 11 MMOL/L — SIGNIFICANT CHANGE UP (ref 7–14)
ANION GAP SERPL CALC-SCNC: 15 MMOL/L
APPEARANCE UR: ABNORMAL
APTT BLD: 40.3 SEC — HIGH (ref 27–39.2)
AST SERPL-CCNC: 6 U/L
AST SERPL-CCNC: 7 U/L — SIGNIFICANT CHANGE UP (ref 0–41)
AST SERPL-CCNC: 8 U/L
BACTERIA # UR AUTO: ABNORMAL
BILIRUB SERPL-MCNC: 0.2 MG/DL
BILIRUB SERPL-MCNC: 0.2 MG/DL
BILIRUB SERPL-MCNC: <0.2 MG/DL — SIGNIFICANT CHANGE UP (ref 0.2–1.2)
BILIRUB UR-MCNC: NEGATIVE — SIGNIFICANT CHANGE UP
BUN SERPL-MCNC: 26 MG/DL
BUN SERPL-MCNC: 32 MG/DL — HIGH (ref 10–20)
BUN SERPL-MCNC: 33 MG/DL
CALCIUM SERPL-MCNC: 8.7 MG/DL
CALCIUM SERPL-MCNC: 8.8 MG/DL
CALCIUM SERPL-MCNC: 9.2 MG/DL — SIGNIFICANT CHANGE UP (ref 8.5–10.1)
CHLORIDE SERPL-SCNC: 107 MMOL/L
CHLORIDE SERPL-SCNC: 108 MMOL/L
CHLORIDE SERPL-SCNC: 111 MMOL/L — HIGH (ref 98–110)
CO2 SERPL-SCNC: 20 MMOL/L
CO2 SERPL-SCNC: 22 MMOL/L — SIGNIFICANT CHANGE UP (ref 17–32)
CO2 SERPL-SCNC: 23 MMOL/L
COLOR SPEC: ABNORMAL
CREAT SERPL-MCNC: 1.4 MG/DL
CREAT SERPL-MCNC: 1.6 MG/DL — HIGH (ref 0.7–1.5)
CREAT SERPL-MCNC: 1.9 MG/DL
CULTURE RESULTS: SIGNIFICANT CHANGE UP
DIFF PNL FLD: ABNORMAL
EPI CELLS # UR: 0 /HPF — SIGNIFICANT CHANGE UP (ref 0–5)
ESTIMATED AVERAGE GLUCOSE: 114 MG/DL — SIGNIFICANT CHANGE UP (ref 68–114)
FERRITIN SERPL-MCNC: 281 NG/ML
FERRITIN SERPL-MCNC: 283 NG/ML
FERRITIN SERPL-MCNC: 446 NG/ML
FOLATE SERPL-MCNC: 12.5 NG/ML
FOLATE SERPL-MCNC: >20 NG/ML
GLUCOSE BLDC GLUCOMTR-MCNC: 89 MG/DL — SIGNIFICANT CHANGE UP (ref 70–99)
GLUCOSE SERPL-MCNC: 66 MG/DL — LOW (ref 70–99)
GLUCOSE SERPL-MCNC: 80 MG/DL
GLUCOSE SERPL-MCNC: 94 MG/DL
GLUCOSE UR QL: NEGATIVE — SIGNIFICANT CHANGE UP
HBA1C BLD-MCNC: 5.6 % — SIGNIFICANT CHANGE UP (ref 4–5.6)
HCT VFR BLD CALC: 28.9 %
HCT VFR BLD CALC: 31.9 % — LOW (ref 42–52)
HCT VFR BLD CALC: 32.1 %
HCT VFR BLD CALC: 35 %
HCT VFR BLD CALC: 35.1 %
HGB BLD-MCNC: 10.1 G/DL — LOW (ref 14–18)
HGB BLD-MCNC: 11.2 G/DL
HGB BLD-MCNC: 11.3 G/DL
HGB BLD-MCNC: 9.4 G/DL
HGB BLD-MCNC: 9.9 G/DL
HYALINE CASTS # UR AUTO: 4 /LPF — SIGNIFICANT CHANGE UP (ref 0–7)
INR BLD: 1.22 RATIO — SIGNIFICANT CHANGE UP (ref 0.65–1.3)
IRON SATN MFR SERPL: 14 %
IRON SATN MFR SERPL: 18 %
IRON SATN MFR SERPL: 31 %
IRON SATN MFR SERPL: 38 %
IRON SERPL-MCNC: 24 UG/DL
IRON SERPL-MCNC: 31 UG/DL
IRON SERPL-MCNC: 62 UG/DL
IRON SERPL-MCNC: 63 UG/DL
KETONES UR-MCNC: NEGATIVE — SIGNIFICANT CHANGE UP
LEUKOCYTE ESTERASE UR-ACNC: ABNORMAL
MCHC RBC-ENTMCNC: 28.8 PG
MCHC RBC-ENTMCNC: 30.5 PG
MCHC RBC-ENTMCNC: 30.6 PG
MCHC RBC-ENTMCNC: 30.7 PG
MCHC RBC-ENTMCNC: 30.8 G/DL
MCHC RBC-ENTMCNC: 30.9 PG — SIGNIFICANT CHANGE UP (ref 27–31)
MCHC RBC-ENTMCNC: 31.7 G/DL — LOW (ref 32–37)
MCHC RBC-ENTMCNC: 32 G/DL
MCHC RBC-ENTMCNC: 32.2 G/DL
MCHC RBC-ENTMCNC: 32.5 G/DL
MCV RBC AUTO: 93.3 FL
MCV RBC AUTO: 93.8 FL
MCV RBC AUTO: 95.4 FL
MCV RBC AUTO: 95.6 FL
MCV RBC AUTO: 97.6 FL — HIGH (ref 80–94)
NITRITE UR-MCNC: NEGATIVE — SIGNIFICANT CHANGE UP
NRBC # BLD: 0 /100 WBCS — SIGNIFICANT CHANGE UP (ref 0–0)
PH UR: 6 — SIGNIFICANT CHANGE UP (ref 5–8)
PLATELET # BLD AUTO: 280 K/UL — SIGNIFICANT CHANGE UP (ref 130–400)
PLATELET # BLD AUTO: 294 K/UL
PLATELET # BLD AUTO: 319 K/UL
PLATELET # BLD AUTO: 356 K/UL
PLATELET # BLD AUTO: 390 K/UL
PMV BLD: 9.2 FL
PMV BLD: 9.6 FL
PMV BLD: 9.6 FL
PMV BLD: 9.7 FL
POTASSIUM SERPL-MCNC: 5.1 MMOL/L — HIGH (ref 3.5–5)
POTASSIUM SERPL-SCNC: 4.8 MMOL/L
POTASSIUM SERPL-SCNC: 5.1 MMOL/L — HIGH (ref 3.5–5)
POTASSIUM SERPL-SCNC: 5.3 MMOL/L
PROT SERPL-MCNC: 6.5 G/DL
PROT SERPL-MCNC: 7 G/DL
PROT SERPL-MCNC: 7.4 G/DL — SIGNIFICANT CHANGE UP (ref 6–8)
PROT UR-MCNC: ABNORMAL
PROTHROM AB SERPL-ACNC: 14 SEC — HIGH (ref 9.95–12.87)
PSA SERPL-MCNC: 4.61 NG/ML
RBC # BLD: 3.08 M/UL
RBC # BLD: 3.27 M/UL — LOW (ref 4.7–6.1)
RBC # BLD: 3.44 M/UL
RBC # BLD: 3.66 M/UL
RBC # BLD: 3.68 M/UL
RBC # FLD: 13 %
RBC # FLD: 13.2 %
RBC # FLD: 14.8 % — HIGH (ref 11.5–14.5)
RBC # FLD: 15.2 %
RBC # FLD: 15.3 %
RBC CASTS # UR COMP ASSIST: >720 /HPF — HIGH (ref 0–4)
SODIUM SERPL-SCNC: 141 MMOL/L
SODIUM SERPL-SCNC: 142 MMOL/L
SODIUM SERPL-SCNC: 144 MMOL/L — SIGNIFICANT CHANGE UP (ref 135–146)
SP GR SPEC: 1.01 — SIGNIFICANT CHANGE UP (ref 1.01–1.02)
SPECIMEN SOURCE: SIGNIFICANT CHANGE UP
TIBC SERPL-MCNC: 165 UG/DL
TIBC SERPL-MCNC: 166 UG/DL
TIBC SERPL-MCNC: 174 UG/DL
TIBC SERPL-MCNC: 200 UG/DL
UIBC SERPL-MCNC: 102 UG/DL
UIBC SERPL-MCNC: 138 UG/DL
UIBC SERPL-MCNC: 142 UG/DL
UIBC SERPL-MCNC: 143 UG/DL
UROBILINOGEN FLD QL: SIGNIFICANT CHANGE UP
VIT B12 SERPL-MCNC: 672 PG/ML
VIT B12 SERPL-MCNC: >2000 PG/ML
WBC # BLD: 7.39 K/UL — SIGNIFICANT CHANGE UP (ref 4.8–10.8)
WBC # FLD AUTO: 10.37 K/UL
WBC # FLD AUTO: 11.08 K/UL
WBC # FLD AUTO: 19.29 K/UL
WBC # FLD AUTO: 7.39 K/UL — SIGNIFICANT CHANGE UP (ref 4.8–10.8)
WBC # FLD AUTO: 9.07 K/UL
WBC UR QL: >720 /HPF — HIGH (ref 0–5)

## 2019-01-01 PROCEDURE — 78708 K FLOW/FUNCT IMAGE W/DRUG: CPT | Mod: 26

## 2019-01-01 PROCEDURE — 74430 CONTRAST X-RAY BLADDER: CPT | Mod: 26

## 2019-01-01 PROCEDURE — 99152 MOD SED SAME PHYS/QHP 5/>YRS: CPT

## 2019-01-01 PROCEDURE — 99213 OFFICE O/P EST LOW 20 MIN: CPT

## 2019-01-01 PROCEDURE — 51102 DRAIN BL W/CATH INSERTION: CPT

## 2019-01-01 PROCEDURE — 51600 INJECTION FOR BLADDER X-RAY: CPT

## 2019-01-01 PROCEDURE — 74176 CT ABD & PELVIS W/O CONTRAST: CPT | Mod: 26

## 2019-01-01 PROCEDURE — 76942 ECHO GUIDE FOR BIOPSY: CPT | Mod: 26

## 2019-01-01 RX ORDER — OXYCODONE 5 MG/1
5 TABLET ORAL
Qty: 120 | Refills: 0 | Status: COMPLETED | COMMUNITY
Start: 2019-01-01 | End: 2020-01-01

## 2019-01-01 RX ORDER — METFORMIN HYDROCHLORIDE 850 MG/1
2 TABLET ORAL
Qty: 0 | Refills: 0 | DISCHARGE

## 2019-01-01 RX ORDER — CIPROFLOXACIN LACTATE 400MG/40ML
400 VIAL (ML) INTRAVENOUS ONCE
Refills: 0 | Status: DISCONTINUED | OUTPATIENT
Start: 2019-01-01 | End: 2019-01-01

## 2019-01-01 RX ORDER — PREGABALIN 225 MG/1
1000 CAPSULE ORAL ONCE
Refills: 0 | Status: COMPLETED | OUTPATIENT
Start: 2019-01-01 | End: 2019-01-01

## 2019-01-01 RX ORDER — CICLOPIROX OLAMINE 7.7 MG/G
1 CREAM TOPICAL
Qty: 0 | Refills: 0 | DISCHARGE

## 2019-01-01 RX ADMIN — PREGABALIN 1000 MICROGRAM(S): 225 CAPSULE ORAL at 17:51

## 2019-01-01 RX ADMIN — PREGABALIN 1000 MICROGRAM(S): 225 CAPSULE ORAL at 12:17

## 2019-01-01 RX ADMIN — PREGABALIN 1000 MICROGRAM(S): 225 CAPSULE ORAL at 12:26

## 2019-01-15 ENCOUNTER — APPOINTMENT (OUTPATIENT)
Dept: UROLOGY | Facility: CLINIC | Age: 79
End: 2019-01-15

## 2019-01-30 RX ORDER — CEFDINIR 250 MG/5ML
1 POWDER, FOR SUSPENSION ORAL
Qty: 0 | Refills: 0 | DISCHARGE
Start: 2019-01-30 | End: 2019-02-13

## 2019-02-06 ENCOUNTER — OUTPATIENT (OUTPATIENT)
Dept: OUTPATIENT SERVICES | Facility: HOSPITAL | Age: 79
LOS: 1 days | Discharge: HOME | End: 2019-02-06

## 2019-02-06 VITALS
DIASTOLIC BLOOD PRESSURE: 56 MMHG | WEIGHT: 154.98 LBS | HEART RATE: 50 BPM | HEIGHT: 69 IN | SYSTOLIC BLOOD PRESSURE: 115 MMHG | RESPIRATION RATE: 20 BRPM | OXYGEN SATURATION: 100 % | TEMPERATURE: 98 F

## 2019-02-06 DIAGNOSIS — N30.01 ACUTE CYSTITIS WITH HEMATURIA: ICD-10-CM

## 2019-02-06 DIAGNOSIS — Z98.890 OTHER SPECIFIED POSTPROCEDURAL STATES: Chronic | ICD-10-CM

## 2019-02-06 DIAGNOSIS — Z01.818 ENCOUNTER FOR OTHER PREPROCEDURAL EXAMINATION: ICD-10-CM

## 2019-02-06 DIAGNOSIS — Z98.41 CATARACT EXTRACTION STATUS, RIGHT EYE: Chronic | ICD-10-CM

## 2019-02-06 DIAGNOSIS — Z98.61 CORONARY ANGIOPLASTY STATUS: Chronic | ICD-10-CM

## 2019-02-06 DIAGNOSIS — Z95.810 PRESENCE OF AUTOMATIC (IMPLANTABLE) CARDIAC DEFIBRILLATOR: Chronic | ICD-10-CM

## 2019-02-06 DIAGNOSIS — Z46.82 ENCOUNTER FOR FITTING AND ADJUSTMENT OF NON-VASCULAR CATHETER: Chronic | ICD-10-CM

## 2019-02-06 LAB
ALBUMIN SERPL ELPH-MCNC: 3.4 G/DL — LOW (ref 3.5–5.2)
ALP SERPL-CCNC: 89 U/L — SIGNIFICANT CHANGE UP (ref 30–115)
ALT FLD-CCNC: 5 U/L — SIGNIFICANT CHANGE UP (ref 0–41)
ANION GAP SERPL CALC-SCNC: 20 MMOL/L — HIGH (ref 7–14)
APPEARANCE UR: ABNORMAL
APTT BLD: 40.3 SEC — HIGH (ref 27–39.2)
AST SERPL-CCNC: 8 U/L — SIGNIFICANT CHANGE UP (ref 0–41)
BACTERIA # UR AUTO: ABNORMAL /HPF
BASOPHILS # BLD AUTO: 0.05 K/UL — SIGNIFICANT CHANGE UP (ref 0–0.2)
BASOPHILS NFR BLD AUTO: 0.5 % — SIGNIFICANT CHANGE UP (ref 0–1)
BILIRUB SERPL-MCNC: <0.2 MG/DL — SIGNIFICANT CHANGE UP (ref 0.2–1.2)
BILIRUB UR-MCNC: ABNORMAL
BUN SERPL-MCNC: 27 MG/DL — HIGH (ref 10–20)
CALCIUM SERPL-MCNC: 8.6 MG/DL — SIGNIFICANT CHANGE UP (ref 8.5–10.1)
CHLORIDE SERPL-SCNC: 102 MMOL/L — SIGNIFICANT CHANGE UP (ref 98–110)
CO2 SERPL-SCNC: 16 MMOL/L — LOW (ref 17–32)
COLOR SPEC: ABNORMAL
CREAT SERPL-MCNC: 1.7 MG/DL — HIGH (ref 0.7–1.5)
DIFF PNL FLD: ABNORMAL
EOSINOPHIL # BLD AUTO: 0.53 K/UL — SIGNIFICANT CHANGE UP (ref 0–0.7)
EOSINOPHIL NFR BLD AUTO: 5.2 % — SIGNIFICANT CHANGE UP (ref 0–8)
EPI CELLS # UR: ABNORMAL /HPF
ESTIMATED AVERAGE GLUCOSE: 128 MG/DL — HIGH (ref 68–114)
GLUCOSE SERPL-MCNC: 124 MG/DL — HIGH (ref 70–99)
GLUCOSE UR QL: 100 MG/DL
HBA1C BLD-MCNC: 6.1 % — HIGH (ref 4–5.6)
HCT VFR BLD CALC: 30.9 % — LOW (ref 42–52)
HGB BLD-MCNC: 9.6 G/DL — LOW (ref 14–18)
IMM GRANULOCYTES NFR BLD AUTO: 0.6 % — HIGH (ref 0.1–0.3)
INR BLD: 1.49 RATIO — HIGH (ref 0.65–1.3)
KETONES UR-MCNC: 40
LEUKOCYTE ESTERASE UR-ACNC: ABNORMAL
LYMPHOCYTES # BLD AUTO: 19.7 % — LOW (ref 20.5–51.1)
LYMPHOCYTES # BLD AUTO: 2.02 K/UL — SIGNIFICANT CHANGE UP (ref 1.2–3.4)
MCHC RBC-ENTMCNC: 28.7 PG — SIGNIFICANT CHANGE UP (ref 27–31)
MCHC RBC-ENTMCNC: 31.1 G/DL — LOW (ref 32–37)
MCV RBC AUTO: 92.2 FL — SIGNIFICANT CHANGE UP (ref 80–94)
MONOCYTES # BLD AUTO: 0.84 K/UL — HIGH (ref 0.1–0.6)
MONOCYTES NFR BLD AUTO: 8.2 % — SIGNIFICANT CHANGE UP (ref 1.7–9.3)
NEUTROPHILS # BLD AUTO: 6.77 K/UL — HIGH (ref 1.4–6.5)
NEUTROPHILS NFR BLD AUTO: 65.8 % — SIGNIFICANT CHANGE UP (ref 42.2–75.2)
NITRITE UR-MCNC: POSITIVE
NRBC # BLD: 0 /100 WBCS — SIGNIFICANT CHANGE UP (ref 0–0)
PH UR: 5 — SIGNIFICANT CHANGE UP (ref 5–8)
PLATELET # BLD AUTO: 405 K/UL — HIGH (ref 130–400)
POTASSIUM SERPL-MCNC: 5.1 MMOL/L — HIGH (ref 3.5–5)
POTASSIUM SERPL-SCNC: 5.1 MMOL/L — HIGH (ref 3.5–5)
PROT SERPL-MCNC: 6.5 G/DL — SIGNIFICANT CHANGE UP (ref 6–8)
PROT UR-MCNC: 100 MG/DL
PROTHROM AB SERPL-ACNC: 17.1 SEC — HIGH (ref 9.95–12.87)
RBC # BLD: 3.35 M/UL — LOW (ref 4.7–6.1)
RBC # FLD: 13.8 % — SIGNIFICANT CHANGE UP (ref 11.5–14.5)
RBC CASTS # UR COMP ASSIST: >50 /HPF
SODIUM SERPL-SCNC: 138 MMOL/L — SIGNIFICANT CHANGE UP (ref 135–146)
SP GR SPEC: 1.02 — SIGNIFICANT CHANGE UP (ref 1.01–1.03)
UROBILINOGEN FLD QL: 2 MG/DL (ref 0.2–0.2)
WBC # BLD: 10.27 K/UL — SIGNIFICANT CHANGE UP (ref 4.8–10.8)
WBC # FLD AUTO: 10.27 K/UL — SIGNIFICANT CHANGE UP (ref 4.8–10.8)
WBC UR QL: ABNORMAL /HPF

## 2019-02-08 LAB
CULTURE RESULTS: SIGNIFICANT CHANGE UP
SPECIMEN SOURCE: SIGNIFICANT CHANGE UP

## 2019-03-19 ENCOUNTER — RX RENEWAL (OUTPATIENT)
Age: 79
End: 2019-03-19

## 2019-05-08 ENCOUNTER — APPOINTMENT (OUTPATIENT)
Dept: HEMATOLOGY ONCOLOGY | Facility: CLINIC | Age: 79
End: 2019-05-08

## 2019-05-08 ENCOUNTER — LABORATORY RESULT (OUTPATIENT)
Age: 79
End: 2019-05-08

## 2019-05-08 VITALS
WEIGHT: 170 LBS | DIASTOLIC BLOOD PRESSURE: 80 MMHG | TEMPERATURE: 97.1 F | SYSTOLIC BLOOD PRESSURE: 153 MMHG | BODY MASS INDEX: 24.61 KG/M2 | HEART RATE: 72 BPM | RESPIRATION RATE: 16 BRPM | HEIGHT: 69.5 IN

## 2019-05-09 LAB
ALBUMIN SERPL ELPH-MCNC: 3.8 G/DL
ALP BLD-CCNC: 85 U/L
ALT SERPL-CCNC: <5 U/L
ANION GAP SERPL CALC-SCNC: 17 MMOL/L
AST SERPL-CCNC: 9 U/L
BILIRUB SERPL-MCNC: <0.2 MG/DL
BUN SERPL-MCNC: 29 MG/DL
CALCIUM SERPL-MCNC: 9.2 MG/DL
CHLORIDE SERPL-SCNC: 106 MMOL/L
CO2 SERPL-SCNC: 20 MMOL/L
CREAT SERPL-MCNC: 1.5 MG/DL
FERRITIN SERPL-MCNC: 90 NG/ML
FOLATE SERPL-MCNC: 19 NG/ML
GLUCOSE SERPL-MCNC: 86 MG/DL
HAPTOGLOB SERPL-MCNC: 380 MG/DL
HCT VFR BLD CALC: 31.2 %
HGB BLD-MCNC: 9.8 G/DL
IRON SATN MFR SERPL: 16 %
IRON SERPL-MCNC: 36 UG/DL
LDH SERPL-CCNC: 180 U/L
MCHC RBC-ENTMCNC: 29.3 PG
MCHC RBC-ENTMCNC: 31.4 G/DL
MCV RBC AUTO: 93.4 FL
PLATELET # BLD AUTO: 372 K/UL
PMV BLD: 9.2 FL
POTASSIUM SERPL-SCNC: 4.9 MMOL/L
PROT SERPL-MCNC: 6.9 G/DL
RBC # BLD: 3.34 M/UL
RBC # FLD: 13.2 %
RETICS # AUTO: 2.1 %
RETICS AGGREG/RBC NFR: 68.8 K/UL
SODIUM SERPL-SCNC: 143 MMOL/L
TIBC SERPL-MCNC: 232 UG/DL
UIBC SERPL-MCNC: 196 UG/DL
VIT B12 SERPL-MCNC: 341 PG/ML
WBC # FLD AUTO: 9.84 K/UL

## 2019-05-10 ENCOUNTER — APPOINTMENT (OUTPATIENT)
Dept: INFUSION THERAPY | Facility: CLINIC | Age: 79
End: 2019-05-10

## 2019-05-14 ENCOUNTER — APPOINTMENT (OUTPATIENT)
Dept: INFUSION THERAPY | Facility: CLINIC | Age: 79
End: 2019-05-14

## 2019-05-14 RX ORDER — PREGABALIN 225 MG/1
1000 CAPSULE ORAL ONCE
Refills: 0 | Status: COMPLETED | OUTPATIENT
Start: 2019-05-14 | End: 2019-05-14

## 2019-05-14 RX ORDER — IRON SUCROSE 20 MG/ML
200 INJECTION, SOLUTION INTRAVENOUS ONCE
Refills: 0 | Status: COMPLETED | OUTPATIENT
Start: 2019-05-14 | End: 2019-05-14

## 2019-05-14 RX ADMIN — IRON SUCROSE 200 MILLIGRAM(S): 20 INJECTION, SOLUTION INTRAVENOUS at 13:45

## 2019-05-14 RX ADMIN — IRON SUCROSE 220 MILLIGRAM(S): 20 INJECTION, SOLUTION INTRAVENOUS at 12:53

## 2019-05-14 RX ADMIN — PREGABALIN 1000 MICROGRAM(S): 225 CAPSULE ORAL at 13:13

## 2019-05-21 ENCOUNTER — APPOINTMENT (OUTPATIENT)
Dept: INFUSION THERAPY | Facility: CLINIC | Age: 79
End: 2019-05-21

## 2019-05-21 RX ORDER — IRON SUCROSE 20 MG/ML
200 INJECTION, SOLUTION INTRAVENOUS ONCE
Refills: 0 | Status: COMPLETED | OUTPATIENT
Start: 2019-05-21 | End: 2019-05-21

## 2019-05-21 RX ADMIN — IRON SUCROSE 200 MILLIGRAM(S): 20 INJECTION, SOLUTION INTRAVENOUS at 13:04

## 2019-05-21 RX ADMIN — IRON SUCROSE 220 MILLIGRAM(S): 20 INJECTION, SOLUTION INTRAVENOUS at 12:34

## 2019-05-21 NOTE — ASSESSMENT
[FreeTextEntry1] : # Normocytic anemia: multifactorial\par --Could be secondary to persistent hematuria vs iron deficiency vs kidney disease.\par --Recent labs from 4/19/19 showed a hb of 9 , MCV of 90 , retic count of 2.43 , Iron level of 39 , vitamin b12 and folate normal.\par --Will order repeat labs here including a cbc , cmp, haptoglobin , iron studies with ferritin , retic count\par --His erythropoietin level is 11.3 ( outside lab) , will hold on EPO for now until bladder tumor ruled out with cystoscopy\par --If procedure is planned, patient should be transfused prem op to keep hb at target\par \par Follow up in 6-8 weeks \par

## 2019-05-21 NOTE — REVIEW OF SYSTEMS
[Lower Ext Edema] : lower extremity edema [Joint Stiffness] : joint stiffness [Negative] : Heme/Lymph [Incontinence] : incontinence [Fatigue] : fatigue [Joint Pain] : no joint pain [Easy Bleeding] : no tendency for easy bleeding [Easy Bruising] : no tendency for easy bruising [de-identified] : h/o diabetis

## 2019-05-21 NOTE — CONSULT LETTER
[DrSami  ___] : Dr. AGUILAR [DrSami ___] : Dr. AGUILAR [Dear  ___] : Dear  [unfilled], [Consult Letter:] : I had the pleasure of evaluating your patient, [unfilled]. [( Thank you for referring [unfilled] for consultation for _____ )] : Thank you for referring [unfilled] for consultation for [unfilled] [Please see my note below.] : Please see my note below. [Sincerely,] : Sincerely, [FreeTextEntry3] : Augustina Padgett MD.

## 2019-05-21 NOTE — HISTORY OF PRESENT ILLNESS
[Disease:__________________________] : Disease: [unfilled] [de-identified] : Billy is a 75 yo male with history of atrial fibrillation s/p BIV, managed with Tykosyn and Pradaxa, CAD s/p PCI , enlarged prostate with symptomatic urethral obstruction s/p TURP complicated by urinary incontinence , is referred for anemia. \par Patient reported that since his TURP, he is constantly incontinent with recurrent UTIs and having intermittent hematuria, passing clots sometimes. CT abdomen/pelvis showed enlarged bladder with bilateral hydronephrosis L>R.  He follows with urology who are planning a cystoscopy to rule out bladder tumor, but were unable to perform it because of recurrent UTIs.\par He is also on iron pill once a day and folic acid. He follows with nephrology , his last creatinine from feb 2019 was 1.7 , never received erythropoietin in the past.

## 2019-05-21 NOTE — PHYSICAL EXAM
[Obese] : obese [Restricted in physically strenuous activity but ambulatory and able to carry out work of a light or sedentary nature] : Status 1- Restricted in physically strenuous activity but ambulatory and able to carry out work of a light or sedentary nature, e.g., light house work, office work [Normal] : well developed, well nourished, in no acute distress [de-identified] : Pale conjunctiva [de-identified] : + b/l lower extremity edema

## 2019-05-28 ENCOUNTER — APPOINTMENT (OUTPATIENT)
Dept: INFUSION THERAPY | Facility: CLINIC | Age: 79
End: 2019-05-28

## 2019-05-28 ENCOUNTER — LABORATORY RESULT (OUTPATIENT)
Age: 79
End: 2019-05-28

## 2019-05-28 RX ORDER — IRON SUCROSE 20 MG/ML
200 INJECTION, SOLUTION INTRAVENOUS ONCE
Refills: 0 | Status: COMPLETED | OUTPATIENT
Start: 2019-05-28 | End: 2019-05-28

## 2019-05-28 RX ADMIN — IRON SUCROSE 220 MILLIGRAM(S): 20 INJECTION, SOLUTION INTRAVENOUS at 15:49

## 2019-05-28 RX ADMIN — IRON SUCROSE 200 MILLIGRAM(S): 20 INJECTION, SOLUTION INTRAVENOUS at 16:40

## 2019-05-29 LAB
FERRITIN SERPL-MCNC: 270 NG/ML
HCT VFR BLD CALC: 29.5 %
HGB BLD-MCNC: 9.2 G/DL
IRON SATN MFR SERPL: 20 %
IRON SERPL-MCNC: 40 UG/DL
MCHC RBC-ENTMCNC: 28.9 PG
MCHC RBC-ENTMCNC: 31.2 G/DL
MCV RBC AUTO: 92.8 FL
PLATELET # BLD AUTO: 334 K/UL
PMV BLD: 10.1 FL
RBC # BLD: 3.18 M/UL
RBC # FLD: 13.5 %
TIBC SERPL-MCNC: 203 UG/DL
UIBC SERPL-MCNC: 163 UG/DL
WBC # FLD AUTO: 9.04 K/UL

## 2019-06-10 ENCOUNTER — OUTPATIENT (OUTPATIENT)
Dept: OUTPATIENT SERVICES | Facility: HOSPITAL | Age: 79
LOS: 1 days | Discharge: HOME | End: 2019-06-10
Payer: MEDICARE

## 2019-06-10 VITALS
WEIGHT: 167.55 LBS | DIASTOLIC BLOOD PRESSURE: 60 MMHG | HEART RATE: 54 BPM | TEMPERATURE: 98 F | SYSTOLIC BLOOD PRESSURE: 130 MMHG | RESPIRATION RATE: 18 BRPM | OXYGEN SATURATION: 100 % | HEIGHT: 69 IN

## 2019-06-10 DIAGNOSIS — Z46.82 ENCOUNTER FOR FITTING AND ADJUSTMENT OF NON-VASCULAR CATHETER: Chronic | ICD-10-CM

## 2019-06-10 DIAGNOSIS — Z98.890 OTHER SPECIFIED POSTPROCEDURAL STATES: Chronic | ICD-10-CM

## 2019-06-10 DIAGNOSIS — N30.01 ACUTE CYSTITIS WITH HEMATURIA: ICD-10-CM

## 2019-06-10 DIAGNOSIS — Z95.810 PRESENCE OF AUTOMATIC (IMPLANTABLE) CARDIAC DEFIBRILLATOR: Chronic | ICD-10-CM

## 2019-06-10 DIAGNOSIS — Z98.41 CATARACT EXTRACTION STATUS, RIGHT EYE: Chronic | ICD-10-CM

## 2019-06-10 DIAGNOSIS — Z01.818 ENCOUNTER FOR OTHER PREPROCEDURAL EXAMINATION: ICD-10-CM

## 2019-06-10 DIAGNOSIS — Z98.61 CORONARY ANGIOPLASTY STATUS: Chronic | ICD-10-CM

## 2019-06-10 LAB
ALBUMIN SERPL ELPH-MCNC: 3.5 G/DL — SIGNIFICANT CHANGE UP (ref 3.5–5.2)
ALP SERPL-CCNC: 141 U/L — HIGH (ref 30–115)
ALT FLD-CCNC: 7 U/L — SIGNIFICANT CHANGE UP (ref 0–41)
ANION GAP SERPL CALC-SCNC: 14 MMOL/L — SIGNIFICANT CHANGE UP (ref 7–14)
APPEARANCE UR: ABNORMAL
APTT BLD: 41.4 SEC — HIGH (ref 27–39.2)
AST SERPL-CCNC: 8 U/L — SIGNIFICANT CHANGE UP (ref 0–41)
BACTERIA # UR AUTO: ABNORMAL /HPF
BASOPHILS # BLD AUTO: 0.03 K/UL — SIGNIFICANT CHANGE UP (ref 0–0.2)
BASOPHILS NFR BLD AUTO: 0.3 % — SIGNIFICANT CHANGE UP (ref 0–1)
BILIRUB SERPL-MCNC: <0.2 MG/DL — SIGNIFICANT CHANGE UP (ref 0.2–1.2)
BILIRUB UR-MCNC: NEGATIVE — SIGNIFICANT CHANGE UP
BUN SERPL-MCNC: 38 MG/DL — HIGH (ref 10–20)
CALCIUM SERPL-MCNC: 9.3 MG/DL — SIGNIFICANT CHANGE UP (ref 8.5–10.1)
CHLORIDE SERPL-SCNC: 109 MMOL/L — SIGNIFICANT CHANGE UP (ref 98–110)
CO2 SERPL-SCNC: 22 MMOL/L — SIGNIFICANT CHANGE UP (ref 17–32)
COLOR SPEC: ABNORMAL
CREAT SERPL-MCNC: 1.5 MG/DL — SIGNIFICANT CHANGE UP (ref 0.7–1.5)
DIFF PNL FLD: ABNORMAL
EOSINOPHIL # BLD AUTO: 0.39 K/UL — SIGNIFICANT CHANGE UP (ref 0–0.7)
EOSINOPHIL NFR BLD AUTO: 4 % — SIGNIFICANT CHANGE UP (ref 0–8)
EPI CELLS # UR: ABNORMAL /HPF
ESTIMATED AVERAGE GLUCOSE: 137 MG/DL — HIGH (ref 68–114)
GLUCOSE SERPL-MCNC: 143 MG/DL — HIGH (ref 70–99)
GLUCOSE UR QL: NEGATIVE MG/DL — SIGNIFICANT CHANGE UP
HBA1C BLD-MCNC: 6.4 % — HIGH (ref 4–5.6)
HCT VFR BLD CALC: 32 % — LOW (ref 42–52)
HGB BLD-MCNC: 9.6 G/DL — LOW (ref 14–18)
IMM GRANULOCYTES NFR BLD AUTO: 0.4 % — HIGH (ref 0.1–0.3)
INR BLD: 1.45 RATIO — HIGH (ref 0.65–1.3)
KETONES UR-MCNC: NEGATIVE — SIGNIFICANT CHANGE UP
LEUKOCYTE ESTERASE UR-ACNC: ABNORMAL
LYMPHOCYTES # BLD AUTO: 1.65 K/UL — SIGNIFICANT CHANGE UP (ref 1.2–3.4)
LYMPHOCYTES # BLD AUTO: 16.9 % — LOW (ref 20.5–51.1)
MCHC RBC-ENTMCNC: 28.2 PG — SIGNIFICANT CHANGE UP (ref 27–31)
MCHC RBC-ENTMCNC: 30 G/DL — LOW (ref 32–37)
MCV RBC AUTO: 93.8 FL — SIGNIFICANT CHANGE UP (ref 80–94)
MONOCYTES # BLD AUTO: 0.88 K/UL — HIGH (ref 0.1–0.6)
MONOCYTES NFR BLD AUTO: 9 % — SIGNIFICANT CHANGE UP (ref 1.7–9.3)
NEUTROPHILS # BLD AUTO: 6.8 K/UL — HIGH (ref 1.4–6.5)
NEUTROPHILS NFR BLD AUTO: 69.4 % — SIGNIFICANT CHANGE UP (ref 42.2–75.2)
NITRITE UR-MCNC: NEGATIVE — SIGNIFICANT CHANGE UP
NRBC # BLD: 0 /100 WBCS — SIGNIFICANT CHANGE UP (ref 0–0)
PH UR: 6 — SIGNIFICANT CHANGE UP (ref 5–8)
PLATELET # BLD AUTO: 374 K/UL — SIGNIFICANT CHANGE UP (ref 130–400)
POTASSIUM SERPL-MCNC: 5.1 MMOL/L — HIGH (ref 3.5–5)
POTASSIUM SERPL-SCNC: 5.1 MMOL/L — HIGH (ref 3.5–5)
PROT SERPL-MCNC: 6.8 G/DL — SIGNIFICANT CHANGE UP (ref 6–8)
PROT UR-MCNC: >=300 MG/DL
PROTHROM AB SERPL-ACNC: 16.6 SEC — HIGH (ref 9.95–12.87)
RBC # BLD: 3.41 M/UL — LOW (ref 4.7–6.1)
RBC # FLD: 14.5 % — SIGNIFICANT CHANGE UP (ref 11.5–14.5)
RBC CASTS # UR COMP ASSIST: >50 /HPF
SODIUM SERPL-SCNC: 145 MMOL/L — SIGNIFICANT CHANGE UP (ref 135–146)
SP GR SPEC: 1.01 — SIGNIFICANT CHANGE UP (ref 1.01–1.03)
UROBILINOGEN FLD QL: 0.2 MG/DL — SIGNIFICANT CHANGE UP (ref 0.2–0.2)
WBC # BLD: 9.79 K/UL — SIGNIFICANT CHANGE UP (ref 4.8–10.8)
WBC # FLD AUTO: 9.79 K/UL — SIGNIFICANT CHANGE UP (ref 4.8–10.8)
WBC UR QL: >50 /HPF

## 2019-06-10 PROCEDURE — 71046 X-RAY EXAM CHEST 2 VIEWS: CPT | Mod: 26

## 2019-06-10 PROCEDURE — 93010 ELECTROCARDIOGRAM REPORT: CPT

## 2019-06-10 NOTE — H&P PST ADULT - NSICDXPASTMEDICALHX_GEN_ALL_CORE_FT
PAST MEDICAL HISTORY:  Afib     Anemia     BPH (benign prostatic hyperplasia)     CAD (coronary artery disease) CARD STENT X2 4/2018    Cardiomyopathy     CHF (congestive heart failure) COMBINED SYSTOLIC & DIASTOLIC    Cholelithiasis     Dementia     Diabetes     Diplopia RIGHT EYE-WEARS PATCH S/P TIA    DM (diabetes mellitus)     Glaucoma     Heart attack 1/2018    Hematuria     HTN (hypertension)     Hydrocele in adult     Hypercholesteremia     Hyperlipidemia     Renal insufficiency     Sepsis 1/18 FROM INFECTED GALLBLADDER    TIA (transient ischemic attack) X2 JULY 2018 PAST MEDICAL HISTORY:  Afib     Anemia     BPH (benign prostatic hyperplasia)     CAD (coronary artery disease) CARD STENT X2 4/2018    Cardiomyopathy     CHF (congestive heart failure) COMBINED SYSTOLIC & DIASTOLIC    Cholelithiasis     Dementia     Diabetes     DM (diabetes mellitus)     GERD (gastroesophageal reflux disease) intermittent    Glaucoma     H/O ptosis of eyelid right eye (sometimes wears patch)    Heart attack 1/2018    Hematuria     HTN (hypertension)     Hydrocele in adult     Hypercholesteremia     Hyperlipidemia     Renal insufficiency     Sepsis 1/18 FROM INFECTED GALLBLADDER    TIA (transient ischemic attack) X2 JULY 2018

## 2019-06-10 NOTE — H&P PST ADULT - OTHER CARE PROVIDERS
dr lackey already seen, dr noah strong (wife advised to call) dr cheung ([facility]) Jean

## 2019-06-10 NOTE — H&P PST ADULT - HISTORY OF PRESENT ILLNESS
79 year old male here for 79 year old male here for cystoscopy bladder biopsy left retrograde pyleogram, pt/wife state + hematuria for approx 7 weeks.  pt denies cp, sob, palpitations or [place]  pt has + urinary frequency, + incontinence and leaking at night, difficulty starting stream, no urgency or burning, hematuria persists  ex janice 1 fos

## 2019-06-10 NOTE — H&P PST ADULT - NSICDXPASTSURGICALHX_GEN_ALL_CORE_FT
PAST SURGICAL HISTORY:  AICD (automatic cardioverter/defibrillator) present Interview    Encounter for biliary drainage tube placement 1/2018    H/O bilateral cataract extraction LEFT- 1/18 RIGHT 6 YRS AGO    H/O coronary angioplasty WITH STENT X2 (4/2018)    H/O flexible sigmoidoscopy 2015    History of prostate surgery 5/17    History of suprapubic catheter PAST SURGICAL HISTORY:  AICD (automatic cardioverter/defibrillator) present LED Light Sense    Encounter for biliary drainage tube placement 1/2018    H/O bilateral cataract extraction LEFT- 1/18 RIGHT 6 YRS AGO    H/O coronary angioplasty WITH STENT X2 (4/2018)    H/O flexible sigmoidoscopy 2015    History of prostate surgery 5/17    History of suprapubic catheter PAST SURGICAL HISTORY:  AICD (automatic cardioverter/defibrillator) present [place]    Encounter for biliary drainage tube placement 1/2018    H/O bilateral cataract extraction LEFT- 1/18 RIGHT 6 YRS AGO    H/O coronary angioplasty WITH STENT X2 (4/2018)    H/O flexible sigmoidoscopy 2015    History of prostate surgery 5/17    History of suprapubic catheter

## 2019-06-11 ENCOUNTER — APPOINTMENT (OUTPATIENT)
Dept: HEMATOLOGY ONCOLOGY | Facility: CLINIC | Age: 79
End: 2019-06-11
Payer: MEDICARE

## 2019-06-11 ENCOUNTER — LABORATORY RESULT (OUTPATIENT)
Age: 79
End: 2019-06-11

## 2019-06-11 ENCOUNTER — APPOINTMENT (OUTPATIENT)
Dept: INFUSION THERAPY | Facility: CLINIC | Age: 79
End: 2019-06-11
Payer: MEDICARE

## 2019-06-11 VITALS
WEIGHT: 165 LBS | TEMPERATURE: 97 F | DIASTOLIC BLOOD PRESSURE: 58 MMHG | HEART RATE: 86 BPM | HEIGHT: 69.5 IN | SYSTOLIC BLOOD PRESSURE: 126 MMHG | BODY MASS INDEX: 23.89 KG/M2

## 2019-06-11 DIAGNOSIS — Z01.818 ENCOUNTER FOR OTHER PREPROCEDURAL EXAMINATION: ICD-10-CM

## 2019-06-11 PROBLEM — R31.9 HEMATURIA, UNSPECIFIED: Chronic | Status: ACTIVE | Noted: 2019-06-10

## 2019-06-11 PROBLEM — E78.5 HYPERLIPIDEMIA, UNSPECIFIED: Chronic | Status: ACTIVE | Noted: 2019-06-10

## 2019-06-11 PROBLEM — K21.9 GASTRO-ESOPHAGEAL REFLUX DISEASE WITHOUT ESOPHAGITIS: Chronic | Status: ACTIVE | Noted: 2019-06-10

## 2019-06-11 PROBLEM — Z86.69 PERSONAL HISTORY OF OTHER DISEASES OF THE NERVOUS SYSTEM AND SENSE ORGANS: Chronic | Status: ACTIVE | Noted: 2019-06-10

## 2019-06-11 PROBLEM — D64.9 ANEMIA, UNSPECIFIED: Chronic | Status: ACTIVE | Noted: 2019-06-10

## 2019-06-11 PROCEDURE — 99213 OFFICE O/P EST LOW 20 MIN: CPT

## 2019-06-11 RX ORDER — PREGABALIN 225 MG/1
1000 CAPSULE ORAL ONCE
Refills: 0 | Status: COMPLETED | OUTPATIENT
Start: 2019-06-11 | End: 2019-06-11

## 2019-06-11 RX ORDER — IRON SUCROSE 20 MG/ML
200 INJECTION, SOLUTION INTRAVENOUS ONCE
Refills: 0 | Status: COMPLETED | OUTPATIENT
Start: 2019-06-11 | End: 2019-06-11

## 2019-06-11 RX ADMIN — IRON SUCROSE 220 MILLIGRAM(S): 20 INJECTION, SOLUTION INTRAVENOUS at 13:18

## 2019-06-11 RX ADMIN — IRON SUCROSE 200 MILLIGRAM(S): 20 INJECTION, SOLUTION INTRAVENOUS at 13:19

## 2019-06-11 RX ADMIN — PREGABALIN 1000 MICROGRAM(S): 225 CAPSULE ORAL at 13:19

## 2019-06-11 NOTE — REVIEW OF SYSTEMS
[Lower Ext Edema] : lower extremity edema [Fatigue] : fatigue [Incontinence] : incontinence [Joint Stiffness] : joint stiffness [Negative] : Heme/Lymph [Joint Pain] : no joint pain [Easy Bleeding] : no tendency for easy bleeding [Easy Bruising] : no tendency for easy bruising [de-identified] : h/o diabetis

## 2019-06-11 NOTE — CONSULT LETTER
[Dear  ___] : Dear  [unfilled], [Consult Letter:] : I had the pleasure of evaluating your patient, [unfilled]. [( Thank you for referring [unfilled] for consultation for _____ )] : Thank you for referring [unfilled] for consultation for [unfilled] [Please see my note below.] : Please see my note below. [Sincerely,] : Sincerely, [DrSami  ___] : Dr. AGUILAR [DrSami ___] : Dr. AGUILAR [FreeTextEntry3] : Augustina Padgett MD.

## 2019-06-11 NOTE — PHYSICAL EXAM
[Restricted in physically strenuous activity but ambulatory and able to carry out work of a light or sedentary nature] : Status 1- Restricted in physically strenuous activity but ambulatory and able to carry out work of a light or sedentary nature, e.g., light house work, office work [Obese] : obese [Normal] : grossly intact [de-identified] : Pale conjunctiva [de-identified] : + b/l lower extremity edema

## 2019-06-11 NOTE — HISTORY OF PRESENT ILLNESS
[Disease:__________________________] : Disease: [unfilled] [de-identified] : Billy is a 75 yo male with history of atrial fibrillation s/p BIV, managed with Tykosyn and Pradaxa, CAD s/p PCI , enlarged prostate with symptomatic urethral obstruction s/p TURP complicated by urinary incontinence , is referred for anemia. \par Patient reported that since his TURP, he is constantly incontinent with recurrent UTIs and having intermittent hematuria, passing clots sometimes. CT abdomen/pelvis showed enlarged bladder with bilateral hydronephrosis L>R.  He follows with urology who are planning a cystoscopy to rule out bladder tumor, but were unable to perform it because of recurrent UTIs.\par He is also on iron pill once a day and folic acid. He follows with nephrology , his last creatinine from feb 2019 was 1.7 , never received erythropoietin in the past.  [de-identified] : 6/11/2019: Billy is here for follow up. He still describes penile pain and currently reports bloody urine and passing clots. He is s/p 4 doses of Venofer, will get dose 5 today, there was not a significant improvement in Hb, other causes for anemia also must be looked into. I have discussed case with Dr. Munroe, who will require Billy's Hb to be above 10.0 for upcoming cycloscopy with possible TURBT on 6/20/2019. He already has been discussing this with cardiology. Dr. Walker, he will hold Eliquis for 5 days prior per cardiology and will confirm the days of hold for ASA (his stents were placed 4/2018). Will plan to transfuse 1 unit of PRBC on 6/18, if Hb is below 10.0.

## 2019-06-12 ENCOUNTER — RX RENEWAL (OUTPATIENT)
Age: 79
End: 2019-06-12

## 2019-06-12 LAB
-  AMIKACIN: SIGNIFICANT CHANGE UP
-  AZTREONAM: SIGNIFICANT CHANGE UP
-  CEFEPIME: SIGNIFICANT CHANGE UP
-  CEFTAZIDIME: SIGNIFICANT CHANGE UP
-  CIPROFLOXACIN: SIGNIFICANT CHANGE UP
-  GENTAMICIN: SIGNIFICANT CHANGE UP
-  IMIPENEM: SIGNIFICANT CHANGE UP
-  LEVOFLOXACIN: SIGNIFICANT CHANGE UP
-  MEROPENEM: SIGNIFICANT CHANGE UP
-  PIPERACILLIN/TAZOBACTAM: SIGNIFICANT CHANGE UP
-  TOBRAMYCIN: SIGNIFICANT CHANGE UP
CULTURE RESULTS: SIGNIFICANT CHANGE UP
HCT VFR BLD CALC: 31.4 %
HGB BLD-MCNC: 9.9 G/DL
MCHC RBC-ENTMCNC: 28.9 PG
MCHC RBC-ENTMCNC: 31.5 G/DL
MCV RBC AUTO: 91.8 FL
METHOD TYPE: SIGNIFICANT CHANGE UP
ORGANISM # SPEC MICROSCOPIC CNT: SIGNIFICANT CHANGE UP
ORGANISM # SPEC MICROSCOPIC CNT: SIGNIFICANT CHANGE UP
PLATELET # BLD AUTO: 341 K/UL
PMV BLD: 10.3 FL
RBC # BLD: 3.42 M/UL
RBC # FLD: 14.5 %
SPECIMEN SOURCE: SIGNIFICANT CHANGE UP
WBC # FLD AUTO: 10.95 K/UL

## 2019-06-12 RX ORDER — CLOTRIMAZOLE AND BETAMETHASONE DIPROPIONATE 10; .5 MG/G; MG/G
1-0.05 CREAM TOPICAL
Qty: 45 | Refills: 1 | Status: ACTIVE | COMMUNITY
Start: 2018-07-17 | End: 1900-01-01

## 2019-06-17 ENCOUNTER — APPOINTMENT (OUTPATIENT)
Dept: HEMATOLOGY ONCOLOGY | Facility: CLINIC | Age: 79
End: 2019-06-17

## 2019-06-17 ENCOUNTER — LABORATORY RESULT (OUTPATIENT)
Age: 79
End: 2019-06-17

## 2019-06-18 ENCOUNTER — APPOINTMENT (OUTPATIENT)
Dept: INFUSION THERAPY | Facility: CLINIC | Age: 79
End: 2019-06-18

## 2019-06-19 LAB
ABO + RH PNL BLD: NORMAL
BLD GP AB SCN SERPL QL: NORMAL
HCT VFR BLD CALC: 31.5 %
HGB BLD-MCNC: 9.5 G/DL
MCHC RBC-ENTMCNC: 28.3 PG
MCHC RBC-ENTMCNC: 30.2 G/DL
MCV RBC AUTO: 93.8 FL
PLATELET # BLD AUTO: 345 K/UL
PMV BLD: 9.8 FL
RBC # BLD: 3.36 M/UL
RBC # FLD: 14.4 %
WBC # FLD AUTO: 9.69 K/UL

## 2019-06-20 ENCOUNTER — OUTPATIENT (OUTPATIENT)
Dept: OUTPATIENT SERVICES | Facility: HOSPITAL | Age: 79
LOS: 1 days | Discharge: HOME | End: 2019-06-20
Payer: MEDICARE

## 2019-06-20 ENCOUNTER — TRANSCRIPTION ENCOUNTER (OUTPATIENT)
Age: 79
End: 2019-06-20

## 2019-06-20 ENCOUNTER — RESULT REVIEW (OUTPATIENT)
Age: 79
End: 2019-06-20

## 2019-06-20 VITALS
DIASTOLIC BLOOD PRESSURE: 53 MMHG | HEIGHT: 69.5 IN | SYSTOLIC BLOOD PRESSURE: 115 MMHG | HEART RATE: 49 BPM | WEIGHT: 164.91 LBS | RESPIRATION RATE: 18 BRPM | TEMPERATURE: 99 F

## 2019-06-20 VITALS
RESPIRATION RATE: 20 BRPM | OXYGEN SATURATION: 100 % | TEMPERATURE: 98 F | SYSTOLIC BLOOD PRESSURE: 150 MMHG | DIASTOLIC BLOOD PRESSURE: 72 MMHG | HEART RATE: 56 BPM

## 2019-06-20 DIAGNOSIS — Z46.82 ENCOUNTER FOR FITTING AND ADJUSTMENT OF NON-VASCULAR CATHETER: Chronic | ICD-10-CM

## 2019-06-20 DIAGNOSIS — Z98.61 CORONARY ANGIOPLASTY STATUS: Chronic | ICD-10-CM

## 2019-06-20 DIAGNOSIS — Z98.890 OTHER SPECIFIED POSTPROCEDURAL STATES: Chronic | ICD-10-CM

## 2019-06-20 DIAGNOSIS — Z98.41 CATARACT EXTRACTION STATUS, RIGHT EYE: Chronic | ICD-10-CM

## 2019-06-20 DIAGNOSIS — Z95.810 PRESENCE OF AUTOMATIC (IMPLANTABLE) CARDIAC DEFIBRILLATOR: Chronic | ICD-10-CM

## 2019-06-20 LAB — GLUCOSE BLDC GLUCOMTR-MCNC: 84 MG/DL — SIGNIFICANT CHANGE UP (ref 70–99)

## 2019-06-20 PROCEDURE — 88305 TISSUE EXAM BY PATHOLOGIST: CPT | Mod: 26

## 2019-06-20 PROCEDURE — 88342 IMHCHEM/IMCYTCHM 1ST ANTB: CPT | Mod: 26

## 2019-06-20 PROCEDURE — 88341 IMHCHEM/IMCYTCHM EA ADD ANTB: CPT | Mod: 26

## 2019-06-20 RX ORDER — HYDROMORPHONE HYDROCHLORIDE 2 MG/ML
0.5 INJECTION INTRAMUSCULAR; INTRAVENOUS; SUBCUTANEOUS
Refills: 0 | Status: DISCONTINUED | OUTPATIENT
Start: 2019-06-20 | End: 2019-06-20

## 2019-06-20 RX ORDER — MEPERIDINE HYDROCHLORIDE 50 MG/ML
12.5 INJECTION INTRAMUSCULAR; INTRAVENOUS; SUBCUTANEOUS ONCE
Refills: 0 | Status: DISCONTINUED | OUTPATIENT
Start: 2019-06-20 | End: 2019-06-20

## 2019-06-20 RX ORDER — SODIUM CHLORIDE 9 MG/ML
1000 INJECTION INTRAMUSCULAR; INTRAVENOUS; SUBCUTANEOUS
Refills: 0 | Status: DISCONTINUED | OUTPATIENT
Start: 2019-06-20 | End: 2019-07-05

## 2019-06-20 RX ORDER — HYDROMORPHONE HYDROCHLORIDE 2 MG/ML
0.25 INJECTION INTRAMUSCULAR; INTRAVENOUS; SUBCUTANEOUS
Refills: 0 | Status: DISCONTINUED | OUTPATIENT
Start: 2019-06-20 | End: 2019-06-20

## 2019-06-20 RX ORDER — MOXIFLOXACIN HYDROCHLORIDE TABLETS, 400 MG 400 MG/1
1 TABLET, FILM COATED ORAL
Qty: 14 | Refills: 0
Start: 2019-06-20 | End: 2019-06-26

## 2019-06-20 RX ORDER — OXYCODONE AND ACETAMINOPHEN 5; 325 MG/1; MG/1
1 TABLET ORAL ONCE
Refills: 0 | Status: DISCONTINUED | OUTPATIENT
Start: 2019-06-20 | End: 2019-06-20

## 2019-06-20 RX ORDER — ONDANSETRON 8 MG/1
4 TABLET, FILM COATED ORAL ONCE
Refills: 0 | Status: DISCONTINUED | OUTPATIENT
Start: 2019-06-20 | End: 2019-07-05

## 2019-06-20 RX ADMIN — SODIUM CHLORIDE 100 MILLILITER(S): 9 INJECTION INTRAMUSCULAR; INTRAVENOUS; SUBCUTANEOUS at 10:47

## 2019-06-20 RX ADMIN — HYDROMORPHONE HYDROCHLORIDE 0.5 MILLIGRAM(S): 2 INJECTION INTRAMUSCULAR; INTRAVENOUS; SUBCUTANEOUS at 12:14

## 2019-06-20 RX ADMIN — HYDROMORPHONE HYDROCHLORIDE 0.25 MILLIGRAM(S): 2 INJECTION INTRAMUSCULAR; INTRAVENOUS; SUBCUTANEOUS at 11:20

## 2019-06-20 RX ADMIN — HYDROMORPHONE HYDROCHLORIDE 0.5 MILLIGRAM(S): 2 INJECTION INTRAMUSCULAR; INTRAVENOUS; SUBCUTANEOUS at 11:55

## 2019-06-20 RX ADMIN — HYDROMORPHONE HYDROCHLORIDE 0.5 MILLIGRAM(S): 2 INJECTION INTRAMUSCULAR; INTRAVENOUS; SUBCUTANEOUS at 11:24

## 2019-06-20 RX ADMIN — HYDROMORPHONE HYDROCHLORIDE 0.25 MILLIGRAM(S): 2 INJECTION INTRAMUSCULAR; INTRAVENOUS; SUBCUTANEOUS at 11:08

## 2019-06-20 RX ADMIN — HYDROMORPHONE HYDROCHLORIDE 0.5 MILLIGRAM(S): 2 INJECTION INTRAMUSCULAR; INTRAVENOUS; SUBCUTANEOUS at 11:44

## 2019-06-20 NOTE — ASU DISCHARGE PLAN (ADULT/PEDIATRIC) - SIGNS AND SYMPTOMS OF INFECTION: FEVER, REDNESS, SWELLING, FOUL SMELLING DISCHARGE
Leighann Elias  Chief Complaint  Chief Complaint   Patient presents with   • Gyn Exam     49-year-old single black female  3 para 1021 presents today for GYN annual exam.    The patient is status post total abdominal hysterectomy in  for symptomatic uterine fibroids.  She has a history of LGSIL with a thin prep obtained prior to the hysterectomy was negative-negative.    The patient requests STD eyes screening.  She is asymptomatic and denies any abnormal vaginal discharge, vaginal odor, abdominal pain.    The patient has a family history of breast cancer in her mother who  at age 64 after being diagnosed one and a half years earlier and her maternal grandmother who was probably younger than age 50 but  of heart disease.    Visit Vitals  /80 (BP Location: [facility], Patient Position: Sitting, Cuff Size: Regular)   Pulse 88   Temp 98.1 °F (36.7 °C) (Oral)   Ht 5' 6.5\" (1.689 m)   Wt 80.7 kg   LMP 2014   SpO2 100%   BMI 28.30 kg/m²       No current outpatient prescriptions on file.     No current facility-administered medications for this visit.        Allergies:  ALLERGIES:  No Known Allergies    Past Medical History / Surgeries:  Past Medical History:   Diagnosis Date   • Closed displaced fracture of phalanx of toe of left foot 10/12/2017   • Joint pain, knee 2014    right knee tendonitis related to running.  Seeing PT for therapy   • NO HX OF     CA, HTN, DM, CAD, CVA, DVT, Asthma   • Uterine fibroid 2014    coming for [facility]      Past Surgical History:   Procedure Laterality Date   • COLPOSCOPY BX CERVIX ENDOCERV CURR  2013   • FOREARM/WRIST SURGERY UNLISTED  child    tendon repair, left wrist   • TOTAL ABDOMINAL HYSTERECTOMY  2014    [facility]       Family History:  Family History   Problem Relation Age of Onset   • Diabetes Mother    • Cancer Maternal Grandmother      breast       Social History:  Social History     Social History   • Marital status:       Spouse name: N/A   • Number of children: 1   • Years of education: N/A     Occupational History   • [employer]       Social History Main Topics   • Smoking status: Never Smoker   • Smokeless tobacco: Never Used   • Alcohol use Yes      Comment: occasional, socially   • Drug use: No   • Sexual activity: Not on file     Other Topics Concern   •  Service No   • Blood Transfusions No   • Caffeine Concern No   • Occupational Exposure No   • Hobby Hazards No   • Sleep Concern No   • Stress Concern No   • Weight Concern No   • Special Diet No   • Back Care No   • Exercise Yes   • Bike Helmet No   • Seat Belt Yes   • Self-Exams Yes     Social History Narrative    She works as a mobile  (small animals).  She has a 13 year old daughter in 8th grade.  (as of 11/18/11)        As of 1/7/13, her daughter is now 14. She continues to work as a . Her mother is Tara Rm who works here at the [employer].        As of 5/38/14, her daughter is 15, has her 's permit, enjoys horseback riding, and is looking at [employer] for college. Leighann continues to works as a mobile .       Review of Systems:  Eye Problem(s):negative  ENT Problem(s):negative  Cardiovascular problem(s):negative  Respiratory problem(s):negative  Gastro-intestinal problem(s):negative GI  Genito-urinary problem(s):negative  Musculoskeletal problem(s):negative  Integumentary problem(s):negative  Neurological problem(s):negative  Psychiatric problem(s):negative  Endocrine problem(s):negative  Hematologic and/or Lymphatic problem(s):negative      PHYSICAL EXAM:  CONSTITUTIONAL: Well-developed, well-nourished, well-kempt female in no  acute distress.  EYES: Conjunctivae and eyelids are without redness, lesions, or  discoloration. Pupils are reactive to light and accommodation. They are  normal size and symmetrical.  NECK: No masses and normal overall appearance. Symmetrical. Trachea is  midline. No  crepitus or palpable mass. Thyroid gland is without  enlargement, tenderness, irregularity.   RESPIRATORY: There is normal respiratory effort without the visible use  of accessory muscles or intercostal retractions. There is normal  diaphragmatic movement. Lungs are clear to auscultation without audible  wheezes, rubs, or rhonchi. There is good air movement and inspiratory and  expiratory sounds are normal in length and quality.  CARDIOVASCULAR: Auscultation of the heart reveals a regular rate and  rhythm without audible murmur, S3 or S4. The PMI is normal in location.  Extremities are negative for edema, significant varicosities, clubbing, or  cyanosis.  CHEST/BREASTS: Breasts are mostly symmetrical, soft, and nontender  without discrete masses, dimpling, discharge, or nodularity. Palpation of  the axillary and supraclavicular areas is negative for mass, fullness,  tenderness, or lymphadenopathy. Both breast nipples are inspected and are  free of lesions, discharge, tenderness, or pain. Significant fibrocystic  changes are not appreciated.  GASTROINTESTINAL/ABDOMEN: Abdomen is negative for rebound, rigidity,  guarding, hepatosplenomegaly, or other masses. Umbilical and inguinal  hernias are not apparent during this examination.  Well-healed Pfannenstiel scar.  GENITOURINARY: External Genitalia: Vulva is normal in appearance  without lesions, tenderness, or abnormal discharge. There is normal hair  distribution of the vulva and pubic mons. Introitus and vagina are normal.  Increased vaginal secretions.  The patient declines wet prep and KOH in favor of probiotic.  .  Pelvic Support: Adequate.  Urethra: There are no urethral masses, tenderness, or scarring. The  meatus is normal in size and appearance.  Bladder: The bladder is nontender without masses, fullness, or palpable  lesions.  Cervix: Surgically absent.  Uterus: Surgically absent. vaginal cuff is well healed, supple, intact.  Bimanual exam is negative for  mass, fullness, tenderness.    Adnexa: Negative to palpation. The cul-de-sac is negative.  Anus: Patent without visible lesions.    EXTREMITIES: No clubbing, cyanosis, or edema.  LYMPHATIC: Palpation of the lymph nodes in the neck, axillae, and groin  was negative for lymphadenopathy, tenderness, other masses, or drainage.  SKIN: Warm and dry without rashes, lesions, or ulcers. Induration,  subcutaneous nodules, and tightening are not appreciated.  PSYCHIATRIC: The patient is alert and oriented to time, place, and  person. Interaction during this visit supports normal patient judgment  and insight. Information obtained during history taking revealed intact  recent and remote memory.    ASSESSMENT/PLAN:  1.  Encounter for gynecologic examination, abnormal findings absent    2.  Cervical cancer screening.  Hysterectomy was performed for benign disease.  ThinPrep not indicated at this time.  3.  Breast cancer screening.  Mammogram ordered.  Diligent self breast exam reviewed and stressed.  Patient is unaware of any genetic testing for her mother or grandmother.  Consider genetic testing for BRCA1 and 2 gene.      4.  STI screening.  The patient would like to be screened to make sure everything is normal.  She is asymptomatic at this time.  Orders are entered.  We discussed using a probiotic for symptoms and signs of bacterial vaginosis.  Information on rephresh provided and acidophilus discussed.  5.  Self breast exam, adequate calcium and Vitamin D3, multivitamin and exercise as tolerated recommended and discussed.  6.  The patient may call for test results 14 days after testing has been completed.  7.  Health maintenance.  CBC, lipid A, CMP ordered.  The patient to follow with her PCP for medical issues and other blood work..  8.  Return to the office prn and in one year.  Call for any GYN concerns.   Statement Selected

## 2019-06-20 NOTE — ASU PATIENT PROFILE, ADULT - PMH
Afib    Anemia    BPH (benign prostatic hyperplasia)    CAD (coronary artery disease)  CARD STENT X2 4/2018  Cardiomyopathy    CHF (congestive heart failure)  COMBINED SYSTOLIC & DIASTOLIC  Cholelithiasis    Dementia    Diabetes    DM (diabetes mellitus)    GERD (gastroesophageal reflux disease)  intermittent  Glaucoma    H/O ptosis of eyelid  right eye (sometimes wears patch)  Heart attack  1/2018  Hematuria    HTN (hypertension)    Hydrocele in adult    Hypercholesteremia    Hyperlipidemia    Renal insufficiency    Sepsis  1/18 FROM INFECTED GALLBLADDER  TIA (transient ischemic attack)  X2 JULY 2018

## 2019-06-20 NOTE — CHART NOTE - NSCHARTNOTEFT_GEN_A_CORE
Electrophysiology PA Note    Called to interrogate patient's BiV-ICD post op s/p cystoscopy with retrograde pyelogram and bladder biopsy    Device [facility] CRT-D  EP MD Dr Victor  last interrogation 4/25/19  Device is functioning well  On Arrhythmia log, multiple runs of NSVT 9-12sec starting 4/27/19, 3 episodes of VF, treated with anti-tachycardial pacing and 2 episodes requiring shock therapy.    Patient was presented with above finding, and recommendation to be admitted to the hospital for further work up, including evaluation for ischemia.  Cardiology consult, Pt's cardiologist is Dr Arteaga  Echo, [facility]  Check and Maintain electrolytes K>4.0 Mg >2.0  Continue and increase Metoprolol  Con't Tikosyn  check EKG for QTc prolongation given multiple ABx therapy      Primary team notified, RICH Boles  [phone]    Patient expressed to me that he is not willing to be admitted.  Primary team notified    Full consult to follow if patient will get admitted.   Please notify [phone]

## 2019-06-20 NOTE — CHART NOTE - NSCHARTNOTEFT_GEN_A_CORE
78 y/o male s/p cystoscopy, with retrograde pyelogram and bladder biopsy for recurrent cystitis POD #0. Pt has extensive cardiac hx including a BiV-ICD. After interrogation of device post op by EPS, it was found that on Arrhythmia log, multiple runs of NSVT 9-12sec starting 4/27/19, 3 episodes of VF, treated with anti-tachycardial pacing and 2 episodes requiring shock therapy.    Recommendations were made for admission and further cardiac w/u including evaluation for ischemia.    Pt and wife at bedside were fully made aware of findings and risks. They both expressed complete understanding of findings and elected to sign out against medical advice.  He reports feeling well and wants to follow up with cardiologist as o/p.  Cardiologist Dr. Arteaga made aware.  Dr. Walsh made aware.

## 2019-06-20 NOTE — ASU PATIENT PROFILE, ADULT - PSH
AICD (automatic cardioverter/defibrillator) present  [facility]  Encounter for biliary drainage tube placement  1/2018  H/O bilateral cataract extraction  LEFT- 1/18 RIGHT 6 YRS AGO  H/O coronary angioplasty  WITH STENT X2 (4/2018)  H/O flexible sigmoidoscopy  2015  History of prostate surgery  5/17  History of suprapubic catheter

## 2019-06-20 NOTE — DISCHARGE NOTE NURSING/CASE MANAGEMENT/SOCIAL WORK - NSDCDPATPORTLINK_GEN_ALL_CORE
You can access the [facility] Patient Portal, offered by [facility], by registering with the following website: [email or web address]

## 2019-06-20 NOTE — BRIEF OPERATIVE NOTE - NSICDXBRIEFPROCEDURE_GEN_ALL_CORE_FT
PROCEDURES:  Cystoscopy, with retrograde pyelogram and bladder biopsy 20-Jun-2019 11:21:18  Jurgen Walsh

## 2019-06-20 NOTE — PRE-ANESTHESIA EVALUATION ADULT - NSANTHPMHFT_GEN_ALL_CORE
lungs clear, no murmur, last eliquis 3 days ago  reviewed cardiac, EPS and primary doctors evaluation in chart

## 2019-06-20 NOTE — ASU DISCHARGE PLAN (ADULT/PEDIATRIC) - CARE PROVIDER_API CALL
Jurgen Walsh)  Urology  [facility], [place] [address]  Phone: [phone]  Fax: [phone]  Follow Up Time:

## 2019-06-27 LAB — SURGICAL PATHOLOGY STUDY: SIGNIFICANT CHANGE UP

## 2019-06-28 DIAGNOSIS — N39.0 URINARY TRACT INFECTION, SITE NOT SPECIFIED: ICD-10-CM

## 2019-06-28 DIAGNOSIS — I25.10 ATHEROSCLEROTIC HEART DISEASE OF NATIVE CORONARY ARTERY WITHOUT ANGINA PECTORIS: ICD-10-CM

## 2019-06-28 DIAGNOSIS — G45.9 TRANSIENT CEREBRAL ISCHEMIC ATTACK, UNSPECIFIED: ICD-10-CM

## 2019-06-28 DIAGNOSIS — I50.9 HEART FAILURE, UNSPECIFIED: ICD-10-CM

## 2019-06-28 DIAGNOSIS — Z88.2 ALLERGY STATUS TO SULFONAMIDES: ICD-10-CM

## 2019-06-28 DIAGNOSIS — F03.90 UNSPECIFIED DEMENTIA WITHOUT BEHAVIORAL DISTURBANCE: ICD-10-CM

## 2019-06-28 DIAGNOSIS — K21.9 GASTRO-ESOPHAGEAL REFLUX DISEASE WITHOUT ESOPHAGITIS: ICD-10-CM

## 2019-06-28 DIAGNOSIS — N28.9 DISORDER OF KIDNEY AND URETER, UNSPECIFIED: ICD-10-CM

## 2019-06-28 DIAGNOSIS — H40.9 UNSPECIFIED GLAUCOMA: ICD-10-CM

## 2019-06-28 DIAGNOSIS — E11.9 TYPE 2 DIABETES MELLITUS WITHOUT COMPLICATIONS: ICD-10-CM

## 2019-06-28 DIAGNOSIS — I42.9 CARDIOMYOPATHY, UNSPECIFIED: ICD-10-CM

## 2019-06-28 DIAGNOSIS — D30.3 BENIGN NEOPLASM OF BLADDER: ICD-10-CM

## 2019-06-28 DIAGNOSIS — N43.3 HYDROCELE, UNSPECIFIED: ICD-10-CM

## 2019-06-28 DIAGNOSIS — I48.91 UNSPECIFIED ATRIAL FIBRILLATION: ICD-10-CM

## 2019-06-28 DIAGNOSIS — I10 ESSENTIAL (PRIMARY) HYPERTENSION: ICD-10-CM

## 2019-06-28 DIAGNOSIS — R31.9 HEMATURIA, UNSPECIFIED: ICD-10-CM

## 2019-06-28 DIAGNOSIS — D64.9 ANEMIA, UNSPECIFIED: ICD-10-CM

## 2019-06-28 DIAGNOSIS — R32 UNSPECIFIED URINARY INCONTINENCE: ICD-10-CM

## 2019-06-28 DIAGNOSIS — E78.00 PURE HYPERCHOLESTEROLEMIA, UNSPECIFIED: ICD-10-CM

## 2019-06-28 DIAGNOSIS — K80.20 CALCULUS OF GALLBLADDER WITHOUT CHOLECYSTITIS WITHOUT OBSTRUCTION: ICD-10-CM

## 2019-06-28 DIAGNOSIS — N40.0 BENIGN PROSTATIC HYPERPLASIA WITHOUT LOWER URINARY TRACT SYMPTOMS: ICD-10-CM

## 2019-07-09 ENCOUNTER — LABORATORY RESULT (OUTPATIENT)
Age: 79
End: 2019-07-09

## 2019-07-09 ENCOUNTER — APPOINTMENT (OUTPATIENT)
Dept: INFUSION THERAPY | Facility: CLINIC | Age: 79
End: 2019-07-09
Payer: MEDICARE

## 2019-07-09 ENCOUNTER — APPOINTMENT (OUTPATIENT)
Dept: HEMATOLOGY ONCOLOGY | Facility: CLINIC | Age: 79
End: 2019-07-09
Payer: MEDICARE

## 2019-07-09 VITALS
RESPIRATION RATE: 16 BRPM | DIASTOLIC BLOOD PRESSURE: 49 MMHG | SYSTOLIC BLOOD PRESSURE: 117 MMHG | HEART RATE: 50 BPM | BODY MASS INDEX: 23.89 KG/M2 | TEMPERATURE: 96 F | WEIGHT: 165 LBS | HEIGHT: 69.5 IN

## 2019-07-09 PROCEDURE — 99213 OFFICE O/P EST LOW 20 MIN: CPT

## 2019-07-09 RX ORDER — IRON SUCROSE 20 MG/ML
200 INJECTION, SOLUTION INTRAVENOUS ONCE
Refills: 0 | Status: DISCONTINUED | OUTPATIENT
Start: 2019-07-09 | End: 2019-07-09

## 2019-07-09 RX ORDER — PREGABALIN 225 MG/1
1000 CAPSULE ORAL ONCE
Refills: 0 | Status: COMPLETED | OUTPATIENT
Start: 2019-07-09 | End: 2019-07-09

## 2019-07-09 RX ADMIN — PREGABALIN 1000 MICROGRAM(S): 225 CAPSULE ORAL at 14:50

## 2019-07-10 LAB
ALBUMIN SERPL ELPH-MCNC: 3.4 G/DL
ALP BLD-CCNC: 88 U/L
ALT SERPL-CCNC: <5 U/L
ANION GAP SERPL CALC-SCNC: 15 MMOL/L
AST SERPL-CCNC: 8 U/L
BILIRUB SERPL-MCNC: 0.2 MG/DL
BUN SERPL-MCNC: 38 MG/DL
CALCIUM SERPL-MCNC: 9 MG/DL
CHLORIDE SERPL-SCNC: 106 MMOL/L
CO2 SERPL-SCNC: 18 MMOL/L
CREAT SERPL-MCNC: 2 MG/DL
DEPRECATED KAPPA LC FREE/LAMBDA SER: 1.4 RATIO
FERRITIN SERPL-MCNC: 376 NG/ML
FOLATE SERPL-MCNC: 17.3 NG/ML
GLUCOSE SERPL-MCNC: 88 MG/DL
HCT VFR BLD CALC: 36 %
HGB BLD-MCNC: 11.3 G/DL
IGA SER QL IEP: 331 MG/DL
IGG SER QL IEP: 1144 MG/DL
IGM SER QL IEP: 23 MG/DL
IRON SATN MFR SERPL: 16 %
IRON SERPL-MCNC: 30 UG/DL
KAPPA LC CSF-MCNC: 6.1 MG/DL
KAPPA LC SERPL-MCNC: 8.52 MG/DL
MCHC RBC-ENTMCNC: 28.7 PG
MCHC RBC-ENTMCNC: 31.4 G/DL
MCV RBC AUTO: 91.4 FL
PLATELET # BLD AUTO: 379 K/UL
PMV BLD: 9.3 FL
POTASSIUM SERPL-SCNC: 5 MMOL/L
PROT SERPL-MCNC: 6.9 G/DL
RBC # BLD: 3.94 M/UL
RBC # FLD: 14.3 %
SODIUM SERPL-SCNC: 139 MMOL/L
TIBC SERPL-MCNC: 189 UG/DL
UIBC SERPL-MCNC: 159 UG/DL
VIT B12 SERPL-MCNC: 516 PG/ML
WBC # FLD AUTO: 11.05 K/UL

## 2019-07-11 ENCOUNTER — APPOINTMENT (OUTPATIENT)
Dept: INFUSION THERAPY | Facility: CLINIC | Age: 79
End: 2019-07-11

## 2019-07-11 LAB
ALBUMIN MFR SERPL ELPH: 47 %
ALBUMIN SERPL-MCNC: 3.1 G/DL
ALBUMIN/GLOB SERPL: 0.9 RATIO
ALPHA1 GLOB MFR SERPL ELPH: 7.8 %
ALPHA1 GLOB SERPL ELPH-MCNC: 0.5 G/DL
ALPHA2 GLOB MFR SERPL ELPH: 16.3 %
ALPHA2 GLOB SERPL ELPH-MCNC: 1.1 G/DL
B-GLOBULIN MFR SERPL ELPH: 12.7 %
B-GLOBULIN SERPL ELPH-MCNC: 0.9 G/DL
EPO SERPL-MCNC: 6.9 MIU/ML
GAMMA GLOB FLD ELPH-MCNC: 1.1 G/DL
GAMMA GLOB MFR SERPL ELPH: 16.2 %
INTERPRETATION SERPL IEP-IMP: NORMAL
M PROTEIN SPEC IFE-MCNC: NORMAL
PROT SERPL-MCNC: 6.7 G/DL
PROT SERPL-MCNC: 6.7 G/DL

## 2019-07-11 RX ORDER — SODIUM CHLORIDE 9 MG/ML
1000 INJECTION INTRAMUSCULAR; INTRAVENOUS; SUBCUTANEOUS
Refills: 0 | Status: COMPLETED | OUTPATIENT
Start: 2019-07-11 | End: 2019-07-11

## 2019-07-11 RX ADMIN — SODIUM CHLORIDE 1000 MILLILITER(S): 9 INJECTION INTRAMUSCULAR; INTRAVENOUS; SUBCUTANEOUS at 14:16

## 2019-07-11 RX ADMIN — SODIUM CHLORIDE 500 MILLILITER(S): 9 INJECTION INTRAMUSCULAR; INTRAVENOUS; SUBCUTANEOUS at 16:16

## 2019-07-18 LAB
ALBUMIN SERPL ELPH-MCNC: 3.2 G/DL
ALP BLD-CCNC: 80 U/L
ALT SERPL-CCNC: <5 U/L
ANION GAP SERPL CALC-SCNC: 12 MMOL/L
AST SERPL-CCNC: 7 U/L
BILIRUB SERPL-MCNC: 0.2 MG/DL
BUN SERPL-MCNC: 31 MG/DL
CALCIUM SERPL-MCNC: 8.9 MG/DL
CHLORIDE SERPL-SCNC: 110 MMOL/L
CO2 SERPL-SCNC: 20 MMOL/L
CREAT SERPL-MCNC: 1.7 MG/DL
GLUCOSE SERPL-MCNC: 122 MG/DL
METHYLMALONATE SERPL-SCNC: 211 NMOL/L
POTASSIUM SERPL-SCNC: 4.8 MMOL/L
PROT SERPL-MCNC: 6.6 G/DL
SODIUM SERPL-SCNC: 142 MMOL/L

## 2019-07-19 ENCOUNTER — OUTPATIENT (OUTPATIENT)
Dept: OUTPATIENT SERVICES | Facility: HOSPITAL | Age: 79
LOS: 1 days | Discharge: HOME | End: 2019-07-19
Payer: MEDICARE

## 2019-07-19 DIAGNOSIS — Z98.890 OTHER SPECIFIED POSTPROCEDURAL STATES: Chronic | ICD-10-CM

## 2019-07-19 DIAGNOSIS — Z46.82 ENCOUNTER FOR FITTING AND ADJUSTMENT OF NON-VASCULAR CATHETER: Chronic | ICD-10-CM

## 2019-07-19 DIAGNOSIS — Z98.61 CORONARY ANGIOPLASTY STATUS: Chronic | ICD-10-CM

## 2019-07-19 DIAGNOSIS — Z95.810 PRESENCE OF AUTOMATIC (IMPLANTABLE) CARDIAC DEFIBRILLATOR: Chronic | ICD-10-CM

## 2019-07-19 DIAGNOSIS — Z98.41 CATARACT EXTRACTION STATUS, RIGHT EYE: Chronic | ICD-10-CM

## 2019-07-19 DIAGNOSIS — N39.0 URINARY TRACT INFECTION, SITE NOT SPECIFIED: ICD-10-CM

## 2019-07-19 PROCEDURE — 76770 US EXAM ABDO BACK WALL COMP: CPT | Mod: 26

## 2019-07-21 NOTE — HISTORY OF PRESENT ILLNESS
[Disease:__________________________] : Disease: [unfilled] [de-identified] : Billy is a 77 yo male with history of atrial fibrillation s/p BIV, managed with Tykosyn and Pradaxa, CAD s/p PCI , enlarged prostate with symptomatic urethral obstruction s/p TURP complicated by urinary incontinence , is referred for anemia. \par Patient reported that since his TURP, he is constantly incontinent with recurrent UTIs and having intermittent hematuria, passing clots sometimes. CT abdomen/pelvis showed enlarged bladder with bilateral hydronephrosis L>R.  He follows with urology who are planning a cystoscopy to rule out bladder tumor, but were unable to perform it because of recurrent UTIs.\par He is also on iron pill once a day and folic acid. He follows with nephrology , his last creatinine from feb 2019 was 1.7 , never received erythropoietin in the past.  [de-identified] : 6/11/2019: Billy is here for follow up. He still describes penile pain and currently reports bloody urine and passing clots. He is s/p 4 doses of Venofer, will get dose 5 today, there was not a significant improvement in Hb, other causes for anemia also must be looked into. I have discussed case with Dr. Munroe, who will require Billy's Hb to be above 10.0 for upcoming cycloscopy with possible TURBT on 6/20/2019. He already has been discussing this with cardiology. Dr. Walker, he will hold Eliquis for 5 days prior per cardiology and will confirm the days of hold for ASA (his stents were placed 4/2018). Will plan to transfuse 1 unit of PRBC on 6/18, if Hb is below 10.0.\par  \par 7/9/19 : Billy is here for a follow up, he reports feeling better. He is s/p cystoscopy with biopsy on  6/20/2019 pathology is benign. He was offered a few options by urology re incontinence/penile pain. We reviewed CBC with improving HGB 11.3/36.0. He is s/p 1 unit of blood preop, he seemed to have held on to transfusion. May also consider Procrit in the future.

## 2019-07-21 NOTE — ASSESSMENT
[FreeTextEntry1] : Normocytic anemia: multifactorial\par --Art least partially secondary to persistent hematuria\par --Completed 5 doses of Venofer on 6/11/2019 with modest improvement in Hb, also required 1 unit transfusion prior to procedure \par --Continue with B12 injections \par --His erythropoietin level is 11.3 ( outside lab), may consider Procrit in the future\par --Cystoscopy on 6/20/2019 revealed biopsy negative for malignancy\par --May benefit from MM panel and/or BMBx in the future \par \par Follow up in 4 weeks \par

## 2019-07-21 NOTE — PHYSICAL EXAM
[Restricted in physically strenuous activity but ambulatory and able to carry out work of a light or sedentary nature] : Status 1- Restricted in physically strenuous activity but ambulatory and able to carry out work of a light or sedentary nature, e.g., light house work, office work [Obese] : obese [Normal] : grossly intact [de-identified] : + b/l lower extremity edema [de-identified] : Pale conjunctiva

## 2019-07-21 NOTE — REVIEW OF SYSTEMS
[Fatigue] : fatigue [Lower Ext Edema] : lower extremity edema [Incontinence] : incontinence [Joint Stiffness] : joint stiffness [Negative] : Heme/Lymph [Joint Pain] : no joint pain [Easy Bleeding] : no tendency for easy bleeding [de-identified] : h/o diabetis [Easy Bruising] : no tendency for easy bruising

## 2019-08-08 NOTE — PHYSICAL EXAM
[Restricted in physically strenuous activity but ambulatory and able to carry out work of a light or sedentary nature] : Status 1- Restricted in physically strenuous activity but ambulatory and able to carry out work of a light or sedentary nature, e.g., light house work, office work [Obese] : obese [Normal] : normal appearance, no rash, nodules, vesicles, ulcers, erythema [de-identified] : Pale conjunctiva [de-identified] : + b/l lower extremity edema

## 2019-08-08 NOTE — HISTORY OF PRESENT ILLNESS
[Disease:__________________________] : Disease: [unfilled] [de-identified] : Billy is a 75 yo male with history of atrial fibrillation s/p BIV, managed with Tykosyn and Pradaxa, CAD s/p PCI , enlarged prostate with symptomatic urethral obstruction s/p TURP complicated by urinary incontinence , is referred for anemia. \par Patient reported that since his TURP, he is constantly incontinent with recurrent UTIs and having intermittent hematuria, passing clots sometimes. CT abdomen/pelvis showed enlarged bladder with bilateral hydronephrosis L>R.  He follows with urology who are planning a cystoscopy to rule out bladder tumor, but were unable to perform it because of recurrent UTIs.\par He is also on iron pill once a day and folic acid. He follows with nephrology , his last creatinine from feb 2019 was 1.7 , never received erythropoietin in the past.  [de-identified] : 6/11/2019: Billy is here for follow up. He still describes penile pain and currently reports bloody urine and passing clots. He is s/p 4 doses of Venofer, will get dose 5 today, there was not a significant improvement in Hb, other causes for anemia also must be looked into. I have discussed case with Dr. Munroe, who will require Billy's Hb to be above 10.0 for upcoming cycloscopy with possible TURBT on 6/20/2019. He already has been discussing this with cardiology. Dr. Walker, he will hold Eliquis for 5 days prior per cardiology and will confirm the days of hold for ASA (his stents were placed 4/2018). Will plan to transfuse 1 unit of PRBC on 6/18, if Hb is below 10.0.\par  \par 7/9/19 : Billy is here for a follow up, he reports feeling better. He is s/p cystoscopy with biopsy on  6/20/2019 pathology is benign. He was offered a few options by urology re incontinence/penile pain. We reviewed CBC with improving HGB 11.3/36.0. He is s/p 1 unit of blood preop, he seemed to have held on to transfusion. May also consider Procrit in the future. \par \par 8/8/2019: Billy reports ongoing on/off hematuria and urinary hesitancy occasionally associated with pain. CBC was reviewed, no transfusions required at this time. He continues with B12 injections. The pan is for suprapubic catheter insertion.

## 2019-08-08 NOTE — ASSESSMENT
[FreeTextEntry1] : Normocytic anemia: multifactorial\par --Art least partially secondary to persistent hematuria\par --Completed 5 doses of Venofer on 6/11/2019 with modest improvement in Hb, also required 1 unit transfusion prior to procedure \par --Continue with B12 injections \par --His erythropoietin level is 11.3 ( outside lab), may consider Procrit in the future\par --Cystoscopy on 6/20/2019 revealed biopsy negative for malignancy\par --May benefit from MM panel and/or BMBx in the future \par \par Follow up in 1-2 months \par

## 2019-08-08 NOTE — REVIEW OF SYSTEMS
[Fatigue] : fatigue [Lower Ext Edema] : lower extremity edema [Incontinence] : incontinence [Joint Stiffness] : joint stiffness [Negative] : Heme/Lymph [Joint Pain] : no joint pain [Easy Bleeding] : no tendency for easy bleeding [Easy Bruising] : no tendency for easy bruising [de-identified] : h/o diabetis

## 2019-09-03 PROBLEM — Z00.00 ENCOUNTER FOR PREVENTIVE HEALTH EXAMINATION: Status: ACTIVE | Noted: 2017-01-23

## 2019-09-17 NOTE — H&P PST ADULT - HISTORY OF PRESENT ILLNESS
79 year old male here for suprapubic tube placement, patient has been leaking urine  fos=1  denies chest pain sob palp  denies recent uri or uti

## 2019-09-17 NOTE — H&P PST ADULT - NSICDXPASTMEDICALHX_GEN_ALL_CORE_FT
PAST MEDICAL HISTORY:  Afib     Anemia     BPH (benign prostatic hyperplasia)     CAD (coronary artery disease) CARD STENT X2 4/2018    Cardiomyopathy     CHF (congestive heart failure) COMBINED SYSTOLIC & DIASTOLIC    Cholelithiasis     Dementia     Diabetes     DM (diabetes mellitus)     GERD (gastroesophageal reflux disease) intermittent    Glaucoma     H/O ptosis of eyelid right eye (sometimes wears patch)    Heart attack 1/2018    Hematuria     HTN (hypertension)     Hydrocele in adult     Hypercholesteremia     Hyperlipidemia     Renal insufficiency     Sepsis 1/18 FROM INFECTED GALLBLADDER    TIA (transient ischemic attack) X2 JULY 2018

## 2019-09-17 NOTE — H&P PST ADULT - NSICDXPASTSURGICALHX_GEN_ALL_CORE_FT
PAST SURGICAL HISTORY:  AICD (automatic cardioverter/defibrillator) present [employer]    Encounter for biliary drainage tube placement 1/2018    H/O bilateral cataract extraction LEFT- 1/18 RIGHT 6 YRS AGO    H/O coronary angioplasty WITH STENT X2 (4/2018)    H/O flexible sigmoidoscopy 2015    History of prostate surgery 5/17    History of suprapubic catheter

## 2019-09-17 NOTE — H&P PST ADULT - NSANTHOSAYNRD_GEN_A_CORE
No. MARCIA screening performed.  STOP BANG Legend: 0-2 = LOW Risk; 3-4 = INTERMEDIATE Risk; 5-8 = HIGH Risk/see marcia tool

## 2019-09-26 NOTE — PROGRESS NOTE ADULT - SUBJECTIVE AND OBJECTIVE BOX
INTERVENTIONAL RADIOLOGY BRIEF-OPERATIVE NOTE    Procedure: Image guided placement of suprapubic catheter    Pre-Op Diagnosis: Urinary incontinence    Post-Op Diagnosis: Same    Attending: Elliot Landau  Resident: None    Anesthesia (type):  [ ] General Anesthesia  [x] Sedation  [ ] Spinal Anesthesia  [x] Local/Regional    Contrast: 30 cc Optiray 320    Estimated Blood Loss: < 5 cc    Condition:   [ ] Critical  [ ] Serious  [ ] Fair   [x] Good    Findings/Follow up Plan of Care: Image guided placement of 14 Citizen of [place] suprapubic urinary bladder pigtail catheter without complication. Patient tolerated procedure well. Catheter left to external drainage.     Specimens Removed: None    Implants: None    Complications: None    Disposition: Recovery room pending D/C      Please call Interventional Radiology [phone] with any questions, concerns, or issues.

## 2019-10-17 NOTE — CONSULT LETTER
[Dear  ___] : Dear  [unfilled], [Consult Letter:] : I had the pleasure of evaluating your patient, [unfilled]. [( Thank you for referring [unfilled] for consultation for _____ )] : Thank you for referring [unfilled] for consultation for [unfilled] [Please see my note below.] : Please see my note below. [Consult Closing:] : Thank you very much for allowing me to participate in the care of this patient.  If you have any questions, please do not hesitate to contact me. [Sincerely,] : Sincerely, [FreeTextEntry3] : Augustina Padgett MD

## 2019-10-17 NOTE — ASSESSMENT
[FreeTextEntry1] : Normocytic anemia: multifactorial, improved \par --At least partially secondary to on/off persistent hematuria. \par --Completed 5 doses of Venofer on 6/11/2019 with modest improvement in Hb, also required 1 unit transfusion prior to procedure and also received 2 doses venofer in Aug 2019\par  --His erythropoietin level is 11.3 (outside lab), may consider Procrit in the future\par --Cystoscopy on 6/20/2019 revealed biopsy negative for malignancy\par --He is s/p suprapubic catheter placement 9/26/2019\par \par --May benefit from MM panel and/or BMBx in the future \par --Continue with B12 injections\par --Repeat iron studies, ferritin, B12, folate\par \par Follow up in 1-2 months \par

## 2019-10-17 NOTE — PHYSICAL EXAM
[Restricted in physically strenuous activity but ambulatory and able to carry out work of a light or sedentary nature] : Status 1- Restricted in physically strenuous activity but ambulatory and able to carry out work of a light or sedentary nature, e.g., light house work, office work [Obese] : obese [Normal] : grossly intact [de-identified] : + b/l lower extremity edema

## 2019-10-17 NOTE — REASON FOR VISIT
[Follow-Up Visit] : a follow-up visit for [Spouse] : spouse [FreeTextEntry2] : anemia [id number] Comprehensive

## 2019-10-17 NOTE — HISTORY OF PRESENT ILLNESS
[Disease:__________________________] : Disease: [unfilled] [de-identified] : Billy is a 77 yo male with history of atrial fibrillation s/p BIV, managed with Tykosyn and Pradaxa, CAD s/p PCI , enlarged prostate with symptomatic urethral obstruction s/p TURP complicated by urinary incontinence , is referred for anemia. \par Patient reported that since his TURP, he is constantly incontinent with recurrent UTIs and having intermittent hematuria, passing clots sometimes. CT abdomen/pelvis showed enlarged bladder with bilateral hydronephrosis L>R.  He follows with urology who are planning a cystoscopy to rule out bladder tumor, but were unable to perform it because of recurrent UTIs.\par He is also on iron pill once a day and folic acid. He follows with nephrology , his last creatinine from feb 2019 was 1.7 , never received erythropoietin in the past.  [de-identified] : 6/11/2019: Billy is here for follow up. He still describes penile pain and currently reports bloody urine and passing clots. He is s/p 4 doses of Venofer, will get dose 5 today, there was not a significant improvement in Hb, other causes for anemia also must be looked into. I have discussed case with Dr. Munroe, who will require Billy's Hb to be above 10.0 for upcoming cycloscopy with possible TURBT on 6/20/2019. He already has been discussing this with cardiology. Dr. Walker, he will hold Eliquis for 5 days prior per cardiology and will confirm the days of hold for ASA (his stents were placed 4/2018). Will plan to transfuse 1 unit of PRBC on 6/18, if Hb is below 10.0.\par  \par 7/9/19 : Billy is here for a follow up, he reports feeling better. He is s/p cystoscopy with biopsy on  6/20/2019 pathology is benign. He was offered a few options by urology re incontinence/penile pain. We reviewed CBC with improving HGB 11.3/36.0. He is s/p 1 unit of blood preop, he seemed to have held on to transfusion. May also consider Procrit in the future. \par \par 10/16/19- Billy is here for follow up. He did get suprapubic catheter placement 3 weeks ago. He still has pain in the lower abdomen/penis area. He received venofer in Aug 2019 and is currently on vitamin b12 monthy injections IM.\par \par 8/8/2019: Billy reports ongoing on/off hematuria and urinary hesitancy occasionally associated with pain. CBC was reviewed, no transfusions required at this time. He continues with B12 injections. The pan is for suprapubic catheter insertion. \par \par 10/16/2019: Billy had suprapubic tube placed by IR on 9/26/2019, since then he reports pain at the site of insertion and continued penile pain. there is no urine coming out of the penis, urine in the bag has significant amount of sediment, abdomen is distended. CBC was reviewed, Hb is improved, leukocytosis was noted. Billy was advised to follow up with Dr. Camila RAMOS, they will plan to arrange for tomorrow visit. Billy was provided with Dr. Phelps's name and information re pain management. \par S/p B12 injection today.

## 2019-11-13 NOTE — PROGRESS NOTE ADULT - SUBJECTIVE AND OBJECTIVE BOX
INTERVENTIONAL RADIOLOGY BRIEF-OPERATIVE NOTE    Procedure: image guided suprapubic catheter cystogram    Pre-Op Diagnosis: urinary retention    Post-Op Diagnosis: urinary retention    Attending: Elliot Landau MD  Resident: Elio Patrick MD    Anesthesia (type):  [ ] General Anesthesia  [ ] Sedation  [ ] Spinal Anesthesia  [x] Local/Regional - 5 mL 1% lidocaine    Contrast: 10 mL    Estimated Blood Loss: <5 mL    Condition:   [ ] Critical  [ ] Serious  [ ] Fair   [x] Good    Findings/Follow up Plan of Care: Suprapubic catheter in appropriate position within the bladder, no extravasation    Specimens Removed: None    Implants: None    Complications: None    Disposition  1. Okay to d/c home  2. Discussed with wife - patient will follow up in 1-2 weeks for suprapubic catheter upsizing/exchange. This procedure will be done with general anesthesia. Patient's apixban will need to be held for about 3 days prior to procedure.    Please call Interventional Radiology [phone] with any questions, concerns, or issues. INTERVENTIONAL RADIOLOGY BRIEF-OPERATIVE NOTE    Procedure: image guided suprapubic catheter cystogram    Pre-Op Diagnosis: urinary retention    Post-Op Diagnosis: urinary retention    Attending: Elliot Landau MD  Resident: Elio Patrick MD    Anesthesia (type):  [ ] General Anesthesia  [ ] Sedation  [ ] Spinal Anesthesia  [x] Local/Regional - 5 mL 1% lidocaine    Contrast: 10 mL    Estimated Blood Loss: <5 mL    Condition:   [ ] Critical  [ ] Serious  [ ] Fair   [x] Good    Findings/Follow up Plan of Care: Suprapubic catheter in appropriate position within the bladder, no extravasation.    Specimens Removed: None    Implants: None    Complications: None    Disposition  1. Okay to d/c home  2. Discussed with wife - patient will follow up in 1-2 weeks for suprapubic catheter upsizing/exchange. This procedure will be done with general anesthesia. Patient's apixban will need to be held for 3 days prior to procedure.    Please call Interventional Radiology [phone] with any questions, concerns, or issues.

## 2019-11-13 NOTE — PROGRESS NOTE ADULT - SUBJECTIVE AND OBJECTIVE BOX
Interventional Radiology Outpatient Documentation    PREOPERATIVE DAY OF PROCEDURE EVALUATION:     I have personally seen and examined this patient. I agree with the history and physical which I have reviewed and noted any changes below:     Plan is for image guided suprapubic catheter cystogram  (Signed electronically Landau, Elliot, MD) 11-13-19 @ 12:52     Procedure/ risks/ benefits/ goals/ alternatives were explained. All questions answered. Informed content obtained from patient. Consent placed in chart.

## 2019-12-25 PROBLEM — R31.9 HEMATURIA: Status: ACTIVE | Noted: 2019-06-11

## 2019-12-25 NOTE — REVIEW OF SYSTEMS
[Fatigue] : fatigue [Lower Ext Edema] : lower extremity edema [Joint Stiffness] : joint stiffness [Negative] : Neurological [Incontinence] : no incontinence [Joint Pain] : no joint pain [FreeTextEntry8] : Penile pain

## 2019-12-25 NOTE — PHYSICAL EXAM
[Restricted in physically strenuous activity but ambulatory and able to carry out work of a light or sedentary nature] : Status 1- Restricted in physically strenuous activity but ambulatory and able to carry out work of a light or sedentary nature, e.g., light house work, office work [Obese] : obese [Normal] : full range of motion and no deformities appreciated [de-identified] : + b/l lower extremity edema

## 2019-12-25 NOTE — ASSESSMENT
[FreeTextEntry1] : Normocytic anemia: multifactorial, improved .\par -- atient will remain on observation.\par --At least partially secondary to on/off persistent hematuria. \par --Completed 5 doses of Venofer on 6/11/2019 with modest improvement in Hb, also required 1 unit transfusion prior to procedure and also received 2 doses venofer in Aug 2019\par --Cystoscopy on 6/20/2019 revealed biopsy negative for malignancy\par --He is s/p suprapubic catheter changed on 12/12/19.\par --Pain medication prescribed Oxycodone 5 mg po every 8 hr helpful for penile pain \par --Patient will follow up with pain management on 1/9/19. \par --May benefit from MM panel and/or BMBx in the future \par --Continue with B12 injections at home\par --Repeat iron studies, ferritin, B12, folate\par \par Follow up in 2 months .\par Patient seen and examined by Dr Padgett who agreed for the above plan of care. \par

## 2019-12-25 NOTE — HISTORY OF PRESENT ILLNESS
[Disease:__________________________] : Disease: [unfilled] [de-identified] : Billy is a 75 yo male with history of atrial fibrillation s/p BIV, managed with Tykosyn and Pradaxa, CAD s/p PCI , enlarged prostate with symptomatic urethral obstruction s/p TURP complicated by urinary incontinence , is referred for anemia. \par Patient reported that since his TURP, he is constantly incontinent with recurrent UTIs and having intermittent hematuria, passing clots sometimes. CT abdomen/pelvis showed enlarged bladder with bilateral hydronephrosis L>R.  He follows with urology who are planning a cystoscopy to rule out bladder tumor, but were unable to perform it because of recurrent UTIs.\par He is also on iron pill once a day and folic acid. He follows with nephrology , his last creatinine from feb 2019 was 1.7 , never received erythropoietin in the past.  [de-identified] : 6/11/2019: Billy is here for follow up. He still describes penile pain and currently reports bloody urine and passing clots. He is s/p 4 doses of Venofer, will get dose 5 today, there was not a significant improvement in Hb, other causes for anemia also must be looked into. I have discussed case with Dr. Munroe, who will require Billy's Hb to be above 10.0 for upcoming cycloscopy with possible TURBT on 6/20/2019. He already has been discussing this with cardiology. Dr. Walker, he will hold Eliquis for 5 days prior per cardiology and will confirm the days of hold for ASA (his stents were placed 4/2018). Will plan to transfuse 1 unit of PRBC on 6/18, if Hb is below 10.0.\par  \par 7/9/19 : Billy is here for a follow up, he reports feeling better. He is s/p cystoscopy with biopsy on  6/20/2019 pathology is benign. He was offered a few options by urology re incontinence/penile pain. We reviewed CBC with improving HGB 11.3/36.0. He is s/p 1 unit of blood preop, he seemed to have held on to transfusion. May also consider Procrit in the future. \par \par 10/16/19- Billy is here for follow up. He did get suprapubic catheter placement 3 weeks ago. He still has pain in the lower abdomen/penis area. He received venofer in Aug 2019 and is currently on vitamin b12 monthy injections IM.\par \par 8/8/2019: Billy reports ongoing on/off hematuria and urinary hesitancy occasionally associated with pain. CBC was reviewed, no transfusions required at this time. He continues with B12 injections. The pan is for suprapubic catheter insertion. \par \par 10/16/2019: Billy had suprapubic tube placed by IR on 9/26/2019, since then he reports pain at the site of insertion and continued penile pain. there is no urine coming out of the penis, urine in the bag has significant amount of sediment, abdomen is distended. CBC was reviewed, Hb is improved, leukocytosis was noted. Billy was advised to follow up with Dr. Camila RAMOS, they will plan to arrange for tomorrow visit. Billy was provided with Dr. Phelps's name and information re pain management. \par S/p B12 injection today. \par \par 12/18/2019: Billy came for a follow up visit, he reports feeling well, pain is well controlled with current Opioid regiment, Billy is requesting refill. He is planning to see pain management. \par he is S/P changing of the suprapubic catheter on 12/12/19. He completed a 5 days course of Cipro.\par He is currently on Iron tablet once daily and continues with B12 injections.

## 2020-01-01 ENCOUNTER — LABORATORY RESULT (OUTPATIENT)
Age: 80
End: 2020-01-01

## 2020-01-01 ENCOUNTER — OUTPATIENT (OUTPATIENT)
Dept: OUTPATIENT SERVICES | Facility: HOSPITAL | Age: 80
LOS: 1 days | Discharge: HOME | End: 2020-01-01

## 2020-01-01 ENCOUNTER — APPOINTMENT (OUTPATIENT)
Dept: GASTROENTEROLOGY | Facility: CLINIC | Age: 80
End: 2020-01-01

## 2020-01-01 ENCOUNTER — INPATIENT (INPATIENT)
Facility: HOSPITAL | Age: 80
LOS: 3 days | Discharge: HOME | End: 2020-01-23
Attending: INTERNAL MEDICINE | Admitting: INTERNAL MEDICINE
Payer: MEDICARE

## 2020-01-01 ENCOUNTER — TRANSCRIPTION ENCOUNTER (OUTPATIENT)
Age: 80
End: 2020-01-01

## 2020-01-01 ENCOUNTER — INPATIENT (INPATIENT)
Facility: HOSPITAL | Age: 80
LOS: 7 days | Discharge: NURSING, REHAB OR LONG-TERM CARE | End: 2020-05-27
Attending: FAMILY MEDICINE | Admitting: FAMILY MEDICINE
Payer: MEDICARE

## 2020-01-01 ENCOUNTER — APPOINTMENT (OUTPATIENT)
Dept: HEMATOLOGY ONCOLOGY | Facility: CLINIC | Age: 80
End: 2020-01-01
Payer: MEDICARE

## 2020-01-01 ENCOUNTER — INPATIENT (INPATIENT)
Facility: HOSPITAL | Age: 80
LOS: 2 days | Discharge: NURSING, REHAB OR LONG-TERM CARE | End: 2020-01-31
Attending: INTERNAL MEDICINE | Admitting: INTERNAL MEDICINE
Payer: MEDICARE

## 2020-01-01 ENCOUNTER — APPOINTMENT (OUTPATIENT)
Dept: HEMATOLOGY ONCOLOGY | Facility: CLINIC | Age: 80
End: 2020-01-01

## 2020-01-01 ENCOUNTER — INPATIENT (INPATIENT)
Facility: HOSPITAL | Age: 80
LOS: 9 days | End: 2020-07-20
Attending: INTERNAL MEDICINE | Admitting: INTERNAL MEDICINE
Payer: MEDICARE

## 2020-01-01 VITALS
SYSTOLIC BLOOD PRESSURE: 157 MMHG | HEART RATE: 74 BPM | HEIGHT: 69 IN | OXYGEN SATURATION: 97 % | RESPIRATION RATE: 18 BRPM | WEIGHT: 149.91 LBS | DIASTOLIC BLOOD PRESSURE: 93 MMHG

## 2020-01-01 VITALS
RESPIRATION RATE: 18 BRPM | WEIGHT: 154.98 LBS | HEIGHT: 69 IN | HEART RATE: 67 BPM | SYSTOLIC BLOOD PRESSURE: 196 MMHG | DIASTOLIC BLOOD PRESSURE: 80 MMHG | OXYGEN SATURATION: 95 % | TEMPERATURE: 99 F

## 2020-01-01 VITALS
SYSTOLIC BLOOD PRESSURE: 136 MMHG | HEART RATE: 50 BPM | TEMPERATURE: 96 F | RESPIRATION RATE: 16 BRPM | DIASTOLIC BLOOD PRESSURE: 62 MMHG

## 2020-01-01 VITALS
WEIGHT: 167 LBS | TEMPERATURE: 97.5 F | RESPIRATION RATE: 14 BRPM | HEART RATE: 49 BPM | HEIGHT: 69 IN | BODY MASS INDEX: 24.73 KG/M2 | DIASTOLIC BLOOD PRESSURE: 74 MMHG | SYSTOLIC BLOOD PRESSURE: 157 MMHG

## 2020-01-01 VITALS
TEMPERATURE: 98 F | OXYGEN SATURATION: 99 % | HEIGHT: 69 IN | HEART RATE: 66 BPM | DIASTOLIC BLOOD PRESSURE: 75 MMHG | RESPIRATION RATE: 18 BRPM | SYSTOLIC BLOOD PRESSURE: 185 MMHG

## 2020-01-01 VITALS
HEIGHT: 69 IN | SYSTOLIC BLOOD PRESSURE: 210 MMHG | DIASTOLIC BLOOD PRESSURE: 93 MMHG | WEIGHT: 154.98 LBS | RESPIRATION RATE: 18 BRPM | TEMPERATURE: 99 F | HEART RATE: 66 BPM

## 2020-01-01 VITALS — HEART RATE: 61 BPM | OXYGEN SATURATION: 92 % | RESPIRATION RATE: 18 BRPM

## 2020-01-01 VITALS — TEMPERATURE: 97 F | SYSTOLIC BLOOD PRESSURE: 154 MMHG | DIASTOLIC BLOOD PRESSURE: 70 MMHG | HEART RATE: 59 BPM

## 2020-01-01 VITALS — HEIGHT: 69 IN | WEIGHT: 165.57 LBS

## 2020-01-01 DIAGNOSIS — H40.9 UNSPECIFIED GLAUCOMA: ICD-10-CM

## 2020-01-01 DIAGNOSIS — N40.1 BENIGN PROSTATIC HYPERPLASIA WITH LOWER URINARY TRACT SYMPMS: ICD-10-CM

## 2020-01-01 DIAGNOSIS — I25.10 ATHEROSCLEROTIC HEART DISEASE OF NATIVE CORONARY ARTERY WITHOUT ANGINA PECTORIS: ICD-10-CM

## 2020-01-01 DIAGNOSIS — Z98.61 CORONARY ANGIOPLASTY STATUS: Chronic | ICD-10-CM

## 2020-01-01 DIAGNOSIS — N13.30 UNSPECIFIED HYDRONEPHROSIS: ICD-10-CM

## 2020-01-01 DIAGNOSIS — K21.9 GASTRO-ESOPHAGEAL REFLUX DISEASE WITHOUT ESOPHAGITIS: ICD-10-CM

## 2020-01-01 DIAGNOSIS — Z98.890 OTHER SPECIFIED POSTPROCEDURAL STATES: Chronic | ICD-10-CM

## 2020-01-01 DIAGNOSIS — E11.9 TYPE 2 DIABETES MELLITUS WITHOUT COMPLICATIONS: ICD-10-CM

## 2020-01-01 DIAGNOSIS — D64.9 ANEMIA, UNSPECIFIED: ICD-10-CM

## 2020-01-01 DIAGNOSIS — N40.0 BENIGN PROSTATIC HYPERPLASIA WITHOUT LOWER URINARY TRACT SYMPTOMS: ICD-10-CM

## 2020-01-01 DIAGNOSIS — I13.0 HYPERTENSIVE HEART AND CHRONIC KIDNEY DISEASE WITH HEART FAILURE AND STAGE 1 THROUGH STAGE 4 CHRONIC KIDNEY DISEASE, OR UNSPECIFIED CHRONIC KIDNEY DISEASE: ICD-10-CM

## 2020-01-01 DIAGNOSIS — Z88.2 ALLERGY STATUS TO SULFONAMIDES: ICD-10-CM

## 2020-01-01 DIAGNOSIS — I50.40 UNSPECIFIED COMBINED SYSTOLIC (CONGESTIVE) AND DIASTOLIC (CONGESTIVE) HEART FAILURE: ICD-10-CM

## 2020-01-01 DIAGNOSIS — T83.510A INFECTION AND INFLAMMATORY REACTION DUE TO CYSTOSTOMY CATHETER, INITIAL ENCOUNTER: ICD-10-CM

## 2020-01-01 DIAGNOSIS — Z79.84 LONG TERM (CURRENT) USE OF ORAL HYPOGLYCEMIC DRUGS: ICD-10-CM

## 2020-01-01 DIAGNOSIS — E11.65 TYPE 2 DIABETES MELLITUS WITH HYPERGLYCEMIA: ICD-10-CM

## 2020-01-01 DIAGNOSIS — A41.9 SEPSIS, UNSPECIFIED ORGANISM: ICD-10-CM

## 2020-01-01 DIAGNOSIS — R94.31 ABNORMAL ELECTROCARDIOGRAM [ECG] [EKG]: ICD-10-CM

## 2020-01-01 DIAGNOSIS — Z79.82 LONG TERM (CURRENT) USE OF ASPIRIN: ICD-10-CM

## 2020-01-01 DIAGNOSIS — Z79.01 LONG TERM (CURRENT) USE OF ANTICOAGULANTS: ICD-10-CM

## 2020-01-01 DIAGNOSIS — I12.9 HYPERTENSIVE CHRONIC KIDNEY DISEASE WITH STAGE 1 THROUGH STAGE 4 CHRONIC KIDNEY DISEASE, OR UNSPECIFIED CHRONIC KIDNEY DISEASE: ICD-10-CM

## 2020-01-01 DIAGNOSIS — N17.9 ACUTE KIDNEY FAILURE, UNSPECIFIED: ICD-10-CM

## 2020-01-01 DIAGNOSIS — Z93.50 UNSPECIFIED CYSTOSTOMY STATUS: ICD-10-CM

## 2020-01-01 DIAGNOSIS — Z98.41 CATARACT EXTRACTION STATUS, RIGHT EYE: Chronic | ICD-10-CM

## 2020-01-01 DIAGNOSIS — E11.22 TYPE 2 DIABETES MELLITUS WITH DIABETIC CHRONIC KIDNEY DISEASE: ICD-10-CM

## 2020-01-01 DIAGNOSIS — R10.9 UNSPECIFIED ABDOMINAL PAIN: ICD-10-CM

## 2020-01-01 DIAGNOSIS — F03.90 UNSPECIFIED DEMENTIA WITHOUT BEHAVIORAL DISTURBANCE: ICD-10-CM

## 2020-01-01 DIAGNOSIS — G45.9 TRANSIENT CEREBRAL ISCHEMIC ATTACK, UNSPECIFIED: ICD-10-CM

## 2020-01-01 DIAGNOSIS — E16.2 HYPOGLYCEMIA, UNSPECIFIED: ICD-10-CM

## 2020-01-01 DIAGNOSIS — N18.3 CHRONIC KIDNEY DISEASE, STAGE 3 (MODERATE): ICD-10-CM

## 2020-01-01 DIAGNOSIS — Z87.891 PERSONAL HISTORY OF NICOTINE DEPENDENCE: ICD-10-CM

## 2020-01-01 DIAGNOSIS — E78.00 PURE HYPERCHOLESTEROLEMIA, UNSPECIFIED: ICD-10-CM

## 2020-01-01 DIAGNOSIS — Z95.810 PRESENCE OF AUTOMATIC (IMPLANTABLE) CARDIAC DEFIBRILLATOR: Chronic | ICD-10-CM

## 2020-01-01 DIAGNOSIS — J98.11 ATELECTASIS: ICD-10-CM

## 2020-01-01 DIAGNOSIS — K80.20 CALCULUS OF GALLBLADDER WITHOUT CHOLECYSTITIS WITHOUT OBSTRUCTION: ICD-10-CM

## 2020-01-01 DIAGNOSIS — Z98.41 CATARACT EXTRACTION STATUS, RIGHT EYE: ICD-10-CM

## 2020-01-01 DIAGNOSIS — N39.0 URINARY TRACT INFECTION, SITE NOT SPECIFIED: ICD-10-CM

## 2020-01-01 DIAGNOSIS — I25.2 OLD MYOCARDIAL INFARCTION: ICD-10-CM

## 2020-01-01 DIAGNOSIS — I50.9 HEART FAILURE, UNSPECIFIED: ICD-10-CM

## 2020-01-01 DIAGNOSIS — Z86.73 PERSONAL HISTORY OF TRANSIENT ISCHEMIC ATTACK (TIA), AND CEREBRAL INFARCTION WITHOUT RESIDUAL DEFICITS: ICD-10-CM

## 2020-01-01 DIAGNOSIS — Z46.82 ENCOUNTER FOR FITTING AND ADJUSTMENT OF NON-VASCULAR CATHETER: Chronic | ICD-10-CM

## 2020-01-01 DIAGNOSIS — E78.5 HYPERLIPIDEMIA, UNSPECIFIED: ICD-10-CM

## 2020-01-01 DIAGNOSIS — T38.3X5A ADVERSE EFFECT OF INSULIN AND ORAL HYPOGLYCEMIC [ANTIDIABETIC] DRUGS, INITIAL ENCOUNTER: ICD-10-CM

## 2020-01-01 DIAGNOSIS — R65.20 SEVERE SEPSIS WITHOUT SEPTIC SHOCK: ICD-10-CM

## 2020-01-01 DIAGNOSIS — K83.8 OTHER SPECIFIED DISEASES OF BILIARY TRACT: ICD-10-CM

## 2020-01-01 DIAGNOSIS — Z98.42 CATARACT EXTRACTION STATUS, LEFT EYE: ICD-10-CM

## 2020-01-01 DIAGNOSIS — K80.70 CALCULUS OF GALLBLADDER AND BILE DUCT WITHOUT CHOLECYSTITIS WITHOUT OBSTRUCTION: ICD-10-CM

## 2020-01-01 DIAGNOSIS — I50.32 CHRONIC DIASTOLIC (CONGESTIVE) HEART FAILURE: ICD-10-CM

## 2020-01-01 DIAGNOSIS — I48.91 UNSPECIFIED ATRIAL FIBRILLATION: ICD-10-CM

## 2020-01-01 DIAGNOSIS — Z95.810 PRESENCE OF AUTOMATIC (IMPLANTABLE) CARDIAC DEFIBRILLATOR: ICD-10-CM

## 2020-01-01 DIAGNOSIS — I42.9 CARDIOMYOPATHY, UNSPECIFIED: ICD-10-CM

## 2020-01-01 DIAGNOSIS — E11.649 TYPE 2 DIABETES MELLITUS WITH HYPOGLYCEMIA WITHOUT COMA: ICD-10-CM

## 2020-01-01 DIAGNOSIS — R94.5 ABNORMAL RESULTS OF LIVER FUNCTION STUDIES: ICD-10-CM

## 2020-01-01 DIAGNOSIS — R79.89 OTHER SPECIFIED ABNORMAL FINDINGS OF BLOOD CHEMISTRY: ICD-10-CM

## 2020-01-01 DIAGNOSIS — E53.8 DEFICIENCY OF OTHER SPECIFIED B GROUP VITAMINS: ICD-10-CM

## 2020-01-01 DIAGNOSIS — N28.9 DISORDER OF KIDNEY AND URETER, UNSPECIFIED: ICD-10-CM

## 2020-01-01 DIAGNOSIS — N13.8 BENIGN PROSTATIC HYPERPLASIA WITH LOWER URINARY TRACT SYMPMS: ICD-10-CM

## 2020-01-01 DIAGNOSIS — R41.82 ALTERED MENTAL STATUS, UNSPECIFIED: ICD-10-CM

## 2020-01-01 DIAGNOSIS — T83.510S INFECTION AND INFLAMMATORY REACTION DUE TO CYSTOSTOMY CATHETER, SEQUELA: ICD-10-CM

## 2020-01-01 DIAGNOSIS — E83.42 HYPOMAGNESEMIA: ICD-10-CM

## 2020-01-01 DIAGNOSIS — I16.0 HYPERTENSIVE URGENCY: ICD-10-CM

## 2020-01-01 DIAGNOSIS — I21.A1 MYOCARDIAL INFARCTION TYPE 2: ICD-10-CM

## 2020-01-01 DIAGNOSIS — N30.00 ACUTE CYSTITIS WITHOUT HEMATURIA: ICD-10-CM

## 2020-01-01 DIAGNOSIS — I10 ESSENTIAL (PRIMARY) HYPERTENSION: ICD-10-CM

## 2020-01-01 DIAGNOSIS — I50.42 CHRONIC COMBINED SYSTOLIC (CONGESTIVE) AND DIASTOLIC (CONGESTIVE) HEART FAILURE: ICD-10-CM

## 2020-01-01 DIAGNOSIS — N40.1 BENIGN PROSTATIC HYPERPLASIA WITH LOWER URINARY TRACT SYMPTOMS: ICD-10-CM
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
## 2020-01-01 DIAGNOSIS — Z95.5 PRESENCE OF CORONARY ANGIOPLASTY IMPLANT AND GRAFT: ICD-10-CM

## 2020-01-01 DIAGNOSIS — H02.401 UNSPECIFIED PTOSIS OF RIGHT EYELID: ICD-10-CM

## 2020-01-01 DIAGNOSIS — E87.2 ACIDOSIS: ICD-10-CM

## 2020-01-01 DIAGNOSIS — Z93.59 OTHER CYSTOSTOMY STATUS: ICD-10-CM

## 2020-01-01 DIAGNOSIS — E61.2 MAGNESIUM DEFICIENCY: ICD-10-CM

## 2020-01-01 LAB
% ALBUMIN: 47.5 % — SIGNIFICANT CHANGE UP
% ALPHA 1: 9 % — SIGNIFICANT CHANGE UP
% ALPHA 2: 16.2 % — SIGNIFICANT CHANGE UP
% BETA: 12.8 % — SIGNIFICANT CHANGE UP
% GAMMA: 14.5 % — SIGNIFICANT CHANGE UP
-  AMIKACIN: SIGNIFICANT CHANGE UP
-  AMOXICILLIN/CLAVULANIC ACID: SIGNIFICANT CHANGE UP
-  AMPICILLIN/SULBACTAM: SIGNIFICANT CHANGE UP
-  AMPICILLIN: SIGNIFICANT CHANGE UP
-  AZTREONAM: SIGNIFICANT CHANGE UP
-  CEFAZOLIN: SIGNIFICANT CHANGE UP
-  CEFEPIME: SIGNIFICANT CHANGE UP
-  CEFOXITIN: SIGNIFICANT CHANGE UP
-  CEFTAZIDIME/AVIBACTAM: SIGNIFICANT CHANGE UP
-  CEFTAZIDIME/AVIBACTAM: SIGNIFICANT CHANGE UP
-  CEFTAZIDIME: SIGNIFICANT CHANGE UP
-  CEFTAZIDIME: SIGNIFICANT CHANGE UP
-  CEFTOLOZANE/TAZOBACTAM: SIGNIFICANT CHANGE UP
-  CEFTOLOZANE/TAZOBACTAM: SIGNIFICANT CHANGE UP
-  CEFTRIAXONE: SIGNIFICANT CHANGE UP
-  CIPROFLOXACIN: SIGNIFICANT CHANGE UP
-  ERTAPENEM: SIGNIFICANT CHANGE UP
-  ERTAPENEM: SIGNIFICANT CHANGE UP
-  GENTAMICIN: SIGNIFICANT CHANGE UP
-  IMIPENEM: SIGNIFICANT CHANGE UP
-  LEVOFLOXACIN: SIGNIFICANT CHANGE UP
-  MEROPENEM/VABORBACTAM: 16 — SIGNIFICANT CHANGE UP
-  MEROPENEM: SIGNIFICANT CHANGE UP
-  NITROFURANTOIN: SIGNIFICANT CHANGE UP
-  PIPERACILLIN/TAZOBACTAM: SIGNIFICANT CHANGE UP
-  POLYMYXIN B: SIGNIFICANT CHANGE UP
-  TIGECYCLINE: SIGNIFICANT CHANGE UP
-  TOBRAMYCIN: SIGNIFICANT CHANGE UP
-  TRIMETHOPRIM/SULFAMETHOXAZOLE: SIGNIFICANT CHANGE UP
24R-OH-CALCIDIOL SERPL-MCNC: 25 NG/ML — LOW (ref 30–80)
A1C WITH ESTIMATED AVERAGE GLUCOSE RESULT: 6.2 % — HIGH (ref 4–5.6)
ALBUMIN SERPL ELPH-MCNC: 2.6 G/DL — LOW (ref 3.6–5.5)
ALBUMIN SERPL ELPH-MCNC: 2.7 G/DL — LOW (ref 3.5–5.2)
ALBUMIN SERPL ELPH-MCNC: 2.7 G/DL — LOW (ref 3.5–5.2)
ALBUMIN SERPL ELPH-MCNC: 2.9 G/DL — LOW (ref 3.5–5.2)
ALBUMIN SERPL ELPH-MCNC: 2.9 G/DL — LOW (ref 3.5–5.2)
ALBUMIN SERPL ELPH-MCNC: 3.1 G/DL — LOW (ref 3.5–5.2)
ALBUMIN SERPL ELPH-MCNC: 3.2 G/DL — LOW (ref 3.5–5.2)
ALBUMIN SERPL ELPH-MCNC: 3.4 G/DL — LOW (ref 3.5–5.2)
ALBUMIN SERPL ELPH-MCNC: 3.5 G/DL — SIGNIFICANT CHANGE UP (ref 3.5–5.2)
ALBUMIN SERPL ELPH-MCNC: 3.5 G/DL — SIGNIFICANT CHANGE UP (ref 3.5–5.2)
ALBUMIN SERPL ELPH-MCNC: 3.9 G/DL — SIGNIFICANT CHANGE UP (ref 3.5–5.2)
ALBUMIN/GLOB SERPL ELPH: 0.9 RATIO — SIGNIFICANT CHANGE UP
ALP SERPL-CCNC: 182 U/L — HIGH (ref 30–115)
ALP SERPL-CCNC: 274 U/L — HIGH (ref 30–115)
ALP SERPL-CCNC: 275 U/L — HIGH (ref 30–115)
ALP SERPL-CCNC: 303 U/L — HIGH (ref 30–115)
ALP SERPL-CCNC: 305 U/L — HIGH (ref 30–115)
ALP SERPL-CCNC: 318 U/L — HIGH (ref 30–115)
ALP SERPL-CCNC: 351 U/L — HIGH (ref 30–115)
ALP SERPL-CCNC: 389 U/L — HIGH (ref 30–115)
ALP SERPL-CCNC: 469 U/L — HIGH (ref 30–115)
ALP SERPL-CCNC: 579 U/L — HIGH (ref 30–115)
ALP SERPL-CCNC: 786 U/L — HIGH (ref 30–115)
ALP SERPL-CCNC: 89 U/L — SIGNIFICANT CHANGE UP (ref 30–115)
ALP SERPL-CCNC: 93 U/L — SIGNIFICANT CHANGE UP (ref 30–115)
ALPHA1 GLOB SERPL ELPH-MCNC: 0.5 G/DL — HIGH (ref 0.1–0.4)
ALPHA2 GLOB SERPL ELPH-MCNC: 0.9 G/DL — SIGNIFICANT CHANGE UP (ref 0.5–1)
ALT FLD-CCNC: 10 U/L — SIGNIFICANT CHANGE UP (ref 0–41)
ALT FLD-CCNC: 109 U/L — HIGH (ref 0–41)
ALT FLD-CCNC: 109 U/L — HIGH (ref 0–41)
ALT FLD-CCNC: 23 U/L — SIGNIFICANT CHANGE UP (ref 0–41)
ALT FLD-CCNC: 26 U/L — SIGNIFICANT CHANGE UP (ref 0–41)
ALT FLD-CCNC: 32 U/L — SIGNIFICANT CHANGE UP (ref 0–41)
ALT FLD-CCNC: 32 U/L — SIGNIFICANT CHANGE UP (ref 0–41)
ALT FLD-CCNC: 45 U/L — HIGH (ref 0–41)
ALT FLD-CCNC: 57 U/L — HIGH (ref 0–41)
ALT FLD-CCNC: 70 U/L — HIGH (ref 0–41)
ALT FLD-CCNC: 83 U/L — HIGH (ref 0–41)
ALT FLD-CCNC: <5 U/L — SIGNIFICANT CHANGE UP (ref 0–41)
ALT FLD-CCNC: <5 U/L — SIGNIFICANT CHANGE UP (ref 0–41)
AMIKACIN SERPL-MCNC: 5.9 UG/ML — SIGNIFICANT CHANGE UP (ref 0–10)
AMIKACIN TROUGH SERPL-MCNC: 6.5 UG/ML — SIGNIFICANT CHANGE UP (ref 0–10)
AMIKACIN TROUGH SERPL-MCNC: 7.9 UG/ML — SIGNIFICANT CHANGE UP (ref 0–10)
ANION GAP SERPL CALC-SCNC: 10 MMOL/L — SIGNIFICANT CHANGE UP (ref 7–14)
ANION GAP SERPL CALC-SCNC: 10 MMOL/L — SIGNIFICANT CHANGE UP (ref 7–14)
ANION GAP SERPL CALC-SCNC: 11 MMOL/L — SIGNIFICANT CHANGE UP (ref 7–14)
ANION GAP SERPL CALC-SCNC: 12 MMOL/L — SIGNIFICANT CHANGE UP (ref 7–14)
ANION GAP SERPL CALC-SCNC: 13 MMOL/L — SIGNIFICANT CHANGE UP (ref 7–14)
ANION GAP SERPL CALC-SCNC: 14 MMOL/L — SIGNIFICANT CHANGE UP (ref 7–14)
ANION GAP SERPL CALC-SCNC: 15 MMOL/L — HIGH (ref 7–14)
ANION GAP SERPL CALC-SCNC: 16 MMOL/L — HIGH (ref 7–14)
ANION GAP SERPL CALC-SCNC: 16 MMOL/L — HIGH (ref 7–14)
ANION GAP SERPL CALC-SCNC: 17 MMOL/L — HIGH (ref 7–14)
ANION GAP SERPL CALC-SCNC: 19 MMOL/L — HIGH (ref 7–14)
ANION GAP SERPL CALC-SCNC: 20 MMOL/L — HIGH (ref 7–14)
ANION GAP SERPL CALC-SCNC: 21 MMOL/L — HIGH (ref 7–14)
ANION GAP SERPL CALC-SCNC: 21 MMOL/L — HIGH (ref 7–14)
ANION GAP SERPL CALC-SCNC: 22 MMOL/L — HIGH (ref 7–14)
ANION GAP SERPL CALC-SCNC: 23 MMOL/L — HIGH (ref 7–14)
ANION GAP SERPL CALC-SCNC: 23 MMOL/L — HIGH (ref 7–14)
ANION GAP SERPL CALC-SCNC: 26 MMOL/L — HIGH (ref 7–14)
ANION GAP SERPL CALC-SCNC: 8 MMOL/L — SIGNIFICANT CHANGE UP (ref 7–14)
APPEARANCE UR: ABNORMAL
APTT BLD: 29.5 SEC — SIGNIFICANT CHANGE UP (ref 27–39.2)
APTT BLD: 30.4 SEC — SIGNIFICANT CHANGE UP (ref 27–39.2)
APTT BLD: 34 SEC — SIGNIFICANT CHANGE UP (ref 27–39.2)
APTT BLD: 37.4 SEC — SIGNIFICANT CHANGE UP (ref 27–39.2)
AST SERPL-CCNC: 10 U/L — SIGNIFICANT CHANGE UP (ref 0–41)
AST SERPL-CCNC: 11 U/L — SIGNIFICANT CHANGE UP (ref 0–41)
AST SERPL-CCNC: 13 U/L — SIGNIFICANT CHANGE UP (ref 0–41)
AST SERPL-CCNC: 13 U/L — SIGNIFICANT CHANGE UP (ref 0–41)
AST SERPL-CCNC: 14 U/L — SIGNIFICANT CHANGE UP (ref 0–41)
AST SERPL-CCNC: 15 U/L — SIGNIFICANT CHANGE UP (ref 0–41)
AST SERPL-CCNC: 15 U/L — SIGNIFICANT CHANGE UP (ref 0–41)
AST SERPL-CCNC: 19 U/L — SIGNIFICANT CHANGE UP (ref 0–41)
AST SERPL-CCNC: 23 U/L — SIGNIFICANT CHANGE UP (ref 0–41)
AST SERPL-CCNC: 32 U/L — SIGNIFICANT CHANGE UP (ref 0–41)
AST SERPL-CCNC: 66 U/L — HIGH (ref 0–41)
B-GLOBULIN SERPL ELPH-MCNC: 0.7 G/DL — SIGNIFICANT CHANGE UP (ref 0.5–1)
BACTERIA # UR AUTO: ABNORMAL
BASE EXCESS BLDA CALC-SCNC: -3.6 MMOL/L — LOW (ref -2–2)
BASE EXCESS BLDV CALC-SCNC: -15.6 MMOL/L — LOW (ref -2–2)
BASE EXCESS BLDV CALC-SCNC: -6.8 MMOL/L — LOW (ref -2–2)
BASOPHILS # BLD AUTO: 0 K/UL — SIGNIFICANT CHANGE UP (ref 0–0.2)
BASOPHILS # BLD AUTO: 0.02 K/UL — SIGNIFICANT CHANGE UP (ref 0–0.2)
BASOPHILS # BLD AUTO: 0.03 K/UL — SIGNIFICANT CHANGE UP (ref 0–0.2)
BASOPHILS # BLD AUTO: 0.04 K/UL — SIGNIFICANT CHANGE UP (ref 0–0.2)
BASOPHILS # BLD AUTO: 0.04 K/UL — SIGNIFICANT CHANGE UP (ref 0–0.2)
BASOPHILS # BLD AUTO: 0.05 K/UL — SIGNIFICANT CHANGE UP (ref 0–0.2)
BASOPHILS # BLD AUTO: 0.05 K/UL — SIGNIFICANT CHANGE UP (ref 0–0.2)
BASOPHILS # BLD AUTO: 0.06 K/UL — SIGNIFICANT CHANGE UP (ref 0–0.2)
BASOPHILS NFR BLD AUTO: 0 % — SIGNIFICANT CHANGE UP (ref 0–1)
BASOPHILS NFR BLD AUTO: 0.1 % — SIGNIFICANT CHANGE UP (ref 0–1)
BASOPHILS NFR BLD AUTO: 0.2 % — SIGNIFICANT CHANGE UP (ref 0–1)
BASOPHILS NFR BLD AUTO: 0.2 % — SIGNIFICANT CHANGE UP (ref 0–1)
BASOPHILS NFR BLD AUTO: 0.3 % — SIGNIFICANT CHANGE UP (ref 0–1)
BASOPHILS NFR BLD AUTO: 0.5 % — SIGNIFICANT CHANGE UP (ref 0–1)
BASOPHILS NFR BLD AUTO: 0.6 % — SIGNIFICANT CHANGE UP (ref 0–1)
BASOPHILS NFR BLD AUTO: 0.6 % — SIGNIFICANT CHANGE UP (ref 0–1)
BILIRUB SERPL-MCNC: 0.3 MG/DL — SIGNIFICANT CHANGE UP (ref 0.2–1.2)
BILIRUB SERPL-MCNC: 0.4 MG/DL — SIGNIFICANT CHANGE UP (ref 0.2–1.2)
BILIRUB SERPL-MCNC: 0.5 MG/DL — SIGNIFICANT CHANGE UP (ref 0.2–1.2)
BILIRUB SERPL-MCNC: 0.7 MG/DL — SIGNIFICANT CHANGE UP (ref 0.2–1.2)
BILIRUB UR-MCNC: NEGATIVE — SIGNIFICANT CHANGE UP
BLD GP AB SCN SERPL QL: SIGNIFICANT CHANGE UP
BLD GP AB SCN SERPL QL: SIGNIFICANT CHANGE UP
BUN SERPL-MCNC: 102 MG/DL — CRITICAL HIGH (ref 10–20)
BUN SERPL-MCNC: 102 MG/DL — CRITICAL HIGH (ref 10–20)
BUN SERPL-MCNC: 103 MG/DL — CRITICAL HIGH (ref 10–20)
BUN SERPL-MCNC: 103 MG/DL — CRITICAL HIGH (ref 10–20)
BUN SERPL-MCNC: 106 MG/DL — CRITICAL HIGH (ref 10–20)
BUN SERPL-MCNC: 112 MG/DL — CRITICAL HIGH (ref 10–20)
BUN SERPL-MCNC: 113 MG/DL — CRITICAL HIGH (ref 10–20)
BUN SERPL-MCNC: 113 MG/DL — CRITICAL HIGH (ref 10–20)
BUN SERPL-MCNC: 116 MG/DL — CRITICAL HIGH (ref 10–20)
BUN SERPL-MCNC: 117 MG/DL — CRITICAL HIGH (ref 10–20)
BUN SERPL-MCNC: 23 MG/DL — HIGH (ref 10–20)
BUN SERPL-MCNC: 24 MG/DL — HIGH (ref 10–20)
BUN SERPL-MCNC: 24 MG/DL — HIGH (ref 10–20)
BUN SERPL-MCNC: 25 MG/DL — HIGH (ref 10–20)
BUN SERPL-MCNC: 27 MG/DL — HIGH (ref 10–20)
BUN SERPL-MCNC: 28 MG/DL — HIGH (ref 10–20)
BUN SERPL-MCNC: 28 MG/DL — HIGH (ref 10–20)
BUN SERPL-MCNC: 30 MG/DL — HIGH (ref 10–20)
BUN SERPL-MCNC: 30 MG/DL — HIGH (ref 10–20)
BUN SERPL-MCNC: 31 MG/DL — HIGH (ref 10–20)
BUN SERPL-MCNC: 52 MG/DL — HIGH (ref 10–20)
BUN SERPL-MCNC: 55 MG/DL — HIGH (ref 10–20)
BUN SERPL-MCNC: 55 MG/DL — HIGH (ref 10–20)
BUN SERPL-MCNC: 56 MG/DL — HIGH (ref 10–20)
BUN SERPL-MCNC: 59 MG/DL — HIGH (ref 10–20)
BUN SERPL-MCNC: 63 MG/DL — CRITICAL HIGH (ref 10–20)
BUN SERPL-MCNC: 68 MG/DL — CRITICAL HIGH (ref 10–20)
BUN SERPL-MCNC: 74 MG/DL — CRITICAL HIGH (ref 10–20)
BUN SERPL-MCNC: 75 MG/DL — CRITICAL HIGH (ref 10–20)
BUN SERPL-MCNC: 98 MG/DL — CRITICAL HIGH (ref 10–20)
CA-I BLD-SCNC: 1.12 MMOL/L — SIGNIFICANT CHANGE UP (ref 1.12–1.3)
CA-I SERPL-SCNC: 0.88 MMOL/L — LOW (ref 1.12–1.3)
CA-I SERPL-SCNC: 0.95 MMOL/L — LOW (ref 1.12–1.3)
CALCIUM SERPL-MCNC: 5.6 MG/DL — CRITICAL LOW (ref 8.4–10.5)
CALCIUM SERPL-MCNC: 5.8 MG/DL — CRITICAL LOW (ref 8.5–10.1)
CALCIUM SERPL-MCNC: 6 MG/DL — LOW (ref 8.5–10.1)
CALCIUM SERPL-MCNC: 6.3 MG/DL — LOW (ref 8.5–10.1)
CALCIUM SERPL-MCNC: 6.3 MG/DL — LOW (ref 8.5–10.1)
CALCIUM SERPL-MCNC: 6.5 MG/DL — LOW (ref 8.5–10.1)
CALCIUM SERPL-MCNC: 6.6 MG/DL — LOW (ref 8.5–10.1)
CALCIUM SERPL-MCNC: 6.7 MG/DL — LOW (ref 8.5–10.1)
CALCIUM SERPL-MCNC: 6.9 MG/DL — LOW (ref 8.5–10.1)
CALCIUM SERPL-MCNC: 6.9 MG/DL — LOW (ref 8.5–10.1)
CALCIUM SERPL-MCNC: 7 MG/DL — LOW (ref 8.5–10.1)
CALCIUM SERPL-MCNC: 7.1 MG/DL — LOW (ref 8.5–10.1)
CALCIUM SERPL-MCNC: 7.4 MG/DL — LOW (ref 8.4–10.5)
CALCIUM SERPL-MCNC: 7.4 MG/DL — LOW (ref 8.5–10.1)
CALCIUM SERPL-MCNC: 7.5 MG/DL — LOW (ref 8.5–10.1)
CALCIUM SERPL-MCNC: 7.6 MG/DL — LOW (ref 8.4–10.5)
CALCIUM SERPL-MCNC: 7.6 MG/DL — LOW (ref 8.5–10.1)
CALCIUM SERPL-MCNC: 7.7 MG/DL — LOW (ref 8.5–10.1)
CALCIUM SERPL-MCNC: 8 MG/DL — LOW (ref 8.5–10.1)
CALCIUM SERPL-MCNC: 8.3 MG/DL — LOW (ref 8.5–10.1)
CALCIUM SERPL-MCNC: 8.4 MG/DL — LOW (ref 8.5–10.1)
CALCIUM SERPL-MCNC: 8.5 MG/DL — SIGNIFICANT CHANGE UP (ref 8.5–10.1)
CALCIUM SERPL-MCNC: 8.5 MG/DL — SIGNIFICANT CHANGE UP (ref 8.5–10.1)
CALCIUM SERPL-MCNC: 8.6 MG/DL — SIGNIFICANT CHANGE UP (ref 8.5–10.1)
CALCIUM SERPL-MCNC: 8.7 MG/DL — SIGNIFICANT CHANGE UP (ref 8.5–10.1)
CALCIUM SERPL-MCNC: 8.7 MG/DL — SIGNIFICANT CHANGE UP (ref 8.5–10.1)
CALCIUM SERPL-MCNC: 8.8 MG/DL — SIGNIFICANT CHANGE UP (ref 8.5–10.1)
CALCIUM SERPL-MCNC: 8.8 MG/DL — SIGNIFICANT CHANGE UP (ref 8.5–10.1)
CALCIUM SERPL-MCNC: 8.9 MG/DL — SIGNIFICANT CHANGE UP (ref 8.5–10.1)
CHLORIDE SERPL-SCNC: 100 MMOL/L — SIGNIFICANT CHANGE UP (ref 98–110)
CHLORIDE SERPL-SCNC: 101 MMOL/L — SIGNIFICANT CHANGE UP (ref 98–110)
CHLORIDE SERPL-SCNC: 101 MMOL/L — SIGNIFICANT CHANGE UP (ref 98–110)
CHLORIDE SERPL-SCNC: 102 MMOL/L — SIGNIFICANT CHANGE UP (ref 98–110)
CHLORIDE SERPL-SCNC: 103 MMOL/L — SIGNIFICANT CHANGE UP (ref 98–110)
CHLORIDE SERPL-SCNC: 104 MMOL/L — SIGNIFICANT CHANGE UP (ref 98–110)
CHLORIDE SERPL-SCNC: 105 MMOL/L — SIGNIFICANT CHANGE UP (ref 98–110)
CHLORIDE SERPL-SCNC: 106 MMOL/L — SIGNIFICANT CHANGE UP (ref 98–110)
CHLORIDE SERPL-SCNC: 107 MMOL/L — SIGNIFICANT CHANGE UP (ref 98–110)
CHLORIDE SERPL-SCNC: 110 MMOL/L — SIGNIFICANT CHANGE UP (ref 98–110)
CHLORIDE SERPL-SCNC: 83 MMOL/L — LOW (ref 98–110)
CHLORIDE SERPL-SCNC: 84 MMOL/L — LOW (ref 98–110)
CHLORIDE SERPL-SCNC: 85 MMOL/L — LOW (ref 98–110)
CHLORIDE SERPL-SCNC: 86 MMOL/L — LOW (ref 98–110)
CHLORIDE SERPL-SCNC: 87 MMOL/L — LOW (ref 98–110)
CHLORIDE SERPL-SCNC: 94 MMOL/L — LOW (ref 98–110)
CHLORIDE SERPL-SCNC: 94 MMOL/L — LOW (ref 98–110)
CHLORIDE SERPL-SCNC: 96 MMOL/L — LOW (ref 98–110)
CHOLESTEROL, TOTAL RESULT: 142 MG/DL — SIGNIFICANT CHANGE UP
CHOLESTEROL/HDL RATIO: 5.7 — HIGH
CK MB CFR SERPL CALC: 5.4 NG/ML — SIGNIFICANT CHANGE UP (ref 0.6–6.3)
CK MB CFR SERPL CALC: 6.3 NG/ML — SIGNIFICANT CHANGE UP (ref 0.6–6.3)
CK MB CFR SERPL CALC: 6.8 NG/ML — HIGH (ref 0.6–6.3)
CK MB CFR SERPL CALC: 6.8 NG/ML — HIGH (ref 0.6–6.3)
CK SERPL-CCNC: 104 U/L — SIGNIFICANT CHANGE UP (ref 0–225)
CK SERPL-CCNC: 112 U/L — SIGNIFICANT CHANGE UP (ref 0–225)
CK SERPL-CCNC: 332 U/L — HIGH (ref 0–225)
CK SERPL-CCNC: 517 U/L — HIGH (ref 0–225)
CK SERPL-CCNC: 96 U/L — SIGNIFICANT CHANGE UP (ref 0–225)
CO2 SERPL-SCNC: 11 MMOL/L — LOW (ref 17–32)
CO2 SERPL-SCNC: 12 MMOL/L — LOW (ref 17–32)
CO2 SERPL-SCNC: 15 MMOL/L — LOW (ref 17–32)
CO2 SERPL-SCNC: 16 MMOL/L — LOW (ref 17–32)
CO2 SERPL-SCNC: 16 MMOL/L — LOW (ref 17–32)
CO2 SERPL-SCNC: 17 MMOL/L — SIGNIFICANT CHANGE UP (ref 17–32)
CO2 SERPL-SCNC: 17 MMOL/L — SIGNIFICANT CHANGE UP (ref 17–32)
CO2 SERPL-SCNC: 18 MMOL/L — SIGNIFICANT CHANGE UP (ref 17–32)
CO2 SERPL-SCNC: 19 MMOL/L — SIGNIFICANT CHANGE UP (ref 17–32)
CO2 SERPL-SCNC: 20 MMOL/L — SIGNIFICANT CHANGE UP (ref 17–32)
CO2 SERPL-SCNC: 21 MMOL/L — SIGNIFICANT CHANGE UP (ref 17–32)
CO2 SERPL-SCNC: 21 MMOL/L — SIGNIFICANT CHANGE UP (ref 17–32)
CO2 SERPL-SCNC: 22 MMOL/L — SIGNIFICANT CHANGE UP (ref 17–32)
CO2 SERPL-SCNC: 23 MMOL/L — SIGNIFICANT CHANGE UP (ref 17–32)
CO2 SERPL-SCNC: 23 MMOL/L — SIGNIFICANT CHANGE UP (ref 17–32)
CO2 SERPL-SCNC: 24 MMOL/L — SIGNIFICANT CHANGE UP (ref 17–32)
CO2 SERPL-SCNC: 24 MMOL/L — SIGNIFICANT CHANGE UP (ref 17–32)
CO2 SERPL-SCNC: 25 MMOL/L — SIGNIFICANT CHANGE UP (ref 17–32)
CO2 SERPL-SCNC: 25 MMOL/L — SIGNIFICANT CHANGE UP (ref 17–32)
COD CRY URNS QL: NEGATIVE — SIGNIFICANT CHANGE UP
COLOR SPEC: YELLOW — SIGNIFICANT CHANGE UP
COMMENT - URINE: SIGNIFICANT CHANGE UP
CREAT ?TM UR-MCNC: 43 MG/DL — SIGNIFICANT CHANGE UP
CREAT ?TM UR-MCNC: 53 MG/DL — SIGNIFICANT CHANGE UP
CREAT ?TM UR-MCNC: 62 MG/DL — SIGNIFICANT CHANGE UP
CREAT SERPL-MCNC: 1.5 MG/DL — SIGNIFICANT CHANGE UP (ref 0.7–1.5)
CREAT SERPL-MCNC: 1.5 MG/DL — SIGNIFICANT CHANGE UP (ref 0.7–1.5)
CREAT SERPL-MCNC: 1.7 MG/DL — HIGH (ref 0.7–1.5)
CREAT SERPL-MCNC: 1.8 MG/DL — HIGH (ref 0.7–1.5)
CREAT SERPL-MCNC: 1.8 MG/DL — HIGH (ref 0.7–1.5)
CREAT SERPL-MCNC: 2 MG/DL — HIGH (ref 0.7–1.5)
CREAT SERPL-MCNC: 2.8 MG/DL — HIGH (ref 0.7–1.5)
CREAT SERPL-MCNC: 2.8 MG/DL — HIGH (ref 0.7–1.5)
CREAT SERPL-MCNC: 2.9 MG/DL — HIGH (ref 0.7–1.5)
CREAT SERPL-MCNC: 3 MG/DL — HIGH (ref 0.7–1.5)
CREAT SERPL-MCNC: 3.1 MG/DL — HIGH (ref 0.7–1.5)
CREAT SERPL-MCNC: 3.5 MG/DL — HIGH (ref 0.7–1.5)
CREAT SERPL-MCNC: 3.9 MG/DL — HIGH (ref 0.7–1.5)
CREAT SERPL-MCNC: 4.5 MG/DL — CRITICAL HIGH (ref 0.7–1.5)
CREAT SERPL-MCNC: 4.6 MG/DL — CRITICAL HIGH (ref 0.7–1.5)
CREAT SERPL-MCNC: 7.1 MG/DL — CRITICAL HIGH (ref 0.7–1.5)
CREAT SERPL-MCNC: 8.1 MG/DL — CRITICAL HIGH (ref 0.7–1.5)
CREAT SERPL-MCNC: 8.4 MG/DL — CRITICAL HIGH (ref 0.7–1.5)
CREAT SERPL-MCNC: 8.5 MG/DL — CRITICAL HIGH (ref 0.7–1.5)
CREAT SERPL-MCNC: 8.6 MG/DL — CRITICAL HIGH (ref 0.7–1.5)
CREAT SERPL-MCNC: 8.7 MG/DL — CRITICAL HIGH (ref 0.7–1.5)
CREAT SERPL-MCNC: 9 MG/DL — CRITICAL HIGH (ref 0.7–1.5)
CREAT SERPL-MCNC: 9.5 MG/DL — CRITICAL HIGH (ref 0.7–1.5)
CREAT SERPL-MCNC: 9.5 MG/DL — CRITICAL HIGH (ref 0.7–1.5)
CREAT SERPL-MCNC: 9.6 MG/DL — CRITICAL HIGH (ref 0.7–1.5)
CULTURE RESULTS: SIGNIFICANT CHANGE UP
DIFF PNL FLD: ABNORMAL
EOSINOPHIL # BLD AUTO: 0 K/UL — SIGNIFICANT CHANGE UP (ref 0–0.7)
EOSINOPHIL # BLD AUTO: 0 K/UL — SIGNIFICANT CHANGE UP (ref 0–0.7)
EOSINOPHIL # BLD AUTO: 0.02 K/UL — SIGNIFICANT CHANGE UP (ref 0–0.7)
EOSINOPHIL # BLD AUTO: 0.14 K/UL — SIGNIFICANT CHANGE UP (ref 0–0.7)
EOSINOPHIL # BLD AUTO: 0.47 K/UL — SIGNIFICANT CHANGE UP (ref 0–0.7)
EOSINOPHIL # BLD AUTO: 0.53 K/UL — SIGNIFICANT CHANGE UP (ref 0–0.7)
EOSINOPHIL # BLD AUTO: 0.6 K/UL — SIGNIFICANT CHANGE UP (ref 0–0.7)
EOSINOPHIL # BLD AUTO: 0.75 K/UL — HIGH (ref 0–0.7)
EOSINOPHIL # BLD AUTO: 0.79 K/UL — HIGH (ref 0–0.7)
EOSINOPHIL # BLD AUTO: 0.81 K/UL — HIGH (ref 0–0.7)
EOSINOPHIL NFR BLD AUTO: 0 % — SIGNIFICANT CHANGE UP (ref 0–8)
EOSINOPHIL NFR BLD AUTO: 0 % — SIGNIFICANT CHANGE UP (ref 0–8)
EOSINOPHIL NFR BLD AUTO: 0.1 % — SIGNIFICANT CHANGE UP (ref 0–8)
EOSINOPHIL NFR BLD AUTO: 0.6 % — SIGNIFICANT CHANGE UP (ref 0–8)
EOSINOPHIL NFR BLD AUTO: 5.5 % — SIGNIFICANT CHANGE UP (ref 0–8)
EOSINOPHIL NFR BLD AUTO: 5.7 % — SIGNIFICANT CHANGE UP (ref 0–8)
EOSINOPHIL NFR BLD AUTO: 7.5 % — SIGNIFICANT CHANGE UP (ref 0–8)
EOSINOPHIL NFR BLD AUTO: 7.7 % — SIGNIFICANT CHANGE UP (ref 0–8)
EOSINOPHIL NFR BLD AUTO: 7.8 % — SIGNIFICANT CHANGE UP (ref 0–8)
EOSINOPHIL NFR BLD AUTO: 9.5 % — HIGH (ref 0–8)
EOSINOPHIL NFR URNS MANUAL: POSITIVE
EPI CELLS # UR: ABNORMAL /HPF
EPI CELLS # UR: NEGATIVE — SIGNIFICANT CHANGE UP
ESTIMATED AVERAGE GLUCOSE: 123 MG/DL — HIGH (ref 68–114)
ESTIMATED AVERAGE GLUCOSE: 131 MG/DL — HIGH (ref 68–114)
FERRITIN SERPL-MCNC: 248 NG/ML
FERRITIN SERPL-MCNC: 549 NG/ML — HIGH (ref 30–400)
FERRITIN SERPL-MCNC: 635 NG/ML — HIGH (ref 30–400)
FOLATE SERPL-MCNC: 11.5 NG/ML — SIGNIFICANT CHANGE UP
FOLATE SERPL-MCNC: >20 NG/ML
GAMMA GLOBULIN: 0.8 G/DL — SIGNIFICANT CHANGE UP (ref 0.6–1.6)
GAS PNL BLDA: SIGNIFICANT CHANGE UP
GAS PNL BLDV: 133 MMOL/L — LOW (ref 136–145)
GAS PNL BLDV: 142 MMOL/L — SIGNIFICANT CHANGE UP (ref 136–145)
GAS PNL BLDV: SIGNIFICANT CHANGE UP
GAS PNL BLDV: SIGNIFICANT CHANGE UP
GLUCOSE BLDC GLUCOMTR-MCNC: 107 MG/DL — HIGH (ref 70–99)
GLUCOSE BLDC GLUCOMTR-MCNC: 113 MG/DL — HIGH (ref 70–99)
GLUCOSE BLDC GLUCOMTR-MCNC: 123 MG/DL — HIGH (ref 70–99)
GLUCOSE BLDC GLUCOMTR-MCNC: 128 MG/DL — HIGH (ref 70–99)
GLUCOSE BLDC GLUCOMTR-MCNC: 128 MG/DL — HIGH (ref 70–99)
GLUCOSE BLDC GLUCOMTR-MCNC: 129 MG/DL — HIGH (ref 70–99)
GLUCOSE BLDC GLUCOMTR-MCNC: 132 MG/DL — HIGH (ref 70–99)
GLUCOSE BLDC GLUCOMTR-MCNC: 135 MG/DL — HIGH (ref 70–99)
GLUCOSE BLDC GLUCOMTR-MCNC: 136 MG/DL — HIGH (ref 70–99)
GLUCOSE BLDC GLUCOMTR-MCNC: 138 MG/DL — HIGH (ref 70–99)
GLUCOSE BLDC GLUCOMTR-MCNC: 140 MG/DL — HIGH (ref 70–99)
GLUCOSE BLDC GLUCOMTR-MCNC: 141 MG/DL — HIGH (ref 70–99)
GLUCOSE BLDC GLUCOMTR-MCNC: 143 MG/DL — HIGH (ref 70–99)
GLUCOSE BLDC GLUCOMTR-MCNC: 144 MG/DL — HIGH (ref 70–99)
GLUCOSE BLDC GLUCOMTR-MCNC: 147 MG/DL — HIGH (ref 70–99)
GLUCOSE BLDC GLUCOMTR-MCNC: 148 MG/DL — HIGH (ref 70–99)
GLUCOSE BLDC GLUCOMTR-MCNC: 151 MG/DL — HIGH (ref 70–99)
GLUCOSE BLDC GLUCOMTR-MCNC: 152 MG/DL — HIGH (ref 70–99)
GLUCOSE BLDC GLUCOMTR-MCNC: 154 MG/DL — HIGH (ref 70–99)
GLUCOSE BLDC GLUCOMTR-MCNC: 157 MG/DL — HIGH (ref 70–99)
GLUCOSE BLDC GLUCOMTR-MCNC: 157 MG/DL — HIGH (ref 70–99)
GLUCOSE BLDC GLUCOMTR-MCNC: 158 MG/DL — HIGH (ref 70–99)
GLUCOSE BLDC GLUCOMTR-MCNC: 159 MG/DL — HIGH (ref 70–99)
GLUCOSE BLDC GLUCOMTR-MCNC: 161 MG/DL — HIGH (ref 70–99)
GLUCOSE BLDC GLUCOMTR-MCNC: 163 MG/DL — HIGH (ref 70–99)
GLUCOSE BLDC GLUCOMTR-MCNC: 164 MG/DL — HIGH (ref 70–99)
GLUCOSE BLDC GLUCOMTR-MCNC: 164 MG/DL — HIGH (ref 70–99)
GLUCOSE BLDC GLUCOMTR-MCNC: 165 MG/DL — HIGH (ref 70–99)
GLUCOSE BLDC GLUCOMTR-MCNC: 171 MG/DL — HIGH (ref 70–99)
GLUCOSE BLDC GLUCOMTR-MCNC: 173 MG/DL — HIGH (ref 70–99)
GLUCOSE BLDC GLUCOMTR-MCNC: 175 MG/DL — HIGH (ref 70–99)
GLUCOSE BLDC GLUCOMTR-MCNC: 178 MG/DL — HIGH (ref 70–99)
GLUCOSE BLDC GLUCOMTR-MCNC: 187 MG/DL — HIGH (ref 70–99)
GLUCOSE BLDC GLUCOMTR-MCNC: 188 MG/DL — HIGH (ref 70–99)
GLUCOSE BLDC GLUCOMTR-MCNC: 189 MG/DL — HIGH (ref 70–99)
GLUCOSE BLDC GLUCOMTR-MCNC: 190 MG/DL — HIGH (ref 70–99)
GLUCOSE BLDC GLUCOMTR-MCNC: 191 MG/DL — HIGH (ref 70–99)
GLUCOSE BLDC GLUCOMTR-MCNC: 192 MG/DL — HIGH (ref 70–99)
GLUCOSE BLDC GLUCOMTR-MCNC: 195 MG/DL — HIGH (ref 70–99)
GLUCOSE BLDC GLUCOMTR-MCNC: 195 MG/DL — HIGH (ref 70–99)
GLUCOSE BLDC GLUCOMTR-MCNC: 196 MG/DL — HIGH (ref 70–99)
GLUCOSE BLDC GLUCOMTR-MCNC: 198 MG/DL — HIGH (ref 70–99)
GLUCOSE BLDC GLUCOMTR-MCNC: 199 MG/DL — HIGH (ref 70–99)
GLUCOSE BLDC GLUCOMTR-MCNC: 202 MG/DL — HIGH (ref 70–99)
GLUCOSE BLDC GLUCOMTR-MCNC: 203 MG/DL — HIGH (ref 70–99)
GLUCOSE BLDC GLUCOMTR-MCNC: 207 MG/DL — HIGH (ref 70–99)
GLUCOSE BLDC GLUCOMTR-MCNC: 207 MG/DL — HIGH (ref 70–99)
GLUCOSE BLDC GLUCOMTR-MCNC: 209 MG/DL — HIGH (ref 70–99)
GLUCOSE BLDC GLUCOMTR-MCNC: 209 MG/DL — HIGH (ref 70–99)
GLUCOSE BLDC GLUCOMTR-MCNC: 210 MG/DL — HIGH (ref 70–99)
GLUCOSE BLDC GLUCOMTR-MCNC: 211 MG/DL — HIGH (ref 70–99)
GLUCOSE BLDC GLUCOMTR-MCNC: 214 MG/DL — HIGH (ref 70–99)
GLUCOSE BLDC GLUCOMTR-MCNC: 215 MG/DL — HIGH (ref 70–99)
GLUCOSE BLDC GLUCOMTR-MCNC: 216 MG/DL — HIGH (ref 70–99)
GLUCOSE BLDC GLUCOMTR-MCNC: 219 MG/DL — HIGH (ref 70–99)
GLUCOSE BLDC GLUCOMTR-MCNC: 220 MG/DL — HIGH (ref 70–99)
GLUCOSE BLDC GLUCOMTR-MCNC: 221 MG/DL — HIGH (ref 70–99)
GLUCOSE BLDC GLUCOMTR-MCNC: 222 MG/DL — HIGH (ref 70–99)
GLUCOSE BLDC GLUCOMTR-MCNC: 224 MG/DL — HIGH (ref 70–99)
GLUCOSE BLDC GLUCOMTR-MCNC: 226 MG/DL — HIGH (ref 70–99)
GLUCOSE BLDC GLUCOMTR-MCNC: 229 MG/DL — HIGH (ref 70–99)
GLUCOSE BLDC GLUCOMTR-MCNC: 230 MG/DL — HIGH (ref 70–99)
GLUCOSE BLDC GLUCOMTR-MCNC: 233 MG/DL — HIGH (ref 70–99)
GLUCOSE BLDC GLUCOMTR-MCNC: 235 MG/DL — HIGH (ref 70–99)
GLUCOSE BLDC GLUCOMTR-MCNC: 235 MG/DL — HIGH (ref 70–99)
GLUCOSE BLDC GLUCOMTR-MCNC: 236 MG/DL — HIGH (ref 70–99)
GLUCOSE BLDC GLUCOMTR-MCNC: 237 MG/DL — HIGH (ref 70–99)
GLUCOSE BLDC GLUCOMTR-MCNC: 239 MG/DL — HIGH (ref 70–99)
GLUCOSE BLDC GLUCOMTR-MCNC: 240 MG/DL — HIGH (ref 70–99)
GLUCOSE BLDC GLUCOMTR-MCNC: 240 MG/DL — HIGH (ref 70–99)
GLUCOSE BLDC GLUCOMTR-MCNC: 241 MG/DL — HIGH (ref 70–99)
GLUCOSE BLDC GLUCOMTR-MCNC: 245 MG/DL — HIGH (ref 70–99)
GLUCOSE BLDC GLUCOMTR-MCNC: 249 MG/DL — HIGH (ref 70–99)
GLUCOSE BLDC GLUCOMTR-MCNC: 249 MG/DL — HIGH (ref 70–99)
GLUCOSE BLDC GLUCOMTR-MCNC: 250 MG/DL — HIGH (ref 70–99)
GLUCOSE BLDC GLUCOMTR-MCNC: 253 MG/DL — HIGH (ref 70–99)
GLUCOSE BLDC GLUCOMTR-MCNC: 254 MG/DL — HIGH (ref 70–99)
GLUCOSE BLDC GLUCOMTR-MCNC: 258 MG/DL — HIGH (ref 70–99)
GLUCOSE BLDC GLUCOMTR-MCNC: 258 MG/DL — HIGH (ref 70–99)
GLUCOSE BLDC GLUCOMTR-MCNC: 259 MG/DL — HIGH (ref 70–99)
GLUCOSE BLDC GLUCOMTR-MCNC: 259 MG/DL — HIGH (ref 70–99)
GLUCOSE BLDC GLUCOMTR-MCNC: 260 MG/DL — HIGH (ref 70–99)
GLUCOSE BLDC GLUCOMTR-MCNC: 261 MG/DL — HIGH (ref 70–99)
GLUCOSE BLDC GLUCOMTR-MCNC: 261 MG/DL — HIGH (ref 70–99)
GLUCOSE BLDC GLUCOMTR-MCNC: 262 MG/DL — HIGH (ref 70–99)
GLUCOSE BLDC GLUCOMTR-MCNC: 263 MG/DL — HIGH (ref 70–99)
GLUCOSE BLDC GLUCOMTR-MCNC: 267 MG/DL — HIGH (ref 70–99)
GLUCOSE BLDC GLUCOMTR-MCNC: 273 MG/DL — HIGH (ref 70–99)
GLUCOSE BLDC GLUCOMTR-MCNC: 278 MG/DL — HIGH (ref 70–99)
GLUCOSE BLDC GLUCOMTR-MCNC: 286 MG/DL — HIGH (ref 70–99)
GLUCOSE BLDC GLUCOMTR-MCNC: 305 MG/DL — HIGH (ref 70–99)
GLUCOSE BLDC GLUCOMTR-MCNC: 305 MG/DL — HIGH (ref 70–99)
GLUCOSE BLDC GLUCOMTR-MCNC: 307 MG/DL — HIGH (ref 70–99)
GLUCOSE BLDC GLUCOMTR-MCNC: 310 MG/DL — HIGH (ref 70–99)
GLUCOSE BLDC GLUCOMTR-MCNC: 311 MG/DL — HIGH (ref 70–99)
GLUCOSE BLDC GLUCOMTR-MCNC: 312 MG/DL — HIGH (ref 70–99)
GLUCOSE BLDC GLUCOMTR-MCNC: 318 MG/DL — HIGH (ref 70–99)
GLUCOSE BLDC GLUCOMTR-MCNC: 318 MG/DL — HIGH (ref 70–99)
GLUCOSE BLDC GLUCOMTR-MCNC: 320 MG/DL — HIGH (ref 70–99)
GLUCOSE BLDC GLUCOMTR-MCNC: 338 MG/DL — HIGH (ref 70–99)
GLUCOSE BLDC GLUCOMTR-MCNC: 339 MG/DL — HIGH (ref 70–99)
GLUCOSE BLDC GLUCOMTR-MCNC: 345 MG/DL — HIGH (ref 70–99)
GLUCOSE BLDC GLUCOMTR-MCNC: 45 MG/DL — CRITICAL LOW (ref 70–99)
GLUCOSE BLDC GLUCOMTR-MCNC: 51 MG/DL — LOW (ref 70–99)
GLUCOSE BLDC GLUCOMTR-MCNC: 58 MG/DL — LOW (ref 70–99)
GLUCOSE BLDC GLUCOMTR-MCNC: 73 MG/DL — SIGNIFICANT CHANGE UP (ref 70–99)
GLUCOSE SERPL-MCNC: 117 MG/DL — HIGH (ref 70–99)
GLUCOSE SERPL-MCNC: 118 MG/DL — HIGH (ref 70–99)
GLUCOSE SERPL-MCNC: 122 MG/DL — HIGH (ref 70–99)
GLUCOSE SERPL-MCNC: 131 MG/DL — HIGH (ref 70–99)
GLUCOSE SERPL-MCNC: 136 MG/DL — HIGH (ref 70–99)
GLUCOSE SERPL-MCNC: 144 MG/DL — HIGH (ref 70–99)
GLUCOSE SERPL-MCNC: 147 MG/DL — HIGH (ref 70–99)
GLUCOSE SERPL-MCNC: 148 MG/DL — HIGH (ref 70–99)
GLUCOSE SERPL-MCNC: 149 MG/DL — HIGH (ref 70–99)
GLUCOSE SERPL-MCNC: 154 MG/DL — HIGH (ref 70–99)
GLUCOSE SERPL-MCNC: 155 MG/DL — HIGH (ref 70–99)
GLUCOSE SERPL-MCNC: 157 MG/DL — HIGH (ref 70–99)
GLUCOSE SERPL-MCNC: 158 MG/DL — HIGH (ref 70–99)
GLUCOSE SERPL-MCNC: 159 MG/DL — HIGH (ref 70–99)
GLUCOSE SERPL-MCNC: 162 MG/DL — HIGH (ref 70–99)
GLUCOSE SERPL-MCNC: 164 MG/DL — HIGH (ref 70–99)
GLUCOSE SERPL-MCNC: 169 MG/DL — HIGH (ref 70–99)
GLUCOSE SERPL-MCNC: 177 MG/DL — HIGH (ref 70–99)
GLUCOSE SERPL-MCNC: 177 MG/DL — HIGH (ref 70–99)
GLUCOSE SERPL-MCNC: 189 MG/DL — HIGH (ref 70–99)
GLUCOSE SERPL-MCNC: 216 MG/DL — HIGH (ref 70–99)
GLUCOSE SERPL-MCNC: 219 MG/DL — HIGH (ref 70–99)
GLUCOSE SERPL-MCNC: 233 MG/DL — HIGH (ref 70–99)
GLUCOSE SERPL-MCNC: 284 MG/DL — HIGH (ref 70–99)
GLUCOSE SERPL-MCNC: 289 MG/DL — HIGH (ref 70–99)
GLUCOSE SERPL-MCNC: 31 MG/DL — CRITICAL LOW (ref 70–99)
GLUCOSE SERPL-MCNC: 327 MG/DL — HIGH (ref 70–99)
GLUCOSE SERPL-MCNC: 75 MG/DL — SIGNIFICANT CHANGE UP (ref 70–99)
GLUCOSE SERPL-MCNC: 89 MG/DL — SIGNIFICANT CHANGE UP (ref 70–99)
GLUCOSE UR QL: NEGATIVE MG/DL — SIGNIFICANT CHANGE UP
GRAM STN FLD: SIGNIFICANT CHANGE UP
GRAN CASTS # UR COMP ASSIST: ABNORMAL /LPF
GRAN CASTS # UR COMP ASSIST: NEGATIVE — SIGNIFICANT CHANGE UP
HBA1C BLD-MCNC: 5.9 % — HIGH (ref 4–5.6)
HBV CORE AB SER-ACNC: SIGNIFICANT CHANGE UP
HBV CORE IGM SER-ACNC: SIGNIFICANT CHANGE UP
HBV SURFACE AB SER-ACNC: SIGNIFICANT CHANGE UP
HBV SURFACE AG SER-ACNC: SIGNIFICANT CHANGE UP
HCO3 BLDA-SCNC: 20 MMOL/L — LOW (ref 23–27)
HCO3 BLDV-SCNC: 11 MMOL/L — LOW (ref 22–29)
HCO3 BLDV-SCNC: 18 MMOL/L — LOW (ref 22–29)
HCT VFR BLD CALC: 20.3 % — LOW (ref 42–52)
HCT VFR BLD CALC: 21.5 % — LOW (ref 42–52)
HCT VFR BLD CALC: 21.5 % — LOW (ref 42–52)
HCT VFR BLD CALC: 21.9 % — LOW (ref 42–52)
HCT VFR BLD CALC: 22 % — LOW (ref 42–52)
HCT VFR BLD CALC: 22 % — LOW (ref 42–52)
HCT VFR BLD CALC: 22.5 % — LOW (ref 42–52)
HCT VFR BLD CALC: 22.7 % — LOW (ref 42–52)
HCT VFR BLD CALC: 23.4 % — LOW (ref 42–52)
HCT VFR BLD CALC: 23.7 % — LOW (ref 42–52)
HCT VFR BLD CALC: 24.6 % — LOW (ref 42–52)
HCT VFR BLD CALC: 24.8 % — LOW (ref 42–52)
HCT VFR BLD CALC: 25 % — LOW (ref 42–52)
HCT VFR BLD CALC: 25.3 % — LOW (ref 42–52)
HCT VFR BLD CALC: 25.6 % — LOW (ref 42–52)
HCT VFR BLD CALC: 25.7 % — LOW (ref 42–52)
HCT VFR BLD CALC: 26.2 % — LOW (ref 42–52)
HCT VFR BLD CALC: 27.2 % — LOW (ref 42–52)
HCT VFR BLD CALC: 29.4 % — LOW (ref 42–52)
HCT VFR BLD CALC: 29.7 % — LOW (ref 42–52)
HCT VFR BLD CALC: 29.9 % — LOW (ref 42–52)
HCT VFR BLD CALC: 30.1 % — LOW (ref 42–52)
HCT VFR BLD CALC: 30.2 %
HCT VFR BLD CALC: 32.1 % — LOW (ref 42–52)
HCT VFR BLD CALC: 32.2 % — LOW (ref 42–52)
HCT VFR BLD CALC: 32.3 % — LOW (ref 42–52)
HCT VFR BLD CALC: 32.5 % — LOW (ref 42–52)
HCT VFR BLD CALC: 33.1 % — LOW (ref 42–52)
HCT VFR BLDA CALC: 17.1 % — LOW (ref 34–44)
HCT VFR BLDA CALC: 27.3 % — LOW (ref 34–44)
HCV AB S/CO SERPL IA: 0.03 COI — SIGNIFICANT CHANGE UP
HCV AB S/CO SERPL IA: 0.04 COI — SIGNIFICANT CHANGE UP
HCV AB SERPL-IMP: SIGNIFICANT CHANGE UP
HCV AB SERPL-IMP: SIGNIFICANT CHANGE UP
HGB BLD CALC-MCNC: 5.6 G/DL — LOW (ref 14–18)
HGB BLD CALC-MCNC: 8.9 G/DL — LOW (ref 14–18)
HGB BLD-MCNC: 10 G/DL
HGB BLD-MCNC: 10.1 G/DL — LOW (ref 14–18)
HGB BLD-MCNC: 10.5 G/DL — LOW (ref 14–18)
HGB BLD-MCNC: 10.6 G/DL — LOW (ref 14–18)
HGB BLD-MCNC: 10.7 G/DL — LOW (ref 14–18)
HGB BLD-MCNC: 10.7 G/DL — LOW (ref 14–18)
HGB BLD-MCNC: 10.9 G/DL — LOW (ref 14–18)
HGB BLD-MCNC: 7 G/DL — LOW (ref 14–18)
HGB BLD-MCNC: 7.2 G/DL — LOW (ref 14–18)
HGB BLD-MCNC: 7.2 G/DL — LOW (ref 14–18)
HGB BLD-MCNC: 7.3 G/DL — LOW (ref 14–18)
HGB BLD-MCNC: 7.5 G/DL — LOW (ref 14–18)
HGB BLD-MCNC: 7.6 G/DL — LOW (ref 14–18)
HGB BLD-MCNC: 7.7 G/DL — LOW (ref 14–18)
HGB BLD-MCNC: 7.8 G/DL — LOW (ref 14–18)
HGB BLD-MCNC: 8 G/DL — LOW (ref 14–18)
HGB BLD-MCNC: 8.1 G/DL — LOW (ref 14–18)
HGB BLD-MCNC: 8.3 G/DL — LOW (ref 14–18)
HGB BLD-MCNC: 8.3 G/DL — LOW (ref 14–18)
HGB BLD-MCNC: 8.4 G/DL — LOW (ref 14–18)
HGB BLD-MCNC: 8.5 G/DL — LOW (ref 14–18)
HGB BLD-MCNC: 8.5 G/DL — LOW (ref 14–18)
HGB BLD-MCNC: 8.7 G/DL — LOW (ref 14–18)
HGB BLD-MCNC: 9.6 G/DL — LOW (ref 14–18)
HGB BLD-MCNC: 9.7 G/DL — LOW (ref 14–18)
HGB BLD-MCNC: 9.8 G/DL — LOW (ref 14–18)
HOROWITZ INDEX BLDA+IHG-RTO: 21 — SIGNIFICANT CHANGE UP
HOROWITZ INDEX BLDV+IHG-RTO: 21 — SIGNIFICANT CHANGE UP
HOROWITZ INDEX BLDV+IHG-RTO: 21 — SIGNIFICANT CHANGE UP
HYALINE CASTS # UR AUTO: NEGATIVE — SIGNIFICANT CHANGE UP
HYPOCHROMIA BLD QL: SLIGHT — SIGNIFICANT CHANGE UP
IMM GRANULOCYTES NFR BLD AUTO: 0.4 % — HIGH (ref 0.1–0.3)
IMM GRANULOCYTES NFR BLD AUTO: 0.4 % — HIGH (ref 0.1–0.3)
IMM GRANULOCYTES NFR BLD AUTO: 0.5 % — HIGH (ref 0.1–0.3)
IMM GRANULOCYTES NFR BLD AUTO: 0.5 % — HIGH (ref 0.1–0.3)
IMM GRANULOCYTES NFR BLD AUTO: 0.6 % — HIGH (ref 0.1–0.3)
IMM GRANULOCYTES NFR BLD AUTO: 1.7 % — HIGH (ref 0.1–0.3)
IMM GRANULOCYTES NFR BLD AUTO: 7.1 % — HIGH (ref 0.1–0.3)
IMM GRANULOCYTES NFR BLD AUTO: 7.2 % — HIGH (ref 0.1–0.3)
INR BLD: 1.26 RATIO — SIGNIFICANT CHANGE UP (ref 0.65–1.3)
INR BLD: 1.28 RATIO — SIGNIFICANT CHANGE UP (ref 0.65–1.3)
INR BLD: 1.31 RATIO — HIGH (ref 0.65–1.3)
INR BLD: 1.52 RATIO — HIGH (ref 0.65–1.3)
INTERPRETATION SERPL IFE-IMP: SIGNIFICANT CHANGE UP
IRON SATN MFR SERPL: 106 UG/DL — SIGNIFICANT CHANGE UP (ref 35–150)
IRON SATN MFR SERPL: 27 %
IRON SATN MFR SERPL: 41 % — SIGNIFICANT CHANGE UP (ref 15–50)
IRON SATN MFR SERPL: 43 % — SIGNIFICANT CHANGE UP (ref 15–50)
IRON SATN MFR SERPL: 57 % — HIGH (ref 15–50)
IRON SATN MFR SERPL: 61 UG/DL — SIGNIFICANT CHANGE UP (ref 35–150)
IRON SATN MFR SERPL: 63 UG/DL — SIGNIFICANT CHANGE UP (ref 35–150)
IRON SERPL-MCNC: 49 UG/DL
KAPPA LC SER QL IFE: 8.92 MG/DL — HIGH (ref 0.33–1.94)
KAPPA/LAMBDA FREE LIGHT CHAIN RATIO, SERUM: 1.14 RATIO — SIGNIFICANT CHANGE UP (ref 0.26–1.65)
KETONES UR-MCNC: ABNORMAL
KETONES UR-MCNC: ABNORMAL
KETONES UR-MCNC: NEGATIVE — SIGNIFICANT CHANGE UP
LACTATE BLDV-MCNC: 1.1 MMOL/L — SIGNIFICANT CHANGE UP (ref 0.5–1.6)
LACTATE BLDV-MCNC: 1.7 MMOL/L — HIGH (ref 0.5–1.6)
LACTATE SERPL-SCNC: 1 MMOL/L — SIGNIFICANT CHANGE UP (ref 0.7–2)
LACTATE SERPL-SCNC: 1.5 MMOL/L — SIGNIFICANT CHANGE UP (ref 0.7–2)
LACTATE SERPL-SCNC: 2.4 MMOL/L — HIGH (ref 0.7–2)
LACTATE SERPL-SCNC: <0.2 MMOL/L — LOW (ref 0.7–2)
LAMBDA LC SER QL IFE: 7.82 MG/DL — HIGH (ref 0.57–2.63)
LDL CHOLESTEROL, CALCULATED: 89 MG/DL — SIGNIFICANT CHANGE UP
LDL MEDIUM: 212 NMOL/L — SIGNIFICANT CHANGE UP (ref 122–498)
LEUKOCYTE ESTERASE UR-ACNC: ABNORMAL
LIDOCAIN IGE QN: 26 U/L — SIGNIFICANT CHANGE UP (ref 7–60)
LIDOCAIN IGE QN: 35 U/L — SIGNIFICANT CHANGE UP (ref 7–60)
LYMPHOCYTES # BLD AUTO: 0.58 K/UL — LOW (ref 1.2–3.4)
LYMPHOCYTES # BLD AUTO: 0.59 K/UL — LOW (ref 1.2–3.4)
LYMPHOCYTES # BLD AUTO: 0.72 K/UL — LOW (ref 1.2–3.4)
LYMPHOCYTES # BLD AUTO: 1.3 K/UL — SIGNIFICANT CHANGE UP (ref 1.2–3.4)
LYMPHOCYTES # BLD AUTO: 1.32 K/UL — SIGNIFICANT CHANGE UP (ref 1.2–3.4)
LYMPHOCYTES # BLD AUTO: 1.38 K/UL — SIGNIFICANT CHANGE UP (ref 1.2–3.4)
LYMPHOCYTES # BLD AUTO: 1.43 K/UL — SIGNIFICANT CHANGE UP (ref 1.2–3.4)
LYMPHOCYTES # BLD AUTO: 1.43 K/UL — SIGNIFICANT CHANGE UP (ref 1.2–3.4)
LYMPHOCYTES # BLD AUTO: 1.44 K/UL — SIGNIFICANT CHANGE UP (ref 1.2–3.4)
LYMPHOCYTES # BLD AUTO: 1.6 K/UL — SIGNIFICANT CHANGE UP (ref 1.2–3.4)
LYMPHOCYTES # BLD AUTO: 13.8 % — LOW (ref 20.5–51.1)
LYMPHOCYTES # BLD AUTO: 14.7 % — LOW (ref 20.5–51.1)
LYMPHOCYTES # BLD AUTO: 15.2 % — LOW (ref 20.5–51.1)
LYMPHOCYTES # BLD AUTO: 15.2 % — LOW (ref 20.5–51.1)
LYMPHOCYTES # BLD AUTO: 17.1 % — LOW (ref 20.5–51.1)
LYMPHOCYTES # BLD AUTO: 18.1 % — LOW (ref 20.5–51.1)
LYMPHOCYTES # BLD AUTO: 2 % — LOW (ref 20.5–51.1)
LYMPHOCYTES # BLD AUTO: 2.5 % — LOW (ref 20.5–51.1)
LYMPHOCYTES # BLD AUTO: 4.4 % — LOW (ref 20.5–51.1)
LYMPHOCYTES # BLD AUTO: 7.9 % — LOW (ref 20.5–51.1)
Lab: 1051 NMOL/L — SIGNIFICANT CHANGE UP (ref 732–2035)
Lab: 209.9 — LOW
Lab: 25 MG/DL — LOW
Lab: 272 NMOL/L — SIGNIFICANT CHANGE UP (ref 85–473)
Lab: 4276 NMOL/L — SIGNIFICANT CHANGE UP (ref 3382–9376)
Lab: 54 NMOL/L — SIGNIFICANT CHANGE UP
Lab: 78 MG/DL — SIGNIFICANT CHANGE UP
Lab: ABNORMAL
MAGNESIUM SERPL-MCNC: 0.9 MG/DL — LOW (ref 1.8–2.4)
MAGNESIUM SERPL-MCNC: 0.9 MG/DL — LOW (ref 1.8–2.4)
MAGNESIUM SERPL-MCNC: 1 MG/DL — LOW (ref 1.8–2.4)
MAGNESIUM SERPL-MCNC: 1.2 MG/DL — LOW (ref 1.8–2.4)
MAGNESIUM SERPL-MCNC: 1.3 MG/DL — LOW (ref 1.8–2.4)
MAGNESIUM SERPL-MCNC: 1.5 MG/DL — LOW (ref 1.8–2.4)
MAGNESIUM SERPL-MCNC: 1.6 MG/DL — LOW (ref 1.8–2.4)
MAGNESIUM SERPL-MCNC: 1.7 MG/DL — LOW (ref 1.8–2.4)
MAGNESIUM SERPL-MCNC: 1.8 MG/DL — SIGNIFICANT CHANGE UP (ref 1.8–2.4)
MAGNESIUM SERPL-MCNC: 1.9 MG/DL — SIGNIFICANT CHANGE UP (ref 1.8–2.4)
MAGNESIUM SERPL-MCNC: 2 MG/DL — SIGNIFICANT CHANGE UP (ref 1.8–2.4)
MANUAL DIF COMMENT BLD-IMP: SIGNIFICANT CHANGE UP
MANUAL SMEAR VERIFICATION: SIGNIFICANT CHANGE UP
MCHC RBC-ENTMCNC: 29.9 PG — SIGNIFICANT CHANGE UP (ref 27–31)
MCHC RBC-ENTMCNC: 30.1 PG — SIGNIFICANT CHANGE UP (ref 27–31)
MCHC RBC-ENTMCNC: 30.1 PG — SIGNIFICANT CHANGE UP (ref 27–31)
MCHC RBC-ENTMCNC: 30.2 PG — SIGNIFICANT CHANGE UP (ref 27–31)
MCHC RBC-ENTMCNC: 30.4 PG — SIGNIFICANT CHANGE UP (ref 27–31)
MCHC RBC-ENTMCNC: 30.4 PG — SIGNIFICANT CHANGE UP (ref 27–31)
MCHC RBC-ENTMCNC: 30.5 PG — SIGNIFICANT CHANGE UP (ref 27–31)
MCHC RBC-ENTMCNC: 30.5 PG — SIGNIFICANT CHANGE UP (ref 27–31)
MCHC RBC-ENTMCNC: 30.6 PG — SIGNIFICANT CHANGE UP (ref 27–31)
MCHC RBC-ENTMCNC: 30.6 PG — SIGNIFICANT CHANGE UP (ref 27–31)
MCHC RBC-ENTMCNC: 30.8 PG — SIGNIFICANT CHANGE UP (ref 27–31)
MCHC RBC-ENTMCNC: 31 PG
MCHC RBC-ENTMCNC: 31 PG — SIGNIFICANT CHANGE UP (ref 27–31)
MCHC RBC-ENTMCNC: 31.1 PG — HIGH (ref 27–31)
MCHC RBC-ENTMCNC: 31.2 PG — HIGH (ref 27–31)
MCHC RBC-ENTMCNC: 31.4 PG — HIGH (ref 27–31)
MCHC RBC-ENTMCNC: 31.6 PG — HIGH (ref 27–31)
MCHC RBC-ENTMCNC: 31.7 PG — HIGH (ref 27–31)
MCHC RBC-ENTMCNC: 31.7 PG — HIGH (ref 27–31)
MCHC RBC-ENTMCNC: 31.9 PG — HIGH (ref 27–31)
MCHC RBC-ENTMCNC: 32 G/DL — SIGNIFICANT CHANGE UP (ref 32–37)
MCHC RBC-ENTMCNC: 32 G/DL — SIGNIFICANT CHANGE UP (ref 32–37)
MCHC RBC-ENTMCNC: 32 PG — HIGH (ref 27–31)
MCHC RBC-ENTMCNC: 32.1 G/DL — SIGNIFICANT CHANGE UP (ref 32–37)
MCHC RBC-ENTMCNC: 32.1 G/DL — SIGNIFICANT CHANGE UP (ref 32–37)
MCHC RBC-ENTMCNC: 32.3 G/DL — SIGNIFICANT CHANGE UP (ref 32–37)
MCHC RBC-ENTMCNC: 32.3 G/DL — SIGNIFICANT CHANGE UP (ref 32–37)
MCHC RBC-ENTMCNC: 32.4 G/DL — SIGNIFICANT CHANGE UP (ref 32–37)
MCHC RBC-ENTMCNC: 32.5 G/DL — SIGNIFICANT CHANGE UP (ref 32–37)
MCHC RBC-ENTMCNC: 32.7 G/DL — SIGNIFICANT CHANGE UP (ref 32–37)
MCHC RBC-ENTMCNC: 32.7 G/DL — SIGNIFICANT CHANGE UP (ref 32–37)
MCHC RBC-ENTMCNC: 32.8 G/DL — SIGNIFICANT CHANGE UP (ref 32–37)
MCHC RBC-ENTMCNC: 32.9 G/DL — SIGNIFICANT CHANGE UP (ref 32–37)
MCHC RBC-ENTMCNC: 33.1 G/DL
MCHC RBC-ENTMCNC: 33.2 G/DL — SIGNIFICANT CHANGE UP (ref 32–37)
MCHC RBC-ENTMCNC: 33.2 G/DL — SIGNIFICANT CHANGE UP (ref 32–37)
MCHC RBC-ENTMCNC: 33.3 G/DL — SIGNIFICANT CHANGE UP (ref 32–37)
MCHC RBC-ENTMCNC: 33.5 G/DL — SIGNIFICANT CHANGE UP (ref 32–37)
MCHC RBC-ENTMCNC: 33.5 G/DL — SIGNIFICANT CHANGE UP (ref 32–37)
MCHC RBC-ENTMCNC: 33.6 G/DL — SIGNIFICANT CHANGE UP (ref 32–37)
MCHC RBC-ENTMCNC: 33.6 G/DL — SIGNIFICANT CHANGE UP (ref 32–37)
MCHC RBC-ENTMCNC: 33.9 G/DL — SIGNIFICANT CHANGE UP (ref 32–37)
MCHC RBC-ENTMCNC: 34 G/DL — SIGNIFICANT CHANGE UP (ref 32–37)
MCHC RBC-ENTMCNC: 34.1 G/DL — SIGNIFICANT CHANGE UP (ref 32–37)
MCHC RBC-ENTMCNC: 34.2 G/DL — SIGNIFICANT CHANGE UP (ref 32–37)
MCHC RBC-ENTMCNC: 34.5 G/DL — SIGNIFICANT CHANGE UP (ref 32–37)
MCV RBC AUTO: 90.3 FL — SIGNIFICANT CHANGE UP (ref 80–94)
MCV RBC AUTO: 90.7 FL — SIGNIFICANT CHANGE UP (ref 80–94)
MCV RBC AUTO: 90.8 FL — SIGNIFICANT CHANGE UP (ref 80–94)
MCV RBC AUTO: 90.9 FL — SIGNIFICANT CHANGE UP (ref 80–94)
MCV RBC AUTO: 91 FL — SIGNIFICANT CHANGE UP (ref 80–94)
MCV RBC AUTO: 91.1 FL — SIGNIFICANT CHANGE UP (ref 80–94)
MCV RBC AUTO: 91.4 FL — SIGNIFICANT CHANGE UP (ref 80–94)
MCV RBC AUTO: 91.6 FL — SIGNIFICANT CHANGE UP (ref 80–94)
MCV RBC AUTO: 92.4 FL — SIGNIFICANT CHANGE UP (ref 80–94)
MCV RBC AUTO: 92.8 FL — SIGNIFICANT CHANGE UP (ref 80–94)
MCV RBC AUTO: 92.9 FL — SIGNIFICANT CHANGE UP (ref 80–94)
MCV RBC AUTO: 93.4 FL — SIGNIFICANT CHANGE UP (ref 80–94)
MCV RBC AUTO: 93.5 FL
MCV RBC AUTO: 94 FL — SIGNIFICANT CHANGE UP (ref 80–94)
MCV RBC AUTO: 94.1 FL — HIGH (ref 80–94)
MCV RBC AUTO: 94.2 FL — HIGH (ref 80–94)
MCV RBC AUTO: 94.4 FL — HIGH (ref 80–94)
MCV RBC AUTO: 94.5 FL — HIGH (ref 80–94)
MCV RBC AUTO: 94.6 FL — HIGH (ref 80–94)
MCV RBC AUTO: 94.7 FL — HIGH (ref 80–94)
MCV RBC AUTO: 95 FL — HIGH (ref 80–94)
MCV RBC AUTO: 95.2 FL — HIGH (ref 80–94)
MCV RBC AUTO: 95.3 FL — HIGH (ref 80–94)
MCV RBC AUTO: 95.8 FL — HIGH (ref 80–94)
MCV RBC AUTO: 95.8 FL — HIGH (ref 80–94)
MCV RBC AUTO: 95.9 FL — HIGH (ref 80–94)
MCV RBC AUTO: 96.1 FL — HIGH (ref 80–94)
MCV RBC AUTO: 96.4 FL — HIGH (ref 80–94)
METHOD TYPE: SIGNIFICANT CHANGE UP
MONOCYTES # BLD AUTO: 0.32 K/UL — SIGNIFICANT CHANGE UP (ref 0.1–0.6)
MONOCYTES # BLD AUTO: 0.4 K/UL — SIGNIFICANT CHANGE UP (ref 0.1–0.6)
MONOCYTES # BLD AUTO: 0.64 K/UL — HIGH (ref 0.1–0.6)
MONOCYTES # BLD AUTO: 0.67 K/UL — HIGH (ref 0.1–0.6)
MONOCYTES # BLD AUTO: 0.69 K/UL — HIGH (ref 0.1–0.6)
MONOCYTES # BLD AUTO: 0.73 K/UL — HIGH (ref 0.1–0.6)
MONOCYTES # BLD AUTO: 0.79 K/UL — HIGH (ref 0.1–0.6)
MONOCYTES # BLD AUTO: 0.81 K/UL — HIGH (ref 0.1–0.6)
MONOCYTES # BLD AUTO: 0.84 K/UL — HIGH (ref 0.1–0.6)
MONOCYTES # BLD AUTO: 0.93 K/UL — HIGH (ref 0.1–0.6)
MONOCYTES NFR BLD AUTO: 1.4 % — LOW (ref 1.7–9.3)
MONOCYTES NFR BLD AUTO: 1.9 % — SIGNIFICANT CHANGE UP (ref 1.7–9.3)
MONOCYTES NFR BLD AUTO: 3 % — SIGNIFICANT CHANGE UP (ref 1.7–9.3)
MONOCYTES NFR BLD AUTO: 4.4 % — SIGNIFICANT CHANGE UP (ref 1.7–9.3)
MONOCYTES NFR BLD AUTO: 7.8 % — SIGNIFICANT CHANGE UP (ref 1.7–9.3)
MONOCYTES NFR BLD AUTO: 8 % — SIGNIFICANT CHANGE UP (ref 1.7–9.3)
MONOCYTES NFR BLD AUTO: 8.1 % — SIGNIFICANT CHANGE UP (ref 1.7–9.3)
MONOCYTES NFR BLD AUTO: 8.6 % — SIGNIFICANT CHANGE UP (ref 1.7–9.3)
MONOCYTES NFR BLD AUTO: 8.8 % — SIGNIFICANT CHANGE UP (ref 1.7–9.3)
MONOCYTES NFR BLD AUTO: 9.5 % — HIGH (ref 1.7–9.3)
NEUTROPHILS # BLD AUTO: 11.99 K/UL — HIGH (ref 1.4–6.5)
NEUTROPHILS # BLD AUTO: 15.74 K/UL — HIGH (ref 1.4–6.5)
NEUTROPHILS # BLD AUTO: 20.66 K/UL — HIGH (ref 1.4–6.5)
NEUTROPHILS # BLD AUTO: 31.86 K/UL — HIGH (ref 1.4–6.5)
NEUTROPHILS # BLD AUTO: 4.99 K/UL — SIGNIFICANT CHANGE UP (ref 1.4–6.5)
NEUTROPHILS # BLD AUTO: 5.04 K/UL — SIGNIFICANT CHANGE UP (ref 1.4–6.5)
NEUTROPHILS # BLD AUTO: 6.02 K/UL — SIGNIFICANT CHANGE UP (ref 1.4–6.5)
NEUTROPHILS # BLD AUTO: 6.56 K/UL — HIGH (ref 1.4–6.5)
NEUTROPHILS # BLD AUTO: 7.11 K/UL — HIGH (ref 1.4–6.5)
NEUTROPHILS # BLD AUTO: 7.27 K/UL — HIGH (ref 1.4–6.5)
NEUTROPHILS NFR BLD AUTO: 63.5 % — SIGNIFICANT CHANGE UP (ref 42.2–75.2)
NEUTROPHILS NFR BLD AUTO: 64.6 % — SIGNIFICANT CHANGE UP (ref 42.2–75.2)
NEUTROPHILS NFR BLD AUTO: 67.6 % — SIGNIFICANT CHANGE UP (ref 42.2–75.2)
NEUTROPHILS NFR BLD AUTO: 69.5 % — SIGNIFICANT CHANGE UP (ref 42.2–75.2)
NEUTROPHILS NFR BLD AUTO: 70.1 % — SIGNIFICANT CHANGE UP (ref 42.2–75.2)
NEUTROPHILS NFR BLD AUTO: 70.5 % — SIGNIFICANT CHANGE UP (ref 42.2–75.2)
NEUTROPHILS NFR BLD AUTO: 86.8 % — HIGH (ref 42.2–75.2)
NEUTROPHILS NFR BLD AUTO: 88.1 % — HIGH (ref 42.2–75.2)
NEUTROPHILS NFR BLD AUTO: 88.9 % — HIGH (ref 42.2–75.2)
NEUTROPHILS NFR BLD AUTO: 90.9 % — HIGH (ref 42.2–75.2)
NEUTS BAND # BLD: 6 % — SIGNIFICANT CHANGE UP (ref 0–6)
NITRITE UR-MCNC: NEGATIVE — SIGNIFICANT CHANGE UP
NON-HDL CHOLESTEROL: 117 — SIGNIFICANT CHANGE UP
NRBC # BLD: 0 /100 WBCS — SIGNIFICANT CHANGE UP (ref 0–0)
NRBC # BLD: 0 /100 — SIGNIFICANT CHANGE UP (ref 0–0)
ORGANISM # SPEC MICROSCOPIC CNT: SIGNIFICANT CHANGE UP
P AERUGINOSA DNA BLD POS NAA+NON-PROBE: SIGNIFICANT CHANGE UP
PCO2 BLDA: 33 MMHG — LOW (ref 38–42)
PCO2 BLDV: 28 MMHG — LOW (ref 41–51)
PCO2 BLDV: 35 MMHG — LOW (ref 41–51)
PH BLDA: 7.4 — SIGNIFICANT CHANGE UP (ref 7.38–7.42)
PH BLDV: 7.2 — LOW (ref 7.26–7.43)
PH BLDV: 7.33 — SIGNIFICANT CHANGE UP (ref 7.26–7.43)
PH UR: 5.5 — SIGNIFICANT CHANGE UP (ref 5–8)
PH UR: 5.5 — SIGNIFICANT CHANGE UP (ref 5–8)
PH UR: 6 — SIGNIFICANT CHANGE UP (ref 5–8)
PH UR: 6 — SIGNIFICANT CHANGE UP (ref 5–8)
PH UR: 7.5 — SIGNIFICANT CHANGE UP (ref 5–8)
PHOSPHATE SERPL-MCNC: 2.6 MG/DL — SIGNIFICANT CHANGE UP (ref 2.1–4.9)
PHOSPHATE SERPL-MCNC: 2.7 MG/DL — SIGNIFICANT CHANGE UP (ref 2.1–4.9)
PHOSPHATE SERPL-MCNC: 3 MG/DL — SIGNIFICANT CHANGE UP (ref 2.1–4.9)
PHOSPHATE SERPL-MCNC: 3.1 MG/DL — SIGNIFICANT CHANGE UP (ref 2.1–4.9)
PHOSPHATE SERPL-MCNC: 4.5 MG/DL — SIGNIFICANT CHANGE UP (ref 2.1–4.9)
PHOSPHATE SERPL-MCNC: 6.6 MG/DL — HIGH (ref 2.1–4.9)
PHOSPHATE SERPL-MCNC: 6.7 MG/DL — HIGH (ref 2.1–4.9)
PHOSPHATE SERPL-MCNC: 7 MG/DL — HIGH (ref 2.1–4.9)
PHOSPHATE SERPL-MCNC: 7.3 MG/DL — HIGH (ref 2.1–4.9)
PHOSPHATE SERPL-MCNC: 7.7 MG/DL — HIGH (ref 2.1–4.9)
PHOSPHATE SERPL-MCNC: 7.7 MG/DL — HIGH (ref 2.1–4.9)
PHOSPHATE SERPL-MCNC: 8 MG/DL — HIGH (ref 2.1–4.9)
PHOSPHATE SERPL-MCNC: 8.3 MG/DL — HIGH (ref 2.1–4.9)
PHOSPHATE SERPL-MCNC: 8.4 MG/DL — HIGH (ref 2.1–4.9)
PLAT MORPH BLD: NORMAL — SIGNIFICANT CHANGE UP
PLATELET # BLD AUTO: 164 K/UL — SIGNIFICANT CHANGE UP (ref 130–400)
PLATELET # BLD AUTO: 165 K/UL — SIGNIFICANT CHANGE UP (ref 130–400)
PLATELET # BLD AUTO: 167 K/UL — SIGNIFICANT CHANGE UP (ref 130–400)
PLATELET # BLD AUTO: 174 K/UL — SIGNIFICANT CHANGE UP (ref 130–400)
PLATELET # BLD AUTO: 185 K/UL — SIGNIFICANT CHANGE UP (ref 130–400)
PLATELET # BLD AUTO: 199 K/UL — SIGNIFICANT CHANGE UP (ref 130–400)
PLATELET # BLD AUTO: 213 K/UL — SIGNIFICANT CHANGE UP (ref 130–400)
PLATELET # BLD AUTO: 220 K/UL — SIGNIFICANT CHANGE UP (ref 130–400)
PLATELET # BLD AUTO: 237 K/UL — SIGNIFICANT CHANGE UP (ref 130–400)
PLATELET # BLD AUTO: 239 K/UL — SIGNIFICANT CHANGE UP (ref 130–400)
PLATELET # BLD AUTO: 247 K/UL — SIGNIFICANT CHANGE UP (ref 130–400)
PLATELET # BLD AUTO: 248 K/UL — SIGNIFICANT CHANGE UP (ref 130–400)
PLATELET # BLD AUTO: 258 K/UL — SIGNIFICANT CHANGE UP (ref 130–400)
PLATELET # BLD AUTO: 260 K/UL — SIGNIFICANT CHANGE UP (ref 130–400)
PLATELET # BLD AUTO: 262 K/UL — SIGNIFICANT CHANGE UP (ref 130–400)
PLATELET # BLD AUTO: 276 K/UL — SIGNIFICANT CHANGE UP (ref 130–400)
PLATELET # BLD AUTO: 276 K/UL — SIGNIFICANT CHANGE UP (ref 130–400)
PLATELET # BLD AUTO: 284 K/UL — SIGNIFICANT CHANGE UP (ref 130–400)
PLATELET # BLD AUTO: 285 K/UL — SIGNIFICANT CHANGE UP (ref 130–400)
PLATELET # BLD AUTO: 291 K/UL
PLATELET # BLD AUTO: 292 K/UL — SIGNIFICANT CHANGE UP (ref 130–400)
PLATELET # BLD AUTO: 293 K/UL — SIGNIFICANT CHANGE UP (ref 130–400)
PLATELET # BLD AUTO: 297 K/UL — SIGNIFICANT CHANGE UP (ref 130–400)
PLATELET # BLD AUTO: 306 K/UL — SIGNIFICANT CHANGE UP (ref 130–400)
PLATELET # BLD AUTO: 311 K/UL — SIGNIFICANT CHANGE UP (ref 130–400)
PLATELET # BLD AUTO: 331 K/UL — SIGNIFICANT CHANGE UP (ref 130–400)
PLATELET # BLD AUTO: 367 K/UL — SIGNIFICANT CHANGE UP (ref 130–400)
PLATELET # BLD AUTO: 478 K/UL — HIGH (ref 130–400)
PLATELET COUNT - ESTIMATE: NORMAL — SIGNIFICANT CHANGE UP
PMV BLD: 10.2 FL
PO2 BLDA: 51 MMHG — LOW (ref 78–95)
PO2 BLDV: 16 MMHG — LOW (ref 20–40)
PO2 BLDV: 28 MMHG — SIGNIFICANT CHANGE UP (ref 20–40)
POTASSIUM BLDV-SCNC: 4.3 MMOL/L — SIGNIFICANT CHANGE UP (ref 3.3–5.6)
POTASSIUM BLDV-SCNC: 4.9 MMOL/L — SIGNIFICANT CHANGE UP (ref 3.3–5.6)
POTASSIUM SERPL-MCNC: 3.9 MMOL/L — SIGNIFICANT CHANGE UP (ref 3.5–5)
POTASSIUM SERPL-MCNC: 3.9 MMOL/L — SIGNIFICANT CHANGE UP (ref 3.5–5)
POTASSIUM SERPL-MCNC: 4 MMOL/L — SIGNIFICANT CHANGE UP (ref 3.5–5)
POTASSIUM SERPL-MCNC: 4.1 MMOL/L — SIGNIFICANT CHANGE UP (ref 3.5–5)
POTASSIUM SERPL-MCNC: 4.1 MMOL/L — SIGNIFICANT CHANGE UP (ref 3.5–5)
POTASSIUM SERPL-MCNC: 4.2 MMOL/L — SIGNIFICANT CHANGE UP (ref 3.5–5)
POTASSIUM SERPL-MCNC: 4.3 MMOL/L — SIGNIFICANT CHANGE UP (ref 3.5–5)
POTASSIUM SERPL-MCNC: 4.4 MMOL/L — SIGNIFICANT CHANGE UP (ref 3.5–5)
POTASSIUM SERPL-MCNC: 4.8 MMOL/L — SIGNIFICANT CHANGE UP (ref 3.5–5)
POTASSIUM SERPL-MCNC: 4.9 MMOL/L — SIGNIFICANT CHANGE UP (ref 3.5–5)
POTASSIUM SERPL-MCNC: 4.9 MMOL/L — SIGNIFICANT CHANGE UP (ref 3.5–5)
POTASSIUM SERPL-MCNC: 5 MMOL/L — SIGNIFICANT CHANGE UP (ref 3.5–5)
POTASSIUM SERPL-MCNC: 5.1 MMOL/L — HIGH (ref 3.5–5)
POTASSIUM SERPL-SCNC: 3.9 MMOL/L — SIGNIFICANT CHANGE UP (ref 3.5–5)
POTASSIUM SERPL-SCNC: 3.9 MMOL/L — SIGNIFICANT CHANGE UP (ref 3.5–5)
POTASSIUM SERPL-SCNC: 4 MMOL/L — SIGNIFICANT CHANGE UP (ref 3.5–5)
POTASSIUM SERPL-SCNC: 4.1 MMOL/L — SIGNIFICANT CHANGE UP (ref 3.5–5)
POTASSIUM SERPL-SCNC: 4.1 MMOL/L — SIGNIFICANT CHANGE UP (ref 3.5–5)
POTASSIUM SERPL-SCNC: 4.2 MMOL/L — SIGNIFICANT CHANGE UP (ref 3.5–5)
POTASSIUM SERPL-SCNC: 4.3 MMOL/L — SIGNIFICANT CHANGE UP (ref 3.5–5)
POTASSIUM SERPL-SCNC: 4.4 MMOL/L — SIGNIFICANT CHANGE UP (ref 3.5–5)
POTASSIUM SERPL-SCNC: 4.8 MMOL/L — SIGNIFICANT CHANGE UP (ref 3.5–5)
POTASSIUM SERPL-SCNC: 4.9 MMOL/L — SIGNIFICANT CHANGE UP (ref 3.5–5)
POTASSIUM SERPL-SCNC: 4.9 MMOL/L — SIGNIFICANT CHANGE UP (ref 3.5–5)
POTASSIUM SERPL-SCNC: 5 MMOL/L — SIGNIFICANT CHANGE UP (ref 3.5–5)
POTASSIUM SERPL-SCNC: 5.1 MMOL/L — HIGH (ref 3.5–5)
POTASSIUM UR-SCNC: 14 MMOL/L — SIGNIFICANT CHANGE UP
PROT ?TM UR-MCNC: 125 MG/DL — HIGH (ref 0–12)
PROT ?TM UR-MCNC: 125 MG/DLG/24H — SIGNIFICANT CHANGE UP
PROT PATTERN SERPL ELPH-IMP: SIGNIFICANT CHANGE UP
PROT SERPL-MCNC: 5.2 G/DL — LOW (ref 6–8)
PROT SERPL-MCNC: 5.3 G/DL — LOW (ref 6–8)
PROT SERPL-MCNC: 5.4 G/DL — LOW (ref 6–8.3)
PROT SERPL-MCNC: 5.4 G/DL — LOW (ref 6–8.3)
PROT SERPL-MCNC: 5.5 G/DL — LOW (ref 6–8)
PROT SERPL-MCNC: 5.6 G/DL — LOW (ref 6–8)
PROT SERPL-MCNC: 5.6 G/DL — LOW (ref 6–8)
PROT SERPL-MCNC: 5.7 G/DL — LOW (ref 6–8)
PROT SERPL-MCNC: 6 G/DL — SIGNIFICANT CHANGE UP (ref 6–8)
PROT SERPL-MCNC: 6.2 G/DL — SIGNIFICANT CHANGE UP (ref 6–8)
PROT SERPL-MCNC: 6.2 G/DL — SIGNIFICANT CHANGE UP (ref 6–8)
PROT SERPL-MCNC: 6.3 G/DL — SIGNIFICANT CHANGE UP (ref 6–8)
PROT SERPL-MCNC: 6.3 G/DL — SIGNIFICANT CHANGE UP (ref 6–8)
PROT SERPL-MCNC: 6.5 G/DL — SIGNIFICANT CHANGE UP (ref 6–8)
PROT SERPL-MCNC: 6.6 G/DL — SIGNIFICANT CHANGE UP (ref 6–8)
PROT UR-MCNC: 100 MG/DL
PROT UR-MCNC: >=300 MG/DL
PROT/CREAT UR-RTO: 2 RATIO — HIGH (ref 0–0.2)
PROTHROM AB SERPL-ACNC: 14.5 SEC — HIGH (ref 9.95–12.87)
PROTHROM AB SERPL-ACNC: 14.7 SEC — HIGH (ref 9.95–12.87)
PROTHROM AB SERPL-ACNC: 15 SEC — HIGH (ref 9.95–12.87)
PROTHROM AB SERPL-ACNC: 17.5 SEC — HIGH (ref 9.95–12.87)
PTH-INTACT FLD-MCNC: 101 PG/ML — HIGH (ref 15–65)
PTH-INTACT FLD-MCNC: 311 PG/ML — HIGH (ref 15–65)
PTH-INTACT FLD-MCNC: 79 PG/ML — HIGH (ref 15–65)
RBC # BLD: 2.23 M/UL — LOW (ref 4.7–6.1)
RBC # BLD: 2.36 M/UL — LOW (ref 4.7–6.1)
RBC # BLD: 2.37 M/UL — LOW (ref 4.7–6.1)
RBC # BLD: 2.39 M/UL — LOW (ref 4.7–6.1)
RBC # BLD: 2.42 M/UL — LOW (ref 4.7–6.1)
RBC # BLD: 2.5 M/UL — LOW (ref 4.7–6.1)
RBC # BLD: 2.52 M/UL — LOW (ref 4.7–6.1)
RBC # BLD: 2.58 M/UL — LOW (ref 4.7–6.1)
RBC # BLD: 2.59 M/UL — LOW (ref 4.7–6.1)
RBC # BLD: 2.62 M/UL — LOW (ref 4.7–6.1)
RBC # BLD: 2.71 M/UL — LOW (ref 4.7–6.1)
RBC # BLD: 2.72 M/UL — LOW (ref 4.7–6.1)
RBC # BLD: 2.73 M/UL — LOW (ref 4.7–6.1)
RBC # BLD: 2.8 M/UL — LOW (ref 4.7–6.1)
RBC # BLD: 2.82 M/UL — LOW (ref 4.7–6.1)
RBC # BLD: 2.84 M/UL — LOW (ref 4.7–6.1)
RBC # BLD: 3.09 M/UL — LOW (ref 4.7–6.1)
RBC # BLD: 3.12 M/UL — LOW (ref 4.7–6.1)
RBC # BLD: 3.14 M/UL — LOW (ref 4.7–6.1)
RBC # BLD: 3.19 M/UL — LOW (ref 4.7–6.1)
RBC # BLD: 3.23 M/UL
RBC # BLD: 3.34 M/UL — LOW (ref 4.7–6.1)
RBC # BLD: 3.35 M/UL — LOW (ref 4.7–6.1)
RBC # BLD: 3.39 M/UL — LOW (ref 4.7–6.1)
RBC # BLD: 3.4 M/UL — LOW (ref 4.7–6.1)
RBC # BLD: 3.5 M/UL — LOW (ref 4.7–6.1)
RBC # FLD: 12.8 % — SIGNIFICANT CHANGE UP (ref 11.5–14.5)
RBC # FLD: 12.9 %
RBC # FLD: 13.1 % — SIGNIFICANT CHANGE UP (ref 11.5–14.5)
RBC # FLD: 13.2 % — SIGNIFICANT CHANGE UP (ref 11.5–14.5)
RBC # FLD: 13.3 % — SIGNIFICANT CHANGE UP (ref 11.5–14.5)
RBC # FLD: 13.3 % — SIGNIFICANT CHANGE UP (ref 11.5–14.5)
RBC # FLD: 13.4 % — SIGNIFICANT CHANGE UP (ref 11.5–14.5)
RBC # FLD: 13.5 % — SIGNIFICANT CHANGE UP (ref 11.5–14.5)
RBC # FLD: 13.5 % — SIGNIFICANT CHANGE UP (ref 11.5–14.5)
RBC # FLD: 13.6 % — SIGNIFICANT CHANGE UP (ref 11.5–14.5)
RBC # FLD: 13.6 % — SIGNIFICANT CHANGE UP (ref 11.5–14.5)
RBC # FLD: 13.7 % — SIGNIFICANT CHANGE UP (ref 11.5–14.5)
RBC # FLD: 13.7 % — SIGNIFICANT CHANGE UP (ref 11.5–14.5)
RBC # FLD: 13.8 % — SIGNIFICANT CHANGE UP (ref 11.5–14.5)
RBC # FLD: 14.1 % — SIGNIFICANT CHANGE UP (ref 11.5–14.5)
RBC # FLD: 14.3 % — SIGNIFICANT CHANGE UP (ref 11.5–14.5)
RBC BLD AUTO: NORMAL — SIGNIFICANT CHANGE UP
RBC CASTS # UR COMP ASSIST: ABNORMAL /HPF
RBC CASTS # UR COMP ASSIST: SIGNIFICANT CHANGE UP /HPF
SAO2 % BLDA: 83 % — LOW (ref 94–98)
SAO2 % BLDV: 18 % — SIGNIFICANT CHANGE UP
SAO2 % BLDV: 42 % — SIGNIFICANT CHANGE UP
SARS-COV-2 IGG SERPL QL IA: NEGATIVE — SIGNIFICANT CHANGE UP
SARS-COV-2 IGM SERPL IA-ACNC: 0.09 INDEX — SIGNIFICANT CHANGE UP
SARS-COV-2 RNA SPEC QL NAA+PROBE: SIGNIFICANT CHANGE UP
SARS-COV-2 RNA SPEC QL NAA+PROBE: SIGNIFICANT CHANGE UP
SODIUM SERPL-SCNC: 122 MMOL/L — LOW (ref 135–146)
SODIUM SERPL-SCNC: 123 MMOL/L — LOW (ref 135–146)
SODIUM SERPL-SCNC: 124 MMOL/L — LOW (ref 135–146)
SODIUM SERPL-SCNC: 126 MMOL/L — LOW (ref 135–146)
SODIUM SERPL-SCNC: 128 MMOL/L — LOW (ref 135–146)
SODIUM SERPL-SCNC: 131 MMOL/L — LOW (ref 135–146)
SODIUM SERPL-SCNC: 134 MMOL/L — LOW (ref 135–146)
SODIUM SERPL-SCNC: 135 MMOL/L — SIGNIFICANT CHANGE UP (ref 135–146)
SODIUM SERPL-SCNC: 137 MMOL/L — SIGNIFICANT CHANGE UP (ref 135–146)
SODIUM SERPL-SCNC: 138 MMOL/L — SIGNIFICANT CHANGE UP (ref 135–146)
SODIUM SERPL-SCNC: 139 MMOL/L — SIGNIFICANT CHANGE UP (ref 135–146)
SODIUM SERPL-SCNC: 139 MMOL/L — SIGNIFICANT CHANGE UP (ref 135–146)
SODIUM SERPL-SCNC: 140 MMOL/L — SIGNIFICANT CHANGE UP (ref 135–146)
SODIUM SERPL-SCNC: 141 MMOL/L — SIGNIFICANT CHANGE UP (ref 135–146)
SODIUM SERPL-SCNC: 141 MMOL/L — SIGNIFICANT CHANGE UP (ref 135–146)
SODIUM UR-SCNC: 34 MMOL/L — SIGNIFICANT CHANGE UP
SODIUM UR-SCNC: 44 MMOL/L — SIGNIFICANT CHANGE UP
SP GR SPEC: 1.02 — SIGNIFICANT CHANGE UP (ref 1.01–1.03)
SPECIMEN SOURCE: SIGNIFICANT CHANGE UP
TIBC SERPL-MCNC: 142 UG/DL — LOW (ref 220–430)
TIBC SERPL-MCNC: 154 UG/DL — LOW (ref 220–430)
TIBC SERPL-MCNC: 179 UG/DL
TIBC SERPL-MCNC: 186 UG/DL — LOW (ref 220–430)
TRI-PHOS CRY UR QL COMP ASSIST: NEGATIVE — SIGNIFICANT CHANGE UP
TRIGLYCERIDES RESULT: 190 MG/DL — HIGH
TROPONIN T SERPL-MCNC: 0.02 NG/ML — HIGH
TROPONIN T SERPL-MCNC: 0.03 NG/ML — CRITICAL HIGH
TROPONIN T SERPL-MCNC: 0.03 NG/ML — CRITICAL HIGH
TROPONIN T SERPL-MCNC: 0.4 NG/ML — CRITICAL HIGH
TROPONIN T SERPL-MCNC: 0.47 NG/ML — CRITICAL HIGH
TROPONIN T SERPL-MCNC: 0.86 NG/ML — CRITICAL HIGH
TROPONIN T SERPL-MCNC: 0.87 NG/ML — CRITICAL HIGH
TROPONIN T SERPL-MCNC: 0.91 NG/ML — CRITICAL HIGH
TSH SERPL-MCNC: 2.12 UIU/ML — SIGNIFICANT CHANGE UP (ref 0.27–4.2)
UIBC SERPL-MCNC: 130 UG/DL
UIBC SERPL-MCNC: 80 UG/DL — LOW (ref 110–370)
UIBC SERPL-MCNC: 81 UG/DL — LOW (ref 110–370)
UIBC SERPL-MCNC: 91 UG/DL — LOW (ref 110–370)
URATE CRY FLD QL MICRO: NEGATIVE — SIGNIFICANT CHANGE UP
UROBILINOGEN FLD QL: 0.2 MG/DL — SIGNIFICANT CHANGE UP (ref 0.2–0.2)
VIT B12 SERPL-MCNC: 1042 PG/ML
VIT B12 SERPL-MCNC: 1215 PG/ML — SIGNIFICANT CHANGE UP (ref 232–1245)
VIT D25+D1,25 OH+D1,25 PNL SERPL-MCNC: 20 PG/ML — SIGNIFICANT CHANGE UP (ref 19.9–79.3)
WBC # BLD: 10.46 K/UL — SIGNIFICANT CHANGE UP (ref 4.8–10.8)
WBC # BLD: 10.52 K/UL — SIGNIFICANT CHANGE UP (ref 4.8–10.8)
WBC # BLD: 11.66 K/UL — HIGH (ref 4.8–10.8)
WBC # BLD: 12.06 K/UL — HIGH (ref 4.8–10.8)
WBC # BLD: 12.34 K/UL — HIGH (ref 4.8–10.8)
WBC # BLD: 12.64 K/UL — HIGH (ref 4.8–10.8)
WBC # BLD: 13.19 K/UL — HIGH (ref 4.8–10.8)
WBC # BLD: 13.84 K/UL — HIGH (ref 4.8–10.8)
WBC # BLD: 14.52 K/UL — HIGH (ref 4.8–10.8)
WBC # BLD: 15.97 K/UL — HIGH (ref 4.8–10.8)
WBC # BLD: 18.11 K/UL — HIGH (ref 4.8–10.8)
WBC # BLD: 23.45 K/UL — HIGH (ref 4.8–10.8)
WBC # BLD: 35.86 K/UL — HIGH (ref 4.8–10.8)
WBC # BLD: 4.86 K/UL — SIGNIFICANT CHANGE UP (ref 4.8–10.8)
WBC # BLD: 5.16 K/UL — SIGNIFICANT CHANGE UP (ref 4.8–10.8)
WBC # BLD: 5.62 K/UL — SIGNIFICANT CHANGE UP (ref 4.8–10.8)
WBC # BLD: 5.9 K/UL — SIGNIFICANT CHANGE UP (ref 4.8–10.8)
WBC # BLD: 6.96 K/UL — SIGNIFICANT CHANGE UP (ref 4.8–10.8)
WBC # BLD: 7.7 K/UL — SIGNIFICANT CHANGE UP (ref 4.8–10.8)
WBC # BLD: 7.72 K/UL — SIGNIFICANT CHANGE UP (ref 4.8–10.8)
WBC # BLD: 7.92 K/UL — SIGNIFICANT CHANGE UP (ref 4.8–10.8)
WBC # BLD: 8.2 K/UL — SIGNIFICANT CHANGE UP (ref 4.8–10.8)
WBC # BLD: 8.49 K/UL — SIGNIFICANT CHANGE UP (ref 4.8–10.8)
WBC # BLD: 8.54 K/UL — SIGNIFICANT CHANGE UP (ref 4.8–10.8)
WBC # BLD: 8.88 K/UL — SIGNIFICANT CHANGE UP (ref 4.8–10.8)
WBC # BLD: 9.37 K/UL — SIGNIFICANT CHANGE UP (ref 4.8–10.8)
WBC # BLD: 9.73 K/UL — SIGNIFICANT CHANGE UP (ref 4.8–10.8)
WBC # FLD AUTO: 10.46 K/UL — SIGNIFICANT CHANGE UP (ref 4.8–10.8)
WBC # FLD AUTO: 10.52 K/UL — SIGNIFICANT CHANGE UP (ref 4.8–10.8)
WBC # FLD AUTO: 11.66 K/UL — HIGH (ref 4.8–10.8)
WBC # FLD AUTO: 12.06 K/UL — HIGH (ref 4.8–10.8)
WBC # FLD AUTO: 12.34 K/UL — HIGH (ref 4.8–10.8)
WBC # FLD AUTO: 12.64 K/UL — HIGH (ref 4.8–10.8)
WBC # FLD AUTO: 13.19 K/UL — HIGH (ref 4.8–10.8)
WBC # FLD AUTO: 13.84 K/UL — HIGH (ref 4.8–10.8)
WBC # FLD AUTO: 14.52 K/UL — HIGH (ref 4.8–10.8)
WBC # FLD AUTO: 15.97 K/UL — HIGH (ref 4.8–10.8)
WBC # FLD AUTO: 18.11 K/UL — HIGH (ref 4.8–10.8)
WBC # FLD AUTO: 23.45 K/UL — HIGH (ref 4.8–10.8)
WBC # FLD AUTO: 35.86 K/UL — HIGH (ref 4.8–10.8)
WBC # FLD AUTO: 4.86 K/UL — SIGNIFICANT CHANGE UP (ref 4.8–10.8)
WBC # FLD AUTO: 5.16 K/UL — SIGNIFICANT CHANGE UP (ref 4.8–10.8)
WBC # FLD AUTO: 5.62 K/UL — SIGNIFICANT CHANGE UP (ref 4.8–10.8)
WBC # FLD AUTO: 5.9 K/UL — SIGNIFICANT CHANGE UP (ref 4.8–10.8)
WBC # FLD AUTO: 6.96 K/UL — SIGNIFICANT CHANGE UP (ref 4.8–10.8)
WBC # FLD AUTO: 7.7 K/UL — SIGNIFICANT CHANGE UP (ref 4.8–10.8)
WBC # FLD AUTO: 7.72 K/UL — SIGNIFICANT CHANGE UP (ref 4.8–10.8)
WBC # FLD AUTO: 7.92 K/UL — SIGNIFICANT CHANGE UP (ref 4.8–10.8)
WBC # FLD AUTO: 8.2 K/UL — SIGNIFICANT CHANGE UP (ref 4.8–10.8)
WBC # FLD AUTO: 8.49 K/UL — SIGNIFICANT CHANGE UP (ref 4.8–10.8)
WBC # FLD AUTO: 8.54 K/UL — SIGNIFICANT CHANGE UP (ref 4.8–10.8)
WBC # FLD AUTO: 8.88 K/UL — SIGNIFICANT CHANGE UP (ref 4.8–10.8)
WBC # FLD AUTO: 8.91 K/UL
WBC # FLD AUTO: 9.37 K/UL — SIGNIFICANT CHANGE UP (ref 4.8–10.8)
WBC # FLD AUTO: 9.73 K/UL — SIGNIFICANT CHANGE UP (ref 4.8–10.8)
WBC UR QL: >50 /HPF
WBC UR QL: >50 /HPF
WBC UR QL: ABNORMAL /HPF
WBC UR QL: ABNORMAL /HPF
WBC UR QL: SIGNIFICANT CHANGE UP /HPF

## 2020-01-01 PROCEDURE — 72170 X-RAY EXAM OF PELVIS: CPT | Mod: 26

## 2020-01-01 PROCEDURE — 99291 CRITICAL CARE FIRST HOUR: CPT | Mod: CS,25

## 2020-01-01 PROCEDURE — 99285 EMERGENCY DEPT VISIT HI MDM: CPT | Mod: CS

## 2020-01-01 PROCEDURE — 71045 X-RAY EXAM CHEST 1 VIEW: CPT | Mod: 26

## 2020-01-01 PROCEDURE — 99233 SBSQ HOSP IP/OBS HIGH 50: CPT

## 2020-01-01 PROCEDURE — 51705 CHANGE OF BLADDER TUBE: CPT

## 2020-01-01 PROCEDURE — 99239 HOSP IP/OBS DSCHRG MGMT >30: CPT

## 2020-01-01 PROCEDURE — 99285 EMERGENCY DEPT VISIT HI MDM: CPT | Mod: 25

## 2020-01-01 PROCEDURE — 76705 ECHO EXAM OF ABDOMEN: CPT | Mod: 26

## 2020-01-01 PROCEDURE — 99221 1ST HOSP IP/OBS SF/LOW 40: CPT

## 2020-01-01 PROCEDURE — 99223 1ST HOSP IP/OBS HIGH 75: CPT

## 2020-01-01 PROCEDURE — 99291 CRITICAL CARE FIRST HOUR: CPT

## 2020-01-01 PROCEDURE — 99232 SBSQ HOSP IP/OBS MODERATE 35: CPT

## 2020-01-01 PROCEDURE — 74176 CT ABD & PELVIS W/O CONTRAST: CPT | Mod: 26

## 2020-01-01 PROCEDURE — 76775 US EXAM ABDO BACK WALL LIM: CPT | Mod: 26

## 2020-01-01 PROCEDURE — 99222 1ST HOSP IP/OBS MODERATE 55: CPT

## 2020-01-01 PROCEDURE — 93010 ELECTROCARDIOGRAM REPORT: CPT

## 2020-01-01 PROCEDURE — ZZZZZ: CPT

## 2020-01-01 PROCEDURE — 99497 ADVNCD CARE PLAN 30 MIN: CPT

## 2020-01-01 PROCEDURE — 76770 US EXAM ABDO BACK WALL COMP: CPT | Mod: 26

## 2020-01-01 PROCEDURE — 99213 OFFICE O/P EST LOW 20 MIN: CPT

## 2020-01-01 PROCEDURE — 99239 HOSP IP/OBS DSCHRG MGMT >30: CPT | Mod: 25

## 2020-01-01 PROCEDURE — 74177 CT ABD & PELVIS W/CONTRAST: CPT | Mod: 26

## 2020-01-01 PROCEDURE — 99285 EMERGENCY DEPT VISIT HI MDM: CPT

## 2020-01-01 PROCEDURE — 99233 SBSQ HOSP IP/OBS HIGH 50: CPT | Mod: 25

## 2020-01-01 PROCEDURE — 70450 CT HEAD/BRAIN W/O DYE: CPT | Mod: 26

## 2020-01-01 RX ORDER — BRIMONIDINE TARTRATE 2 MG/MG
1 SOLUTION/ DROPS OPHTHALMIC AT BEDTIME
Refills: 0 | Status: DISCONTINUED | OUTPATIENT
Start: 2020-01-01 | End: 2020-01-01

## 2020-01-01 RX ORDER — INSULIN HUMAN 100 [IU]/ML
3 INJECTION, SOLUTION SUBCUTANEOUS ONCE
Refills: 0 | Status: COMPLETED | OUTPATIENT
Start: 2020-01-01 | End: 2020-01-01

## 2020-01-01 RX ORDER — SODIUM CHLORIDE 9 MG/ML
1000 INJECTION, SOLUTION INTRAVENOUS
Refills: 0 | Status: DISCONTINUED | OUTPATIENT
Start: 2020-01-01 | End: 2020-01-01

## 2020-01-01 RX ORDER — OXYCODONE HYDROCHLORIDE 5 MG/1
1 TABLET ORAL
Qty: 0 | Refills: 0 | DISCHARGE
Start: 2020-01-01

## 2020-01-01 RX ORDER — MORPHINE SULFATE 50 MG/1
1 CAPSULE, EXTENDED RELEASE ORAL
Qty: 100 | Refills: 0 | Status: DISCONTINUED | OUTPATIENT
Start: 2020-01-01 | End: 2020-01-01

## 2020-01-01 RX ORDER — DEXTROSE 50 % IN WATER 50 %
15 SYRINGE (ML) INTRAVENOUS ONCE
Refills: 0 | Status: DISCONTINUED | OUTPATIENT
Start: 2020-01-01 | End: 2020-01-01

## 2020-01-01 RX ORDER — SODIUM CHLORIDE 9 MG/ML
250 INJECTION, SOLUTION INTRAVENOUS ONCE
Refills: 0 | Status: DISCONTINUED | OUTPATIENT
Start: 2020-01-01 | End: 2020-01-01

## 2020-01-01 RX ORDER — SODIUM BICARBONATE 1 MEQ/ML
1 SYRINGE (ML) INTRAVENOUS
Qty: 0 | Refills: 0 | DISCHARGE

## 2020-01-01 RX ORDER — FUROSEMIDE 40 MG
20 TABLET ORAL DAILY
Refills: 0 | Status: DISCONTINUED | OUTPATIENT
Start: 2020-01-01 | End: 2020-01-01

## 2020-01-01 RX ORDER — FUROSEMIDE 40 MG
1 TABLET ORAL
Qty: 0 | Refills: 0 | DISCHARGE

## 2020-01-01 RX ORDER — CEFTOLOZANE AND TAZOBACTAM 1; .5 G/10ML; G/10ML
375 INJECTION, POWDER, LYOPHILIZED, FOR SOLUTION INTRAVENOUS
Refills: 0 | Status: DISCONTINUED | OUTPATIENT
Start: 2020-01-01 | End: 2020-01-01

## 2020-01-01 RX ORDER — INSULIN LISPRO 100/ML
VIAL (ML) SUBCUTANEOUS
Refills: 0 | Status: DISCONTINUED | OUTPATIENT
Start: 2020-01-01 | End: 2020-01-01

## 2020-01-01 RX ORDER — ASPIRIN/CALCIUM CARB/MAGNESIUM 324 MG
81 TABLET ORAL DAILY
Refills: 0 | Status: DISCONTINUED | OUTPATIENT
Start: 2020-01-01 | End: 2020-01-01

## 2020-01-01 RX ORDER — MAGNESIUM SULFATE 500 MG/ML
1 VIAL (ML) INJECTION ONCE
Refills: 0 | Status: COMPLETED | OUTPATIENT
Start: 2020-01-01 | End: 2020-01-01

## 2020-01-01 RX ORDER — NIFEDIPINE 30 MG
30 TABLET, EXTENDED RELEASE 24 HR ORAL ONCE
Refills: 0 | Status: COMPLETED | OUTPATIENT
Start: 2020-01-01 | End: 2020-01-01

## 2020-01-01 RX ORDER — SYRINGE, DISPOSABLE, 1 ML
27G X 1/2" SYRINGE, EMPTY DISPOSABLE MISCELLANEOUS
Qty: 8 | Refills: 0 | Status: ACTIVE | COMMUNITY
Start: 2019-01-01 | End: 1900-01-01

## 2020-01-01 RX ORDER — METOPROLOL TARTRATE 50 MG
100 TABLET ORAL
Refills: 0 | Status: DISCONTINUED | OUTPATIENT
Start: 2020-01-01 | End: 2020-01-01

## 2020-01-01 RX ORDER — METFORMIN HYDROCHLORIDE 850 MG/1
1000 TABLET ORAL
Refills: 0 | Status: DISCONTINUED | OUTPATIENT
Start: 2020-01-01 | End: 2020-01-01

## 2020-01-01 RX ORDER — BRIMONIDINE TARTRATE 2 MG/MG
1 SOLUTION/ DROPS OPHTHALMIC
Qty: 0 | Refills: 0 | DISCHARGE

## 2020-01-01 RX ORDER — APIXABAN 2.5 MG/1
1 TABLET, FILM COATED ORAL
Qty: 0 | Refills: 0 | DISCHARGE
Start: 2020-01-01

## 2020-01-01 RX ORDER — ONDANSETRON 8 MG/1
8 TABLET, FILM COATED ORAL THREE TIMES A DAY
Refills: 0 | Status: DISCONTINUED | OUTPATIENT
Start: 2020-01-01 | End: 2020-01-01

## 2020-01-01 RX ORDER — SENNA PLUS 8.6 MG/1
2 TABLET ORAL
Qty: 0 | Refills: 0 | DISCHARGE
Start: 2020-01-01

## 2020-01-01 RX ORDER — LIDOCAINE 4 G/100G
1 CREAM TOPICAL
Qty: 0 | Refills: 0 | DISCHARGE
Start: 2020-01-01

## 2020-01-01 RX ORDER — MEROPENEM 1 G/30ML
500 INJECTION INTRAVENOUS EVERY 24 HOURS
Refills: 0 | Status: DISCONTINUED | OUTPATIENT
Start: 2020-01-01 | End: 2020-01-01

## 2020-01-01 RX ORDER — METFORMIN HYDROCHLORIDE 850 MG/1
4 TABLET ORAL
Qty: 0 | Refills: 0 | DISCHARGE

## 2020-01-01 RX ORDER — SODIUM CHLORIDE 9 MG/ML
1200 INJECTION INTRAMUSCULAR; INTRAVENOUS; SUBCUTANEOUS
Refills: 0 | Status: DISCONTINUED | OUTPATIENT
Start: 2020-01-01 | End: 2020-01-01

## 2020-01-01 RX ORDER — INSULIN GLARGINE 100 [IU]/ML
10 INJECTION, SOLUTION SUBCUTANEOUS ONCE
Refills: 0 | Status: COMPLETED | OUTPATIENT
Start: 2020-01-01 | End: 2020-01-01

## 2020-01-01 RX ORDER — POTASSIUM CHLORIDE 20 MEQ
20 PACKET (EA) ORAL DAILY
Refills: 0 | Status: DISCONTINUED | OUTPATIENT
Start: 2020-01-01 | End: 2020-01-01

## 2020-01-01 RX ORDER — INSULIN LISPRO 100/ML
3 VIAL (ML) SUBCUTANEOUS ONCE
Refills: 0 | Status: DISCONTINUED | OUTPATIENT
Start: 2020-01-01 | End: 2020-01-01

## 2020-01-01 RX ORDER — HYDROMORPHONE HYDROCHLORIDE 2 MG/ML
2 INJECTION INTRAMUSCULAR; INTRAVENOUS; SUBCUTANEOUS EVERY 6 HOURS
Refills: 0 | Status: DISCONTINUED | OUTPATIENT
Start: 2020-01-01 | End: 2020-01-01

## 2020-01-01 RX ORDER — HYDRALAZINE HCL 50 MG
5 TABLET ORAL ONCE
Refills: 0 | Status: COMPLETED | OUTPATIENT
Start: 2020-01-01 | End: 2020-01-01

## 2020-01-01 RX ORDER — NIFEDIPINE 30 MG
90 TABLET, EXTENDED RELEASE 24 HR ORAL DAILY
Refills: 0 | Status: DISCONTINUED | OUTPATIENT
Start: 2020-01-01 | End: 2020-01-01

## 2020-01-01 RX ORDER — LATANOPROST 0.05 MG/ML
1 SOLUTION/ DROPS OPHTHALMIC; TOPICAL
Qty: 0 | Refills: 0 | DISCHARGE

## 2020-01-01 RX ORDER — CEFTRIAXONE 500 MG/1
1000 INJECTION, POWDER, FOR SOLUTION INTRAMUSCULAR; INTRAVENOUS ONCE
Refills: 0 | Status: COMPLETED | OUTPATIENT
Start: 2020-01-01 | End: 2020-01-01

## 2020-01-01 RX ORDER — OXYCODONE HYDROCHLORIDE 5 MG/1
5 TABLET ORAL EVERY 6 HOURS
Refills: 0 | Status: DISCONTINUED | OUTPATIENT
Start: 2020-01-01 | End: 2020-01-01

## 2020-01-01 RX ORDER — INSULIN LISPRO 100/ML
10 VIAL (ML) SUBCUTANEOUS
Refills: 0 | Status: DISCONTINUED | OUTPATIENT
Start: 2020-01-01 | End: 2020-01-01

## 2020-01-01 RX ORDER — BACITRACIN ZINC 500 UNIT/G
1 OINTMENT IN PACKET (EA) TOPICAL
Refills: 0 | Status: DISCONTINUED | OUTPATIENT
Start: 2020-01-01 | End: 2020-01-01

## 2020-01-01 RX ORDER — NIFEDIPINE 30 MG
2 TABLET, EXTENDED RELEASE 24 HR ORAL
Qty: 0 | Refills: 0 | DISCHARGE
Start: 2020-01-01

## 2020-01-01 RX ORDER — MAGNESIUM SULFATE 500 MG/ML
2 VIAL (ML) INJECTION
Refills: 0 | Status: COMPLETED | OUTPATIENT
Start: 2020-01-01 | End: 2020-01-01

## 2020-01-01 RX ORDER — LATANOPROST 0.05 MG/ML
1 SOLUTION/ DROPS OPHTHALMIC; TOPICAL AT BEDTIME
Refills: 0 | Status: DISCONTINUED | OUTPATIENT
Start: 2020-01-01 | End: 2020-01-01

## 2020-01-01 RX ORDER — CIPROFLOXACIN LACTATE 400MG/40ML
1 VIAL (ML) INTRAVENOUS
Qty: 0 | Refills: 0 | DISCHARGE
Start: 2020-01-01

## 2020-01-01 RX ORDER — METOPROLOL TARTRATE 50 MG
1 TABLET ORAL
Qty: 0 | Refills: 0 | DISCHARGE

## 2020-01-01 RX ORDER — INSULIN LISPRO 100/ML
10 VIAL (ML) SUBCUTANEOUS ONCE
Refills: 0 | Status: COMPLETED | OUTPATIENT
Start: 2020-01-01 | End: 2020-01-01

## 2020-01-01 RX ORDER — AMIKACIN SULFATE 250 MG/ML
400 INJECTION, SOLUTION INTRAMUSCULAR; INTRAVENOUS
Refills: 0 | Status: DISCONTINUED | OUTPATIENT
Start: 2020-01-01 | End: 2020-01-01

## 2020-01-01 RX ORDER — BRIMONIDINE TARTRATE 2 MG/MG
1 SOLUTION/ DROPS OPHTHALMIC THREE TIMES A DAY
Refills: 0 | Status: DISCONTINUED | OUTPATIENT
Start: 2020-01-01 | End: 2020-01-01

## 2020-01-01 RX ORDER — CALCIUM GLUCONATE 100 MG/ML
2 VIAL (ML) INTRAVENOUS ONCE
Refills: 0 | Status: COMPLETED | OUTPATIENT
Start: 2020-01-01 | End: 2020-01-01

## 2020-01-01 RX ORDER — LANOLIN ALCOHOL/MO/W.PET/CERES
3 CREAM (GRAM) TOPICAL ONCE
Refills: 0 | Status: COMPLETED | OUTPATIENT
Start: 2020-01-01 | End: 2020-01-01

## 2020-01-01 RX ORDER — MAGNESIUM SULFATE 500 MG/ML
2 VIAL (ML) INJECTION ONCE
Refills: 0 | Status: COMPLETED | OUTPATIENT
Start: 2020-01-01 | End: 2020-01-01

## 2020-01-01 RX ORDER — SENNA PLUS 8.6 MG/1
1 TABLET ORAL DAILY
Refills: 0 | Status: DISCONTINUED | OUTPATIENT
Start: 2020-01-01 | End: 2020-01-01

## 2020-01-01 RX ORDER — HYDROMORPHONE HYDROCHLORIDE 2 MG/ML
0.5 INJECTION INTRAMUSCULAR; INTRAVENOUS; SUBCUTANEOUS
Refills: 0 | Status: DISCONTINUED | OUTPATIENT
Start: 2020-01-01 | End: 2020-01-01

## 2020-01-01 RX ORDER — CLOTRIMAZOLE AND BETAMETHASONE DIPROPIONATE 10; .5 MG/G; MG/G
1 CREAM TOPICAL
Qty: 0 | Refills: 0 | DISCHARGE

## 2020-01-01 RX ORDER — SODIUM BICARBONATE 1 MEQ/ML
0.17 SYRINGE (ML) INTRAVENOUS
Qty: 150 | Refills: 0 | Status: DISCONTINUED | OUTPATIENT
Start: 2020-01-01 | End: 2020-01-01

## 2020-01-01 RX ORDER — SODIUM BICARBONATE 1 MEQ/ML
0.08 SYRINGE (ML) INTRAVENOUS
Qty: 75 | Refills: 0 | Status: DISCONTINUED | OUTPATIENT
Start: 2020-01-01 | End: 2020-01-01

## 2020-01-01 RX ORDER — BRIMONIDINE TARTRATE 2 MG/MG
1 SOLUTION/ DROPS OPHTHALMIC
Refills: 0 | Status: DISCONTINUED | OUTPATIENT
Start: 2020-01-01 | End: 2020-01-01

## 2020-01-01 RX ORDER — CEFTOLOZANE AND TAZOBACTAM 1; .5 G/10ML; G/10ML
375 INJECTION, POWDER, LYOPHILIZED, FOR SOLUTION INTRAVENOUS EVERY 8 HOURS
Refills: 0 | Status: DISCONTINUED | OUTPATIENT
Start: 2020-01-01 | End: 2020-01-01

## 2020-01-01 RX ORDER — CALCIUM GLUCONATE 100 MG/ML
1 VIAL (ML) INTRAVENOUS ONCE
Refills: 0 | Status: COMPLETED | OUTPATIENT
Start: 2020-01-01 | End: 2020-01-01

## 2020-01-01 RX ORDER — DEXTROSE 50 % IN WATER 50 %
25 SYRINGE (ML) INTRAVENOUS ONCE
Refills: 0 | Status: DISCONTINUED | OUTPATIENT
Start: 2020-01-01 | End: 2020-01-01

## 2020-01-01 RX ORDER — FUROSEMIDE 40 MG
1 TABLET ORAL
Qty: 0 | Refills: 0 | DISCHARGE
Start: 2020-01-01

## 2020-01-01 RX ORDER — OXYCODONE HYDROCHLORIDE 5 MG/1
10 TABLET ORAL EVERY 8 HOURS
Refills: 0 | Status: DISCONTINUED | OUTPATIENT
Start: 2020-01-01 | End: 2020-01-01

## 2020-01-01 RX ORDER — OXYCODONE HYDROCHLORIDE 5 MG/1
5 TABLET ORAL ONCE
Refills: 0 | Status: DISCONTINUED | OUTPATIENT
Start: 2020-01-01 | End: 2020-01-01

## 2020-01-01 RX ORDER — ATORVASTATIN CALCIUM 80 MG/1
40 TABLET, FILM COATED ORAL AT BEDTIME
Refills: 0 | Status: DISCONTINUED | OUTPATIENT
Start: 2020-01-01 | End: 2020-01-01

## 2020-01-01 RX ORDER — DOFETILIDE 0.25 MG/1
500 CAPSULE ORAL
Refills: 0 | Status: DISCONTINUED | OUTPATIENT
Start: 2020-01-01 | End: 2020-01-01

## 2020-01-01 RX ORDER — SODIUM BICARBONATE 1 MEQ/ML
650 SYRINGE (ML) INTRAVENOUS THREE TIMES A DAY
Refills: 0 | Status: DISCONTINUED | OUTPATIENT
Start: 2020-01-01 | End: 2020-01-01

## 2020-01-01 RX ORDER — SITAGLIPTIN 50 MG/1
1 TABLET, FILM COATED ORAL
Qty: 30 | Refills: 0
Start: 2020-01-01 | End: 2020-01-01

## 2020-01-01 RX ORDER — INSULIN GLARGINE 100 [IU]/ML
36 INJECTION, SOLUTION SUBCUTANEOUS AT BEDTIME
Refills: 0 | Status: DISCONTINUED | OUTPATIENT
Start: 2020-01-01 | End: 2020-01-01

## 2020-01-01 RX ORDER — SODIUM CHLORIDE 9 MG/ML
1000 INJECTION INTRAMUSCULAR; INTRAVENOUS; SUBCUTANEOUS
Refills: 0 | Status: DISCONTINUED | OUTPATIENT
Start: 2020-01-01 | End: 2020-01-01

## 2020-01-01 RX ORDER — PANTOPRAZOLE SODIUM 20 MG/1
40 TABLET, DELAYED RELEASE ORAL
Refills: 0 | Status: DISCONTINUED | OUTPATIENT
Start: 2020-01-01 | End: 2020-01-01

## 2020-01-01 RX ORDER — METFORMIN HYDROCHLORIDE 850 MG/1
1 TABLET ORAL
Qty: 0 | Refills: 0 | DISCHARGE

## 2020-01-01 RX ORDER — OXYBUTYNIN CHLORIDE 5 MG
1 TABLET ORAL
Qty: 0 | Refills: 0 | DISCHARGE

## 2020-01-01 RX ORDER — CIPROFLOXACIN LACTATE 400MG/40ML
1 VIAL (ML) INTRAVENOUS
Qty: 10 | Refills: 0
Start: 2020-01-01 | End: 2020-01-01

## 2020-01-01 RX ORDER — APIXABAN 2.5 MG/1
5 TABLET, FILM COATED ORAL EVERY 12 HOURS
Refills: 0 | Status: DISCONTINUED | OUTPATIENT
Start: 2020-01-01 | End: 2020-01-01

## 2020-01-01 RX ORDER — METFORMIN HYDROCHLORIDE 850 MG/1
500 TABLET ORAL
Refills: 0 | Status: DISCONTINUED | OUTPATIENT
Start: 2020-01-01 | End: 2020-01-01

## 2020-01-01 RX ORDER — DEXTROSE 50 % IN WATER 50 %
12.5 SYRINGE (ML) INTRAVENOUS ONCE
Refills: 0 | Status: DISCONTINUED | OUTPATIENT
Start: 2020-01-01 | End: 2020-01-01

## 2020-01-01 RX ORDER — CHLORHEXIDINE GLUCONATE 213 G/1000ML
1 SOLUTION TOPICAL
Refills: 0 | Status: DISCONTINUED | OUTPATIENT
Start: 2020-01-01 | End: 2020-01-01

## 2020-01-01 RX ORDER — ATORVASTATIN CALCIUM 80 MG/1
1 TABLET, FILM COATED ORAL
Qty: 0 | Refills: 0 | DISCHARGE
Start: 2020-01-01

## 2020-01-01 RX ORDER — SODIUM CHLORIDE 9 MG/ML
1000 INJECTION INTRAMUSCULAR; INTRAVENOUS; SUBCUTANEOUS ONCE
Refills: 0 | Status: COMPLETED | OUTPATIENT
Start: 2020-01-01 | End: 2020-01-01

## 2020-01-01 RX ORDER — GLUCAGON INJECTION, SOLUTION 0.5 MG/.1ML
1 INJECTION, SOLUTION SUBCUTANEOUS ONCE
Refills: 0 | Status: DISCONTINUED | OUTPATIENT
Start: 2020-01-01 | End: 2020-01-01

## 2020-01-01 RX ORDER — FERROUS GLUCONATE 100 %
0 POWDER (GRAM) MISCELLANEOUS
Qty: 0 | Refills: 0 | DISCHARGE

## 2020-01-01 RX ORDER — NIFEDIPINE 30 MG
60 TABLET, EXTENDED RELEASE 24 HR ORAL DAILY
Refills: 0 | Status: DISCONTINUED | OUTPATIENT
Start: 2020-01-01 | End: 2020-01-01

## 2020-01-01 RX ORDER — APIXABAN 2.5 MG/1
2.5 TABLET, FILM COATED ORAL EVERY 12 HOURS
Refills: 0 | Status: DISCONTINUED | OUTPATIENT
Start: 2020-01-01 | End: 2020-01-01

## 2020-01-01 RX ORDER — SITAGLIPTIN 50 MG/1
1 TABLET, FILM COATED ORAL
Qty: 0 | Refills: 0 | DISCHARGE

## 2020-01-01 RX ORDER — ALBUTEROL 90 UG/1
2.5 AEROSOL, METERED ORAL ONCE
Refills: 0 | Status: COMPLETED | OUTPATIENT
Start: 2020-01-01 | End: 2020-01-01

## 2020-01-01 RX ORDER — CIPROFLOXACIN LACTATE 400MG/40ML
250 VIAL (ML) INTRAVENOUS EVERY 12 HOURS
Refills: 0 | Status: DISCONTINUED | OUTPATIENT
Start: 2020-01-01 | End: 2020-01-01

## 2020-01-01 RX ORDER — HYDRALAZINE HCL 50 MG
50 TABLET ORAL EVERY 8 HOURS
Refills: 0 | Status: DISCONTINUED | OUTPATIENT
Start: 2020-01-01 | End: 2020-01-01

## 2020-01-01 RX ORDER — MAGNESIUM SULFATE 500 MG/ML
2 VIAL (ML) INJECTION ONCE
Refills: 0 | Status: DISCONTINUED | OUTPATIENT
Start: 2020-01-01 | End: 2020-01-01

## 2020-01-01 RX ORDER — HYDRALAZINE HCL 50 MG
50 TABLET ORAL THREE TIMES A DAY
Refills: 0 | Status: DISCONTINUED | OUTPATIENT
Start: 2020-01-01 | End: 2020-01-01

## 2020-01-01 RX ORDER — OXYCODONE HYDROCHLORIDE 5 MG/1
5 TABLET ORAL EVERY 12 HOURS
Refills: 0 | Status: DISCONTINUED | OUTPATIENT
Start: 2020-01-01 | End: 2020-01-01

## 2020-01-01 RX ORDER — MORPHINE SULFATE 50 MG/1
2 CAPSULE, EXTENDED RELEASE ORAL
Qty: 100 | Refills: 0 | Status: DISCONTINUED | OUTPATIENT
Start: 2020-01-01 | End: 2020-01-01

## 2020-01-01 RX ORDER — CEFEPIME 1 G/1
1000 INJECTION, POWDER, FOR SOLUTION INTRAMUSCULAR; INTRAVENOUS DAILY
Refills: 0 | Status: DISCONTINUED | OUTPATIENT
Start: 2020-01-01 | End: 2020-01-01

## 2020-01-01 RX ORDER — TRAMADOL HYDROCHLORIDE 50 MG/1
25 TABLET ORAL ONCE
Refills: 0 | Status: DISCONTINUED | OUTPATIENT
Start: 2020-01-01 | End: 2020-01-01

## 2020-01-01 RX ORDER — INSULIN LISPRO 100/ML
3 VIAL (ML) SUBCUTANEOUS ONCE
Refills: 0 | Status: COMPLETED | OUTPATIENT
Start: 2020-01-01 | End: 2020-01-01

## 2020-01-01 RX ORDER — NIFEDIPINE 30 MG
120 TABLET, EXTENDED RELEASE 24 HR ORAL DAILY
Refills: 0 | Status: DISCONTINUED | OUTPATIENT
Start: 2020-01-01 | End: 2020-01-01

## 2020-01-01 RX ORDER — HYDRALAZINE HCL 50 MG
25 TABLET ORAL ONCE
Refills: 0 | Status: COMPLETED | OUTPATIENT
Start: 2020-01-01 | End: 2020-01-01

## 2020-01-01 RX ORDER — DEXTROSE 10 % IN WATER 10 %
250 INTRAVENOUS SOLUTION INTRAVENOUS ONCE
Refills: 0 | Status: COMPLETED | OUTPATIENT
Start: 2020-01-01 | End: 2020-01-01

## 2020-01-01 RX ORDER — BRIMONIDINE TARTRATE 2 MG/MG
1 SOLUTION/ DROPS OPHTHALMIC EVERY 12 HOURS
Refills: 0 | Status: DISCONTINUED | OUTPATIENT
Start: 2020-01-01 | End: 2020-01-01

## 2020-01-01 RX ORDER — CEFTRIAXONE 500 MG/1
1000 INJECTION, POWDER, FOR SOLUTION INTRAMUSCULAR; INTRAVENOUS EVERY 24 HOURS
Refills: 0 | Status: DISCONTINUED | OUTPATIENT
Start: 2020-01-01 | End: 2020-01-01

## 2020-01-01 RX ORDER — INSULIN ASPART 100 [IU]/ML
5 INJECTION, SOLUTION SUBCUTANEOUS
Qty: 0 | Refills: 0 | DISCHARGE

## 2020-01-01 RX ORDER — METFORMIN HYDROCHLORIDE 850 MG/1
1 TABLET ORAL
Qty: 0 | Refills: 0 | DISCHARGE
Start: 2020-01-01

## 2020-01-01 RX ORDER — HYDRALAZINE HCL 50 MG
1 TABLET ORAL
Qty: 0 | Refills: 0 | DISCHARGE
Start: 2020-01-01

## 2020-01-01 RX ORDER — INSULIN LISPRO 100/ML
VIAL (ML) SUBCUTANEOUS AT BEDTIME
Refills: 0 | Status: DISCONTINUED | OUTPATIENT
Start: 2020-01-01 | End: 2020-01-01

## 2020-01-01 RX ORDER — METOPROLOL TARTRATE 50 MG
1 TABLET ORAL
Qty: 0 | Refills: 0 | DISCHARGE
Start: 2020-01-01

## 2020-01-01 RX ORDER — MEROPENEM 1 G/30ML
1000 INJECTION INTRAVENOUS ONCE
Refills: 0 | Status: COMPLETED | OUTPATIENT
Start: 2020-01-01 | End: 2020-01-01

## 2020-01-01 RX ORDER — ETHACRYNIC ACID 25 MG/1
50 TABLET ORAL ONCE
Refills: 0 | Status: COMPLETED | OUTPATIENT
Start: 2020-01-01 | End: 2020-01-01

## 2020-01-01 RX ORDER — LISINOPRIL 2.5 MG/1
1 TABLET ORAL
Qty: 0 | Refills: 0 | DISCHARGE
Start: 2020-01-01

## 2020-01-01 RX ORDER — SENNA PLUS 8.6 MG/1
2 TABLET ORAL AT BEDTIME
Refills: 0 | Status: DISCONTINUED | OUTPATIENT
Start: 2020-01-01 | End: 2020-01-01

## 2020-01-01 RX ORDER — MORPHINE SULFATE 50 MG/1
4 CAPSULE, EXTENDED RELEASE ORAL ONCE
Refills: 0 | Status: DISCONTINUED | OUTPATIENT
Start: 2020-01-01 | End: 2020-01-01

## 2020-01-01 RX ORDER — OCTREOTIDE ACETATE 200 UG/ML
50 INJECTION, SOLUTION INTRAVENOUS; SUBCUTANEOUS ONCE
Refills: 0 | Status: COMPLETED | OUTPATIENT
Start: 2020-01-01 | End: 2020-01-01

## 2020-01-01 RX ORDER — LIDOCAINE 4 G/100G
1 CREAM TOPICAL DAILY
Refills: 0 | Status: DISCONTINUED | OUTPATIENT
Start: 2020-01-01 | End: 2020-01-01

## 2020-01-01 RX ORDER — INSULIN LISPRO 100/ML
0 VIAL (ML) SUBCUTANEOUS
Qty: 0 | Refills: 0 | DISCHARGE
Start: 2020-01-01

## 2020-01-01 RX ORDER — FLUCONAZOLE 150 MG/1
1 TABLET ORAL
Qty: 0 | Refills: 0 | DISCHARGE

## 2020-01-01 RX ORDER — HYDRALAZINE HCL 50 MG
25 TABLET ORAL THREE TIMES A DAY
Refills: 0 | Status: DISCONTINUED | OUTPATIENT
Start: 2020-01-01 | End: 2020-01-01

## 2020-01-01 RX ORDER — ASPIRIN/CALCIUM CARB/MAGNESIUM 324 MG
1 TABLET ORAL
Qty: 0 | Refills: 0 | DISCHARGE

## 2020-01-01 RX ORDER — BACITRACIN ZINC 500 UNIT/G
1 OINTMENT IN PACKET (EA) TOPICAL
Qty: 0 | Refills: 0 | DISCHARGE
Start: 2020-01-01

## 2020-01-01 RX ORDER — OXYBUTYNIN CHLORIDE 5 MG
10 TABLET ORAL DAILY
Refills: 0 | Status: DISCONTINUED | OUTPATIENT
Start: 2020-01-01 | End: 2020-01-01

## 2020-01-01 RX ORDER — LISINOPRIL 2.5 MG/1
20 TABLET ORAL DAILY
Refills: 0 | Status: DISCONTINUED | OUTPATIENT
Start: 2020-01-01 | End: 2020-01-01

## 2020-01-01 RX ORDER — METOCLOPRAMIDE HCL 10 MG
5 TABLET ORAL ONCE
Refills: 0 | Status: COMPLETED | OUTPATIENT
Start: 2020-01-01 | End: 2020-01-01

## 2020-01-01 RX ORDER — CALCITRIOL 0.5 UG/1
0.5 CAPSULE ORAL DAILY
Refills: 0 | Status: DISCONTINUED | OUTPATIENT
Start: 2020-01-01 | End: 2020-01-01

## 2020-01-01 RX ORDER — HYDRALAZINE HCL 50 MG
25 TABLET ORAL EVERY 8 HOURS
Refills: 0 | Status: DISCONTINUED | OUTPATIENT
Start: 2020-01-01 | End: 2020-01-01

## 2020-01-01 RX ORDER — DOFETILIDE 0.25 MG/1
1 CAPSULE ORAL
Qty: 0 | Refills: 0 | DISCHARGE

## 2020-01-01 RX ORDER — OXYCODONE 5 MG/1
5 TABLET ORAL EVERY 8 HOURS
Qty: 90 | Refills: 0 | Status: ACTIVE | COMMUNITY
Start: 2019-01-01 | End: 1900-01-01

## 2020-01-01 RX ORDER — LANOLIN ALCOHOL/MO/W.PET/CERES
1 CREAM (GRAM) TOPICAL
Qty: 0 | Refills: 0 | DISCHARGE
Start: 2020-01-01

## 2020-01-01 RX ORDER — SODIUM BICARBONATE 1 MEQ/ML
0.16 SYRINGE (ML) INTRAVENOUS
Qty: 150 | Refills: 0 | Status: DISCONTINUED | OUTPATIENT
Start: 2020-01-01 | End: 2020-01-01

## 2020-01-01 RX ORDER — APIXABAN 2.5 MG/1
2.5 TABLET, FILM COATED ORAL
Refills: 0 | Status: DISCONTINUED | OUTPATIENT
Start: 2020-01-01 | End: 2020-01-01

## 2020-01-01 RX ORDER — OXYCODONE HYDROCHLORIDE 5 MG/1
1 TABLET ORAL
Qty: 0 | Refills: 0 | DISCHARGE

## 2020-01-01 RX ORDER — METOPROLOL TARTRATE 50 MG
2 TABLET ORAL
Qty: 0 | Refills: 0 | DISCHARGE

## 2020-01-01 RX ORDER — ATORVASTATIN CALCIUM 80 MG/1
1 TABLET, FILM COATED ORAL
Qty: 0 | Refills: 0 | DISCHARGE

## 2020-01-01 RX ORDER — NIFEDIPINE 30 MG
1 TABLET, EXTENDED RELEASE 24 HR ORAL
Qty: 0 | Refills: 0 | DISCHARGE
Start: 2020-01-01

## 2020-01-01 RX ORDER — MORPHINE SULFATE 50 MG/1
2 CAPSULE, EXTENDED RELEASE ORAL EVERY 4 HOURS
Refills: 0 | Status: DISCONTINUED | OUTPATIENT
Start: 2020-01-01 | End: 2020-01-01

## 2020-01-01 RX ORDER — HYDRALAZINE HCL 50 MG
10 TABLET ORAL THREE TIMES A DAY
Refills: 0 | Status: DISCONTINUED | OUTPATIENT
Start: 2020-01-01 | End: 2020-01-01

## 2020-01-01 RX ORDER — LANOLIN ALCOHOL/MO/W.PET/CERES
5 CREAM (GRAM) TOPICAL AT BEDTIME
Refills: 0 | Status: COMPLETED | OUTPATIENT
Start: 2020-01-01 | End: 2020-01-01

## 2020-01-01 RX ORDER — METFORMIN HYDROCHLORIDE 850 MG/1
1000 TABLET ORAL ONCE
Refills: 0 | Status: COMPLETED | OUTPATIENT
Start: 2020-01-01 | End: 2020-01-01

## 2020-01-01 RX ORDER — CANAGLIFLOZIN 100 MG/1
1 TABLET, FILM COATED ORAL
Qty: 0 | Refills: 0 | DISCHARGE

## 2020-01-01 RX ORDER — ACETAMINOPHEN 500 MG
650 TABLET ORAL EVERY 6 HOURS
Refills: 0 | Status: DISCONTINUED | OUTPATIENT
Start: 2020-01-01 | End: 2020-01-01

## 2020-01-01 RX ORDER — SOD,AMMONIUM,POTASSIUM LACTATE
1 CREAM (GRAM) TOPICAL
Qty: 0 | Refills: 0 | DISCHARGE

## 2020-01-01 RX ORDER — PANTOPRAZOLE SODIUM 20 MG/1
40 TABLET, DELAYED RELEASE ORAL DAILY
Refills: 0 | Status: DISCONTINUED | OUTPATIENT
Start: 2020-01-01 | End: 2020-01-01

## 2020-01-01 RX ORDER — METFORMIN HYDROCHLORIDE 850 MG/1
1 TABLET ORAL
Qty: 60 | Refills: 0
Start: 2020-01-01 | End: 2020-01-01

## 2020-01-01 RX ORDER — INSULIN HUMAN 100 [IU]/ML
10 INJECTION, SOLUTION SUBCUTANEOUS ONCE
Refills: 0 | Status: COMPLETED | OUTPATIENT
Start: 2020-01-01 | End: 2020-01-01

## 2020-01-01 RX ORDER — CEFEPIME 1 G/1
1000 INJECTION, POWDER, FOR SOLUTION INTRAMUSCULAR; INTRAVENOUS ONCE
Refills: 0 | Status: COMPLETED | OUTPATIENT
Start: 2020-01-01 | End: 2020-01-01

## 2020-01-01 RX ORDER — HYDROMORPHONE HYDROCHLORIDE 2 MG/ML
0.5 INJECTION INTRAMUSCULAR; INTRAVENOUS; SUBCUTANEOUS ONCE
Refills: 0 | Status: DISCONTINUED | OUTPATIENT
Start: 2020-01-01 | End: 2020-01-01

## 2020-01-01 RX ORDER — OCTREOTIDE ACETATE 200 UG/ML
50 INJECTION, SOLUTION INTRAVENOUS; SUBCUTANEOUS EVERY 6 HOURS
Refills: 0 | Status: DISCONTINUED | OUTPATIENT
Start: 2020-01-01 | End: 2020-01-01

## 2020-01-01 RX ORDER — LANOLIN ALCOHOL/MO/W.PET/CERES
3 CREAM (GRAM) TOPICAL AT BEDTIME
Refills: 0 | Status: DISCONTINUED | OUTPATIENT
Start: 2020-01-01 | End: 2020-01-01

## 2020-01-01 RX ORDER — CALCIUM ACETATE 667 MG
1334 TABLET ORAL
Refills: 0 | Status: DISCONTINUED | OUTPATIENT
Start: 2020-01-01 | End: 2020-01-01

## 2020-01-01 RX ORDER — CYANOCOBALAMIN
1000 KIT
Qty: 6 | Refills: 1 | Status: ACTIVE | COMMUNITY
Start: 2019-01-01 | End: 1900-01-01

## 2020-01-01 RX ORDER — POTASSIUM CHLORIDE 20 MEQ
1 PACKET (EA) ORAL
Qty: 0 | Refills: 0 | DISCHARGE

## 2020-01-01 RX ORDER — OXYCODONE HYDROCHLORIDE 5 MG/1
5 TABLET ORAL EVERY 8 HOURS
Refills: 0 | Status: DISCONTINUED | OUTPATIENT
Start: 2020-01-01 | End: 2020-01-01

## 2020-01-01 RX ORDER — MAGNESIUM OXIDE 400 MG ORAL TABLET 241.3 MG
400 TABLET ORAL
Refills: 0 | Status: DISCONTINUED | OUTPATIENT
Start: 2020-01-01 | End: 2020-01-01

## 2020-01-01 RX ORDER — ACETAMINOPHEN 500 MG
650 TABLET ORAL ONCE
Refills: 0 | Status: COMPLETED | OUTPATIENT
Start: 2020-01-01 | End: 2020-01-01

## 2020-01-01 RX ORDER — HYDRALAZINE HCL 50 MG
10 TABLET ORAL ONCE
Refills: 0 | Status: COMPLETED | OUTPATIENT
Start: 2020-01-01 | End: 2020-01-01

## 2020-01-01 RX ORDER — FAMOTIDINE 10 MG/ML
20 INJECTION INTRAVENOUS DAILY
Refills: 0 | Status: DISCONTINUED | OUTPATIENT
Start: 2020-01-01 | End: 2020-01-01

## 2020-01-01 RX ORDER — CALCIUM ACETATE 667 MG
667 TABLET ORAL DAILY
Refills: 0 | Status: DISCONTINUED | OUTPATIENT
Start: 2020-01-01 | End: 2020-01-01

## 2020-01-01 RX ORDER — CEFTOLOZANE AND TAZOBACTAM 1; .5 G/10ML; G/10ML
1500 INJECTION, POWDER, LYOPHILIZED, FOR SOLUTION INTRAVENOUS ONCE
Refills: 0 | Status: COMPLETED | OUTPATIENT
Start: 2020-01-01 | End: 2020-01-01

## 2020-01-01 RX ORDER — SITAGLIPTIN 50 MG/1
1 TABLET, FILM COATED ORAL
Qty: 0 | Refills: 0 | DISCHARGE
Start: 2020-01-01

## 2020-01-01 RX ORDER — CHLORHEXIDINE GLUCONATE 213 G/1000ML
1 SOLUTION TOPICAL DAILY
Refills: 0 | Status: DISCONTINUED | OUTPATIENT
Start: 2020-01-01 | End: 2020-01-01

## 2020-01-01 RX ORDER — LANOLIN ALCOHOL/MO/W.PET/CERES
5 CREAM (GRAM) TOPICAL AT BEDTIME
Refills: 0 | Status: DISCONTINUED | OUTPATIENT
Start: 2020-01-01 | End: 2020-01-01

## 2020-01-01 RX ORDER — OXYCODONE HYDROCHLORIDE 5 MG/1
5 TABLET ORAL
Refills: 0 | Status: DISCONTINUED | OUTPATIENT
Start: 2020-01-01 | End: 2020-01-01

## 2020-01-01 RX ADMIN — Medication 10 MILLIGRAM(S): at 19:45

## 2020-01-01 RX ADMIN — Medication 10 MILLIGRAM(S): at 12:50

## 2020-01-01 RX ADMIN — Medication 5 MILLIGRAM(S): at 21:55

## 2020-01-01 RX ADMIN — Medication 2: at 12:00

## 2020-01-01 RX ADMIN — Medication 5 MILLIGRAM(S): at 22:28

## 2020-01-01 RX ADMIN — AMIKACIN SULFATE 101.6 MILLIGRAM(S): 250 INJECTION, SOLUTION INTRAMUSCULAR; INTRAVENOUS at 18:34

## 2020-01-01 RX ADMIN — SODIUM CHLORIDE 70 MILLILITER(S): 9 INJECTION INTRAMUSCULAR; INTRAVENOUS; SUBCUTANEOUS at 04:18

## 2020-01-01 RX ADMIN — LATANOPROST 1 DROP(S): 0.05 SOLUTION/ DROPS OPHTHALMIC; TOPICAL at 21:06

## 2020-01-01 RX ADMIN — SODIUM CHLORIDE 100 MILLILITER(S): 9 INJECTION, SOLUTION INTRAVENOUS at 11:29

## 2020-01-01 RX ADMIN — BRIMONIDINE TARTRATE 1 DROP(S): 2 SOLUTION/ DROPS OPHTHALMIC at 05:24

## 2020-01-01 RX ADMIN — Medication 100 MILLIGRAM(S): at 05:01

## 2020-01-01 RX ADMIN — Medication 250 MILLIGRAM(S): at 05:09

## 2020-01-01 RX ADMIN — LISINOPRIL 20 MILLIGRAM(S): 2.5 TABLET ORAL at 06:05

## 2020-01-01 RX ADMIN — Medication 250 MILLIGRAM(S): at 05:23

## 2020-01-01 RX ADMIN — OXYCODONE HYDROCHLORIDE 5 MILLIGRAM(S): 5 TABLET ORAL at 21:56

## 2020-01-01 RX ADMIN — APIXABAN 2.5 MILLIGRAM(S): 2.5 TABLET, FILM COATED ORAL at 17:35

## 2020-01-01 RX ADMIN — Medication 1: at 17:16

## 2020-01-01 RX ADMIN — Medication 50 MILLIGRAM(S): at 13:29

## 2020-01-01 RX ADMIN — APIXABAN 5 MILLIGRAM(S): 2.5 TABLET, FILM COATED ORAL at 05:52

## 2020-01-01 RX ADMIN — Medication 650 MILLIGRAM(S): at 01:29

## 2020-01-01 RX ADMIN — Medication 650 MILLIGRAM(S): at 05:51

## 2020-01-01 RX ADMIN — Medication 1: at 07:43

## 2020-01-01 RX ADMIN — Medication 30 MILLIGRAM(S): at 10:46

## 2020-01-01 RX ADMIN — SODIUM CHLORIDE 1000 MILLILITER(S): 9 INJECTION INTRAMUSCULAR; INTRAVENOUS; SUBCUTANEOUS at 20:00

## 2020-01-01 RX ADMIN — HYDROMORPHONE HYDROCHLORIDE 2 MILLIGRAM(S): 2 INJECTION INTRAMUSCULAR; INTRAVENOUS; SUBCUTANEOUS at 23:45

## 2020-01-01 RX ADMIN — OXYCODONE HYDROCHLORIDE 5 MILLIGRAM(S): 5 TABLET ORAL at 12:41

## 2020-01-01 RX ADMIN — APIXABAN 5 MILLIGRAM(S): 2.5 TABLET, FILM COATED ORAL at 17:30

## 2020-01-01 RX ADMIN — TRAMADOL HYDROCHLORIDE 25 MILLIGRAM(S): 50 TABLET ORAL at 17:48

## 2020-01-01 RX ADMIN — OXYCODONE HYDROCHLORIDE 10 MILLIGRAM(S): 5 TABLET ORAL at 15:51

## 2020-01-01 RX ADMIN — Medication 4: at 16:46

## 2020-01-01 RX ADMIN — OCTREOTIDE ACETATE 50 MICROGRAM(S): 200 INJECTION, SOLUTION INTRAVENOUS; SUBCUTANEOUS at 12:05

## 2020-01-01 RX ADMIN — Medication 4: at 17:15

## 2020-01-01 RX ADMIN — APIXABAN 2.5 MILLIGRAM(S): 2.5 TABLET, FILM COATED ORAL at 05:47

## 2020-01-01 RX ADMIN — OXYCODONE HYDROCHLORIDE 10 MILLIGRAM(S): 5 TABLET ORAL at 22:25

## 2020-01-01 RX ADMIN — Medication 1: at 16:36

## 2020-01-01 RX ADMIN — Medication 10 MILLIGRAM(S): at 11:44

## 2020-01-01 RX ADMIN — Medication 50 MILLIGRAM(S): at 21:15

## 2020-01-01 RX ADMIN — Medication 667 MILLIGRAM(S): at 11:13

## 2020-01-01 RX ADMIN — Medication 30 MILLILITER(S): at 22:23

## 2020-01-01 RX ADMIN — Medication 3: at 17:11

## 2020-01-01 RX ADMIN — Medication 5 MILLIGRAM(S): at 21:57

## 2020-01-01 RX ADMIN — CEFTOLOZANE AND TAZOBACTAM 100 MILLIGRAM(S): 1; .5 INJECTION, POWDER, LYOPHILIZED, FOR SOLUTION INTRAVENOUS at 10:20

## 2020-01-01 RX ADMIN — Medication 90 MILLIGRAM(S): at 06:17

## 2020-01-01 RX ADMIN — Medication 650 MILLIGRAM(S): at 21:06

## 2020-01-01 RX ADMIN — Medication 50 MILLIGRAM(S): at 21:47

## 2020-01-01 RX ADMIN — Medication 50 MILLIGRAM(S): at 05:21

## 2020-01-01 RX ADMIN — OXYCODONE HYDROCHLORIDE 5 MILLIGRAM(S): 5 TABLET ORAL at 21:42

## 2020-01-01 RX ADMIN — APIXABAN 5 MILLIGRAM(S): 2.5 TABLET, FILM COATED ORAL at 17:48

## 2020-01-01 RX ADMIN — Medication 2: at 11:49

## 2020-01-01 RX ADMIN — Medication 60 MILLIGRAM(S): at 05:20

## 2020-01-01 RX ADMIN — INSULIN GLARGINE 10 UNIT(S): 100 INJECTION, SOLUTION SUBCUTANEOUS at 15:37

## 2020-01-01 RX ADMIN — Medication 20 MILLIEQUIVALENT(S): at 11:48

## 2020-01-01 RX ADMIN — HYDROMORPHONE HYDROCHLORIDE 0.5 MILLIGRAM(S): 2 INJECTION INTRAMUSCULAR; INTRAVENOUS; SUBCUTANEOUS at 12:29

## 2020-01-01 RX ADMIN — Medication 100 MILLIGRAM(S): at 05:51

## 2020-01-01 RX ADMIN — Medication 650 MILLIGRAM(S): at 15:31

## 2020-01-01 RX ADMIN — Medication 4: at 11:44

## 2020-01-01 RX ADMIN — Medication 1 APPLICATION(S): at 19:42

## 2020-01-01 RX ADMIN — Medication 650 MILLIGRAM(S): at 06:17

## 2020-01-01 RX ADMIN — Medication 25 MILLIGRAM(S): at 12:15

## 2020-01-01 RX ADMIN — APIXABAN 5 MILLIGRAM(S): 2.5 TABLET, FILM COATED ORAL at 05:49

## 2020-01-01 RX ADMIN — DOFETILIDE 500 MICROGRAM(S): 0.25 CAPSULE ORAL at 06:16

## 2020-01-01 RX ADMIN — Medication 667 MILLIGRAM(S): at 11:52

## 2020-01-01 RX ADMIN — Medication 5 MILLIGRAM(S): at 21:53

## 2020-01-01 RX ADMIN — Medication 10 UNIT(S): at 16:54

## 2020-01-01 RX ADMIN — APIXABAN 5 MILLIGRAM(S): 2.5 TABLET, FILM COATED ORAL at 06:16

## 2020-01-01 RX ADMIN — OXYCODONE HYDROCHLORIDE 10 MILLIGRAM(S): 5 TABLET ORAL at 01:17

## 2020-01-01 RX ADMIN — MEROPENEM 100 MILLIGRAM(S): 1 INJECTION INTRAVENOUS at 23:54

## 2020-01-01 RX ADMIN — SODIUM CHLORIDE 75 MILLILITER(S): 9 INJECTION, SOLUTION INTRAVENOUS at 15:49

## 2020-01-01 RX ADMIN — Medication 90 MILLIGRAM(S): at 05:12

## 2020-01-01 RX ADMIN — OXYCODONE HYDROCHLORIDE 10 MILLIGRAM(S): 5 TABLET ORAL at 06:03

## 2020-01-01 RX ADMIN — CEFTOLOZANE AND TAZOBACTAM 100 MILLIGRAM(S): 1; .5 INJECTION, POWDER, LYOPHILIZED, FOR SOLUTION INTRAVENOUS at 05:45

## 2020-01-01 RX ADMIN — Medication 100 MILLIGRAM(S): at 05:20

## 2020-01-01 RX ADMIN — Medication 100 MILLIGRAM(S): at 17:12

## 2020-01-01 RX ADMIN — Medication 2: at 11:30

## 2020-01-01 RX ADMIN — BRIMONIDINE TARTRATE 1 DROP(S): 2 SOLUTION/ DROPS OPHTHALMIC at 22:13

## 2020-01-01 RX ADMIN — LATANOPROST 1 DROP(S): 0.05 SOLUTION/ DROPS OPHTHALMIC; TOPICAL at 21:16

## 2020-01-01 RX ADMIN — Medication 50 MILLIGRAM(S): at 05:12

## 2020-01-01 RX ADMIN — OXYCODONE HYDROCHLORIDE 10 MILLIGRAM(S): 5 TABLET ORAL at 12:41

## 2020-01-01 RX ADMIN — Medication 650 MILLIGRAM(S): at 14:32

## 2020-01-01 RX ADMIN — INSULIN HUMAN 3 UNIT(S): 100 INJECTION, SOLUTION SUBCUTANEOUS at 22:20

## 2020-01-01 RX ADMIN — SODIUM CHLORIDE 75 MILLILITER(S): 9 INJECTION, SOLUTION INTRAVENOUS at 09:59

## 2020-01-01 RX ADMIN — Medication 50 GRAM(S): at 12:05

## 2020-01-01 RX ADMIN — Medication 81 MILLIGRAM(S): at 11:30

## 2020-01-01 RX ADMIN — Medication 5 MILLIGRAM(S): at 22:16

## 2020-01-01 RX ADMIN — Medication 60 MILLIGRAM(S): at 05:23

## 2020-01-01 RX ADMIN — PANTOPRAZOLE SODIUM 40 MILLIGRAM(S): 20 TABLET, DELAYED RELEASE ORAL at 05:47

## 2020-01-01 RX ADMIN — PANTOPRAZOLE SODIUM 40 MILLIGRAM(S): 20 TABLET, DELAYED RELEASE ORAL at 05:08

## 2020-01-01 RX ADMIN — Medication 2: at 11:39

## 2020-01-01 RX ADMIN — Medication 650 MILLIGRAM(S): at 21:31

## 2020-01-01 RX ADMIN — APIXABAN 2.5 MILLIGRAM(S): 2.5 TABLET, FILM COATED ORAL at 06:45

## 2020-01-01 RX ADMIN — HYDROMORPHONE HYDROCHLORIDE 0.5 MILLIGRAM(S): 2 INJECTION INTRAMUSCULAR; INTRAVENOUS; SUBCUTANEOUS at 13:40

## 2020-01-01 RX ADMIN — Medication 250 MILLIGRAM(S): at 05:20

## 2020-01-01 RX ADMIN — CEFTOLOZANE AND TAZOBACTAM 100 MILLIGRAM(S): 1; .5 INJECTION, POWDER, LYOPHILIZED, FOR SOLUTION INTRAVENOUS at 11:13

## 2020-01-01 RX ADMIN — BRIMONIDINE TARTRATE 1 DROP(S): 2 SOLUTION/ DROPS OPHTHALMIC at 17:34

## 2020-01-01 RX ADMIN — Medication 75 MEQ/KG/HR: at 21:28

## 2020-01-01 RX ADMIN — LATANOPROST 1 DROP(S): 0.05 SOLUTION/ DROPS OPHTHALMIC; TOPICAL at 21:56

## 2020-01-01 RX ADMIN — Medication 650 MILLIGRAM(S): at 13:05

## 2020-01-01 RX ADMIN — APIXABAN 5 MILLIGRAM(S): 2.5 TABLET, FILM COATED ORAL at 05:20

## 2020-01-01 RX ADMIN — Medication 75 MEQ/KG/HR: at 01:59

## 2020-01-01 RX ADMIN — CEFTRIAXONE 100 MILLIGRAM(S): 500 INJECTION, POWDER, FOR SOLUTION INTRAMUSCULAR; INTRAVENOUS at 05:54

## 2020-01-01 RX ADMIN — DOFETILIDE 500 MICROGRAM(S): 0.25 CAPSULE ORAL at 06:05

## 2020-01-01 RX ADMIN — Medication 1: at 16:42

## 2020-01-01 RX ADMIN — Medication 3: at 16:56

## 2020-01-01 RX ADMIN — Medication 650 MILLIGRAM(S): at 14:27

## 2020-01-01 RX ADMIN — Medication 100 MILLIGRAM(S): at 05:23

## 2020-01-01 RX ADMIN — Medication 5 MILLIGRAM(S): at 23:19

## 2020-01-01 RX ADMIN — Medication 1: at 08:16

## 2020-01-01 RX ADMIN — CALCITRIOL 0.5 MICROGRAM(S): 0.5 CAPSULE ORAL at 11:46

## 2020-01-01 RX ADMIN — Medication 650 MILLIGRAM(S): at 05:52

## 2020-01-01 RX ADMIN — SODIUM CHLORIDE 75 MILLILITER(S): 9 INJECTION, SOLUTION INTRAVENOUS at 00:12

## 2020-01-01 RX ADMIN — Medication 650 MILLIGRAM(S): at 14:15

## 2020-01-01 RX ADMIN — APIXABAN 2.5 MILLIGRAM(S): 2.5 TABLET, FILM COATED ORAL at 05:01

## 2020-01-01 RX ADMIN — Medication 250 MILLIGRAM(S): at 17:03

## 2020-01-01 RX ADMIN — Medication 4: at 16:43

## 2020-01-01 RX ADMIN — PANTOPRAZOLE SODIUM 40 MILLIGRAM(S): 20 TABLET, DELAYED RELEASE ORAL at 05:20

## 2020-01-01 RX ADMIN — Medication 650 MILLIGRAM(S): at 13:30

## 2020-01-01 RX ADMIN — CEFTOLOZANE AND TAZOBACTAM 100 MILLIGRAM(S): 1; .5 INJECTION, POWDER, LYOPHILIZED, FOR SOLUTION INTRAVENOUS at 17:15

## 2020-01-01 RX ADMIN — OXYCODONE HYDROCHLORIDE 5 MILLIGRAM(S): 5 TABLET ORAL at 23:23

## 2020-01-01 RX ADMIN — Medication 2: at 22:14

## 2020-01-01 RX ADMIN — OXYCODONE HYDROCHLORIDE 10 MILLIGRAM(S): 5 TABLET ORAL at 21:01

## 2020-01-01 RX ADMIN — APIXABAN 2.5 MILLIGRAM(S): 2.5 TABLET, FILM COATED ORAL at 05:35

## 2020-01-01 RX ADMIN — Medication 81 MILLIGRAM(S): at 12:23

## 2020-01-01 RX ADMIN — Medication 650 MILLIGRAM(S): at 21:02

## 2020-01-01 RX ADMIN — BRIMONIDINE TARTRATE 1 DROP(S): 2 SOLUTION/ DROPS OPHTHALMIC at 17:23

## 2020-01-01 RX ADMIN — Medication 100 MILLIGRAM(S): at 06:22

## 2020-01-01 RX ADMIN — Medication 1: at 08:07

## 2020-01-01 RX ADMIN — Medication 667 MILLIGRAM(S): at 11:04

## 2020-01-01 RX ADMIN — APIXABAN 2.5 MILLIGRAM(S): 2.5 TABLET, FILM COATED ORAL at 17:12

## 2020-01-01 RX ADMIN — Medication 250 MILLIGRAM(S): at 06:13

## 2020-01-01 RX ADMIN — Medication 3 MILLIGRAM(S): at 21:19

## 2020-01-01 RX ADMIN — APIXABAN 2.5 MILLIGRAM(S): 2.5 TABLET, FILM COATED ORAL at 06:12

## 2020-01-01 RX ADMIN — SENNA PLUS 1 TABLET(S): 8.6 TABLET ORAL at 23:22

## 2020-01-01 RX ADMIN — BRIMONIDINE TARTRATE 1 DROP(S): 2 SOLUTION/ DROPS OPHTHALMIC at 05:20

## 2020-01-01 RX ADMIN — BRIMONIDINE TARTRATE 1 DROP(S): 2 SOLUTION/ DROPS OPHTHALMIC at 06:21

## 2020-01-01 RX ADMIN — Medication 90 MILLIGRAM(S): at 13:29

## 2020-01-01 RX ADMIN — PANTOPRAZOLE SODIUM 40 MILLIGRAM(S): 20 TABLET, DELAYED RELEASE ORAL at 06:21

## 2020-01-01 RX ADMIN — Medication 5 MILLIGRAM(S): at 00:00

## 2020-01-01 RX ADMIN — Medication 10 MILLIGRAM(S): at 12:06

## 2020-01-01 RX ADMIN — Medication 2: at 12:17

## 2020-01-01 RX ADMIN — Medication 81 MILLIGRAM(S): at 11:38

## 2020-01-01 RX ADMIN — Medication 250 MILLIGRAM(S): at 06:12

## 2020-01-01 RX ADMIN — ATORVASTATIN CALCIUM 40 MILLIGRAM(S): 80 TABLET, FILM COATED ORAL at 21:15

## 2020-01-01 RX ADMIN — SODIUM CHLORIDE 70 MILLILITER(S): 9 INJECTION INTRAMUSCULAR; INTRAVENOUS; SUBCUTANEOUS at 11:22

## 2020-01-01 RX ADMIN — PANTOPRAZOLE SODIUM 40 MILLIGRAM(S): 20 TABLET, DELAYED RELEASE ORAL at 06:27

## 2020-01-01 RX ADMIN — Medication 650 MILLIGRAM(S): at 06:27

## 2020-01-01 RX ADMIN — Medication 650 MILLIGRAM(S): at 05:08

## 2020-01-01 RX ADMIN — Medication 250 MILLIGRAM(S): at 17:34

## 2020-01-01 RX ADMIN — Medication 200 GRAM(S): at 09:53

## 2020-01-01 RX ADMIN — Medication 1: at 07:52

## 2020-01-01 RX ADMIN — CALCITRIOL 0.5 MICROGRAM(S): 0.5 CAPSULE ORAL at 11:29

## 2020-01-01 RX ADMIN — CHLORHEXIDINE GLUCONATE 1 APPLICATION(S): 213 SOLUTION TOPICAL at 11:47

## 2020-01-01 RX ADMIN — Medication 10 UNIT(S): at 14:15

## 2020-01-01 RX ADMIN — Medication 50 GRAM(S): at 19:10

## 2020-01-01 RX ADMIN — PANTOPRAZOLE SODIUM 40 MILLIGRAM(S): 20 TABLET, DELAYED RELEASE ORAL at 06:12

## 2020-01-01 RX ADMIN — Medication 3: at 11:42

## 2020-01-01 RX ADMIN — Medication 60 MILLIGRAM(S): at 14:15

## 2020-01-01 RX ADMIN — SODIUM CHLORIDE 75 MILLILITER(S): 9 INJECTION, SOLUTION INTRAVENOUS at 21:56

## 2020-01-01 RX ADMIN — Medication 10 MILLIGRAM(S): at 11:21

## 2020-01-01 RX ADMIN — Medication 100 MILLIGRAM(S): at 05:17

## 2020-01-01 RX ADMIN — HYDROMORPHONE HYDROCHLORIDE 0.5 MILLIGRAM(S): 2 INJECTION INTRAMUSCULAR; INTRAVENOUS; SUBCUTANEOUS at 15:24

## 2020-01-01 RX ADMIN — ETHACRYNIC ACID 50 MILLIGRAM(S): 25 TABLET ORAL at 14:30

## 2020-01-01 RX ADMIN — Medication 250 MILLIGRAM(S): at 17:23

## 2020-01-01 RX ADMIN — APIXABAN 5 MILLIGRAM(S): 2.5 TABLET, FILM COATED ORAL at 17:15

## 2020-01-01 RX ADMIN — LISINOPRIL 20 MILLIGRAM(S): 2.5 TABLET ORAL at 12:41

## 2020-01-01 RX ADMIN — Medication 100 MILLIGRAM(S): at 07:01

## 2020-01-01 RX ADMIN — PANTOPRAZOLE SODIUM 40 MILLIGRAM(S): 20 TABLET, DELAYED RELEASE ORAL at 06:31

## 2020-01-01 RX ADMIN — ATORVASTATIN CALCIUM 40 MILLIGRAM(S): 80 TABLET, FILM COATED ORAL at 22:22

## 2020-01-01 RX ADMIN — Medication 100 MILLIGRAM(S): at 17:34

## 2020-01-01 RX ADMIN — LATANOPROST 1 DROP(S): 0.05 SOLUTION/ DROPS OPHTHALMIC; TOPICAL at 22:29

## 2020-01-01 RX ADMIN — Medication 20 MILLIGRAM(S): at 14:40

## 2020-01-01 RX ADMIN — SODIUM CHLORIDE 50 MILLILITER(S): 9 INJECTION, SOLUTION INTRAVENOUS at 23:22

## 2020-01-01 RX ADMIN — APIXABAN 2.5 MILLIGRAM(S): 2.5 TABLET, FILM COATED ORAL at 17:36

## 2020-01-01 RX ADMIN — SODIUM CHLORIDE 75 MILLILITER(S): 9 INJECTION, SOLUTION INTRAVENOUS at 17:09

## 2020-01-01 RX ADMIN — OXYCODONE HYDROCHLORIDE 10 MILLIGRAM(S): 5 TABLET ORAL at 06:06

## 2020-01-01 RX ADMIN — Medication 60 MILLIGRAM(S): at 05:01

## 2020-01-01 RX ADMIN — OXYCODONE HYDROCHLORIDE 10 MILLIGRAM(S): 5 TABLET ORAL at 23:35

## 2020-01-01 RX ADMIN — Medication 2: at 11:28

## 2020-01-01 RX ADMIN — Medication 1: at 11:48

## 2020-01-01 RX ADMIN — LISINOPRIL 20 MILLIGRAM(S): 2.5 TABLET ORAL at 05:51

## 2020-01-01 RX ADMIN — Medication 100 MILLIGRAM(S): at 05:35

## 2020-01-01 RX ADMIN — HYDROMORPHONE HYDROCHLORIDE 2 MILLIGRAM(S): 2 INJECTION INTRAMUSCULAR; INTRAVENOUS; SUBCUTANEOUS at 22:16

## 2020-01-01 RX ADMIN — CEFTOLOZANE AND TAZOBACTAM 100 MILLIGRAM(S): 1; .5 INJECTION, POWDER, LYOPHILIZED, FOR SOLUTION INTRAVENOUS at 09:56

## 2020-01-01 RX ADMIN — Medication 650 MILLIGRAM(S): at 13:31

## 2020-01-01 RX ADMIN — BRIMONIDINE TARTRATE 1 DROP(S): 2 SOLUTION/ DROPS OPHTHALMIC at 05:51

## 2020-01-01 RX ADMIN — DOFETILIDE 500 MICROGRAM(S): 0.25 CAPSULE ORAL at 05:52

## 2020-01-01 RX ADMIN — OXYCODONE HYDROCHLORIDE 5 MILLIGRAM(S): 5 TABLET ORAL at 11:33

## 2020-01-01 RX ADMIN — OXYCODONE HYDROCHLORIDE 5 MILLIGRAM(S): 5 TABLET ORAL at 22:28

## 2020-01-01 RX ADMIN — APIXABAN 5 MILLIGRAM(S): 2.5 TABLET, FILM COATED ORAL at 17:28

## 2020-01-01 RX ADMIN — OXYCODONE HYDROCHLORIDE 10 MILLIGRAM(S): 5 TABLET ORAL at 00:32

## 2020-01-01 RX ADMIN — Medication 50 MILLIGRAM(S): at 13:17

## 2020-01-01 RX ADMIN — Medication 20 MILLIGRAM(S): at 05:17

## 2020-01-01 RX ADMIN — SODIUM CHLORIDE 70 MILLILITER(S): 9 INJECTION INTRAMUSCULAR; INTRAVENOUS; SUBCUTANEOUS at 13:33

## 2020-01-01 RX ADMIN — Medication 60 MILLIGRAM(S): at 06:45

## 2020-01-01 RX ADMIN — SODIUM CHLORIDE 100 MILLILITER(S): 9 INJECTION, SOLUTION INTRAVENOUS at 06:08

## 2020-01-01 RX ADMIN — Medication 650 MILLIGRAM(S): at 21:21

## 2020-01-01 RX ADMIN — OXYCODONE HYDROCHLORIDE 5 MILLIGRAM(S): 5 TABLET ORAL at 10:36

## 2020-01-01 RX ADMIN — CALCITRIOL 0.5 MICROGRAM(S): 0.5 CAPSULE ORAL at 11:04

## 2020-01-01 RX ADMIN — CEFTOLOZANE AND TAZOBACTAM 100 MILLIGRAM(S): 1; .5 INJECTION, POWDER, LYOPHILIZED, FOR SOLUTION INTRAVENOUS at 23:21

## 2020-01-01 RX ADMIN — HYDROMORPHONE HYDROCHLORIDE 2 MILLIGRAM(S): 2 INJECTION INTRAMUSCULAR; INTRAVENOUS; SUBCUTANEOUS at 17:14

## 2020-01-01 RX ADMIN — SODIUM CHLORIDE 100 MILLILITER(S): 9 INJECTION, SOLUTION INTRAVENOUS at 14:35

## 2020-01-01 RX ADMIN — OXYCODONE HYDROCHLORIDE 5 MILLIGRAM(S): 5 TABLET ORAL at 22:17

## 2020-01-01 RX ADMIN — Medication 650 MILLIGRAM(S): at 21:03

## 2020-01-01 RX ADMIN — Medication 100 MILLIGRAM(S): at 06:12

## 2020-01-01 RX ADMIN — MAGNESIUM OXIDE 400 MG ORAL TABLET 400 MILLIGRAM(S): 241.3 TABLET ORAL at 12:21

## 2020-01-01 RX ADMIN — Medication 2: at 16:54

## 2020-01-01 RX ADMIN — Medication 1: at 08:12

## 2020-01-01 RX ADMIN — Medication 650 MILLIGRAM(S): at 05:12

## 2020-01-01 RX ADMIN — Medication 200 GRAM(S): at 13:46

## 2020-01-01 RX ADMIN — MAGNESIUM OXIDE 400 MG ORAL TABLET 400 MILLIGRAM(S): 241.3 TABLET ORAL at 14:19

## 2020-01-01 RX ADMIN — ONDANSETRON 8 MILLIGRAM(S): 8 TABLET, FILM COATED ORAL at 13:37

## 2020-01-01 RX ADMIN — CEFEPIME 1000 MILLIGRAM(S): 1 INJECTION, POWDER, FOR SOLUTION INTRAMUSCULAR; INTRAVENOUS at 20:00

## 2020-01-01 RX ADMIN — CALCITRIOL 0.5 MICROGRAM(S): 0.5 CAPSULE ORAL at 11:52

## 2020-01-01 RX ADMIN — HYDROMORPHONE HYDROCHLORIDE 0.5 MILLIGRAM(S): 2 INJECTION INTRAMUSCULAR; INTRAVENOUS; SUBCUTANEOUS at 13:22

## 2020-01-01 RX ADMIN — PANTOPRAZOLE SODIUM 40 MILLIGRAM(S): 20 TABLET, DELAYED RELEASE ORAL at 05:35

## 2020-01-01 RX ADMIN — Medication 650 MILLIGRAM(S): at 22:12

## 2020-01-01 RX ADMIN — Medication 100 MILLIGRAM(S): at 05:48

## 2020-01-01 RX ADMIN — Medication 2: at 08:26

## 2020-01-01 RX ADMIN — Medication 20 MILLIGRAM(S): at 05:51

## 2020-01-01 RX ADMIN — Medication 50 GRAM(S): at 10:29

## 2020-01-01 RX ADMIN — Medication 667 MILLIGRAM(S): at 11:29

## 2020-01-01 RX ADMIN — Medication 25 MILLIGRAM(S): at 05:01

## 2020-01-01 RX ADMIN — Medication 60 MILLIGRAM(S): at 05:35

## 2020-01-01 RX ADMIN — MAGNESIUM OXIDE 400 MG ORAL TABLET 400 MILLIGRAM(S): 241.3 TABLET ORAL at 17:48

## 2020-01-01 RX ADMIN — ETHACRYNIC ACID 50 MILLIGRAM(S): 25 TABLET ORAL at 15:32

## 2020-01-01 RX ADMIN — Medication 60 MILLIGRAM(S): at 05:13

## 2020-01-01 RX ADMIN — OXYCODONE HYDROCHLORIDE 10 MILLIGRAM(S): 5 TABLET ORAL at 14:50

## 2020-01-01 RX ADMIN — CEFTRIAXONE 100 MILLIGRAM(S): 500 INJECTION, POWDER, FOR SOLUTION INTRAMUSCULAR; INTRAVENOUS at 06:03

## 2020-01-01 RX ADMIN — ALBUTEROL 2.5 MILLIGRAM(S): 90 AEROSOL, METERED ORAL at 12:35

## 2020-01-01 RX ADMIN — Medication 25 MILLIGRAM(S): at 18:14

## 2020-01-01 RX ADMIN — DOFETILIDE 500 MICROGRAM(S): 0.25 CAPSULE ORAL at 17:13

## 2020-01-01 RX ADMIN — Medication 250 MILLIGRAM(S): at 17:26

## 2020-01-01 RX ADMIN — Medication 667 MILLIGRAM(S): at 11:46

## 2020-01-01 RX ADMIN — OXYCODONE HYDROCHLORIDE 5 MILLIGRAM(S): 5 TABLET ORAL at 21:00

## 2020-01-01 RX ADMIN — OXYCODONE HYDROCHLORIDE 5 MILLIGRAM(S): 5 TABLET ORAL at 13:43

## 2020-01-01 RX ADMIN — Medication 50 GRAM(S): at 10:00

## 2020-01-01 RX ADMIN — MAGNESIUM OXIDE 400 MG ORAL TABLET 400 MILLIGRAM(S): 241.3 TABLET ORAL at 17:06

## 2020-01-01 RX ADMIN — Medication 650 MILLIGRAM(S): at 22:20

## 2020-01-01 RX ADMIN — OXYCODONE HYDROCHLORIDE 10 MILLIGRAM(S): 5 TABLET ORAL at 06:20

## 2020-01-01 RX ADMIN — Medication 2: at 16:55

## 2020-01-01 RX ADMIN — CEFTOLOZANE AND TAZOBACTAM 100 MILLIGRAM(S): 1; .5 INJECTION, POWDER, LYOPHILIZED, FOR SOLUTION INTRAVENOUS at 17:57

## 2020-01-01 RX ADMIN — Medication 3: at 11:37

## 2020-01-01 RX ADMIN — Medication 650 MILLIGRAM(S): at 05:20

## 2020-01-01 RX ADMIN — BRIMONIDINE TARTRATE 1 DROP(S): 2 SOLUTION/ DROPS OPHTHALMIC at 05:14

## 2020-01-01 RX ADMIN — Medication 81 MILLIGRAM(S): at 12:04

## 2020-01-01 RX ADMIN — APIXABAN 2.5 MILLIGRAM(S): 2.5 TABLET, FILM COATED ORAL at 22:33

## 2020-01-01 RX ADMIN — HYDROMORPHONE HYDROCHLORIDE 2 MILLIGRAM(S): 2 INJECTION INTRAMUSCULAR; INTRAVENOUS; SUBCUTANEOUS at 17:56

## 2020-01-01 RX ADMIN — PANTOPRAZOLE SODIUM 40 MILLIGRAM(S): 20 TABLET, DELAYED RELEASE ORAL at 05:24

## 2020-01-01 RX ADMIN — PANTOPRAZOLE SODIUM 40 MILLIGRAM(S): 20 TABLET, DELAYED RELEASE ORAL at 06:06

## 2020-01-01 RX ADMIN — Medication 1: at 07:44

## 2020-01-01 RX ADMIN — Medication 650 MILLIGRAM(S): at 06:21

## 2020-01-01 RX ADMIN — MORPHINE SULFATE 2 MG/HR: 50 CAPSULE, EXTENDED RELEASE ORAL at 18:06

## 2020-01-01 RX ADMIN — Medication 81 MILLIGRAM(S): at 11:05

## 2020-01-01 RX ADMIN — Medication 2: at 17:03

## 2020-01-01 RX ADMIN — PANTOPRAZOLE SODIUM 40 MILLIGRAM(S): 20 TABLET, DELAYED RELEASE ORAL at 05:52

## 2020-01-01 RX ADMIN — MORPHINE SULFATE 4 MILLIGRAM(S): 50 CAPSULE, EXTENDED RELEASE ORAL at 01:38

## 2020-01-01 RX ADMIN — Medication 100 MILLIGRAM(S): at 17:15

## 2020-01-01 RX ADMIN — Medication 650 MILLIGRAM(S): at 21:39

## 2020-01-01 RX ADMIN — Medication 100 MILLIGRAM(S): at 17:35

## 2020-01-01 RX ADMIN — SODIUM CHLORIDE 100 MILLILITER(S): 9 INJECTION, SOLUTION INTRAVENOUS at 05:06

## 2020-01-01 RX ADMIN — CEFEPIME 100 MILLIGRAM(S): 1 INJECTION, POWDER, FOR SOLUTION INTRAMUSCULAR; INTRAVENOUS at 12:36

## 2020-01-01 RX ADMIN — Medication 25 MILLIGRAM(S): at 14:27

## 2020-01-01 RX ADMIN — Medication 50 MILLIGRAM(S): at 21:06

## 2020-01-01 RX ADMIN — Medication 1334 MILLIGRAM(S): at 12:14

## 2020-01-01 RX ADMIN — FAMOTIDINE 20 MILLIGRAM(S): 10 INJECTION INTRAVENOUS at 13:46

## 2020-01-01 RX ADMIN — OXYCODONE HYDROCHLORIDE 5 MILLIGRAM(S): 5 TABLET ORAL at 03:08

## 2020-01-01 RX ADMIN — OXYCODONE HYDROCHLORIDE 10 MILLIGRAM(S): 5 TABLET ORAL at 07:42

## 2020-01-01 RX ADMIN — DOFETILIDE 500 MICROGRAM(S): 0.25 CAPSULE ORAL at 06:28

## 2020-01-01 RX ADMIN — PANTOPRAZOLE SODIUM 40 MILLIGRAM(S): 20 TABLET, DELAYED RELEASE ORAL at 12:04

## 2020-01-01 RX ADMIN — Medication 4: at 12:29

## 2020-01-01 RX ADMIN — Medication 30 MILLILITER(S): at 08:28

## 2020-01-01 RX ADMIN — Medication 50 GRAM(S): at 01:40

## 2020-01-01 RX ADMIN — Medication 2: at 07:44

## 2020-01-01 RX ADMIN — CEFTOLOZANE AND TAZOBACTAM 100 MILLIGRAM(S): 1; .5 INJECTION, POWDER, LYOPHILIZED, FOR SOLUTION INTRAVENOUS at 05:33

## 2020-01-01 RX ADMIN — APIXABAN 2.5 MILLIGRAM(S): 2.5 TABLET, FILM COATED ORAL at 17:34

## 2020-01-01 RX ADMIN — Medication 100 GRAM(S): at 14:32

## 2020-01-01 RX ADMIN — BRIMONIDINE TARTRATE 1 DROP(S): 2 SOLUTION/ DROPS OPHTHALMIC at 17:27

## 2020-01-01 RX ADMIN — CEFTOLOZANE AND TAZOBACTAM 100 MILLIGRAM(S): 1; .5 INJECTION, POWDER, LYOPHILIZED, FOR SOLUTION INTRAVENOUS at 06:20

## 2020-01-01 RX ADMIN — LATANOPROST 1 DROP(S): 0.05 SOLUTION/ DROPS OPHTHALMIC; TOPICAL at 21:02

## 2020-01-01 RX ADMIN — Medication 650 MILLIGRAM(S): at 05:34

## 2020-01-01 RX ADMIN — LATANOPROST 1 DROP(S): 0.05 SOLUTION/ DROPS OPHTHALMIC; TOPICAL at 22:15

## 2020-01-01 RX ADMIN — Medication 50 GRAM(S): at 00:38

## 2020-01-01 RX ADMIN — Medication 60 MILLIGRAM(S): at 05:45

## 2020-01-01 RX ADMIN — MEROPENEM 100 MILLIGRAM(S): 1 INJECTION INTRAVENOUS at 16:53

## 2020-01-01 RX ADMIN — CALCITRIOL 0.5 MICROGRAM(S): 0.5 CAPSULE ORAL at 11:13

## 2020-01-01 RX ADMIN — OXYCODONE HYDROCHLORIDE 10 MILLIGRAM(S): 5 TABLET ORAL at 05:52

## 2020-01-01 RX ADMIN — APIXABAN 2.5 MILLIGRAM(S): 2.5 TABLET, FILM COATED ORAL at 05:12

## 2020-01-01 RX ADMIN — ONDANSETRON 8 MILLIGRAM(S): 8 TABLET, FILM COATED ORAL at 14:48

## 2020-01-01 RX ADMIN — BRIMONIDINE TARTRATE 1 DROP(S): 2 SOLUTION/ DROPS OPHTHALMIC at 06:45

## 2020-01-01 RX ADMIN — Medication 3: at 11:41

## 2020-01-01 RX ADMIN — Medication 50 MILLIGRAM(S): at 05:08

## 2020-01-01 RX ADMIN — CEFTRIAXONE 100 MILLIGRAM(S): 500 INJECTION, POWDER, FOR SOLUTION INTRAMUSCULAR; INTRAVENOUS at 06:27

## 2020-01-01 RX ADMIN — APIXABAN 2.5 MILLIGRAM(S): 2.5 TABLET, FILM COATED ORAL at 05:24

## 2020-01-01 RX ADMIN — Medication 650 MILLIGRAM(S): at 22:36

## 2020-01-01 RX ADMIN — ATORVASTATIN CALCIUM 40 MILLIGRAM(S): 80 TABLET, FILM COATED ORAL at 21:03

## 2020-01-01 RX ADMIN — DOFETILIDE 500 MICROGRAM(S): 0.25 CAPSULE ORAL at 17:30

## 2020-01-01 RX ADMIN — Medication 100 MILLIGRAM(S): at 17:26

## 2020-01-01 RX ADMIN — Medication 5 MILLIGRAM(S): at 08:47

## 2020-01-01 RX ADMIN — Medication 81 MILLIGRAM(S): at 11:44

## 2020-01-01 RX ADMIN — Medication 10 MILLIGRAM(S): at 06:45

## 2020-01-01 RX ADMIN — OXYCODONE HYDROCHLORIDE 5 MILLIGRAM(S): 5 TABLET ORAL at 21:58

## 2020-01-01 RX ADMIN — DOFETILIDE 500 MICROGRAM(S): 0.25 CAPSULE ORAL at 17:28

## 2020-01-01 RX ADMIN — BRIMONIDINE TARTRATE 1 DROP(S): 2 SOLUTION/ DROPS OPHTHALMIC at 14:27

## 2020-01-01 RX ADMIN — Medication 50 GRAM(S): at 13:28

## 2020-01-01 RX ADMIN — OXYCODONE HYDROCHLORIDE 5 MILLIGRAM(S): 5 TABLET ORAL at 10:29

## 2020-01-01 RX ADMIN — BRIMONIDINE TARTRATE 1 DROP(S): 2 SOLUTION/ DROPS OPHTHALMIC at 17:11

## 2020-01-01 RX ADMIN — LATANOPROST 1 DROP(S): 0.05 SOLUTION/ DROPS OPHTHALMIC; TOPICAL at 21:18

## 2020-01-01 RX ADMIN — CEFTOLOZANE AND TAZOBACTAM 100 MILLIGRAM(S): 1; .5 INJECTION, POWDER, LYOPHILIZED, FOR SOLUTION INTRAVENOUS at 04:26

## 2020-01-01 RX ADMIN — Medication 1: at 16:52

## 2020-01-01 RX ADMIN — APIXABAN 2.5 MILLIGRAM(S): 2.5 TABLET, FILM COATED ORAL at 17:23

## 2020-01-01 RX ADMIN — Medication 30 MILLIGRAM(S): at 03:32

## 2020-01-01 RX ADMIN — BRIMONIDINE TARTRATE 1 DROP(S): 2 SOLUTION/ DROPS OPHTHALMIC at 06:05

## 2020-01-01 RX ADMIN — SODIUM CHLORIDE 1000 MILLILITER(S): 9 INJECTION INTRAMUSCULAR; INTRAVENOUS; SUBCUTANEOUS at 19:10

## 2020-01-01 RX ADMIN — Medication 250 MILLIGRAM(S): at 17:22

## 2020-01-01 RX ADMIN — Medication 650 MILLIGRAM(S): at 06:06

## 2020-01-01 RX ADMIN — Medication 650 MILLIGRAM(S): at 14:16

## 2020-01-01 RX ADMIN — Medication 1: at 08:19

## 2020-01-01 RX ADMIN — Medication 81 MILLIGRAM(S): at 11:42

## 2020-01-01 RX ADMIN — Medication 650 MILLIGRAM(S): at 21:36

## 2020-01-01 RX ADMIN — Medication 81 MILLIGRAM(S): at 11:21

## 2020-01-01 RX ADMIN — BRIMONIDINE TARTRATE 1 DROP(S): 2 SOLUTION/ DROPS OPHTHALMIC at 22:22

## 2020-01-01 RX ADMIN — CEFTOLOZANE AND TAZOBACTAM 100 MILLIGRAM(S): 1; .5 INJECTION, POWDER, LYOPHILIZED, FOR SOLUTION INTRAVENOUS at 09:52

## 2020-01-01 RX ADMIN — Medication 2: at 11:52

## 2020-01-01 RX ADMIN — APIXABAN 5 MILLIGRAM(S): 2.5 TABLET, FILM COATED ORAL at 17:06

## 2020-01-01 RX ADMIN — LISINOPRIL 20 MILLIGRAM(S): 2.5 TABLET ORAL at 05:17

## 2020-01-01 RX ADMIN — Medication 10 MILLIGRAM(S): at 02:27

## 2020-01-01 RX ADMIN — APIXABAN 2.5 MILLIGRAM(S): 2.5 TABLET, FILM COATED ORAL at 06:05

## 2020-01-01 RX ADMIN — Medication 2: at 17:30

## 2020-01-01 RX ADMIN — METFORMIN HYDROCHLORIDE 1000 MILLIGRAM(S): 850 TABLET ORAL at 11:45

## 2020-01-01 RX ADMIN — Medication 1000 MILLILITER(S): at 23:07

## 2020-01-01 RX ADMIN — Medication 650 MILLIGRAM(S): at 05:53

## 2020-01-01 RX ADMIN — Medication 20 MILLIGRAM(S): at 06:04

## 2020-01-01 RX ADMIN — Medication 100 MILLIGRAM(S): at 17:48

## 2020-01-01 RX ADMIN — INSULIN HUMAN 10 UNIT(S): 100 INJECTION, SOLUTION SUBCUTANEOUS at 14:55

## 2020-01-01 RX ADMIN — APIXABAN 5 MILLIGRAM(S): 2.5 TABLET, FILM COATED ORAL at 06:21

## 2020-01-01 RX ADMIN — SODIUM CHLORIDE 1000 MILLILITER(S): 9 INJECTION INTRAMUSCULAR; INTRAVENOUS; SUBCUTANEOUS at 22:28

## 2020-01-01 RX ADMIN — Medication 20 MILLIGRAM(S): at 06:21

## 2020-01-01 RX ADMIN — Medication 3 MILLIGRAM(S): at 22:22

## 2020-01-01 RX ADMIN — Medication 250 MILLIGRAM(S): at 06:45

## 2020-01-01 RX ADMIN — APIXABAN 2.5 MILLIGRAM(S): 2.5 TABLET, FILM COATED ORAL at 17:26

## 2020-01-01 RX ADMIN — Medication 90 MILLIGRAM(S): at 06:28

## 2020-01-01 RX ADMIN — Medication 2: at 12:05

## 2020-01-01 RX ADMIN — Medication 81 MILLIGRAM(S): at 11:48

## 2020-01-01 RX ADMIN — Medication 3 MILLIGRAM(S): at 22:12

## 2020-01-01 RX ADMIN — CHLORHEXIDINE GLUCONATE 1 APPLICATION(S): 213 SOLUTION TOPICAL at 05:11

## 2020-01-01 RX ADMIN — Medication 50 GRAM(S): at 14:54

## 2020-01-01 RX ADMIN — CALCITRIOL 0.5 MICROGRAM(S): 0.5 CAPSULE ORAL at 12:14

## 2020-01-01 RX ADMIN — PANTOPRAZOLE SODIUM 40 MILLIGRAM(S): 20 TABLET, DELAYED RELEASE ORAL at 06:17

## 2020-01-01 RX ADMIN — BRIMONIDINE TARTRATE 1 DROP(S): 2 SOLUTION/ DROPS OPHTHALMIC at 21:16

## 2020-01-01 RX ADMIN — Medication 650 MILLIGRAM(S): at 14:14

## 2020-01-01 RX ADMIN — Medication 100 GRAM(S): at 13:06

## 2020-01-01 RX ADMIN — Medication 650 MILLIGRAM(S): at 14:02

## 2020-01-01 RX ADMIN — Medication 60 MILLIGRAM(S): at 05:47

## 2020-01-01 RX ADMIN — Medication 50 MILLIGRAM(S): at 14:13

## 2020-01-01 RX ADMIN — APIXABAN 2.5 MILLIGRAM(S): 2.5 TABLET, FILM COATED ORAL at 17:03

## 2020-01-01 RX ADMIN — SODIUM CHLORIDE 75 MILLILITER(S): 9 INJECTION, SOLUTION INTRAVENOUS at 04:34

## 2020-01-01 RX ADMIN — SENNA PLUS 1 TABLET(S): 8.6 TABLET ORAL at 22:28

## 2020-01-01 RX ADMIN — OCTREOTIDE ACETATE 50 MICROGRAM(S): 200 INJECTION, SOLUTION INTRAVENOUS; SUBCUTANEOUS at 00:01

## 2020-01-01 RX ADMIN — Medication 100 MILLIGRAM(S): at 17:06

## 2020-01-01 RX ADMIN — OXYCODONE HYDROCHLORIDE 10 MILLIGRAM(S): 5 TABLET ORAL at 06:44

## 2020-01-01 RX ADMIN — Medication 81 MILLIGRAM(S): at 12:36

## 2020-01-01 RX ADMIN — Medication 650 MILLIGRAM(S): at 05:17

## 2020-01-01 RX ADMIN — ATORVASTATIN CALCIUM 40 MILLIGRAM(S): 80 TABLET, FILM COATED ORAL at 22:12

## 2020-01-01 RX ADMIN — APIXABAN 2.5 MILLIGRAM(S): 2.5 TABLET, FILM COATED ORAL at 17:22

## 2020-01-01 RX ADMIN — SODIUM CHLORIDE 70 MILLILITER(S): 9 INJECTION INTRAMUSCULAR; INTRAVENOUS; SUBCUTANEOUS at 00:23

## 2020-01-01 RX ADMIN — Medication 3: at 16:32

## 2020-01-01 RX ADMIN — CHLORHEXIDINE GLUCONATE 1 APPLICATION(S): 213 SOLUTION TOPICAL at 12:06

## 2020-01-01 RX ADMIN — SODIUM CHLORIDE 1000 MILLILITER(S): 9 INJECTION INTRAMUSCULAR; INTRAVENOUS; SUBCUTANEOUS at 23:54

## 2020-01-01 RX ADMIN — Medication 1 MILLIGRAM(S): at 18:27

## 2020-01-01 RX ADMIN — CEFEPIME 100 MILLIGRAM(S): 1 INJECTION, POWDER, FOR SOLUTION INTRAMUSCULAR; INTRAVENOUS at 11:42

## 2020-01-01 RX ADMIN — Medication 90 MILLIGRAM(S): at 06:21

## 2020-01-01 RX ADMIN — BRIMONIDINE TARTRATE 1 DROP(S): 2 SOLUTION/ DROPS OPHTHALMIC at 13:05

## 2020-01-01 RX ADMIN — Medication 100 GRAM(S): at 18:43

## 2020-01-01 RX ADMIN — Medication 100 MILLIGRAM(S): at 06:45

## 2020-01-01 RX ADMIN — Medication 650 MILLIGRAM(S): at 17:30

## 2020-01-01 RX ADMIN — Medication 100 MILLIGRAM(S): at 17:22

## 2020-01-01 RX ADMIN — Medication 81 MILLIGRAM(S): at 12:06

## 2020-01-01 RX ADMIN — PANTOPRAZOLE SODIUM 40 MILLIGRAM(S): 20 TABLET, DELAYED RELEASE ORAL at 06:45

## 2020-01-01 RX ADMIN — MORPHINE SULFATE 2 MILLIGRAM(S): 50 CAPSULE, EXTENDED RELEASE ORAL at 23:14

## 2020-01-01 RX ADMIN — Medication 90 MILLIGRAM(S): at 05:51

## 2020-01-01 RX ADMIN — SENNA PLUS 2 TABLET(S): 8.6 TABLET ORAL at 22:12

## 2020-01-01 RX ADMIN — PANTOPRAZOLE SODIUM 40 MILLIGRAM(S): 20 TABLET, DELAYED RELEASE ORAL at 05:01

## 2020-01-01 RX ADMIN — Medication 100 MILLIGRAM(S): at 05:13

## 2020-01-01 RX ADMIN — LISINOPRIL 20 MILLIGRAM(S): 2.5 TABLET ORAL at 06:21

## 2020-01-01 RX ADMIN — OXYCODONE HYDROCHLORIDE 10 MILLIGRAM(S): 5 TABLET ORAL at 01:43

## 2020-01-01 RX ADMIN — Medication 90 MILLIGRAM(S): at 06:06

## 2020-01-01 RX ADMIN — CEFEPIME 100 MILLIGRAM(S): 1 INJECTION, POWDER, FOR SOLUTION INTRAMUSCULAR; INTRAVENOUS at 19:30

## 2020-01-01 RX ADMIN — Medication 25 GRAM(S): at 09:06

## 2020-01-01 RX ADMIN — Medication 90 MILLIGRAM(S): at 05:52

## 2020-01-01 RX ADMIN — Medication 650 MILLIGRAM(S): at 22:34

## 2020-01-01 RX ADMIN — Medication 1: at 11:29

## 2020-01-01 RX ADMIN — Medication 100 MILLIGRAM(S): at 17:03

## 2020-01-01 RX ADMIN — Medication 60 MILLIGRAM(S): at 06:12

## 2020-01-01 RX ADMIN — APIXABAN 2.5 MILLIGRAM(S): 2.5 TABLET, FILM COATED ORAL at 17:09

## 2020-01-01 RX ADMIN — OXYCODONE HYDROCHLORIDE 10 MILLIGRAM(S): 5 TABLET ORAL at 06:50

## 2020-01-01 RX ADMIN — Medication 2: at 12:30

## 2020-01-01 RX ADMIN — CEFTRIAXONE 100 MILLIGRAM(S): 500 INJECTION, POWDER, FOR SOLUTION INTRAMUSCULAR; INTRAVENOUS at 02:14

## 2020-01-01 RX ADMIN — Medication 25 MILLIGRAM(S): at 21:00

## 2020-01-01 RX ADMIN — Medication 2: at 12:04

## 2020-01-01 RX ADMIN — Medication 650 MILLIGRAM(S): at 13:29

## 2020-01-01 RX ADMIN — Medication 650 MILLIGRAM(S): at 21:12

## 2020-01-01 RX ADMIN — Medication 650 MILLIGRAM(S): at 05:45

## 2020-01-01 RX ADMIN — LATANOPROST 1 DROP(S): 0.05 SOLUTION/ DROPS OPHTHALMIC; TOPICAL at 21:36

## 2020-01-01 RX ADMIN — Medication 4: at 11:52

## 2020-01-01 RX ADMIN — SODIUM CHLORIDE 75 MILLILITER(S): 9 INJECTION, SOLUTION INTRAVENOUS at 08:07

## 2020-01-01 RX ADMIN — BRIMONIDINE TARTRATE 1 DROP(S): 2 SOLUTION/ DROPS OPHTHALMIC at 14:15

## 2020-01-01 RX ADMIN — Medication 2 GRAM(S): at 20:00

## 2020-01-01 RX ADMIN — SENNA PLUS 1 TABLET(S): 8.6 TABLET ORAL at 21:35

## 2020-01-01 RX ADMIN — CHLORHEXIDINE GLUCONATE 1 APPLICATION(S): 213 SOLUTION TOPICAL at 11:22

## 2020-01-01 RX ADMIN — MEROPENEM 100 MILLIGRAM(S): 1 INJECTION INTRAVENOUS at 18:04

## 2020-01-01 RX ADMIN — OXYCODONE HYDROCHLORIDE 10 MILLIGRAM(S): 5 TABLET ORAL at 16:02

## 2020-01-01 RX ADMIN — OCTREOTIDE ACETATE 50 MICROGRAM(S): 200 INJECTION, SOLUTION INTRAVENOUS; SUBCUTANEOUS at 05:17

## 2020-01-01 RX ADMIN — Medication 20 MILLIEQUIVALENT(S): at 12:06

## 2020-01-01 RX ADMIN — LATANOPROST 1 DROP(S): 0.05 SOLUTION/ DROPS OPHTHALMIC; TOPICAL at 22:23

## 2020-01-01 RX ADMIN — APIXABAN 2.5 MILLIGRAM(S): 2.5 TABLET, FILM COATED ORAL at 05:20

## 2020-01-01 RX ADMIN — PANTOPRAZOLE SODIUM 40 MILLIGRAM(S): 20 TABLET, DELAYED RELEASE ORAL at 05:13

## 2020-01-01 RX ADMIN — Medication 1: at 17:15

## 2020-01-01 RX ADMIN — Medication 3 UNIT(S): at 21:01

## 2020-01-01 RX ADMIN — FAMOTIDINE 20 MILLIGRAM(S): 10 INJECTION INTRAVENOUS at 12:14

## 2020-01-01 RX ADMIN — Medication 81 MILLIGRAM(S): at 11:36

## 2020-01-01 RX ADMIN — Medication 60 MILLIGRAM(S): at 05:08

## 2020-01-01 RX ADMIN — APIXABAN 2.5 MILLIGRAM(S): 2.5 TABLET, FILM COATED ORAL at 05:09

## 2020-01-01 RX ADMIN — OXYCODONE HYDROCHLORIDE 10 MILLIGRAM(S): 5 TABLET ORAL at 17:36

## 2020-01-01 RX ADMIN — Medication 60 MILLIGRAM(S): at 05:53

## 2020-01-01 RX ADMIN — BRIMONIDINE TARTRATE 1 DROP(S): 2 SOLUTION/ DROPS OPHTHALMIC at 06:12

## 2020-01-19 NOTE — ED PROVIDER NOTE - PSH
AICD (automatic cardioverter/defibrillator) present  [employer]  Encounter for biliary drainage tube placement  1/2018  H/O bilateral cataract extraction  LEFT- 1/18 RIGHT 6 YRS AGO  H/O coronary angioplasty  WITH STENT X2 (4/2018)  H/O flexible sigmoidoscopy  2015  History of prostate surgery  5/17  History of suprapubic catheter AICD (automatic cardioverter/defibrillator) present  [employer]  Encounter for biliary drainage tube placement  1/2018  H/O bilateral cataract extraction  LEFT- 1/18 RIGHT 6 YRS AGO  H/O coronary angioplasty  WITH STENT X2 (4/2018)  H/O flexible sigmoidoscopy  2015  History of prostate surgery  5/17  History of suprapubic catheter

## 2020-01-19 NOTE — ED PROVIDER NOTE - ATTENDING CONTRIBUTION TO CARE
I personally evaluated the patient. I reviewed the Resident’s or Physician Assistant’s note (as assigned above), and agree with the findings and plan except as documented in my note.  Chart reviewed. H/O dementia, DM, suprapubic catheter, brought in by EMS with altered mental status, found to have low glc, responded to D50. Wife states he yanked on his suprapubic catheter. Exam shows alert patient in no distress, HEENT NCAT, lungs clear, RR S1S2, abdomen soft +suprapubic catheter, +diffuse tenderness +BS, no rebound or guarding, no CCE.

## 2020-01-19 NOTE — ED PROVIDER NOTE - CARE PLAN
Principal Discharge DX:	Altered mental state  Secondary Diagnosis:	UTI (urinary tract infection)  Secondary Diagnosis:	Troponin I above reference range  Secondary Diagnosis:	Hypoglycemia

## 2020-01-19 NOTE — ED PROVIDER NOTE - NS ED ROS FT
Constitutional: (-) fever  Eyes/ENT: (-) blurry vision, (-) epistaxis  Cardiovascular: (-) chest pain, (-) syncope  Respiratory: (-) cough, (-) shortness of breath  Gastrointestinal: (-) vomiting, (-) diarrhea  : (+) obstruction, (-) hematuria  Musculoskeletal: (-) neck pain, (-) back pain, (-) joint pain  Integumentary: (-) rash, (-) edema  Neurological: (-) headache, (-) altered mental status  Allergic/Immunologic: (-) pruritus

## 2020-01-19 NOTE — ED PROVIDER NOTE - CLINICAL SUMMARY MEDICAL DECISION MAKING FREE TEXT BOX
Labs noted for WBC 8.5, Cr 1.7, trop 0.03. UA WBC 26-50. EKG no acute changes. CXR negative. CT abdo +cystitis, left hydro, gallstones with CBD stone. Head CT negative. Supr catheter changed. Given IVF and ceftriaxone. Will admit to ICU.

## 2020-01-19 NOTE — ED PROVIDER NOTE - OBJECTIVE STATEMENT
79y M pmh as listed presents for eval obstruction. Pt wife states pt suprapubic cath has not drained since this morning after the pt pulled it out, now presents with mild/moderate sharp suprapubic abdominal pain, no aggravating or relieving factors. Denies fever, ha, cp, sob, weakness, numbness, n/v/d/c

## 2020-01-19 NOTE — ED ADULT NURSE NOTE - PSH
AICD (automatic cardioverter/defibrillator) present  [employer]  Encounter for biliary drainage tube placement  1/2018  H/O bilateral cataract extraction  LEFT- 1/18 RIGHT 6 YRS AGO  H/O coronary angioplasty  WITH STENT X2 (4/2018)  H/O flexible sigmoidoscopy  2015  History of prostate surgery  5/17  History of suprapubic catheter

## 2020-01-19 NOTE — ED PROVIDER NOTE - PHYSICAL EXAMINATION
CONST: NAD  EYES: Sclera and conjunctiva clear.   ENT: No nasal discharge. Oropharynx normal appearing, no erythema or exudates. No abscess or swelling. Uvula midline.   NECK: Non-tender, no meningeal signs. normal ROM. supple   CARD: S1 S2; No jvd  RESP: Equal BS B/L, No wheezes, rhonchi or rales. No distress  GI: Tender, distended. Suprapubic cath. no cva tenderness. normal BS  : External appearance wnl  MS: Normal ROM in all extremities. pulses 2 +. no calf tenderness or swelling  SKIN: Warm, dry, no acute rashes. Good turgor  NEURO: A&Ox3, No focal deficits. Strength 5/5 with no sensory deficits.

## 2020-01-20 NOTE — PROGRESS NOTE ADULT - SUBJECTIVE AND OBJECTIVE BOX
Patient reports his catheter got caught on something at home and got pulled, pt denies CP, some irritation at suprapubic site       T(F): 98.2 (01-20-20 @ 07:01), Max: 99 (01-19-20 @ 21:55)  HR: 63 (01-20-20 @ 08:36)  BP: 134/60 (01-20-20 @ 08:36)  RR: 20  SpO2: 97% (01-20-20 @ 08:36) (96% - 100%)    PHYSICAL EXAM:  GENERAL: NAD  HEAD:  Atraumatic, Normocephalic  NERVOUS SYSTEM:  no focal deficits   CHEST/LUNG: Clear to percussion bilaterally; No rales, rhonchi, wheezing, or rubs  HEART: Regular rate and rhythm; No murmurs, rubs, or gallops  ABDOMEN: Soft, Nontender, Nondistended; Bowel sounds present  EXTREMITIES:  2+ Peripheral Pulses, No clubbing, cyanosis, or edema  LYMPH: No lymphadenopathy noted  SKIN: No rashes or lesions    LABS  01-20    139  |  102  |  23<H>  ----------------------------<  177<H>  4.1   |  22  |  1.7<H>    Ca    8.7      20 Jan 2020 06:21  Mg     1.9     01-20    TPro  6.2  /  Alb  3.4<L>  /  TBili  0.3  /  DBili  x   /  AST  10  /  ALT  <5  /  AlkPhos  89  01-20                          10.9   9.73  )-----------( 237      ( 20 Jan 2020 06:21 )             33.1     PT/INR - ( 19 Jan 2020 22:40 )   PT: 15.00 sec;   INR: 1.31 ratio         PTT - ( 19 Jan 2020 22:40 )  PTT:37.4 sec  < from: 12 Lead ECG (06.10.19 @ 08:23) >  Diagnosis Line Atrial-sensed ventricular-paced rhythm    < end of copied text >    CARDIAC ENZYMES    Troponin T, Serum: 0.03 ng/mL (01-20-20 @ 06:21)  Troponin T, Serum: 0.03 ng/mL (01-19-20 @ 22:40)    Urinalysis (01.20.20 @ 00:40)    Glucose Qualitative, Urine: Negative mg/dL    Blood, Urine: Large    pH Urine: 7.5    Color: Yellow    Urine Appearance: Cloudy    Bilirubin: Negative    Ketone - Urine: Trace    Specific Gravity: 1.025    Protein, Urine: >=300 mg/dL    Urobilinogen: 0.2 mg/dL    Nitrite: Negative    Leukocyte Esterase Concentration: Moderate    RADIOLOGY  < from: CT Abdomen and Pelvis w/ IV Cont (01.20.20 @ 01:05) >  IMPRESSION:   1. Findings consistent with urinary bladder cystitis with ascending left urinary tract infection. Unchanged mild left hydroureteronephrosis, which may be seen in setting of infection.  2. Evidence of 4 mm distal common bile duct calculus with mild intra and extrahepatic biliary ductal dilatation Distended gallbladder with cholelithiasis. Correlation with LFTs recommended.    < end of copied text >    MEDICATIONS  (STANDING):  apixaban 5 milliGRAM(s) Oral every 12 hours  aspirin enteric coated 81 milliGRAM(s) Oral daily  dofetilide 500 MICROGram(s) Oral two times a day  NIFEdipine XL 90 milliGRAM(s) Oral daily  oxybutynin 10 milliGRAM(s) Oral daily  oxyCODONE    IR 10 milliGRAM(s) Oral every 8 hours  pantoprazole    Tablet 40 milliGRAM(s) Oral before breakfast  sodium bicarbonate 650 milliGRAM(s) Oral three times a day    MEDICATIONS  (PRN):

## 2020-01-20 NOTE — CONSULT NOTE ADULT - SUBJECTIVE AND OBJECTIVE BOX
CARDIOLOGY CONSULT NOTE     CHIEF COMPLAINT/REASON FOR CONSULT:    HPI:  78 yo white male w/ pmh as noted below. pt brought to hospital for: acute confusion, worsening of his chronic suprapubic pain. work up in ER shows pt to have UTI w/ mildly elevated troponins. in addition pt has uncontrolled /107. (20 Jan 2020 02:21)      PAST MEDICAL & SURGICAL HISTORY:  GERD (gastroesophageal reflux disease): intermittent  H/O ptosis of eyelid: right eye (sometimes wears patch)  Hematuria  Hyperlipidemia  Anemia  Diabetes  Renal insufficiency  Cardiomyopathy  Cholelithiasis  Hydrocele in adult  Glaucoma  BPH (benign prostatic hyperplasia)  CAD (coronary artery disease): CARD STENT X2 4/2018  TIA (transient ischemic attack): X2 JULY 2018  Dementia  CHF (congestive heart failure): COMBINED SYSTOLIC &amp; DIASTOLIC  Afib  Heart attack: 1/2018  Sepsis: 1/18 FROM INFECTED GALLBLADDER  Hypercholesteremia  HTN (hypertension)  DM (diabetes mellitus)  History of suprapubic catheter  H/O flexible sigmoidoscopy: 2015  H/O bilateral cataract extraction: LEFT- 1/18 RIGHT 6 YRS AGO  Encounter for biliary drainage tube placement: 1/2018  H/O coronary angioplasty: WITH STENT X2 (4/2018)  History of prostate surgery: 5/17  AICD (automatic cardioverter/defibrillator) present: [employer]      Cardiac Risks:   [x ]HTN, [x ] DM, [ ] Smoking, [ x] FH,  [x ] Lipids        MEDICATIONS:  MEDICATIONS  (STANDING):  apixaban 5 milliGRAM(s) Oral every 12 hours  aspirin enteric coated 81 milliGRAM(s) Oral daily  dofetilide 500 MICROGram(s) Oral two times a day  NIFEdipine XL 90 milliGRAM(s) Oral daily  oxybutynin 10 milliGRAM(s) Oral daily  oxyCODONE    IR 10 milliGRAM(s) Oral every 8 hours  pantoprazole    Tablet 40 milliGRAM(s) Oral before breakfast  sodium bicarbonate 650 milliGRAM(s) Oral three times a day      FAMILY HISTORY:  FHx: lung cancer: father  FH: type 2 diabetes      SOCIAL HISTORY:      [ ] Marital status     Allergies    sulfa drugs (Other)      	    REVIEW OF SYSTEMS:  CONSTITUTIONAL: No fever, weight loss, or fatigue  EYES: No eye pain, visual disturbances, or discharge  ENMT:  No difficulty hearing, tinnitus, vertigo; No sinus or throat pain  NECK: No pain or stiffness  RESPIRATORY: No cough, wheezing, chills or hemoptysis; No Shortness of Breath  CARDIOVASCULAR: No chest pain, palpitations, passing out, dizziness, or leg swelling  GASTROINTESTINAL: No abdominal or epigastric pain. No nausea, vomiting, or hematemesis; No diarrhea or constipation. No melena or hematochezia.  GENITOURINARY: No dysuria, frequency, hematuria, or incontinence  NEUROLOGICAL: No headaches, memory loss, loss of strength, numbness, or tremors  SKIN: No itching, burning, rashes, or lesions   	        PHYSICAL EXAM:  T(C): 36.8 (01-20-20 @ 07:01), Max: 37.2 (01-19-20 @ 21:55)  HR: 72 (01-20-20 @ 07:02) (62 - 80)  BP: 183/85 (01-20-20 @ 07:02) (149/70 - 219/92)  RR: 20 (01-20-20 @ 07:02) (18 - 25)  SpO2: 98% (01-20-20 @ 07:02) (96% - 100%)  Wt(kg): --  I&O's Summary    19 Jan 2020 07:01  -  20 Jan 2020 07:00  --------------------------------------------------------  IN: 0 mL / OUT: 475 mL / NET: -475 mL    20 Jan 2020 07:01  -  20 Jan 2020 08:01  --------------------------------------------------------  IN: 0 mL / OUT: 50 mL / NET: -50 mL        Appearance: Normal	  Psychiatry: A & O x 3, Mood & affect appropriate  HEENT:   Normal oral mucosa, PERRL, EOMI	  Lymphatic: No lymphadenopathy  Cardiovascular: Normal S1 S2,RRR, No JVD, No murmurs  Respiratory: Lungs clear to auscultation	  Gastrointestinal:  Soft, Non-tender, + BS	  Skin: No rashes, No ecchymoses, No cyanosis	  Neurologic: Non-focal  Extremities: Normal range of motion, No clubbing, cyanosis or edema  Vascular: Peripheral pulses palpable 2+ bilaterally      ECG:  	not available    	  LABS:	 	    CARDIAC MARKERS:                                    10.9   9.73  )-----------( 237      ( 20 Jan 2020 06:21 )             33.1     01-20    139  |  102  |  23<H>  ----------------------------<  177<H>  4.1   |  22  |  1.7<H>    Ca    8.7      20 Jan 2020 06:21  Mg     1.9     01-20    TPro  6.2  /  Alb  3.4<L>  /  TBili  0.3  /  DBili  x   /  AST  10  /  ALT  <5  /  AlkPhos  89  01-20    PT/INR - ( 19 Jan 2020 22:40 )   PT: 15.00 sec;   INR: 1.31 ratio         PTT - ( 19 Jan 2020 22:40 )  PTT:37.4 sec

## 2020-01-20 NOTE — PROGRESS NOTE ADULT - ATTENDING COMMENTS
Attending Statement: I have personally performed a face to face diagnostic evaluation on this patient. The patient is suffering from Hypertensive Urgency: Improved  A fib I have reviewed the above note and agree with the history, exam and suggestions for care, except as I have noted in the text.

## 2020-01-20 NOTE — H&P ADULT - NSICDXPASTSURGICALHX_GEN_ALL_CORE_FT
PAST SURGICAL HISTORY:  AICD (automatic cardioverter/defibrillator) present [employer]    Encounter for biliary drainage tube placement 1/2018    H/O bilateral cataract extraction LEFT- 1/18 RIGHT 6 YRS AGO    H/O coronary angioplasty WITH STENT X2 (4/2018)    H/O flexible sigmoidoscopy 2015    History of prostate surgery 5/17    History of suprapubic catheter

## 2020-01-20 NOTE — H&P ADULT - ASSESSMENT
1. uncontroled HTN (possibley secondary to chronic pain)  2. elevated troponins  3. UTI       discussed w/ intensivist reggie adm. to ICU   pain control , BP control,   started iv antibx

## 2020-01-20 NOTE — CONSULT NOTE ADULT - ASSESSMENT
Patient poor historian , Cad afib  dm dementia. He denies chest pain sob, He presented confused . Supra pubic pain. Enzymes reviewed No evidence mi now, Check labs ekg . Continue meds. Prognosis guarded

## 2020-01-20 NOTE — PROGRESS NOTE ADULT - SUBJECTIVE AND OBJECTIVE BOX
Patient is a 79y old  Male who presents with a chief complaint of uncontrolled HTN, w/ borderline elevated troponin (2020 08:00)        SUBJECTIVE: NO major events overnight.       REVIEW OF SYSTEMS:  See HPI    PHYSICAL EXAM  Vital Signs Last 24 Hrs  T(C): 36.8 (2020 07:01), Max: 37.2 (2020 21:55)  T(F): 98.2 (2020 07:01), Max: 99 (2020 21:55)  HR: 63 (2020 08:36) (62 - 80)  BP: 134/60 (2020 08:36) (134/60 - 219/92)  BP(mean): 86 (2020 08:36) (86 - 133)  RR: 24 (2020 08:36) (18 - 25)  SpO2: 97% (2020 08:36) (96% - 100%)    General: In NAD  HEENT: MARTHA               Lymphatic system: No Cervical LN    Respiratory: Wilmar BS  Cardiovascular: Regular  Gastrointestinal: Soft. + BS, Supra pubic catheter.  Musculoskeletal: No clubbing.  moves all extremities.  Full range of motion    Skin: Warm.  Intact  Neurological: No motor or sensory deficit      20 @ 07:  -  20 @ 07:00  --------------------------------------------------------  IN:  Total IN: 0 mL    OUT:    Voided: 475 mL  Total OUT: 475 mL    Total NET: -475 mL      20 @ 07:01  20 @ 09:24  --------------------------------------------------------  IN:    Oral Fluid: 240 mL  Total IN: 240 mL    OUT:    Voided: 175 mL  Total OUT: 175 mL    Total NET: 65 mL      LABS:                          10.9   9.73  )-----------( 237      ( 2020 06:21 )             33.1                                                   139  |  102  |  23<H>  ----------------------------<  177<H>  4.1   |  22  |  1.7<H>    Ca    8.7      2020 06:21  Mg     1.9     -    TPro  6.2  /  Alb  3.4<L>  /  TBili  0.3  /  DBili  x   /  AST  10  /  ALT  <5  /  AlkPhos  89  20      PT/INR - ( 2020 22:40 )   PT: 15.00 sec;   INR: 1.31 ratio         PTT - ( 2020 22:40 )  PTT:37.4 sec                                       Urinalysis Basic - ( 2020 00:40 )    Color: Yellow / Appearance: Cloudy / S.025 / pH: x  Gluc: x / Ketone: Trace  / Bili: Negative / Urobili: 0.2 mg/dL   Blood: x / Protein: >=300 mg/dL / Nitrite: Negative   Leuk Esterase: Moderate / RBC: 26-50 /HPF / WBC 26-50 /HPF   Sq Epi: x / Non Sq Epi: Few /HPF / Bacteria: Few    CARDIAC MARKERS ( 2020 06:21 )  x     / 0.03 ng/mL / x     / x     / x      CARDIAC MARKERS ( 2020 22:40 )  x     / 0.03 ng/mL / x     / x     / x                                                LIVER FUNCTIONS - ( 2020 06:21 )  Alb: 3.4 g/dL / Pro: 6.2 g/dL / ALK PHOS: 89 U/L / ALT: <5 U/L / AST: 10 U/L / GGT: x                                                           MEDICATIONS  (STANDING):  apixaban 5 milliGRAM(s) Oral every 12 hours  aspirin enteric coated 81 milliGRAM(s) Oral daily  dofetilide 500 MICROGram(s) Oral two times a day  NIFEdipine XL 90 milliGRAM(s) Oral daily  oxybutynin 10 milliGRAM(s) Oral daily  oxyCODONE    IR 10 milliGRAM(s) Oral every 8 hours  pantoprazole    Tablet 40 milliGRAM(s) Oral before breakfast  sodium bicarbonate 650 milliGRAM(s) Oral three times a day    MEDICATIONS  (PRN):

## 2020-01-20 NOTE — H&P ADULT - NSHPPHYSICALEXAM_GEN_ALL_CORE
GENERAL:  78y/o  white Male NAD, resting comfortably.  HEAD:  Atraumatic, Normocephalic  EYES: EOMI, PERRLA, conjunctiva and sclera clear  NECK: Supple, No JVD, no cervical lymphadenopathy, non-tender  CHEST/LUNG: Clear to auscultation bilaterally; No wheeze, rhonchi, or rales  HEART: Regular rate and rhythm; S1&S2  ABDOMEN: Soft, Nontender, large mid abdominal hernia w/ suprapubic catheter  4 quadrants; Bowel sounds present  EXTREMITIES:   Peripheral Pulses Present, No clubbing, no cyanosis, or no edema, no calf tenderness  PSYCH: AAOx3, cooperative, appropriate  NEUROLOGY: WNL  SKIN: WNL

## 2020-01-20 NOTE — H&P ADULT - HISTORY OF PRESENT ILLNESS
78 yo white male w/ pmh as noted below. pt brought to hospital for: acute confusion, worsening of his chronic suprapubic pain. work up in ER shows pt to have UTI w/ mildly elevated troponins. in addition pt has uncontrolled /107.

## 2020-01-20 NOTE — CHART NOTE - NSCHARTNOTEFT_GEN_A_CORE
pt diabetic pt takes  metformin, glipizide, and Januvia at home     pt has ISRRAEL cr 1.7 will place on ISS

## 2020-01-20 NOTE — H&P ADULT - NSHPLABSRESULTS_GEN_ALL_CORE
10.6   8.54  )-----------( 248      ( 2020 22:40 )             32.3           141  |  102  |  24<H>  ----------------------------<  89  4.3   |  23  |  1.7<H>    Ca    8.8      2020 22:40  Mg     1.2         TPro  6.6  /  Alb  3.9  /  TBili  0.4  /  DBili  x   /  AST  11  /  ALT  <5  /  AlkPhos  93                Urinalysis Basic - ( 2020 00:40 )    Color: Yellow / Appearance: Cloudy / S.025 / pH: x  Gluc: x / Ketone: Trace  / Bili: Negative / Urobili: 0.2 mg/dL   Blood: x / Protein: >=300 mg/dL / Nitrite: Negative   Leuk Esterase: Moderate / RBC: 26-50 /HPF / WBC 26-50 /HPF   Sq Epi: x / Non Sq Epi: Few /HPF / Bacteria: Few        PT/INR - ( 2020 22:40 )   PT: 15.00 sec;   INR: 1.31 ratio         PTT - ( 2020 22:40 )  PTT:37.4 sec    Lactate Trend   @ 22:40 Lactate:1.0       CARDIAC MARKERS ( 2020 22:40 )  x     / 0.03 ng/mL / x     / x     / x            CAPILLARY BLOOD GLUCOSE      POCT Blood Glucose.: 95 mg/dL (2020 21:58)

## 2020-01-20 NOTE — PROGRESS NOTE ADULT - ASSESSMENT
Impression:  Hypertensive Urgency: Improved  A fib on AC  Recurrent UTI with supra public Catheter  CKD      PLAN:    CNS: NO depressants    HEENT: Oral care    PULMONARY:  HOB @ 45 degrees    CARDIOVASCULAR: Blood pressure control. Cardiology follow up.     GI: GI prophylaxis.  Feeding     RENAL:  Follow up lytes.  Correct as needed. Urology follow up.     INFECTIOUS DISEASE: Follow up cultures. Urine culture. Cw Abx for now.     HEMATOLOGICAL:  DVT prophylaxis.    ENDOCRINE:  Follow up FS.  Insulin protocol if needed.    MUSCULOSKELETAL: Out of bed to chair  PT/Rehab  Downgrade to floor. Impression:  Hypertensive Urgency: Improved  A fib on AC  Recurrent UTI with supra public Catheter  CKD      PLAN:    CNS: NO depressants    HEENT: Oral care    PULMONARY:  HOB @ 45 degrees    CARDIOVASCULAR: Blood pressure control. Cardiology follow up.     GI: GI prophylaxis.  Feeding     RENAL:  Follow up lytes.  Correct as needed. Urology follow up.     INFECTIOUS DISEASE: Follow up cultures. Urine culture. Cw Abx for now.     HEMATOLOGICAL:  DVT prophylaxis.    ENDOCRINE:  Follow up FS.  Insulin protocol if needed.    MUSCULOSKELETAL: Out of bed to chair  PT/Rehab  Downgrade to floor if OK with card

## 2020-01-21 NOTE — PHYSICAL THERAPY INITIAL EVALUATION ADULT - GENERAL OBSERVATIONS, REHAB EVAL
15:10 - 15:35. Chart reviewed. Patient available to be seen for physical therapy, confirmed with nurse. Patient encountered semi-reclined in bed, +supra pubic tube.  C/o abdominal pain, but would like to walk with PT now.

## 2020-01-21 NOTE — PROGRESS NOTE ADULT - SUBJECTIVE AND OBJECTIVE BOX
Patient is alert and oriented, no complaints       T(F): 97.1 (01-21-20 @ 06:56), Max: 98.2 (01-20-20 @ 11:00)  HR: 67 (01-21-20 @ 09:52)  BP: 140/52 (01-21-20 @ 09:52)  RR: 16 (01-21-20 @ 06:56)      PHYSICAL EXAM:  GENERAL: NAD  HEAD:  Atraumatic, Normocephalic  EYES: EOMI, PERRLA, conjunctiva and sclera clear  NERVOUS SYSTEM:  Alert & Oriented X3, no focal deficits   CHEST/LUNG: Clear to percussion bilaterally; No rales, rhonchi, wheezing, or rubs  HEART: Regular rate and rhythm; No murmurs, rubs, or gallops  ABDOMEN: Soft, Nontender, Nondistended; Bowel sounds present  EXTREMITIES:  2+ Peripheral Pulses, No clubbing, cyanosis, or edema  LYMPH: No lymphadenopathy noted  SKIN: No rashes or lesions    LABS  01-21    140  |  102  |  24<H>  ----------------------------<  149<H>  4.0   |  25  |  1.7<H>    Ca    8.5      21 Jan 2020 07:31  Mg     1.9     01-20    TPro  6.2  /  Alb  3.4<L>  /  TBili  0.3  /  DBili  x   /  AST  10  /  ALT  <5  /  AlkPhos  89  01-20                          10.1   7.92  )-----------( 239      ( 21 Jan 2020 07:31 )             30.1     PT/INR - ( 19 Jan 2020 22:40 )   PT: 15.00 sec;   INR: 1.31 ratio         PTT - ( 19 Jan 2020 22:40 )  PTT:37.4 sec    CARDIAC ENZYMES    Troponin T, Serum: 0.02 ng/mL (01-20-20 @ 11:27)  Troponin T, Serum: 0.03 ng/mL (01-20-20 @ 06:21)  Troponin T, Serum: 0.03 ng/mL (01-19-20 @ 22:40)    RADIOLOGY  < from: CT Abdomen and Pelvis w/ IV Cont (01.20.20 @ 01:05) >    IMPRESSION:   1. Findings consistent with urinary bladder cystitis with ascending left urinary tract infection. Unchanged mild left hydroureteronephrosis, which may be seen in setting of infection.    < end of copied text >    MEDICATIONS  (STANDING):  apixaban 5 milliGRAM(s) Oral every 12 hours  aspirin enteric coated 81 milliGRAM(s) Oral daily  cefTRIAXone   IVPB 1000 milliGRAM(s) IV Intermittent every 24 hours  chlorhexidine 4% Liquid 1 Application(s) Topical <User Schedule>  dofetilide 500 MICROGram(s) Oral two times a day  insulin lispro (HumaLOG) corrective regimen sliding scale   SubCutaneous three times a day before meals  NIFEdipine XL 90 milliGRAM(s) Oral daily  oxybutynin 10 milliGRAM(s) Oral daily  pantoprazole    Tablet 40 milliGRAM(s) Oral before breakfast  sodium bicarbonate 650 milliGRAM(s) Oral three times a day    MEDICATIONS  (PRN):  dextrose 40% Gel 15 Gram(s) Oral once PRN Blood Glucose LESS THAN 70 milliGRAM(s)/deciliter  glucagon  Injectable 1 milliGRAM(s) IntraMuscular once PRN Glucose LESS THAN 70 milligrams/deciliter  oxyCODONE    IR 10 milliGRAM(s) Oral every 8 hours PRN Severe Pain (7 - 10)

## 2020-01-21 NOTE — CONSULT NOTE ADULT - PROBLEM SELECTOR RECOMMENDATION 9
Continue IV Abx for recurrent UTI  Suprapubic tube in place to drainage bag  monitor output  keep insertion site clean

## 2020-01-21 NOTE — PHYSICAL THERAPY INITIAL EVALUATION ADULT - IMPAIRMENTS FOUND, PT EVAL
posture/gait, locomotion, and balance/ergonomics and body mechanics/aerobic capacity/endurance/muscle strength

## 2020-01-21 NOTE — PHYSICAL THERAPY INITIAL EVALUATION ADULT - GAIT DEVIATIONS NOTED, PT EVAL
decreased marquis/decreased step length/increased time in double stance/decreased weight-shifting ability

## 2020-01-21 NOTE — CONSULT NOTE ADULT - SUBJECTIVE AND OBJECTIVE BOX
HPI:  78 y/o white male w/ significant pmhx as noted below. Pt brought to hospital for confusion, s/p fall and chronic suprapubic pain. ED shows pt to have UTI w/ mildly elevated troponins. in addition pt has uncontrolled /107.  At present patient resting comfortably in bed, denies any pain.  Patient states pain is chronic in nature and exacerbated with increased intra-abdominal tension, patient sees pain management outpatient.  Pt reports ED changed and re-inserted suprapubic catheter with 16fr atraumatic insertion.  Pt denies any urinary complaints, no hematuria, and denies cough, cold , fever or chills.       PAST MEDICAL & SURGICAL HISTORY:  GERD (gastroesophageal reflux disease): intermittent  H/O ptosis of eyelid: right eye (sometimes wears patch)  Hematuria  Hyperlipidemia  Anemia  Diabetes  Renal insufficiency  Cardiomyopathy  Cholelithiasis  Hydrocele in adult  Glaucoma  BPH (benign prostatic hyperplasia)  CAD (coronary artery disease): CARD STENT X2 2018  TIA (transient ischemic attack): X2 2018  Dementia  CHF (congestive heart failure): COMBINED SYSTOLIC &amp; DIASTOLIC  Afib  Heart attack: 2018  Sepsis:  FROM INFECTED GALLBLADDER  Hypercholesteremia  HTN (hypertension)  DM (diabetes mellitus)  History of suprapubic catheter  H/O flexible sigmoidoscopy:   H/O bilateral cataract extraction: LEFT-  RIGHT 6 YRS AGO  Encounter for biliary drainage tube placement: 2018  H/O coronary angioplasty: WITH STENT X2 (2018)  History of prostate surgery:   AICD (automatic cardioverter/defibrillator) present: [employer]      MEDICATIONS  (STANDING):  apixaban 5 milliGRAM(s) Oral every 12 hours  aspirin enteric coated 81 milliGRAM(s) Oral daily  cefTRIAXone   IVPB 1000 milliGRAM(s) IV Intermittent every 24 hours  chlorhexidine 4% Liquid 1 Application(s) Topical <User Schedule>  dextrose 5%. 1000 milliLiter(s) (50 mL/Hr) IV Continuous <Continuous>  dextrose 50% Injectable 12.5 Gram(s) IV Push once  dextrose 50% Injectable 25 Gram(s) IV Push once  dextrose 50% Injectable 25 Gram(s) IV Push once  dofetilide 500 MICROGram(s) Oral two times a day  insulin lispro (HumaLOG) corrective regimen sliding scale   SubCutaneous three times a day before meals  NIFEdipine XL 90 milliGRAM(s) Oral daily  oxybutynin 10 milliGRAM(s) Oral daily  pantoprazole    Tablet 40 milliGRAM(s) Oral before breakfast  sodium bicarbonate 650 milliGRAM(s) Oral three times a day    MEDICATIONS  (PRN):  dextrose 40% Gel 15 Gram(s) Oral once PRN Blood Glucose LESS THAN 70 milliGRAM(s)/deciliter  glucagon  Injectable 1 milliGRAM(s) IntraMuscular once PRN Glucose LESS THAN 70 milligrams/deciliter  oxyCODONE    IR 10 milliGRAM(s) Oral every 8 hours PRN Severe Pain (7 - 10)        Vital Signs Last 24 Hrs  T(C): 36.2 (2020 06:56), Max: 36.6 (2020 13:56)  T(F): 97.1 (2020 06:56), Max: 97.9 (2020 13:56)  HR: 67 (2020 09:52) (61 - 68)  BP: 140/52 (2020 09:52) (120/56 - 179/80)  RR: 16 (2020 06:56) (16 - 16)      PHYSICAL EXAM:    Constitutional: NAD, well-developed  HEENT: EOMI  Neck: no pain  Back: No CVA tenderness  Respiratory: Clear to ascultation b/l, no wheezing, rales, or ronchi  Abd: Soft, no distention, mild tenderness x 4 quadrants, no rigidity, no organomegally  no palpable herniations  +suprapubic catheter present, site has mild erythema, mild exudate  Extremities: no edema  Neurological: A/O x 3  Psychiatric: Normal mood, normal affect  Skin: No rashes    I&O's Summary    2020 07:01  -  2020 07:00  --------------------------------------------------------  IN: 240 mL / OUT: 1325 mL / NET: -1085 mL        LABS:                        10.1   7.92  )-----------( 239      ( 2020 07:31 )             30.1         140  |  102  |  24<H>  ----------------------------<  149<H>  4.0   |  25  |  1.7<H>    Ca    8.5      2020 07:31  Mg     1.9         TPro  6.2  /  Alb  3.4<L>  /  TBili  0.3  /  DBili  x   /  AST  10  /  ALT  <5  /  AlkPhos  89      PT/INR - ( 2020 22:40 )   PT: 15.00 sec;   INR: 1.31 ratio         PTT - ( 2020 22:40 )  PTT:37.4 sec  Urinalysis Basic - ( 2020 00:40 )    Color: Yellow / Appearance: Cloudy / S.025 / pH: x  Gluc: x / Ketone: Trace  / Bili: Negative / Urobili: 0.2 mg/dL   Blood: x / Protein: >=300 mg/dL / Nitrite: Negative   Leuk Esterase: Moderate / RBC: 26-50 /HPF / WBC 26-50 /HPF   Sq Epi: x / Non Sq Epi: Few /HPF / Bacteria: Few

## 2020-01-22 NOTE — PROGRESS NOTE ADULT - SUBJECTIVE AND OBJECTIVE BOX
Patient is comfortable in bed, no complaints      T(F): 97.3 (01-22-20 @ 06:04), Max: 98.2 (01-21-20 @ 22:26)  HR: 92 (01-22-20 @ 06:04)  BP: 196/93 (01-22-20 @ 06:04)  RR: 14 (01-22-20 @ 06:04)      PHYSICAL EXAM:  GENERAL: NAD  HEAD:  Atraumatic, Normocephalic  NERVOUS SYSTEM:  no focal deficits   CHEST/LUNG: Clear to percussion bilaterally; No rales, rhonchi, wheezing, or rubs  HEART: Regular rate and rhythm; No murmurs, rubs, or gallops  ABDOMEN: Soft, Nontender, Nondistended; Bowel sounds present  EXTREMITIES:  2+ Peripheral Pulses, No clubbing, cyanosis, or edema  LYMPH: No lymphadenopathy noted  SKIN: No rashes or lesions    LABS  01-21    140  |  102  |  24<H>  ----------------------------<  149<H>  4.0   |  25  |  1.7<H>    Ca    8.5      21 Jan 2020 07:31                            10.1   7.92  )-----------( 239      ( 21 Jan 2020 07:31 )             30.1       CARDIAC ENZYMES    Troponin T, Serum: 0.02 ng/mL (01-20-20 @ 11:27)  Troponin T, Serum: 0.03 ng/mL (01-20-20 @ 06:21)  Troponin T, Serum: 0.03 ng/mL (01-19-20 @ 22:40)      Culture Results:   50,000 - 99,000 CFU/mL Gram Negative Rods  50,000 - 99,000 CFU/mL Coag Negative Staphylococcus "Susceptibilities not  performed" (01-20-20)    RADIOLOGY    MEDICATIONS  (STANDING):  apixaban 5 milliGRAM(s) Oral every 12 hours  aspirin enteric coated 81 milliGRAM(s) Oral daily  cefTRIAXone   IVPB 1000 milliGRAM(s) IV Intermittent every 24 hours  chlorhexidine 4% Liquid 1 Application(s) Topical <User Schedule>  dofetilide 500 MICROGram(s) Oral two times a day  insulin lispro (HumaLOG) corrective regimen sliding scale   SubCutaneous three times a day before meals  NIFEdipine XL 90 milliGRAM(s) Oral daily  oxybutynin 10 milliGRAM(s) Oral daily  pantoprazole    Tablet 40 milliGRAM(s) Oral before breakfast  sodium bicarbonate 650 milliGRAM(s) Oral three times a day    MEDICATIONS  (PRN):  BACItracin   Ointment 1 Application(s) Topical two times a day PRN skin irritation  dextrose 40% Gel 15 Gram(s) Oral once PRN Blood Glucose LESS THAN 70 milliGRAM(s)/deciliter  glucagon  Injectable 1 milliGRAM(s) IntraMuscular once PRN Glucose LESS THAN 70 milligrams/deciliter  oxyCODONE    IR 10 milliGRAM(s) Oral every 8 hours PRN Severe Pain (7 - 10)

## 2020-01-23 NOTE — DISCHARGE NOTE PROVIDER - PROVIDER TOKENS
PROVIDER:[TOKEN:[[id number]]] PROVIDER:[TOKEN:[[id number]]],PROVIDER:[TOKEN:[[id number]]]

## 2020-01-23 NOTE — DISCHARGE NOTE PROVIDER - NSDCMRMEDTOKEN_GEN_ALL_CORE_FT
Alphagan P 0.1% ophthalmic solution: 1 drop(s) to each affected eye once a day (at bedtime)  Alphagan P 0.1% ophthalmic solution: 1 drop(s) to each affected eye every 8 hours  ammonium lactate 12% topical lotion: Apply topically to affected area 2 times a day, As Needed  apixaban 2.5 mg oral tablet: 1 tab(s) orally every 12 hours  Aspir 81 oral delayed release tablet: 1 tab(s) orally once a day  atorvastatin 40 mg oral tablet: 1 tab(s) orally once a day (at bedtime)  bacitracin 500 units/g topical ointment: 1 application topically 2 times a day, As needed, skin irritation  Cipro 250 mg oral tablet: 1 tab(s) orally 2 times a day   clotrimazole-betamethasone dipropionate 1%-0.05% topical cream: Apply topically to affected area 2 times a day, As Needed  dofetilide 500 mcg oral capsule: 1 cap(s) orally 2 times a day  dofetilide 500 mcg oral capsule: 1 cap(s) orally 2 times a day  furosemide 20 mg oral tablet: 1 tab(s) orally once a day  GlipiZIDE XL 10 mg oral tablet, extended release: 1 tab(s) orally once a day  GlipiZIDE XL 10 mg oral tablet, extended release: 2 tab(s) orally once a day  Januvia 50 mg oral tablet: 1 tab(s) orally once a day  latanoprost 0.005% ophthalmic solution: 1 drop(s) to each affected eye once a day (in the evening) ou  Lipitor 40 mg oral tablet: 1 tab(s) orally once a day  lisinopril 20 mg oral tablet: 1 tab(s) orally once a day  metFORMIN 500 mg oral tablet, extended release: 4 tab(s) orally once a day  Metoprolol Tartrate 50 mg oral tablet: 2 tab(s) orally 2 times a day  NIFEdipine 90 mg oral tablet, extended release: 1 tab(s) orally once a day  oxybutynin 15 mg/24 hr oral tablet, extended release: 1 tab(s) orally once a day, As Needed  oxyCODONE 10 mg oral tablet: 1 tab(s) orally every 8 hours  pantoprazole 40 mg oral delayed release tablet: 1 tab(s) orally once a day (before a meal), As Needed  potassium chloride 20 mEq oral tablet, extended release: 1 tab(s) orally once a day  sodium bicarbonate 650 mg oral tablet: 1 tab(s) orally every 8 hours, As Needed  sodium bicarbonate 650 mg oral tablet: 1 tab(s) orally 3 times a day

## 2020-01-23 NOTE — DISCHARGE NOTE NURSING/CASE MANAGEMENT/SOCIAL WORK - PATIENT PORTAL LINK FT
You can access the FollowMyHealth Patient Portal offered by [facility] by registering at the following website: [email or web address]. By joining [facility]’s FollowMyHealth portal, you will also be able to view your health information using other applications (apps) compatible with our system.

## 2020-01-23 NOTE — DISCHARGE NOTE PROVIDER - CARE PROVIDER_API CALL
Nimesh Moran)  Internal Medicine  [address] [place] [address]  Phone: [phone]  Fax: [phone]  Follow Up Time: Nimesh Moran)  Internal Medicine  [address] [place] [address]  Phone: [phone]  Fax: [phone]  Follow Up Time:     Jurgen Walsh)  Urology  [address] [place] [address], [facility] [place] [address]  Phone: [phone]  Fax: [phone]  Follow Up Time:

## 2020-01-23 NOTE — PROGRESS NOTE ADULT - PROBLEM SELECTOR PLAN 1
IV antibiotics --> oral cipro  urine cultures E-coli  may dc home today 35min spent on dc  urology follow up with  Dr Jacob
IV antibiotics and follow urine cultures  urology consult Dr Jacob
IV antibiotics and follow urine cultures  urology consult Dr Jacob
IV antibiotics and follow urine cultures  urology consult Dr Agrawal  no need for ICU at this time

## 2020-01-23 NOTE — PROGRESS NOTE ADULT - SUBJECTIVE AND OBJECTIVE BOX
Patient is comfortable in bed, no complaints      T(F): 97.4 (01-23-20 @ 06:17), Max: 98.1 (01-22-20 @ 21:46)  HR: 50 (01-23-20 @ 09:35)  BP: 178/73 (01-23-20 @ 09:35)  RR: 16 (01-23-20 @ 09:35)      PHYSICAL EXAM:  GENERAL: NAD  HEAD:  Atraumatic, Normocephalic  EYES: EOMI, PERRLA, conjunctiva and sclera clear  NERVOUS SYSTEM:  no focal deficits   CHEST/LUNG: Clear to percussion bilaterally; No rales, rhonchi, wheezing, or rubs  HEART: Regular rate and rhythm; No murmurs, rubs, or gallops  ABDOMEN: Soft, Nontender, Nondistended; Bowel sounds present  EXTREMITIES:  2+ Peripheral Pulses, No clubbing, cyanosis, or edema  LYMPH: No lymphadenopathy noted  SKIN: No rashes or lesions    LABS  01-23    138  |  101  |  28<H>  ----------------------------<  158<H>  3.9   |  24  |  1.7<H>    Ca    8.3<L>      23 Jan 2020 08:16                            9.8    7.70  )-----------( 220      ( 23 Jan 2020 08:16 )             29.4       Culture Results:   50,000 - 99,000 CFU/mL Escherichia coli  50,000 - 99,000 CFU/mL Coag Negative Staphylococcus "Susceptibilities not  performed" (01-20-20)    RADIOLOGY  < from: CT Abdomen and Pelvis w/ IV Cont (01.20.20 @ 01:05) >  IMPRESSION:   1. Findings consistent with urinary bladder cystitis with ascending left urinary tract infection. Unchanged mild left hydroureteronephrosis, which may be seen in setting of infection.    < end of copied text >    MEDICATIONS  (STANDING):  apixaban 2.5 milliGRAM(s) Oral two times a day  aspirin enteric coated 81 milliGRAM(s) Oral daily  atorvastatin 40 milliGRAM(s) Oral at bedtime  brimonidine 0.2% Ophthalmic Solution 1 Drop(s) Both EYES every 12 hours  cefTRIAXone   IVPB 1000 milliGRAM(s) IV Intermittent every 24 hours  chlorhexidine 4% Liquid 1 Application(s) Topical <User Schedule>  dofetilide 500 MICROGram(s) Oral two times a day  furosemide    Tablet 20 milliGRAM(s) Oral daily  insulin lispro (HumaLOG) corrective regimen sliding scale   SubCutaneous three times a day before meals  latanoprost 0.005% Ophthalmic Solution 1 Drop(s) Both EYES at bedtime  lisinopril 20 milliGRAM(s) Oral daily  metoprolol tartrate 100 milliGRAM(s) Oral two times a day  NIFEdipine XL 90 milliGRAM(s) Oral daily  oxybutynin 10 milliGRAM(s) Oral daily  pantoprazole    Tablet 40 milliGRAM(s) Oral before breakfast  sodium bicarbonate 650 milliGRAM(s) Oral three times a day    MEDICATIONS  (PRN):  BACItracin   Ointment 1 Application(s) Topical two times a day PRN skin irritation  dextrose 40% Gel 15 Gram(s) Oral once PRN Blood Glucose LESS THAN 70 milliGRAM(s)/deciliter  glucagon  Injectable 1 milliGRAM(s) IntraMuscular once PRN Glucose LESS THAN 70 milligrams/deciliter  oxyCODONE    IR 10 milliGRAM(s) Oral every 8 hours PRN Severe Pain (7 - 10)

## 2020-01-23 NOTE — DISCHARGE NOTE PROVIDER - REASON FOR ADMISSION
suprapubic catheter pulled out, cystitis uncontrolled HTN, w/ borderline elevated troponin acute cystitis

## 2020-01-23 NOTE — DISCHARGE NOTE PROVIDER - HOSPITAL COURSE
80 y/o M  with a PAST MEDICAL HISTORY of , Afib , Anemia , BPH (benign prostatic hyperplasia) , CAD (coronary artery disease) CARD STENT X2 4/2018, CHF (congestive heart failure)  DIASTOLIC, Dementia , Diabetes         DM (diabetes mellitus) , GERD (gastroesophageal reflux disease) intermittent, Glaucoma , HTN (hypertension), Hypercholesteremia ,CKD3, TIA (transient ischemic attack) X2 JULY 2018, History of suprapubic catheter. presented     to the hospital  after pulling his suprapubic catheter out, confused and found with acute cystitis. pt had a new catheter placed in ED and was admitted and treated with IV antibiotics. Today the pt is back to his baseline mental         status and urine cultures revealed ecoli sensitive to cipro. pt was seen by urology and cleared for dc with outpt urology f/u

## 2020-01-23 NOTE — PROGRESS NOTE ADULT - REASON FOR ADMISSION
uncontrolled HTN, w/ borderline elevated troponin
uncontrolled HTN / UTI
Mccarthy pulled out
cystitis / suprapubic catheter
cystitis

## 2020-01-28 NOTE — ED PROVIDER NOTE - ATTENDING CONTRIBUTION TO CARE
79y m h/o cad/stents, chf, AF on elliquis, dm, gerd, bph, htn, hl, tia, dementia, ckd p/w hypoglycemia. Pt rpts he was "acting strangely" @ home per his wife, crawling on floor, she called 911 and EMS chkd his FS found to be in 30s. Given dextrose IV en route to ED. Pt recently discharged from hospital on regimen of metformin 2000 mg qAM, glipizide XL 10mg & 20 mg BID (an incr from QD), and januvia 50 mg daily. States he has been taking meds as prescribed, they are laid out for him by his wife, took all 3 meds around 11am today. FS now nl in ED. Denies any other sx. No dizziness, cp/sob, cough, nvd, abd pain, flank pain, rash. PMD Sharon.    PE:  nad  skin warm, dry  ncat  neck supple  rrr nl s1s2 no mrg  ctab no wrr  abd soft ntnd no palpable masses no rgr  back non-tender no cvat  ext no cce dpi  neuro aaox3 grossly nf exam

## 2020-01-28 NOTE — ED ADULT NURSE NOTE - OBJECTIVE STATEMENT
Pt presents c/o low blood sugar , POCT blood glucose at triage is 159  , 1 amp D50 was given by EMS n route , pt AO x 4 , denies  headache , denies dizziness , denies abdominal pain, denies nausea , no SOB , speaks in full sentences ,no seizure episode ,  HX DM , BPH , CAD

## 2020-01-28 NOTE — ED PROVIDER NOTE - CLINICAL SUMMARY MEDICAL DECISION MAKING FREE TEXT BOX
hypoglycemia @ home, on CONKLIN, known CKD - iv dextrose given en route to ED, after which pt remained normoglycemic x sev hrs - d/w Tox who rec octreotide & FS q1h x 24h followed by add'l 24h of monitoring, endo consult to d/c CONKLIN - approved for ICU by fellow Dr. Hobson

## 2020-01-28 NOTE — ED ADULT NURSE NOTE - NSIMPLEMENTINTERV_GEN_ALL_ED
Implemented All Universal Safety Interventions:  [place] to call system. Call bell, personal items and telephone within reach. Instruct patient to call for assistance. Room bathroom lighting operational. Non-slip footwear when patient is off stretcher. Physically safe environment: no spills, clutter or unnecessary equipment. Stretcher in lowest position, wheels locked, appropriate side rails in place.

## 2020-01-28 NOTE — ED PROVIDER NOTE - CRITICAL CARE PROVIDED
consultation with other physicians/additional history taking/direct patient care (not related to procedure)/consult w/ pt's family directly relating to pts condition/documentation/interpretation of diagnostic studies/telephone consultation with the patient's family

## 2020-01-28 NOTE — H&P ADULT - NSICDXPASTSURGICALHX_GEN_ALL_CORE_FT
PAST SURGICAL HISTORY:  AICD (automatic cardioverter/defibrillator) present [facility]    Encounter for biliary drainage tube placement 1/2018    H/O bilateral cataract extraction LEFT- 1/18 RIGHT 6 YRS AGO    H/O coronary angioplasty WITH STENT X2 (4/2018)    H/O flexible sigmoidoscopy 2015    History of prostate surgery 5/17    History of suprapubic catheter

## 2020-01-28 NOTE — ED PROVIDER NOTE - OBJECTIVE STATEMENT
78 y/o M with PMH HTN, HLD, BPH with suprapubic cath, CAD s/p PCI x 2 (2018), CKDIII, afib on eliquis, TIA, DM on metformin 2000mg QD, Januvia 50mg QD, and Glipizide 10mg XL BID (which is increased from GLipizide XL 10mg once a day, a change made 5 days ago) presents to ED for hypoglycemia/episode of AMS @ 3 hrs PTA after wife found pt crawling on floor and called EMS to find BG @ 30. given dextrose by EMS. now has no complaints.  denies fall/head injury/LOC/fever/n/v/d/ab pain/pain anywhere. recalls all events. 80 y/o M with PMH HTN, HLD, BPH with suprapubic cath, CAD s/p PCI x 2 (2018), CKDIII, afib on eliquis, TIA, DM on metformin 2000mg QD, Januvia 50mg QD, and Glipizide 10mg XL BID (which is increased from GLipizide XL 10mg once a day, a change made 5 days ago) presents to ED for hypoglycemia/episode of AMS @ 3 hrs PTA after wife found pt crawling on floor and called EMS to find BG @ 30. given dextrose by EMS. relates did not eat lunch today and did not take more medications than he is supposed to-- his wife also helps him with his meds and his morning meds were premeasured in a pill box.    now has no complaints.  denies fall/head injury/LOC/fever/n/v/d/ab pain/pain anywhere. recalls all events.

## 2020-01-28 NOTE — ED PROVIDER NOTE - PROGRESS NOTE DETAILS
Authored by Dr. Gloria Vieira: d/w Tox fellow Dr. Ca, case d/w Att Dr. Hicks, given pt's CKD rec octreotide 50 mcg SQ q6h & FS q1h x 24h, followed by an add'l 24h of BG monitoring and endo c/s to discuss discontinuation of sulfonylureas in this pt, ok for ICU vs. EDOU mgmt; will page ICU now for approval CASA: consent for toxicology consult obtained. disucssed with ICU fellow sandrita and with ICU resident. will admit under tiffanie donohue.

## 2020-01-28 NOTE — H&P ADULT - ASSESSMENT
sulfonylurea toxicity  DM2  HTN  Prolonged QTc  CAD/ s/p AICD  SPC for BPH  Afib    # SFU toxicity:  - Patient has worsening CKD, still on metformin, had increase in glipizide dose 5 days ago  - admit to MICU for monitoring  - d/c glipizide, metformin and Januvia  - c/s endocrinology for DM management  - octreotide SQ q 6 hours x 4 doses  - FS q 1 hour  - A1C was 5.6 9/19   repeat A1c, TSH f/u    # HTN:  - c/w nifedipine, metoprolol and lisinopril     # obstructive CKD with BPH and SPC:  - Cr at baseline  - maintain SPC  - stop metformin, stop PO potassium  - nephrology outpatient referral for CKD   - outpt urology f/u    # CAD/cardiomyopathy s/p AICD:  - c/w aspirin and statin and BB    # Afib:  - CHADsVASc 7, c/w eliquis  - stop dofetilide for frequent prolonged QTc on previous EKGs  - c/w BB and eliquis, currently on 2.5 BID but only one criteria for reduced dose (Cr>1.5), age <80, wt 77 Kg  - will give eliquis 5 mg BID  - c/s Cardiology in AM    # DVT ppx: on eliquis  GI ppx: hold protonix due to hx of QTc prolongation  Ambulate with assistance   CC diet  Full code

## 2020-01-28 NOTE — ED PROVIDER NOTE - NS ED ROS FT
Review of Systems    Constitutional: (-) fever   Eyes/ENT: (-) vision changes  Cardiovascular: (-) chest pain, (-) syncope (-) palpitations  Respiratory: (-) cough, (-) shortness of breath  Gastrointestinal: (-) vomiting, (-) diarrhea (-)black/bloody stools (-) abdominal pain  Genitourinary:  (-) dysuria   Musculoskeletal: (-) neck pain, (-) back pain, (-) leg pain/swelling  Integumentary: (-) rash, (-) edema  Neurological: (-) headache, (+) altered mental status  Hematologic: (-) easy bruising   Allergic/Immunologic: (-) pruritus

## 2020-01-28 NOTE — H&P ADULT - NSHPREVIEWOFSYSTEMS_GEN_ALL_CORE
REVIEW OF SYSTEMS    CONSTITUTIONAL: No weakness, fevers or chills  EYES/ENT: No visual changes;  No vertigo or throat pain   NECK: No pain or stiffness. No cervical midline tenderness.  RESPIRATORY: No difficulty breathing, cough, wheezing.  CARDIOVASCULAR: No chest pain or palpitations  GASTROINTESTINAL: No abdominal or epigastric pain. No nausea, vomiting, or hematemesis. No diarrhea or constipation. No melena, BRBPR, or hematochezia.  GENITOURINARY: No dysuria, frequency or hematuria.  NEUROLOGICAL: altered sensorium.

## 2020-01-28 NOTE — H&P ADULT - HISTORY OF PRESENT ILLNESS
78 yo M PMHx HTN, DLD, DM2, BPH s/p SPC, CAD s/p stents, glaucoma, TIA, CKD 3b, afib on eliquis presented for 78 yo M PMHx HTN, DLD, DM2, BPH s/p SPC, CAD s/p stents, glaucoma, TIA, CKD 3b, afib on eliquis presented for altered mental status. Patient was found by wife crawling on the floor, called EMS, FS 30 so given juice and mental status improved. Patient denies taking insulin, however was discharged 5 days ago from [facility] for cystitis and given cipro, also had his glipizide increased from OD to BID. His wife helps him with medications.  In ED patient was awake and alert. VS stable. Cr at baseline. Toxicology consulted and recommended octreotide q6hrs for 4 doses due to long half life of glipizide ER. FS q1hr.

## 2020-01-28 NOTE — ED PROVIDER NOTE - PHYSICAL EXAMINATION
PHYSICAL EXAM:    GENERAL: Alert, appears stated age, well appearing, non-toxic-- drinking cranberry juice  SKIN: Warm, pink and dry.   HEAD: NC, AT, no step offs   EYE: Normal lids/conjunctiva, PERRL, EOMI  ENT: Normal hearing, patent oropharynx  NECK: +supple. No meningismus, or JVD  Pulm: Bilateral BS, normal resp effort, no wheezes, stridor, or retractions  CV: RRR, no M/R/G, 2+and = radial pulses  Abd: soft, non-tender, non-distended  Mskel: no erythema, cyanosis, edema. no calf tenderness  Neuro: AAOx3, no sensory/motor deficits  No speech slurring, pronator drift, facial asymmetry. normal finger-to-nose b/l. 5/5 strength throughout.

## 2020-01-28 NOTE — H&P ADULT - NSHPPHYSICALEXAM_GEN_ALL_CORE
PHYSICAL EXAM:    T(C): 36.7 (01-28-20 @ 19:12), Max: 36.7 (01-28-20 @ 19:12)  HR: 66 (01-28-20 @ 19:12) (66 - 66)  BP: 185/75 (01-28-20 @ 19:12) (185/75 - 185/75)  RR: 18 (01-28-20 @ 19:12) (18 - 18)  SpO2: 99% (01-28-20 @ 19:12) (99% - 99%)    Constitutional: NAD  HEENT: right ptosis. Neck supple, No oral lesions, no throat redness or swelling  Respiratory: Breath sounds  CTA b/l, no accessory muscle use  Cardiovascular: regular rate and rhythm, normal S1 S2, no murmurs  Gastrointestinal: soft non-tender no hepatosplenomegaly, normal BS  Genitourinary: no lesions, no discharge  Extremities: positive peripheral pulses no extremity edema  Neurological: AAO3 no focal motor deficit  Skin: no lesions or wounds  Musculoskeletal: no joint swelling or tenderness

## 2020-01-29 NOTE — CONSULT NOTE ADULT - SUBJECTIVE AND OBJECTIVE BOX
HPI:  80 yo M PMHx HTN, DLD, DM2, BPH s/p SPC, CAD s/p stents, glaucoma, TIA, CKD 3b, afib on eliquis presented for altered mental status. Patient was found by wife crawling on the floor, called EMS, FS 30 so given juice and mental status improved. Patient denies taking insulin, however was discharged 5 days ago from [facility] for cystitis and given cipro, also had his glipizide increased from OD to BID. His wife helps him with medications.  In ED patient was awake and alert. VS stable. Cr at baseline. Toxicology consulted and recommended octreotide q6hrs for 4 doses due to long half life of glipizide ER. FS q1hr. (28 Jan 2020 22:59)        PAST MEDICAL & SURGICAL HISTORY:  GERD (gastroesophageal reflux disease): intermittent  H/O ptosis of eyelid: right eye (sometimes wears patch)  Hematuria  Hyperlipidemia  Anemia  Diabetes  Renal insufficiency  Cardiomyopathy  Cholelithiasis  Hydrocele in adult  Glaucoma  BPH (benign prostatic hyperplasia)  CAD (coronary artery disease): CARD STENT X2 4/2018  TIA (transient ischemic attack): X2 JULY 2018  Dementia  CHF (congestive heart failure): COMBINED SYSTOLIC &amp; DIASTOLIC  Afib  Heart attack: 1/2018  Sepsis: 1/18 FROM INFECTED GALLBLADDER  Hypercholesteremia  HTN (hypertension)  History of suprapubic catheter  H/O flexible sigmoidoscopy: 2015  H/O bilateral cataract extraction: LEFT- 1/18 RIGHT 6 YRS AGO  Encounter for biliary drainage tube placement: 1/2018  H/O coronary angioplasty: WITH STENT X2 (4/2018)  History of prostate surgery: 5/17  AICD (automatic cardioverter/defibrillator) present: boarding pass    Home Meds: Home Medications:  Alphagan P 0.1% ophthalmic solution: 1 drop(s) to each affected eye once a day (at bedtime) (22 Jan 2020 12:17)  ammonium lactate 12% topical lotion: Apply topically to affected area 2 times a day, As Needed (22 Jan 2020 12:17)  apixaban 2.5 mg oral tablet: 1 tab(s) orally every 12 hours (22 Jan 2020 12:17)  Aspir 81 oral delayed release tablet: 1 tab(s) orally once a day (22 Jan 2020 12:17)  atorvastatin 40 mg oral tablet: 1 tab(s) orally once a day (at bedtime) (22 Jan 2020 12:17)  bacitracin 500 units/g topical ointment: 1 application topically 2 times a day, As needed, skin irritation (23 Jan 2020 12:18)  clotrimazole-betamethasone dipropionate 1%-0.05% topical cream: Apply topically to affected area 2 times a day, As Needed (22 Jan 2020 12:17)  dofetilide 500 mcg oral capsule: 1 cap(s) orally 2 times a day (22 Jan 2020 12:17)  furosemide 20 mg oral tablet: 1 tab(s) orally once a day (22 Jan 2020 12:17)  GlipiZIDE XL 10 mg oral tablet, extended release: 1 tab(s) orally once a day (22 Jan 2020 12:17)  Januvia 50 mg oral tablet: 1 tab(s) orally once a day (22 Jan 2020 12:17)  latanoprost 0.005% ophthalmic solution: 1 drop(s) to each affected eye once a day (in the evening) ou (22 Jan 2020 12:17)  lisinopril 20 mg oral tablet: 1 tab(s) orally once a day (22 Jan 2020 12:17)  metFORMIN 500 mg oral tablet, extended release: 4 tab(s) orally once a day (22 Jan 2020 12:17)  Metoprolol Tartrate 50 mg oral tablet: 2 tab(s) orally 2 times a day (22 Jan 2020 12:17)  NIFEdipine 90 mg oral tablet, extended release: 1 tab(s) orally once a day (22 Jan 2020 12:17)  oxyCODONE 10 mg oral tablet: 1 tab(s) orally every 8 hours (22 Jan 2020 12:17)  pantoprazole 40 mg oral delayed release tablet: 1 tab(s) orally once a day (before a meal) (28 Jan 2020 23:03)  potassium chloride 20 mEq oral tablet, extended release: 1 tab(s) orally once a day (22 Jan 2020 12:17)  sodium bicarbonate 650 mg oral tablet: 1 tab(s) orally 3 times a day (22 Jan 2020 12:17)    Allergies: Allergies    sulfa drugs (Other)    Intolerances      Soc:   Advanced Directives: Presumed Full Code     CURRENT MEDICATIONS:   --------------------------------------------------------------------------------------  Neurologic Medications  melatonin 3 milliGRAM(s) Oral at bedtime  oxyCODONE    IR 10 milliGRAM(s) Oral every 8 hours PRN Severe Pain (7 - 10)    Respiratory Medications    Cardiovascular Medications  furosemide    Tablet 20 milliGRAM(s) Oral daily  lisinopril 20 milliGRAM(s) Oral daily  metoprolol tartrate 100 milliGRAM(s) Oral two times a day  NIFEdipine XL 90 milliGRAM(s) Oral daily    Gastrointestinal Medications  pantoprazole  Injectable 40 milliGRAM(s) IV Push daily  potassium chloride    Tablet ER 20 milliEquivalent(s) Oral daily  sodium bicarbonate 650 milliGRAM(s) Oral three times a day    Genitourinary Medications    Hematologic/Oncologic Medications  apixaban 5 milliGRAM(s) Oral every 12 hours  aspirin enteric coated 81 milliGRAM(s) Oral daily    Antimicrobial/Immunologic Medications    Endocrine/Metabolic Medications  atorvastatin 40 milliGRAM(s) Oral at bedtime  metFORMIN 1000 milliGRAM(s) Oral two times a day with meals  octreotide  Injectable 50 MICROGram(s) SubCutaneous every 6 hours    Topical/Other Medications  brimonidine 0.2% Ophthalmic Solution 1 Drop(s) Both EYES three times a day  chlorhexidine 4% Liquid 1 Application(s) Topical daily  latanoprost 0.005% Ophthalmic Solution 1 Drop(s) Both EYES at bedtime    --------------------------------------------------------------------------------------    VITAL SIGNS, INS/OUTS (last 24 hours):  --------------------------------------------------------------------------------------  ICU Vital Signs Last 24 Hrs  T(C): 36.6 (29 Jan 2020 08:00), Max: 37.2 (29 Jan 2020 01:15)  T(F): 97.9 (29 Jan 2020 08:00), Max: 98.9 (29 Jan 2020 01:15)  HR: 52 (29 Jan 2020 09:00) (50 - 76)  BP: 116/50 (29 Jan 2020 09:00) (116/50 - 203/89)  BP(mean): 75 (29 Jan 2020 09:00) (75 - 144)        LABS  --------------------------------------------------------------------------------------  Labs:  CAPILLARY BLOOD GLUCOSE      POCT Blood Glucose.: 259 mg/dL (29 Jan 2020 09:12)  POCT Blood Glucose.: 202 mg/dL (29 Jan 2020 08:10)  POCT Blood Glucose.: 187 mg/dL (29 Jan 2020 07:31)  POCT Blood Glucose.: 189 mg/dL (29 Jan 2020 06:21)  POCT Blood Glucose.: 211 mg/dL (29 Jan 2020 05:28)  POCT Blood Glucose.: 219 mg/dL (29 Jan 2020 04:38)  POCT Blood Glucose.: 229 mg/dL (29 Jan 2020 03:38)  POCT Blood Glucose.: 273 mg/dL (29 Jan 2020 02:42)  POCT Blood Glucose.: 220 mg/dL (29 Jan 2020 01:39)  POCT Blood Glucose.: 188 mg/dL (29 Jan 2020 00:20)  POCT Blood Glucose.: 190 mg/dL (28 Jan 2020 23:00)  POCT Blood Glucose.: 141 mg/dL (28 Jan 2020 22:02)  POCT Blood Glucose.: 157 mg/dL (28 Jan 2020 21:06)  POCT Blood Glucose.: 159 mg/dL (28 Jan 2020 19:15)                          9.7    8.20  )-----------( 258      ( 29 Jan 2020 05:25 )             29.7         01-29    135  |  102  |  25<H>  ----------------------------<  233<H>  4.3   |  21  |  1.5      Calcium, Total Serum: 8.4 mg/dL (01-29-20 @ 05:25)      LFTs:             6.0  | 0.3  | 23       ------------------[351     ( 29 Jan 2020 05:25 )  3.5  | x    | 109 HPI:  80 yo M PMHx HTN, DLD, DM2, BPH s/p SPC, CAD s/p stents, glaucoma, TIA, CKD 3b, afib on eliquis presented for altered mental status. Patient was found by wife crawling on the floor, called EMS, FS 30 so given juice and mental status improved. Patient denies taking insulin, however was discharged 5 days ago from [facility] for cystitis and given cipro, also had his glipizide increased from OD to BID. His wife helps him with medications.  In ED patient was awake and alert. VS stable. Cr at baseline. Toxicology consulted and recommended octreotide q6hrs for 4 doses due to long half life of glipizide ER. FS q1hr. (28 Jan 2020 22:59)        PAST MEDICAL & SURGICAL HISTORY:  GERD (gastroesophageal reflux disease): intermittent  H/O ptosis of eyelid: right eye (sometimes wears patch)  Hematuria  Hyperlipidemia  Anemia  Diabetes  Renal insufficiency  Cardiomyopathy  Cholelithiasis  Hydrocele in adult  Glaucoma  BPH (benign prostatic hyperplasia)  CAD (coronary artery disease): CARD STENT X2 4/2018  TIA (transient ischemic attack): X2 JULY 2018  Dementia  CHF (congestive heart failure): COMBINED SYSTOLIC &amp; DIASTOLIC  Afib  Heart attack: 1/2018  Sepsis: 1/18 FROM INFECTED GALLBLADDER  Hypercholesteremia  HTN (hypertension)  History of suprapubic catheter  H/O flexible sigmoidoscopy: 2015  H/O bilateral cataract extraction: LEFT- 1/18 RIGHT 6 YRS AGO  Encounter for biliary drainage tube placement: 1/2018  H/O coronary angioplasty: WITH STENT X2 (4/2018)  History of prostate surgery: 5/17  AICD (automatic cardioverter/defibrillator) present: [employer]    Home Meds: Home Medications:  Alphagan P 0.1% ophthalmic solution: 1 drop(s) to each affected eye once a day (at bedtime) (22 Jan 2020 12:17)  ammonium lactate 12% topical lotion: Apply topically to affected area 2 times a day, As Needed (22 Jan 2020 12:17)  apixaban 2.5 mg oral tablet: 1 tab(s) orally every 12 hours (22 Jan 2020 12:17)  Aspir 81 oral delayed release tablet: 1 tab(s) orally once a day (22 Jan 2020 12:17)  atorvastatin 40 mg oral tablet: 1 tab(s) orally once a day (at bedtime) (22 Jan 2020 12:17)  bacitracin 500 units/g topical ointment: 1 application topically 2 times a day, As needed, skin irritation (23 Jan 2020 12:18)  clotrimazole-betamethasone dipropionate 1%-0.05% topical cream: Apply topically to affected area 2 times a day, As Needed (22 Jan 2020 12:17)  dofetilide 500 mcg oral capsule: 1 cap(s) orally 2 times a day (22 Jan 2020 12:17)  furosemide 20 mg oral tablet: 1 tab(s) orally once a day (22 Jan 2020 12:17)  GlipiZIDE XL 10 mg oral tablet, extended release: 1 tab(s) orally once a day (22 Jan 2020 12:17)  Januvia 50 mg oral tablet: 1 tab(s) orally once a day (22 Jan 2020 12:17)  latanoprost 0.005% ophthalmic solution: 1 drop(s) to each affected eye once a day (in the evening) ou (22 Jan 2020 12:17)  lisinopril 20 mg oral tablet: 1 tab(s) orally once a day (22 Jan 2020 12:17)  metFORMIN 500 mg oral tablet, extended release: 4 tab(s) orally once a day (22 Jan 2020 12:17)  Metoprolol Tartrate 50 mg oral tablet: 2 tab(s) orally 2 times a day (22 Jan 2020 12:17)  NIFEdipine 90 mg oral tablet, extended release: 1 tab(s) orally once a day (22 Jan 2020 12:17)  oxyCODONE 10 mg oral tablet: 1 tab(s) orally every 8 hours (22 Jan 2020 12:17)  pantoprazole 40 mg oral delayed release tablet: 1 tab(s) orally once a day (before a meal) (28 Jan 2020 23:03)  potassium chloride 20 mEq oral tablet, extended release: 1 tab(s) orally once a day (22 Jan 2020 12:17)  sodium bicarbonate 650 mg oral tablet: 1 tab(s) orally 3 times a day (22 Jan 2020 12:17)    Allergies: Allergies    sulfa drugs (Other)    Intolerances      Soc:   Advanced Directives: Presumed Full Code     CURRENT MEDICATIONS:   --------------------------------------------------------------------------------------  Neurologic Medications  melatonin 3 milliGRAM(s) Oral at bedtime  oxyCODONE    IR 10 milliGRAM(s) Oral every 8 hours PRN Severe Pain (7 - 10)    Respiratory Medications    Cardiovascular Medications  furosemide    Tablet 20 milliGRAM(s) Oral daily  lisinopril 20 milliGRAM(s) Oral daily  metoprolol tartrate 100 milliGRAM(s) Oral two times a day  NIFEdipine XL 90 milliGRAM(s) Oral daily    Gastrointestinal Medications  pantoprazole  Injectable 40 milliGRAM(s) IV Push daily  potassium chloride    Tablet ER 20 milliEquivalent(s) Oral daily  sodium bicarbonate 650 milliGRAM(s) Oral three times a day    Genitourinary Medications    Hematologic/Oncologic Medications  apixaban 5 milliGRAM(s) Oral every 12 hours  aspirin enteric coated 81 milliGRAM(s) Oral daily    Antimicrobial/Immunologic Medications    Endocrine/Metabolic Medications  atorvastatin 40 milliGRAM(s) Oral at bedtime  metFORMIN 1000 milliGRAM(s) Oral two times a day with meals  octreotide  Injectable 50 MICROGram(s) SubCutaneous every 6 hours    Topical/Other Medications  brimonidine 0.2% Ophthalmic Solution 1 Drop(s) Both EYES three times a day  chlorhexidine 4% Liquid 1 Application(s) Topical daily  latanoprost 0.005% Ophthalmic Solution 1 Drop(s) Both EYES at bedtime    --------------------------------------------------------------------------------------    VITAL SIGNS, INS/OUTS (last 24 hours):  --------------------------------------------------------------------------------------  ICU Vital Signs Last 24 Hrs  T(C): 36.6 (29 Jan 2020 08:00), Max: 37.2 (29 Jan 2020 01:15)  T(F): 97.9 (29 Jan 2020 08:00), Max: 98.9 (29 Jan 2020 01:15)  HR: 52 (29 Jan 2020 09:00) (50 - 76)  BP: 116/50 (29 Jan 2020 09:00) (116/50 - 203/89)  BP(mean): 75 (29 Jan 2020 09:00) (75 - 144)    T(C): 36.6 (01-29-20 @ 08:00), Max: 37.2 (01-29-20 @ 01:15)  HR: 52 (01-29-20 @ 10:30) (50 - 76)  BP: 120/55 (01-29-20 @ 10:30) (115/51 - 203/89)  RR: 19 (01-29-20 @ 10:30) (12 - 29)  SpO2: 99% (01-29-20 @ 10:30) (95% - 100%)    PHYSICAL EXAM:  GENERAL: NAD, well-developed  NECK: Supple, No JVD  CHEST/LUNG: Clear to auscultation bilaterally; No wheeze  HEART: Regular rate and rhythm; No murmurs, rubs, or gallops  ABDOMEN: Soft, Nontender, Nondistended; Bowel sounds present, Suprapubic catheter present   EXTREMITIES:  2+ Peripheral Pulses, No clubbing, cyanosis, or edema  PSYCH: AAOx3  NEUROLOGY: non-focal  SKIN: No rashes or lesions    LABS  --------------------------------------------------------------------------------------  Labs:  CAPILLARY BLOOD GLUCOSE      POCT Blood Glucose.: 259 mg/dL (29 Jan 2020 09:12)  POCT Blood Glucose.: 202 mg/dL (29 Jan 2020 08:10)  POCT Blood Glucose.: 187 mg/dL (29 Jan 2020 07:31)  POCT Blood Glucose.: 189 mg/dL (29 Jan 2020 06:21)  POCT Blood Glucose.: 211 mg/dL (29 Jan 2020 05:28)  POCT Blood Glucose.: 219 mg/dL (29 Jan 2020 04:38)  POCT Blood Glucose.: 229 mg/dL (29 Jan 2020 03:38)  POCT Blood Glucose.: 273 mg/dL (29 Jan 2020 02:42)  POCT Blood Glucose.: 220 mg/dL (29 Jan 2020 01:39)  POCT Blood Glucose.: 188 mg/dL (29 Jan 2020 00:20)  POCT Blood Glucose.: 190 mg/dL (28 Jan 2020 23:00)  POCT Blood Glucose.: 141 mg/dL (28 Jan 2020 22:02)  POCT Blood Glucose.: 157 mg/dL (28 Jan 2020 21:06)  POCT Blood Glucose.: 159 mg/dL (28 Jan 2020 19:15)                          9.7    8.20  )-----------( 258      ( 29 Jan 2020 05:25 )             29.7         01-29    135  |  102  |  25<H>  ----------------------------<  233<H>  4.3   |  21  |  1.5      Calcium, Total Serum: 8.4 mg/dL (01-29-20 @ 05:25)      LFTs:             6.0  | 0.3  | 23       ------------------[351     ( 29 Jan 2020 05:25 )  3.5  | x    | 109

## 2020-01-29 NOTE — CONSULT NOTE ADULT - ATTENDING COMMENTS
-- Please call with any further questions    Fabiola    [phone] (pager)
chronic renal failure, so metformin 500 twice daily might be best, also add sitagliptin 100 mg. daily. insulin P.R.N. send script for sitagliptin for [facility] where it is $ 50 per month. need strict calorie reduction to promote weight loss. try 1200 kcals daily, stop all fruit juices.

## 2020-01-29 NOTE — PATIENT PROFILE ADULT - VISION (WITH CORRECTIVE LENSES IF THE PATIENT USUALLY WEARS THEM):
right eye ptosis/Partially impaired: cannot see medication labels or newsprint, but can see obstacles in path, and the surrounding layout; can count fingers at arm's length

## 2020-01-29 NOTE — PROGRESS NOTE ADULT - SUBJECTIVE AND OBJECTIVE BOX
PATIENT:  ROCIO NATION  [id number]    CHIEF COMPLAINT:  Patient is a 79y old  Male who presents with a chief complaint of Altered mental status (29 Jan 2020 09:51)      INTERVAL HISTORYOVERNIGHT EVENTS:  No overnight events. Patient complains of long stay in hospital. Ate well in the morning and afternoon.        MEDICATIONS:  MEDICATIONS  (STANDING):  apixaban 5 milliGRAM(s) Oral every 12 hours  aspirin enteric coated 81 milliGRAM(s) Oral daily  atorvastatin 40 milliGRAM(s) Oral at bedtime  brimonidine 0.2% Ophthalmic Solution 1 Drop(s) Both EYES three times a day  chlorhexidine 4% Liquid 1 Application(s) Topical daily  dextrose 5%. 1000 milliLiter(s) (50 mL/Hr) IV Continuous <Continuous>  dextrose 50% Injectable 12.5 Gram(s) IV Push once  dextrose 50% Injectable 25 Gram(s) IV Push once  dextrose 50% Injectable 25 Gram(s) IV Push once  furosemide    Tablet 20 milliGRAM(s) Oral daily  insulin glargine Injectable (LANTUS) 36 Unit(s) SubCutaneous at bedtime  insulin glargine Injectable (LANTUS) 10 Unit(s) SubCutaneous once  insulin lispro Injectable (HumaLOG) 10 Unit(s) SubCutaneous before breakfast  insulin lispro Injectable (HumaLOG) 10 Unit(s) SubCutaneous before lunch  insulin lispro Injectable (HumaLOG) 10 Unit(s) SubCutaneous before dinner  insulin regular  human recombinant. 10 Unit(s) SubCutaneous once  latanoprost 0.005% Ophthalmic Solution 1 Drop(s) Both EYES at bedtime  lisinopril 20 milliGRAM(s) Oral daily  melatonin 3 milliGRAM(s) Oral at bedtime  metoprolol tartrate 100 milliGRAM(s) Oral two times a day  NIFEdipine XL 90 milliGRAM(s) Oral daily  octreotide  Injectable 50 MICROGram(s) SubCutaneous every 6 hours  potassium chloride    Tablet ER 20 milliEquivalent(s) Oral daily  sodium bicarbonate 650 milliGRAM(s) Oral three times a day    MEDICATIONS  (PRN):  dextrose 40% Gel 15 Gram(s) Oral once PRN Blood Glucose LESS THAN 70 milliGRAM(s)/deciliter  glucagon  Injectable 1 milliGRAM(s) IntraMuscular once PRN Glucose LESS THAN 70 milligrams/deciliter  oxyCODONE    IR 10 milliGRAM(s) Oral every 8 hours PRN Severe Pain (7 - 10)      ALLERGIES:  Allergies    sulfa drugs (Other)    Intolerances        OBJECTIVE:  ICU Vital Signs Last 24 Hrs  T(C): 36.4 (29 Jan 2020 12:00), Max: 37.2 (29 Jan 2020 01:15)  T(F): 97.5 (29 Jan 2020 12:00), Max: 98.9 (29 Jan 2020 01:15)  HR: 52 (29 Jan 2020 13:00) (50 - 76)  BP: 118/51 (29 Jan 2020 13:00) (112/46 - 203/89)  BP(mean): 79 (29 Jan 2020 13:00) (73 - 144)  ABP: --  ABP(mean): --  RR: 13 (29 Jan 2020 13:00) (6 - 29)  SpO2: 98% (29 Jan 2020 13:00) (95% - 100%)      Adult Advanced Hemodynamics Last 24 Hrs  CVP(mm Hg): --  CVP(cm H2O): --  CO: --  CI: --  PA: --  PA(mean): --  PCWP: --  SVR: --  SVRI: --  PVR: --  PVRI: --  CAPILLARY BLOOD GLUCOSE      POCT Blood Glucose.: 339 mg/dL (29 Jan 2020 14:34)  POCT Blood Glucose.: 345 mg/dL (29 Jan 2020 14:08)  POCT Blood Glucose.: 320 mg/dL (29 Jan 2020 14:06)  POCT Blood Glucose.: 254 mg/dL (29 Jan 2020 11:44)  POCT Blood Glucose.: 259 mg/dL (29 Jan 2020 09:12)  POCT Blood Glucose.: 202 mg/dL (29 Jan 2020 08:10)  POCT Blood Glucose.: 187 mg/dL (29 Jan 2020 07:31)  POCT Blood Glucose.: 189 mg/dL (29 Jan 2020 06:21)  POCT Blood Glucose.: 211 mg/dL (29 Jan 2020 05:28)  POCT Blood Glucose.: 219 mg/dL (29 Jan 2020 04:38)  POCT Blood Glucose.: 229 mg/dL (29 Jan 2020 03:38)  POCT Blood Glucose.: 273 mg/dL (29 Jan 2020 02:42)  POCT Blood Glucose.: 220 mg/dL (29 Jan 2020 01:39)  POCT Blood Glucose.: 188 mg/dL (29 Jan 2020 00:20)  POCT Blood Glucose.: 190 mg/dL (28 Jan 2020 23:00)  POCT Blood Glucose.: 141 mg/dL (28 Jan 2020 22:02)  POCT Blood Glucose.: 157 mg/dL (28 Jan 2020 21:06)  POCT Blood Glucose.: 159 mg/dL (28 Jan 2020 19:15)    CAPILLARY BLOOD GLUCOSE      POCT Blood Glucose.: 339 mg/dL (29 Jan 2020 14:34)    I&O's Summary    28 Jan 2020 07:01  -  29 Jan 2020 07:00  --------------------------------------------------------  IN: 50 mL / OUT: 450 mL / NET: -400 mL    29 Jan 2020 07:01  -  29 Jan 2020 14:49  --------------------------------------------------------  IN: 185 mL / OUT: 275 mL / NET: -90 mL      Daily Height in cm: 175.26 (29 Jan 2020 01:15)    Daily     PHYSICAL EXAMINATION:  General: WN/WD NAD  HEENT: PERRLA, EOMI, moist mucous membranes  Neurology: A&Ox3, nonfocal, BLANK x 4  Respiratory: CTA B/L, normal respiratory effort, no wheezes, crackles, rales  CV: RRR, S1S2, no murmurs, rubs or gallops  Abdominal: Soft, abdominal discomfort, ND +BS  Extremities: No edema, + peripheral pulses      LABS:                          9.7    8.20  )-----------( 258      ( 29 Jan 2020 05:25 )             29.7     01-29    135  |  102  |  25<H>  ----------------------------<  233<H>  4.3   |  21  |  1.5    Ca    8.4<L>      29 Jan 2020 05:25  Phos  3.0     01-29  Mg     1.9     01-29    TPro  6.0  /  Alb  3.5  /  TBili  0.3  /  DBili  x   /  AST  23  /  ALT  109<H>  /  AlkPhos  351<H>  01-29    LIVER FUNCTIONS - ( 29 Jan 2020 05:25 )  Alb: 3.5 g/dL / Pro: 6.0 g/dL / ALK PHOS: 351 U/L / ALT: 109 U/L / AST: 23 U/L / GGT: x               Assessment:  80 yo M PMHx HTN, DLD, DM2, BPH s/p SPC, CAD s/p stents, glaucoma, TIA, CKD 3b, afib on eliquis presented for altered mental status. Patient was found by wife crawling on the floor, called EMS, FS 30 so given juice and mental status improved. Patient denies taking insulin, however was discharged 5 days ago from [place] for cystitis and given cipro, also had his glipizide increased from OD to BID. His wife helps him with medications.  In ED patient was awake and alert. VS stable. Cr at baseline. Toxicology consulted and recommended octreotide q6hrs for 4 doses due to long half life of glipizide ER. FS q1hr.    Insulin trending up to 200s, giving insulin basal and meal coverage. Will downgrade.    #Hypoglycemia 2/2 polypharmacy  - Glipizide toxicity  - Toxicology consulted, recommended octreotide q6hrs for 4 doses and fingersticks q1hr  - Endocrine recommends metformin 500 mg bid, sitagliptin 100 mg, insulin prn, 1200 kcal daily limit, no fruit juice      #Htn/CAD  - Aspirin, atorvastatin  - Lisinopril, metoprolol, nifedipine  - Continue home meds    HO AFib o Eliquis PATIENT:  ROCIO NATION  [id number]    CHIEF COMPLAINT:  Patient is a 79y old  Male who presents with a chief complaint of Altered mental status (29 Jan 2020 09:51)      INTERVAL HISTORYOVERNIGHT EVENTS:  No overnight events. Patient complains of long stay in hospital. Ate well in the morning and afternoon.        MEDICATIONS:  MEDICATIONS  (STANDING):  apixaban 5 milliGRAM(s) Oral every 12 hours  aspirin enteric coated 81 milliGRAM(s) Oral daily  atorvastatin 40 milliGRAM(s) Oral at bedtime  brimonidine 0.2% Ophthalmic Solution 1 Drop(s) Both EYES three times a day  chlorhexidine 4% Liquid 1 Application(s) Topical daily  dextrose 5%. 1000 milliLiter(s) (50 mL/Hr) IV Continuous <Continuous>  dextrose 50% Injectable 12.5 Gram(s) IV Push once  dextrose 50% Injectable 25 Gram(s) IV Push once  dextrose 50% Injectable 25 Gram(s) IV Push once  furosemide    Tablet 20 milliGRAM(s) Oral daily  insulin glargine Injectable (LANTUS) 36 Unit(s) SubCutaneous at bedtime  insulin glargine Injectable (LANTUS) 10 Unit(s) SubCutaneous once  insulin lispro Injectable (HumaLOG) 10 Unit(s) SubCutaneous before breakfast  insulin lispro Injectable (HumaLOG) 10 Unit(s) SubCutaneous before lunch  insulin lispro Injectable (HumaLOG) 10 Unit(s) SubCutaneous before dinner  insulin regular  human recombinant. 10 Unit(s) SubCutaneous once  latanoprost 0.005% Ophthalmic Solution 1 Drop(s) Both EYES at bedtime  lisinopril 20 milliGRAM(s) Oral daily  melatonin 3 milliGRAM(s) Oral at bedtime  metoprolol tartrate 100 milliGRAM(s) Oral two times a day  NIFEdipine XL 90 milliGRAM(s) Oral daily  octreotide  Injectable 50 MICROGram(s) SubCutaneous every 6 hours  potassium chloride    Tablet ER 20 milliEquivalent(s) Oral daily  sodium bicarbonate 650 milliGRAM(s) Oral three times a day    MEDICATIONS  (PRN):  dextrose 40% Gel 15 Gram(s) Oral once PRN Blood Glucose LESS THAN 70 milliGRAM(s)/deciliter  glucagon  Injectable 1 milliGRAM(s) IntraMuscular once PRN Glucose LESS THAN 70 milligrams/deciliter  oxyCODONE    IR 10 milliGRAM(s) Oral every 8 hours PRN Severe Pain (7 - 10)      ALLERGIES:  Allergies    sulfa drugs (Other)    Intolerances        OBJECTIVE:  ICU Vital Signs Last 24 Hrs  T(C): 36.4 (29 Jan 2020 12:00), Max: 37.2 (29 Jan 2020 01:15)  T(F): 97.5 (29 Jan 2020 12:00), Max: 98.9 (29 Jan 2020 01:15)  HR: 52 (29 Jan 2020 13:00) (50 - 76)  BP: 118/51 (29 Jan 2020 13:00) (112/46 - 203/89)  BP(mean): 79 (29 Jan 2020 13:00) (73 - 144)  ABP: --  ABP(mean): --  RR: 13 (29 Jan 2020 13:00) (6 - 29)  SpO2: 98% (29 Jan 2020 13:00) (95% - 100%)      Adult Advanced Hemodynamics Last 24 Hrs  CVP(mm Hg): --  CVP(cm H2O): --  CO: --  CI: --  PA: --  PA(mean): --  PCWP: --  SVR: --  SVRI: --  PVR: --  PVRI: --  CAPILLARY BLOOD GLUCOSE      POCT Blood Glucose.: 339 mg/dL (29 Jan 2020 14:34)  POCT Blood Glucose.: 345 mg/dL (29 Jan 2020 14:08)  POCT Blood Glucose.: 320 mg/dL (29 Jan 2020 14:06)  POCT Blood Glucose.: 254 mg/dL (29 Jan 2020 11:44)  POCT Blood Glucose.: 259 mg/dL (29 Jan 2020 09:12)  POCT Blood Glucose.: 202 mg/dL (29 Jan 2020 08:10)  POCT Blood Glucose.: 187 mg/dL (29 Jan 2020 07:31)  POCT Blood Glucose.: 189 mg/dL (29 Jan 2020 06:21)  POCT Blood Glucose.: 211 mg/dL (29 Jan 2020 05:28)  POCT Blood Glucose.: 219 mg/dL (29 Jan 2020 04:38)  POCT Blood Glucose.: 229 mg/dL (29 Jan 2020 03:38)  POCT Blood Glucose.: 273 mg/dL (29 Jan 2020 02:42)  POCT Blood Glucose.: 220 mg/dL (29 Jan 2020 01:39)  POCT Blood Glucose.: 188 mg/dL (29 Jan 2020 00:20)  POCT Blood Glucose.: 190 mg/dL (28 Jan 2020 23:00)  POCT Blood Glucose.: 141 mg/dL (28 Jan 2020 22:02)  POCT Blood Glucose.: 157 mg/dL (28 Jan 2020 21:06)  POCT Blood Glucose.: 159 mg/dL (28 Jan 2020 19:15)    CAPILLARY BLOOD GLUCOSE      POCT Blood Glucose.: 339 mg/dL (29 Jan 2020 14:34)    I&O's Summary    28 Jan 2020 07:01  -  29 Jan 2020 07:00  --------------------------------------------------------  IN: 50 mL / OUT: 450 mL / NET: -400 mL    29 Jan 2020 07:01  -  29 Jan 2020 14:49  --------------------------------------------------------  IN: 185 mL / OUT: 275 mL / NET: -90 mL      Daily Height in cm: 175.26 (29 Jan 2020 01:15)    Daily     PHYSICAL EXAMINATION:  General: WN/WD NAD  HEENT: PERRLA, EOMI, moist mucous membranes  Neurology: A&Ox3, nonfocal, BLANK x 4  Respiratory: CTA B/L, normal respiratory effort, no wheezes, crackles, rales  CV: RRR, S1S2, no murmurs, rubs or gallops  Abdominal: Soft, abdominal discomfort, ND +BS  Extremities: No edema, + peripheral pulses      LABS:                          9.7    8.20  )-----------( 258      ( 29 Jan 2020 05:25 )             29.7     01-29    135  |  102  |  25<H>  ----------------------------<  233<H>  4.3   |  21  |  1.5    Ca    8.4<L>      29 Jan 2020 05:25  Phos  3.0     01-29  Mg     1.9     01-29    TPro  6.0  /  Alb  3.5  /  TBili  0.3  /  DBili  x   /  AST  23  /  ALT  109<H>  /  AlkPhos  351<H>  01-29    LIVER FUNCTIONS - ( 29 Jan 2020 05:25 )  Alb: 3.5 g/dL / Pro: 6.0 g/dL / ALK PHOS: 351 U/L / ALT: 109 U/L / AST: 23 U/L / GGT: x               Assessment:  80 yo M PMHx HTN, DLD, DM2, BPH s/p SPC, CAD s/p stents, glaucoma, TIA, CKD 3b, afib on eliquis presented for altered mental status. Patient was found by wife crawling on the floor, called EMS, FS 30 so given juice and mental status improved. Patient denies taking insulin, however was discharged 5 days ago from [facility] for cystitis and given cipro, also had his glipizide increased from OD to BID. His wife helps him with medications.  In ED patient was awake and alert. VS stable. Cr at baseline. Toxicology consulted and recommended octreotide q6hrs for 4 doses due to long half life of glipizide ER. FS q1hr.    Insulin trending up to 200s, giving insulin basal and meal coverage. Will downgrade.    #Hypoglycemia 2/2 polypharmacy  - Glipizide toxicity  - Toxicology consulted, recommended octreotide q6hrs for 4 doses and fingersticks q1hr  - Endocrine recommends metformin 500 mg bid, sitagliptin 100 mg, insulin prn, 1200 kcal daily limit, no fruit juice      #Htn/CAD  - Aspirin, atorvastatin  - Lisinopril, metoprolol, nifedipine  - Continue home meds    #AFib o Eliquis  - Continue eliquis  - Rate controlled    GI: pantoprazole  Diet: Consistent carb, 1200 kcal  Activity: Increase as tolerated  Code: full

## 2020-01-29 NOTE — CONSULT NOTE ADULT - ASSESSMENT
IMPRESSION:    Hypoglycemia 2/2 polypharmacy   HO Htn/CAD  ARNULFO AFib o Eliquis     PLAN:    CNS: Avoid sedatives     HEENT: Oral care    PULMONARY:  HOB @ 45 degrees, aspiration precautions     CARDIOVASCULAR: Cont home BP medications     GI: GI prophylaxis.  Feeding     RENAL:  Follow up lytes.  Correct as needed    INFECTIOUS DISEASE: Follow up cultures    HEMATOLOGICAL:  DVT prophylaxis.    ENDOCRINE:  Follow up FS. Metformin 1gm bid for now, add further if needed.     MUSCULOSKELETAL: Ambulate as tolerated     Downgrade to medical floor

## 2020-01-29 NOTE — CONSULT NOTE ADULT - SUBJECTIVE AND OBJECTIVE BOX
Patient is a 79y old  Male who presents with a chief complaint of Altered mental status (28 Jan 2020 22:59)      HPI:  80 yo M PMHx HTN, DLD, DM2, BPH s/p SPC, CAD s/p stents, glaucoma, TIA, CKD 3b, afib on eliquis presented for altered mental status. Patient was found by wife crawling on the floor, called EMS, FS 30 so given juice and mental status improved. Patient denies taking insulin, however was discharged 5 days ago from [facility] for cystitis and given cipro, also had his glipizide increased from OD to BID. His wife helps him with medications.  In ED patient was awake and alert. VS stable. Cr at baseline. Toxicology consulted and recommended octreotide q6hrs for 4 doses due to long half life of glipizide ER. FS q1hr. (28 Jan 2020 22:59)  In the ED was seen by toxicology. In ICu for closer monitoring. Overnight no events. Blood sugars in 180-260       PAST MEDICAL & SURGICAL HISTORY:  GERD (gastroesophageal reflux disease): intermittent  H/O ptosis of eyelid: right eye (sometimes wears patch)  Hematuria  Hyperlipidemia  Anemia  Diabetes  Renal insufficiency  Cardiomyopathy  Cholelithiasis  Hydrocele in adult  Glaucoma  BPH (benign prostatic hyperplasia)  CAD (coronary artery disease): CARD STENT X2 4/2018  TIA (transient ischemic attack): X2 JULY 2018  Dementia  CHF (congestive heart failure): COMBINED SYSTOLIC &amp; DIASTOLIC  Afib  Heart attack: 1/2018  Sepsis: 1/18 FROM INFECTED GALLBLADDER  Hypercholesteremia  HTN (hypertension)  DM (diabetes mellitus)  History of suprapubic catheter  H/O flexible sigmoidoscopy: 2015  H/O bilateral cataract extraction: LEFT- 1/18 RIGHT 6 YRS AGO  Encounter for biliary drainage tube placement: 1/2018  H/O coronary angioplasty: WITH STENT X2 (4/2018)  History of prostate surgery: 5/17  AICD (automatic cardioverter/defibrillator) present: [employer] HX:   Smoking       -ve x 3                   ETOH                            Other    FAMILY HISTORY:  FHx: lung cancer: father  FH: type 2 diabetes  :  No known cardiovacular family hisotry     Review Of Systems:     CONSTITUTIONAL:   no fever   no chills.  no weight gain   no weight loss    EYES:   no discharge,   no pain  no redness,   no visual changes.    ENT:   Ears: no ear pain and no hearing problems.  Nose: no nasal congestion and no nasal drainage.  Mouth/Throat: no dysphagia,  no hoarseness and no throat pain.  Neck: no lumps, no pain, no stiffness and no swollen glands.     CARDIOVASCULAR:   no chest pain,   no swelling  no palpitaions  no syncope    RESPIRATORY:  no SOB,  no wheezing ,  no respiratory difficulty  no sputum production    GASTROINTESTINAL:   no abdominal pain,   no constipation,   no diarrhea,   no vomiting.    GENITOURINARY:  no dysuria,   no frequency,   no urgency  no hematuria.    MUSCULOSKELETAL:   no back pain,   no musculoskeletal pain,  no weakness.    SKIN:   no jaundice,   no lesions,   no pruritis,   no rashes.    NEURO:   no loss of consciousness,   ? fall  no gait abnormality,   no headache,   no sensory deficits,   no weakness.    PSYCHIATRIC:   no known mental health issues  no anxiety  no depression    ALLERGIC/IMMUNOLOGIC:   No active allergic or immunologic issues      Allergies    sulfa drugs (Other)    Intolerances    PHYSICAL EXAM    ICU Vital Signs Last 24 Hrs  T(C): 36.6 (29 Jan 2020 08:00), Max: 37.2 (29 Jan 2020 01:15)  T(F): 97.9 (29 Jan 2020 08:00), Max: 98.9 (29 Jan 2020 01:15)  HR: 52 (29 Jan 2020 09:00) (50 - 76)  BP: 116/50 (29 Jan 2020 09:00) (116/50 - 203/89)  BP(mean): 75 (29 Jan 2020 09:00) (75 - 144)  RR: 18 (29 Jan 2020 09:00) (12 - 29)  SpO2: 97% (29 Jan 2020 09:00) (97% - 100%)      CONSTITUTIONAL:   Ill appearing.  Well nourished.  NAD    ENT:   Airway patent,   Mouth with normal mucosa.   No thrush    EYES:   pupils equal,   round and reactive to light.    CARDIAC:   Normal rate,   Regular rhythm.    Heart sounds S1, S2.   No edema    Vascular:   normal systolic impulse  no bruits    RESPIRATORY:   No wheezing   Normal chest expansion  No use of accessory muscles    GASTROINTESTINAL:  Abdomen soft   Non-tender,   No guarding,   + BS    GENITOURINARY  normal genitalia for sex  no edema    MUSCULOSKELETAL:   Range of motion is not limited,  Nno clubbing, cyanosis    NEUROLOGICAL:   Alert and oriented   No motor or sensory deficits.  Pertinent DTRs normal    SKIN:   Skin normal color for race,   Warm and dry  No evidence of rash.    PSYCHIATRIC:   Normal mood and affect.   No apparent risk to self or others.    HEME LYMPH:   No splenomegaly.  No cervical  lymphadenopathy.  No inguinal lymphadenopathy    01-28-20 @ 07:01  -  01-29-20 @ 07:00  --------------------------------------------------------  IN:    Oral Fluid: 50 mL  Total IN: 50 mL    OUT:    Indwelling Catheter - Suprapubic: 450 mL  Total OUT: 450 mL    Total NET: -400 mL      01-29-20 @ 07:01  -  01-29-20 @ 09:22  --------------------------------------------------------  IN:    Oral Fluid: 85 mL  Total IN: 85 mL    OUT:    Indwelling Catheter - Suprapubic: 125 mL  Total OUT: 125 mL    Total NET: -40 mL    LABS:                          9.7    8.20  )-----------( 258      ( 29 Jan 2020 05:25 )             29.7                                               01-29    135  |  102  |  25<H>  ----------------------------<  233<H>  4.3   |  21  |  1.5    Ca    8.4<L>      29 Jan 2020 05:25  Phos  3.0     01-29  Mg     1.9     01-29    TPro  6.0  /  Alb  3.5  /  TBili  0.3  /  DBili  x   /  AST  23  /  ALT  109<H>  /  AlkPhos  351<H>  01-29                                              LIVER FUNCTIONS - ( 29 Jan 2020 05:25 )  Alb: 3.5 g/dL / Pro: 6.0 g/dL / ALK PHOS: 351 U/L / ALT: 109 U/L / AST: 23 U/L / GGT: x                                              X-Rays reviewed:                                                                                    ECHO    CXR interpreted by me:    MEDICATIONS  (STANDING):  apixaban 5 milliGRAM(s) Oral every 12 hours  aspirin enteric coated 81 milliGRAM(s) Oral daily  atorvastatin 40 milliGRAM(s) Oral at bedtime  brimonidine 0.2% Ophthalmic Solution 1 Drop(s) Both EYES three times a day  chlorhexidine 4% Liquid 1 Application(s) Topical daily  furosemide    Tablet 20 milliGRAM(s) Oral daily  latanoprost 0.005% Ophthalmic Solution 1 Drop(s) Both EYES at bedtime  lisinopril 20 milliGRAM(s) Oral daily  melatonin 3 milliGRAM(s) Oral at bedtime  metoprolol tartrate 100 milliGRAM(s) Oral two times a day  NIFEdipine XL 90 milliGRAM(s) Oral daily  octreotide  Injectable 50 MICROGram(s) SubCutaneous every 6 hours  pantoprazole  Injectable 40 milliGRAM(s) IV Push daily  potassium chloride    Tablet ER 20 milliEquivalent(s) Oral daily  sodium bicarbonate 650 milliGRAM(s) Oral three times a day    MEDICATIONS  (PRN):  oxyCODONE    IR 10 milliGRAM(s) Oral every 8 hours PRN Severe Pain (7 - 10)    CXR : No infiltrate, AICD in place

## 2020-01-29 NOTE — CONSULT NOTE ADULT - ASSESSMENT
# Hypoglycemia secondary to sulfonylurea toxicity # Hypoglycemia secondary to sulfonylurea toxicity  - Continue Metformin 1 g BID   - Start insulin basal bolus

## 2020-01-29 NOTE — CHART NOTE - NSCHARTNOTEFT_GEN_A_CORE
Called by RN because patient is hypertensive. Given CAD history, cannot use hydralazine and given borderline low heart rate reluctant to give labetalol. Will administer 30mg Nefidipine XL at this time, as patient is shortly due for his anti-hypertensives at 6 AM. Will continue to monitor.

## 2020-01-29 NOTE — CHART NOTE - NSCHARTNOTEFT_GEN_A_CORE
78 yo M PMHx HTN, DLD, DM2, BPH s/p SPC, CAD s/p stents, glaucoma, TIA, CKD 3b, afib on eliquis presented for altered mental status. Patient was found by wife crawling on the floor, called EMS, FS 30 so given juice and mental status improved. Patient denies taking insulin, however was discharged 5 days ago from [facility] for cystitis and given cipro, also had his glipizide increased from OD to BID. His wife helps him with medications.  In ED patient was awake and alert. VS stable. Cr at baseline. Toxicology consulted and recommended octreotide q6hrs for 4 doses due to long half life of glipizide ER. FS q1hr.    Insulin trending up to 200s, giving insulin basal and meal coverage. Will downgrade.    #Hypoglycemia 2/2 polypharmacy  - Glipizide toxicity  - Toxicology consulted, recommended octreotide q6hrs for 4 doses and fingersticks q1hr  - Endocrine recommends metformin 500 mg bid, sitagliptin 100 mg, insulin prn, 1200 kcal daily limit, no fruit juice      #Htn/CAD  - Aspirin, atorvastatin  - Lisinopril, metoprolol, nifedipine  - Continue home meds    #AFib o Eliquis  - Continue eliquis  - Rate controlled    GI: pantoprazole  Diet: Consistent carb, 1200 kcal  Activity: Increase as tolerated  Code: full 80 yo M PMHx HTN, DLD, DM2, BPH s/p SPC, CAD s/p stents, glaucoma, TIA, CKD 3b, afib on eliquis presented for altered mental status. Patient was found by wife crawling on the floor, called EMS, FS 30 so given juice and mental status improved. Patient denies taking insulin, however was discharged 5 days ago from [facility] for cystitis and given cipro, also had his glipizide increased from OD to BID. His wife helps him with medications.  In ED patient was awake and alert. VS stable. Cr at baseline. Toxicology consulted and recommended octreotide q6hrs for 4 doses due to long half life of glipizide ER. FS q1hr.    Insulin trending up to 200s, giving insulin basal and meal coverage. Will downgrade.    #Hypoglycemia 2/2 polypharmacy  - Glipizide toxicity  - Toxicology consulted, recommended octreotide q6hrs for 4 doses and fingersticks q1hr  - Endocrine recommends metformin 500 mg bid, sitagliptin 100 mg, insulin prn, 1200 kcal daily limit, no fruit juice  - Hold glipizide and januvia  - Fingersticks q1hr and increase with blood glucose stability  - On insulin basal and meal coverage  - Toxicology and endocrine following    #Htn/CAD  - Aspirin, atorvastatin  - Lisinopril, metoprolol, nifedipine  - Continue home meds    #AFib o Eliquis  - Continue eliquis  - Rate controlled    GI: pantoprazole  Diet: Consistent carb, 1200 kcal  Activity: Increase as tolerated  Code: full

## 2020-01-29 NOTE — CONSULT NOTE ADULT - SUBJECTIVE AND OBJECTIVE BOX
Time:01-29-20 @ 16:56  [facility] [id number] Neuro 010 B  Emergent/Routine    Chief Complaint:  Hypoglycemia    History of Present Illness:  79y man PMHx HTN, DLD, DM2, BPH s/p SPC, CAD s/p stents, glaucoma, TIA, CKD 3b, afib on apixaban presented for altered mental status. Patient was found by wife crawling on the floor, called EMS, FS 30 so given juice and mental status improved. Patient denies taking insulin, however was discharged 5 days ago from [place] for cystitis and given cipro, also had his glipizide increased from QD to BID. His wife helps him with medications.  In ED patient was awake and alert. VS stable. Cr at baseline. Octreotide q6hrs for 4 doses due to long half life of glipizide ER. FS q1hr.  However, patient has only received 2 doses thus far and now very hyperglycemic.  Otherwise asymptomatic.      Past Medical History:  HYPOGLYCEMIA CKD CHRONIC KIDNEY DISEASE  ^HYPOGLYCEMIA CKD CHRONIC KIDNEY DISEASE  H/o or current diagnosis of HF- Contraindication to ACEI/ARBs  H/o or current diagnosis of HF- ACEI/ARB contraindication unknown  H/o or current diagnosis of HF- Contraindication to ACEI/ARBs  FHx: lung cancer  FH: type 2 diabetes  No pertinent family history in first degree relatives  Handoff  MEWS Score  GERD (gastroesophageal reflux disease)  H/O ptosis of eyelid  Hematuria  Hyperlipidemia  Anemia  Diabetes  Renal insufficiency  Cardiomyopathy  Cholelithiasis  CKD (chronic kidney disease)  Hydrocele in adult  Glaucoma  BPH (benign prostatic hyperplasia)  CAD (coronary artery disease)  Diplopia  TIA (transient ischemic attack)  Dementia  CHF (congestive heart failure)  Afib  Heart attack  Sepsis  Hypercholesteremia  HTN (hypertension)  DM (diabetes mellitus)  Hypoglycemia  History of suprapubic catheter  H/O flexible sigmoidoscopy  H/O bilateral cataract extraction  Encounter for biliary drainage tube placement  H/O coronary angioplasty  History of prostate surgery  AICD (automatic cardioverter/defibrillator) present  MED EVAL  5  CKD (chronic kidney disease)        Home Medications:  Alphagan P 0.1% ophthalmic solution: 1 drop(s) to each affected eye once a day (at bedtime) (22 Jan 2020 12:17)  ammonium lactate 12% topical lotion: Apply topically to affected area 2 times a day, As Needed (22 Jan 2020 12:17)  apixaban 2.5 mg oral tablet: 1 tab(s) orally every 12 hours (22 Jan 2020 12:17)  Aspir 81 oral delayed release tablet: 1 tab(s) orally once a day (22 Jan 2020 12:17)  atorvastatin 40 mg oral tablet: 1 tab(s) orally once a day (at bedtime) (22 Jan 2020 12:17)  bacitracin 500 units/g topical ointment: 1 application topically 2 times a day, As needed, skin irritation (23 Jan 2020 12:18)  clotrimazole-betamethasone dipropionate 1%-0.05% topical cream: Apply topically to affected area 2 times a day, As Needed (22 Jan 2020 12:17)  dofetilide 500 mcg oral capsule: 1 cap(s) orally 2 times a day (22 Jan 2020 12:17)  furosemide 20 mg oral tablet: 1 tab(s) orally once a day (22 Jan 2020 12:17)  GlipiZIDE XL 10 mg oral tablet, extended release: 1 tab(s) orally once a day (22 Jan 2020 12:17)  Januvia 50 mg oral tablet: 1 tab(s) orally once a day (22 Jan 2020 12:17)  latanoprost 0.005% ophthalmic solution: 1 drop(s) to each affected eye once a day (in the evening) ou (22 Jan 2020 12:17)  lisinopril 20 mg oral tablet: 1 tab(s) orally once a day (22 Jan 2020 12:17)  metFORMIN 500 mg oral tablet, extended release: 4 tab(s) orally once a day (22 Jan 2020 12:17)  Metoprolol Tartrate 50 mg oral tablet: 2 tab(s) orally 2 times a day (22 Jan 2020 12:17)  NIFEdipine 90 mg oral tablet, extended release: 1 tab(s) orally once a day (22 Jan 2020 12:17)  oxyCODONE 10 mg oral tablet: 1 tab(s) orally every 8 hours (22 Jan 2020 12:17)  pantoprazole 40 mg oral delayed release tablet: 1 tab(s) orally once a day (before a meal) (28 Jan 2020 23:03)  potassium chloride 20 mEq oral tablet, extended release: 1 tab(s) orally once a day (22 Jan 2020 12:17)  sodium bicarbonate 650 mg oral tablet: 1 tab(s) orally 3 times a day (22 Jan 2020 12:17)      Active Medications:  MEDICATIONS  (STANDING):  apixaban 5 milliGRAM(s) Oral every 12 hours  aspirin enteric coated 81 milliGRAM(s) Oral daily  atorvastatin 40 milliGRAM(s) Oral at bedtime  brimonidine 0.2% Ophthalmic Solution 1 Drop(s) Both EYES three times a day  chlorhexidine 4% Liquid 1 Application(s) Topical daily  dextrose 5%. 1000 milliLiter(s) (50 mL/Hr) IV Continuous <Continuous>  dextrose 50% Injectable 12.5 Gram(s) IV Push once  dextrose 50% Injectable 25 Gram(s) IV Push once  dextrose 50% Injectable 25 Gram(s) IV Push once  furosemide    Tablet 20 milliGRAM(s) Oral daily  insulin glargine Injectable (LANTUS) 36 Unit(s) SubCutaneous at bedtime  insulin lispro Injectable (HumaLOG) 10 Unit(s) SubCutaneous before breakfast  insulin lispro Injectable (HumaLOG) 10 Unit(s) SubCutaneous before lunch  insulin lispro Injectable (HumaLOG) 10 Unit(s) SubCutaneous before dinner  latanoprost 0.005% Ophthalmic Solution 1 Drop(s) Both EYES at bedtime  lisinopril 20 milliGRAM(s) Oral daily  melatonin 3 milliGRAM(s) Oral at bedtime  metoprolol tartrate 100 milliGRAM(s) Oral two times a day  NIFEdipine XL 90 milliGRAM(s) Oral daily  octreotide  Injectable 50 MICROGram(s) SubCutaneous every 6 hours  potassium chloride    Tablet ER 20 milliEquivalent(s) Oral daily  sodium bicarbonate 650 milliGRAM(s) Oral three times a day    MEDICATIONS  (PRN):  dextrose 40% Gel 15 Gram(s) Oral once PRN Blood Glucose LESS THAN 70 milliGRAM(s)/deciliter  glucagon  Injectable 1 milliGRAM(s) IntraMuscular once PRN Glucose LESS THAN 70 milligrams/deciliter  oxyCODONE    IR 10 milliGRAM(s) Oral every 8 hours PRN Severe Pain (7 - 10)        Social History:  Tobacco:   Denied  EtOH:   Denied  Illicit Substances:   Denied    Family History:  N/A    Review of Systems:  GENERAL: Denies fever/chills, loss of appetite/weight or fatigue  SKIN: Kirsty rashes, abrasions, lacerations, ecchymosis, erythema, or edema.  HEAD: Denies headache, dizziness or trauma  EYES: Denies blurry vision, diplopia, or photophobia  ENT: Denies earaches, discharge or hearing loss. Denies nasal discharge or epistaxis. Denies sore throat.   CARDIAC: Denies chest pain, palpitations, or SOB.   RESPIRATORY: Denies SOB, cough, hemoptysis or wheezing.   GI: Denies abdominal pain, n/v/d, bloody stools or melena.   : Denies hematuria, dysuria or frequency. Complains of pain to penis and bladder  MSK: Denies myalgias, bony deformity or pain.   NEURO: Denies numbness, tingling, weakness.    Physical Exam:  Vital Signs Last 24 Hrs  T(C): 36.1 (29 Jan 2020 15:54), Max: 37.2 (29 Jan 2020 01:15)  T(F): 96.9 (29 Jan 2020 15:54), Max: 98.9 (29 Jan 2020 01:15)  HR: 54 (29 Jan 2020 15:54) (50 - 76)  BP: 127/60 (29 Jan 2020 15:54) (112/46 - 203/89)  BP(mean): 90 (29 Jan 2020 15:00) (73 - 144)  RR: 16 (29 Jan 2020 15:54) (6 - 29)  SpO2: 97% (29 Jan 2020 15:54) (95% - 100%)  CAPILLARY BLOOD GLUCOSE      POCT Blood Glucose.: 235 mg/dL (29 Jan 2020 16:19)  POCT Blood Glucose.: 305 mg/dL (29 Jan 2020 15:05)  POCT Blood Glucose.: 339 mg/dL (29 Jan 2020 14:34)  POCT Blood Glucose.: 345 mg/dL (29 Jan 2020 14:08)  POCT Blood Glucose.: 320 mg/dL (29 Jan 2020 14:06)  POCT Blood Glucose.: 254 mg/dL (29 Jan 2020 11:44)  POCT Blood Glucose.: 259 mg/dL (29 Jan 2020 09:12)  POCT Blood Glucose.: 202 mg/dL (29 Jan 2020 08:10)  POCT Blood Glucose.: 187 mg/dL (29 Jan 2020 07:31)  POCT Blood Glucose.: 189 mg/dL (29 Jan 2020 06:21)  POCT Blood Glucose.: 211 mg/dL (29 Jan 2020 05:28)  POCT Blood Glucose.: 219 mg/dL (29 Jan 2020 04:38)  POCT Blood Glucose.: 229 mg/dL (29 Jan 2020 03:38)  POCT Blood Glucose.: 273 mg/dL (29 Jan 2020 02:42)  POCT Blood Glucose.: 220 mg/dL (29 Jan 2020 01:39)  POCT Blood Glucose.: 188 mg/dL (29 Jan 2020 00:20)  POCT Blood Glucose.: 190 mg/dL (28 Jan 2020 23:00)  POCT Blood Glucose.: 141 mg/dL (28 Jan 2020 22:02)  POCT Blood Glucose.: 157 mg/dL (28 Jan 2020 21:06)  POCT Blood Glucose.: 159 mg/dL (28 Jan 2020 19:15)      VITAL SIGNS: I have reviewed nursing notes and confirm.  CONSTITUTIONAL: Chronically ill appearing; in no acute distress.  SKIN: Skin exam is warm and dry, no acute rash.  HEAD: Normocephalic; atraumatic.  EYES: PERRL, EOM intact; conjunctiva and sclera clear.  ENT: No nasal discharge; airway clear. TMs clear.  NECK: Supple; non tender.  CARD: S1, S2 normal; (+) systolic murmur radiating to axilla, no gallops, or rubs. Regular rate and rhythm.  RESP: No wheezes, rales or rhonchi.  ABD: Normal bowel sounds; soft; non-distended; non-tender; no hepatosplenomegaly.  EXT: Normal ROM. No clubbing, cyanosis or edema.  LYMPH: No acute cervical adenopathy.  NEURO: Alert, oriented. Grossly unremarkable. No focal deficits.  PSYCH: Cooperative, appropriate.      GCS:  E:   4/4  V:   5/5  M:   6/6    EKG:  Labs:                        9.7    8.20  )-----------( 258      ( 29 Jan 2020 05:25 )             29.7     01-29    135  |  102  |  25<H>  ----------------------------<  233<H>  4.3   |  21  |  1.5    Ca    8.4<L>      29 Jan 2020 05:25  Phos  3.0     01-29  Mg     1.9     01-29    TPro  6.0  /  Alb  3.5  /  TBili  0.3  /  DBili  x   /  AST  23  /  ALT  109<H>  /  AlkPhos  351<H>  01-29

## 2020-01-30 NOTE — MEDICAL STUDENT PROGRESS NOTE(EDUCATION) - NS MD HP STUD ASPLAN PLAN FT
#Hypoglycemia  - Secondary to possible interaction of glipizide and ciprofloxacin/ due to increased dose of glipizide from OD to BID  - s/p octreotide 2 doses  - d/c insulin  - Toxicology and endocrine recs appreciated; will hold all hyperglycemic medications as pt is on D5  - Monitor FS, if >180 d/c D5  - Can start metformin 500mg PO BID tomorrow    #HTN/CAD  - Continue aspirin 81mg PO qd, atorvastatin 80mg PO qd  - Continue Lisinopril, nifedipine    #Afib on Eliquis, rate controlled  - Continue metoprolol 100mg PO BID  - Continue Eliquis 5mg PO q12      GI ppx: Pantoprazole 40mg PO before breakfast  Diet: Consistent carb, calorie controlled 1200kcal   Activity: Increase as tolerated  Code Status: Full    DISPO: Continue monitoring FS while on D5, can resume hyperglycemic medications tomorrow. #Hypoglycemia  - Likely secondary to  increased dose of glipizide from OD to BID  - s/p octreotide 2 doses  - d/c insulin and metformin for now  - Toxicology and endocrine recs appreciated: avoid glipizide, can discharge on metformin 500mg bid and sitagliptin  - FS elevated this afternoon to 300s, will stop D5 and monitor Fs  - If FS elevated >400, administer insulin lispro 3 units and give metformin 500 mg po    #Hypomagnesemia  -1.7 this AM  -s/p IV 2mg   -start magnesium oxide 400mg tid     #HTN: well-controlled  - Continue Lisinopril 20mg qd, nifedipine 90mg qd  -continue with lasix 20mg qd    #Afib on Eliquis, rate controlled  - Continue metoprolol 100mg PO BID  - Continue Eliquis 5mg PO q12    #CAD  -continue with aspirin and lipitor 40mg    #CKD3  -baseline Scr 1.5  -CrCl: 39mL/min  -continue with sodium bicarb 650mg tid  -avoid nephrotoxic medications    GI ppx: Pantoprazole 40mg PO before breakfast  Diet: Consistent carb, calorie controlled 1200kcal   Activity: Increase as tolerated  Code Status: Full    DISPO: Continue monitoring FS; family requesting SNF, will order rehab consult #Hypoglycemia  - Likely secondary to  increased dose of glipizide from OD to BID  - s/p octreotide 2 doses  - d/c insulin and metformin for now  - Toxicology and endocrine recs appreciated: avoid glipizide, can discharge on metformin 500mg bid and sitagliptin  - FS elevated this afternoon to 300s, will stop D5 and monitor Fs  - If FS elevated >400, administer insulin lispro 3 units and give metformin 500 mg po  - f/u further endocrinology recs    #Hypomagnesemia  -1.7 this AM  -s/p IV 2mg   -start magnesium oxide 400mg tid     #HTN: well-controlled  - Continue Lisinopril 20mg qd, nifedipine 90mg qd  -continue with lasix 20mg qd    #Afib on Eliquis, rate controlled  - Continue metoprolol 100mg PO BID  - Continue Eliquis 5mg PO q12    #CAD  -continue with aspirin and lipitor 40mg    #CKD3  -baseline Scr 1.5  -CrCl: 39mL/min  -continue with sodium bicarb 650mg tid  -avoid nephrotoxic medications    GI ppx: Pantoprazole 40mg PO before breakfast  Diet: Consistent carb, calorie controlled 1200kcal   Activity: Increase as tolerated  Code Status: Full    DISPO: Continue monitoring FS; family requesting SNF, will order rehab consult    I examined the patient with Medical Student. Plan was discussed. Note was reviewed and edited where appropriate by me. Agree with plan and management as above.   Patient presented with hypoglycemia secondary to glipizide overdose, s/p 2 doses of octreotide. FS labile, will monitor off insulin and anti-diabetes medications for now. Anticipate discharge tomorrow if finger sticks remain stable. Will discharge on metformin and sitaglipin per endocrine recs.

## 2020-01-30 NOTE — MEDICAL STUDENT PROGRESS NOTE(EDUCATION) - NS MD HP STUD ASPLAN ASSES FT
Patient is a 80yo male with a PMH of HTN, DLD, DM2, BPH s/p SPC, CAD s/p stents, glaucoma, TIA, CKD 3b, and afib on Eliquis who presented to the ED for altered mental status. His wife found him crawling on the floor at home, FS found to be 30. Previous admission 5 days ago for cystitis was given ciprofloxacin and glipizide was increased from OD to BID. Patient admitted for hypoglycemia secondary to polypharmacy.

## 2020-01-30 NOTE — MEDICAL STUDENT PROGRESS NOTE(EDUCATION) - SUBJECTIVE AND OBJECTIVE BOX
Hospital Day:  2d    Subjective:    Patient is a 79y old  Male who presents with a chief complaint of Altered mental status (29 Jan 2020 16:54)    Last night 9:30pm glucose was 45, started on D5. Pt is confused as to why he is here, is also not oriented to place. Pt denies fever, chills, SOB, chest pain, n/v/d, urinary symptoms. Has not ambulated.      Past Medical Hx:   GERD (gastroesophageal reflux disease)  H/O ptosis of eyelid  Hematuria  Hyperlipidemia  Anemia  Diabetes  Renal insufficiency  Cardiomyopathy  Cholelithiasis  CKD (chronic kidney disease)  Hydrocele in adult  Glaucoma  BPH (benign prostatic hyperplasia)  CAD (coronary artery disease)  Diplopia  TIA (transient ischemic attack)  Dementia  CHF (congestive heart failure)  Afib  Heart attack  Sepsis  Hypercholesteremia  HTN (hypertension)  DM (diabetes mellitus)    Past Sx:  History of suprapubic catheter  H/O flexible sigmoidoscopy  H/O bilateral cataract extraction  Encounter for biliary drainage tube placement  H/O coronary angioplasty  History of prostate surgery  AICD (automatic cardioverter/defibrillator) present    Allergies:  sulfa drugs (Other)    Current Meds:   Stand Meds:  apixaban 5 milliGRAM(s) Oral every 12 hours  aspirin enteric coated 81 milliGRAM(s) Oral daily  atorvastatin 40 milliGRAM(s) Oral at bedtime  brimonidine 0.2% Ophthalmic Solution 1 Drop(s) Both EYES three times a day  chlorhexidine 4% Liquid 1 Application(s) Topical daily  dextrose 5%. 1000 milliLiter(s) (50 mL/Hr) IV Continuous <Continuous>  dextrose 5%. 1000 milliLiter(s) (50 mL/Hr) IV Continuous <Continuous>  dextrose 50% Injectable 12.5 Gram(s) IV Push once  dextrose 50% Injectable 25 Gram(s) IV Push once  dextrose 50% Injectable 25 Gram(s) IV Push once  furosemide    Tablet 20 milliGRAM(s) Oral daily  latanoprost 0.005% Ophthalmic Solution 1 Drop(s) Both EYES at bedtime  lisinopril 20 milliGRAM(s) Oral daily  melatonin 3 milliGRAM(s) Oral at bedtime  metoprolol tartrate 100 milliGRAM(s) Oral two times a day  NIFEdipine XL 90 milliGRAM(s) Oral daily  pantoprazole    Tablet 40 milliGRAM(s) Oral before breakfast  potassium chloride    Tablet ER 20 milliEquivalent(s) Oral daily  senna 2 Tablet(s) Oral at bedtime  sodium bicarbonate 650 milliGRAM(s) Oral three times a day    PRN Meds:  dextrose 40% Gel 15 Gram(s) Oral once PRN Blood Glucose LESS THAN 70 milliGRAM(s)/deciliter  glucagon  Injectable 1 milliGRAM(s) IntraMuscular once PRN Glucose LESS THAN 70 milligrams/deciliter  oxyCODONE    IR 10 milliGRAM(s) Oral every 8 hours PRN Severe Pain (7 - 10)    HOME MEDICATIONS:  Alphagan P 0.1% ophthalmic solution: 1 drop(s) to each affected eye once a day (at bedtime)  ammonium lactate 12% topical lotion: Apply topically to affected area 2 times a day, As Needed  apixaban 2.5 mg oral tablet: 1 tab(s) orally every 12 hours  Aspir 81 oral delayed release tablet: 1 tab(s) orally once a day  atorvastatin 40 mg oral tablet: 1 tab(s) orally once a day (at bedtime)  bacitracin 500 units/g topical ointment: 1 application topically 2 times a day, As needed, skin irritation  clotrimazole-betamethasone dipropionate 1%-0.05% topical cream: Apply topically to affected area 2 times a day, As Needed  dofetilide 500 mcg oral capsule: 1 cap(s) orally 2 times a day  furosemide 20 mg oral tablet: 1 tab(s) orally once a day  GlipiZIDE XL 10 mg oral tablet, extended release: 1 tab(s) orally once a day  Januvia 50 mg oral tablet: 1 tab(s) orally once a day  latanoprost 0.005% ophthalmic solution: 1 drop(s) to each affected eye once a day (in the evening) ou  lisinopril 20 mg oral tablet: 1 tab(s) orally once a day  metFORMIN 500 mg oral tablet, extended release: 4 tab(s) orally once a day  Metoprolol Tartrate 50 mg oral tablet: 2 tab(s) orally 2 times a day  NIFEdipine 90 mg oral tablet, extended release: 1 tab(s) orally once a day  oxyCODONE 10 mg oral tablet: 1 tab(s) orally every 8 hours  pantoprazole 40 mg oral delayed release tablet: 1 tab(s) orally once a day (before a meal)  potassium chloride 20 mEq oral tablet, extended release: 1 tab(s) orally once a day  sodium bicarbonate 650 mg oral tablet: 1 tab(s) orally 3 times a day      Vital Signs:   T(F): 98.6 (01-30-20 @ 06:14), Max: 98.6 (01-30-20 @ 06:14)  HR: 68 (01-30-20 @ 06:15) (50 - 72)  BP: 140/70 (01-30-20 @ 06:15) (112/46 - 166/76)  RR: 20 (01-30-20 @ 06:14) (6 - 20)  SpO2: 97% (01-30-20 @ 06:14) (97% - 99%)      01-29-20 @ 07:01  -  01-30-20 @ 07:00  --------------------------------------------------------  IN: 235 mL / OUT: 1825 mL / NET: -1590 mL        Physical Exam:   GENERAL: NAD  HEENT: NCAT  CHEST/LUNG: CTAB  HEART: Regular rate and rhythm; s1 s2 appreciated, No murmurs, rubs, or gallops  ABDOMEN: Soft, Nontender, Nondistended; Bowel sounds present  EXTREMITIES: No LE edema b/l  NERVOUS SYSTEM:  Alert & Oriented X2 (person and time)        Labs:                         10.7   10.46 )-----------( 293      ( 30 Jan 2020 06:53 )             32.2     Neutrophil% 69.5, Lymphocyte% 13.8, Monocyte% 8.0, Bands% 0.4 01-30-20 @ 06:53    30 Jan 2020 06:53    137    |  103    |  30     ----------------------------<  148    4.2     |  20     |  1.7      Ca    8.6        30 Jan 2020 06:53  Phos  3.0       29 Jan 2020 05:25  Mg     1.7       30 Jan 2020 06:53    TPro  6.3    /  Alb  3.4    /  TBili  0.4    /  DBili  x      /  AST  19     /  ALT  83     /  AlkPhos  318    30 Jan 2020 06:53          Hemoglobin A1C, Whole Blood: 5.9 % (01-29-20 @ 05:25)

## 2020-01-30 NOTE — PROGRESS NOTE ADULT - SUBJECTIVE AND OBJECTIVE BOX
Patient is a 79y old  Male who presents with a chief complaint of Altered mental status (29 Jan 2020 16:54)    INTERVAL HPI/OVERNIGHT EVENTS: no complaints, feels better, wants to go home  ROS: Denies CP, SOB, AP, new weakness  All other systems reviewed and are within normal limits.  HPI:  80 yo M PMHx HTN, DLD, DM2, BPH s/p SPC, CAD s/p stents, glaucoma, TIA, CKD 3b, afib on eliquis presented for altered mental status. Patient was found by wife crawling on the floor, called EMS, FS 30 so given juice and mental status improved. Patient denies taking insulin, however was discharged 5 days ago from [facility] for cystitis and given cipro, also had his glipizide increased from OD to BID. His wife helps him with medications.  In ED patient was awake and alert. VS stable. Cr at baseline. Toxicology consulted and recommended octreotide q6hrs for 4 doses due to long half life of glipizide ER. FS q1hr. (28 Jan 2020 22:59)    HYPOGLYCEMIA CKD CHRONIC KIDNEY DISEASE  ^MED EVAL  H/o or current diagnosis of HF- Contraindication to ACEI/ARBs  H/o or current diagnosis of HF- ACEI/ARB contraindication unknown  H/o or current diagnosis of HF- Contraindication to ACEI/ARBs  FHx: lung cancer  FH: type 2 diabetes  No pertinent family history in first degree relatives  Handoff  MEWS Score  GERD (gastroesophageal reflux disease)  H/O ptosis of eyelid  Hematuria  Hyperlipidemia  Anemia  Diabetes  Renal insufficiency  Cardiomyopathy  Cholelithiasis  CKD (chronic kidney disease)  Hydrocele in adult  Glaucoma  BPH (benign prostatic hyperplasia)  CAD (coronary artery disease)  Diplopia  TIA (transient ischemic attack)  Dementia  CHF (congestive heart failure)  Afib  Heart attack  Sepsis  Hypercholesteremia  HTN (hypertension)  DM (diabetes mellitus)  Hypoglycemia  History of suprapubic catheter  H/O flexible sigmoidoscopy  H/O bilateral cataract extraction  Encounter for biliary drainage tube placement  H/O coronary angioplasty  History of prostate surgery  AICD (automatic cardioverter/defibrillator) present  MED EVAL  5  CKD (chronic kidney disease)      General: NAD, AAO3  CV: S1 S2  Resp: decreased breath sounds at bases  GI: NT/ND/S +BS, +suprapubic cath  MS: no clubbing/cyanosis/edema, 2+ pulses b/l  Neuro: nonfocal, 2+reflexes thruout    MEDICATIONS  (STANDING):  apixaban 5 milliGRAM(s) Oral every 12 hours  aspirin enteric coated 81 milliGRAM(s) Oral daily  atorvastatin 40 milliGRAM(s) Oral at bedtime  brimonidine 0.2% Ophthalmic Solution 1 Drop(s) Both EYES three times a day  chlorhexidine 4% Liquid 1 Application(s) Topical daily  dextrose 5%. 1000 milliLiter(s) (50 mL/Hr) IV Continuous <Continuous>  dextrose 5%. 1000 milliLiter(s) (50 mL/Hr) IV Continuous <Continuous>  dextrose 50% Injectable 12.5 Gram(s) IV Push once  dextrose 50% Injectable 25 Gram(s) IV Push once  dextrose 50% Injectable 25 Gram(s) IV Push once  furosemide    Tablet 20 milliGRAM(s) Oral daily  latanoprost 0.005% Ophthalmic Solution 1 Drop(s) Both EYES at bedtime  lisinopril 20 milliGRAM(s) Oral daily  magnesium oxide 400 milliGRAM(s) Oral three times a day with meals  melatonin 3 milliGRAM(s) Oral at bedtime  metoprolol tartrate 100 milliGRAM(s) Oral two times a day  NIFEdipine XL 90 milliGRAM(s) Oral daily  pantoprazole    Tablet 40 milliGRAM(s) Oral before breakfast  senna 2 Tablet(s) Oral at bedtime  sodium bicarbonate 650 milliGRAM(s) Oral three times a day    MEDICATIONS  (PRN):  dextrose 40% Gel 15 Gram(s) Oral once PRN Blood Glucose LESS THAN 70 milliGRAM(s)/deciliter  glucagon  Injectable 1 milliGRAM(s) IntraMuscular once PRN Glucose LESS THAN 70 milligrams/deciliter  oxyCODONE    IR 10 milliGRAM(s) Oral every 8 hours PRN Severe Pain (7 - 10)    Home Medications:  Alphagan P 0.1% ophthalmic solution: 1 drop(s) to each affected eye once a day (at bedtime) (22 Jan 2020 12:17)  ammonium lactate 12% topical lotion: Apply topically to affected area 2 times a day, As Needed (22 Jan 2020 12:17)  apixaban 2.5 mg oral tablet: 1 tab(s) orally every 12 hours (22 Jan 2020 12:17)  Aspir 81 oral delayed release tablet: 1 tab(s) orally once a day (22 Jan 2020 12:17)  atorvastatin 40 mg oral tablet: 1 tab(s) orally once a day (at bedtime) (22 Jan 2020 12:17)  bacitracin 500 units/g topical ointment: 1 application topically 2 times a day, As needed, skin irritation (23 Jan 2020 12:18)  clotrimazole-betamethasone dipropionate 1%-0.05% topical cream: Apply topically to affected area 2 times a day, As Needed (22 Jan 2020 12:17)  dofetilide 500 mcg oral capsule: 1 cap(s) orally 2 times a day (22 Jan 2020 12:17)  furosemide 20 mg oral tablet: 1 tab(s) orally once a day (22 Jan 2020 12:17)  GlipiZIDE XL 10 mg oral tablet, extended release: 1 tab(s) orally once a day (22 Jan 2020 12:17)  Januvia 50 mg oral tablet: 1 tab(s) orally once a day (22 Jan 2020 12:17)  latanoprost 0.005% ophthalmic solution: 1 drop(s) to each affected eye once a day (in the evening) ou (22 Jan 2020 12:17)  lisinopril 20 mg oral tablet: 1 tab(s) orally once a day (22 Jan 2020 12:17)  metFORMIN 500 mg oral tablet, extended release: 4 tab(s) orally once a day (22 Jan 2020 12:17)  Metoprolol Tartrate 50 mg oral tablet: 2 tab(s) orally 2 times a day (22 Jan 2020 12:17)  NIFEdipine 90 mg oral tablet, extended release: 1 tab(s) orally once a day (22 Jan 2020 12:17)  oxyCODONE 10 mg oral tablet: 1 tab(s) orally every 8 hours (22 Jan 2020 12:17)  pantoprazole 40 mg oral delayed release tablet: 1 tab(s) orally once a day (before a meal) (28 Jan 2020 23:03)  potassium chloride 20 mEq oral tablet, extended release: 1 tab(s) orally once a day (22 Jan 2020 12:17)  sodium bicarbonate 650 mg oral tablet: 1 tab(s) orally 3 times a day (22 Jan 2020 12:17)    Vital Signs Last 24 Hrs  T(C): 36.7 (30 Jan 2020 13:51), Max: 37 (30 Jan 2020 06:14)  T(F): 98 (30 Jan 2020 13:51), Max: 98.6 (30 Jan 2020 06:14)  HR: 63 (30 Jan 2020 17:05) (59 - 72)  BP: 177/79 (30 Jan 2020 17:05) (140/70 - 177/79)  BP(mean): --  RR: 20 (30 Jan 2020 13:51) (16 - 20)  SpO2: 97% (30 Jan 2020 06:14) (97% - 98%)  CAPILLARY BLOOD GLUCOSE      POCT Blood Glucose.: 258 mg/dL (30 Jan 2020 16:26)  POCT Blood Glucose.: 318 mg/dL (30 Jan 2020 11:26)  POCT Blood Glucose.: 132 mg/dL (30 Jan 2020 07:21)  POCT Blood Glucose.: 128 mg/dL (30 Jan 2020 05:05)  POCT Blood Glucose.: 143 mg/dL (30 Jan 2020 03:06)  POCT Blood Glucose.: 173 mg/dL (30 Jan 2020 01:07)  POCT Blood Glucose.: 209 mg/dL (29 Jan 2020 23:54)  POCT Blood Glucose.: 58 mg/dL (29 Jan 2020 22:27)  POCT Blood Glucose.: 45 mg/dL (29 Jan 2020 21:28)    LABS:                        10.7   10.46 )-----------( 293      ( 30 Jan 2020 06:53 )             32.2     01-30    137  |  103  |  30<H>  ----------------------------<  148<H>  4.2   |  20  |  1.7<H>    Ca    8.6      30 Jan 2020 06:53  Phos  3.0     01-29  Mg     1.7     01-30    TPro  6.3  /  Alb  3.4<L>  /  TBili  0.4  /  DBili  x   /  AST  19  /  ALT  83<H>  /  AlkPhos  318<H>  01-30    LIVER FUNCTIONS - ( 30 Jan 2020 06:53 )  Alb: 3.4 g/dL / Pro: 6.3 g/dL / ALK PHOS: 318 U/L / ALT: 83 U/L / AST: 19 U/L / GGT: x                           Chart, Consultant(s) Notes Reviewed:  [x ] YES  [ ] NO  Care Discussed with Consultants/Other Providers/ Housestaff [ x] YES  [ ] NO  Radiology, labs, old records personally reviewed.

## 2020-01-30 NOTE — PROGRESS NOTE ADULT - ASSESSMENT
Patient is a 80yo male with a PMH of HTN, DLD, DM2, BPH s/p SPC, CAD s/p stents, glaucoma, TIA, CKD 3b, and afib on Eliquis who presented to the ED for altered mental status. His wife found him crawling on the floor at home, FS found to be 30. Previous admission 5 days ago for cystitis was given ciprofloxacin and glipizide was increased from OD to BID. Patient admitted for hypoglycemia secondary to sulfonylurea toxicity.  	  #Hypoglycemia  - due to sulfonylurea toxicity  - s/p octreotide 2 doses  - d/c insulin and metformin for now  - Toxicology and endocrine recs appreciated: avoid glipizide, can discharge on metformin 500mg bid and sitagliptin  - FS elevated this afternoon to 300s, will stop D5 and monitor Fs  - If FS elevated >400, administer insulin lispro 3 units and give metformin 500 mg po    #Hypomagnesemia  -1.7 this AM  -s/p IV 2mg   -start magnesium oxide 400mg tid     #HTN: well-controlled  - Continue Lisinopril 20mg qd, nifedipine 90mg qd  -continue with lasix 20mg qd    #Afib on Eliquis, rate controlled  - Continue metoprolol 100mg PO BID  - Continue Eliquis 5mg PO q12    #CAD  -continue with aspirin and lipitor 40mg    #CKD3  -baseline Scr 1.5  -CrCl: 39mL/min  -continue with sodium bicarb 650mg tid  -avoid nephrotoxic medications    GI ppx: Pantoprazole 40mg PO before breakfast  Diet: Consistent carb, calorie controlled 1200kcal   Activity: Increase as tolerated  Code Status: Full    DISPO: Continue monitoring FS; family requesting SNF, will order rehab consult

## 2020-01-31 NOTE — DISCHARGE NOTE PROVIDER - CARE PROVIDER_API CALL
Nimesh Herrera  Phone: (   )    -  Fax: (   )    -  Established Patient  Follow Up Time: 2 weeks Nimesh Herrera  Phone: (   )    -  Fax: (   )    -  Established Patient  Follow Up Time: 1 week    Chelly Rodriguez)  Internal Medicine  [address] [place] [address]  Phone: [phone]  Fax: [phone]  Follow Up Time: 2 weeks

## 2020-01-31 NOTE — DISCHARGE NOTE PROVIDER - CARE PROVIDERS DIRECT ADDRESSES
,DirectAddress_Unknown ,DirectAddress_Unknown,[email or web address]

## 2020-01-31 NOTE — CONSULT NOTE ADULT - SUBJECTIVE AND OBJECTIVE BOX
HPI:  79 y.o. M PMHx HTN, DLD, DM2, BPH s/p SPC, CAD s/p stents, glaucoma, TIA, CKD 3b, afib on eliquis presented for altered mental status. Patient was found by wife crawling on the floor, called EMS, FS 30 so given juice and mental status improved. Patient denies taking insulin, however was discharged 5 days ago from [place] for cystitis and given cipro, also had his glipizide increased from OD to BID. His wife helps him with medications.  In ED patient was awake and alert. VS stable. Cr at baseline. Toxicology consulted and recommended octreotide q6hrs for 4 doses due to long half life of glipizide ER. FS q1hr. (28 Jan 2020 22:59)      PAST MEDICAL & SURGICAL HISTORY:  GERD (gastroesophageal reflux disease): intermittent  H/O ptosis of eyelid: right eye (sometimes wears patch)  Hematuria  Hyperlipidemia  Anemia  Diabetes  Renal insufficiency  Cardiomyopathy  Cholelithiasis  Hydrocele in adult  Glaucoma  BPH (benign prostatic hyperplasia)  CAD (coronary artery disease): CARD STENT X2 4/2018  TIA (transient ischemic attack): X2 JULY 2018  Dementia  CHF (congestive heart failure): COMBINED SYSTOLIC &amp; DIASTOLIC  Afib  Heart attack: 1/2018  Sepsis: 1/18 FROM INFECTED GALLBLADDER  Hypercholesteremia  HTN (hypertension)  DM (diabetes mellitus)  History of suprapubic catheter  H/O flexible sigmoidoscopy: 2015  H/O bilateral cataract extraction: LEFT- 1/18 RIGHT 6 YRS AGO  Encounter for biliary drainage tube placement: 1/2018  H/O coronary angioplasty: WITH STENT X2 (4/2018)  History of prostate surgery: 5/17  AICD (automatic cardioverter/defibrillator) present: [facility] COURSE:  Per hospitalist assessment of 1/30/3030 by Dr. RALPH Ford:  Patient is a 78yo male with a PMH of HTN, DLD, DM2, BPH s/p SPC, CAD s/p stents, glaucoma, TIA, CKD 3b, and afib on Eliquis who presented to the ED for altered mental status. His wife found him crawling on the floor at home, FS found to be 30. Previous admission 5 days ago for cystitis was given ciprofloxacin and glipizide was increased from OD to BID. Patient admitted for hypoglycemia secondary to sulfonylurea toxicity.  	  #Hypoglycemia  - due to sulfonylurea toxicity  - s/p octreotide 2 doses  - d/c insulin and metformin for now  - Toxicology and endocrine recs appreciated: avoid glipizide, can discharge on metformin 500mg bid and sitagliptin  - FS elevated this afternoon to 300s, will stop D5 and monitor Fs  - If FS elevated >400, administer insulin lispro 3 units and give metformin 500 mg po    #Hypomagnesemia  -1.7 this AM  -s/p IV 2mg   -start magnesium oxide 400mg tid     #HTN: well-controlled  - Continue Lisinopril 20mg qd, nifedipine 90mg qd  -continue with lasix 20mg qd    #Afib on Eliquis, rate controlled  - Continue metoprolol 100mg PO BID  - Continue Eliquis 5mg PO q12    #CAD  -continue with aspirin and lipitor 40mg    #CKD3  -baseline Scr 1.5  -CrCl: 39mL/min  -continue with sodium bicarb 650mg tid  -avoid nephrotoxic medications    GI ppx: Pantoprazole 40mg PO before breakfast  Diet: Consistent carb, calorie controlled 1200kcal   Activity: Increase as tolerated  Code Status: Full    DISPO: Continue monitoring FS; family requesting SNF, will order rehab consult          TODAY'S SUBJECTIVE & REVIEW OF SYMPTOMS:    Constitutional WNL  Cardiac WNL   Respiratory WNL  GI WNL   WNL  Endocrine WNL  Skin WNL  Musculoskeletal WNL  Neuro WNL  Cognitive WNL  Psych WNL      MEDICATIONS  (STANDING):  apixaban 5 milliGRAM(s) Oral every 12 hours  aspirin enteric coated 81 milliGRAM(s) Oral daily  atorvastatin 40 milliGRAM(s) Oral at bedtime  brimonidine 0.2% Ophthalmic Solution 1 Drop(s) Both EYES three times a day  chlorhexidine 4% Liquid 1 Application(s) Topical daily  dextrose 5%. 1000 milliLiter(s) (50 mL/Hr) IV Continuous <Continuous>  dextrose 5%. 1000 milliLiter(s) (50 mL/Hr) IV Continuous <Continuous>  dextrose 50% Injectable 12.5 Gram(s) IV Push once  dextrose 50% Injectable 25 Gram(s) IV Push once  dextrose 50% Injectable 25 Gram(s) IV Push once  furosemide    Tablet 20 milliGRAM(s) Oral daily  latanoprost 0.005% Ophthalmic Solution 1 Drop(s) Both EYES at bedtime  lisinopril 20 milliGRAM(s) Oral daily  magnesium oxide 400 milliGRAM(s) Oral three times a day with meals  melatonin 3 milliGRAM(s) Oral at bedtime  metoprolol tartrate 100 milliGRAM(s) Oral two times a day  NIFEdipine XL 30 milliGRAM(s) Oral once  pantoprazole    Tablet 40 milliGRAM(s) Oral before breakfast  senna 2 Tablet(s) Oral at bedtime  sodium bicarbonate 650 milliGRAM(s) Oral three times a day    MEDICATIONS  (PRN):  dextrose 40% Gel 15 Gram(s) Oral once PRN Blood Glucose LESS THAN 70 milliGRAM(s)/deciliter  glucagon  Injectable 1 milliGRAM(s) IntraMuscular once PRN Glucose LESS THAN 70 milligrams/deciliter  oxyCODONE    IR 10 milliGRAM(s) Oral every 8 hours PRN Severe Pain (7 - 10)      FAMILY HISTORY:  FHx: lung cancer: father  FH: type 2 diabetes      Allergies    sulfa drugs (Other)    Intolerances        SOCIAL HISTORY:    [  ] Smoking  [  ] EtOH  [  ] Substance abuse     Home Environment:  [  ] Alone  [  ] Lives with Family  [  ] Home Health Aide    Dwelling:  [  ] Private House  [  ] Apartment  [  ] Adult Home/Assisted Living Facility  [  ] Skilled Nursing Facility      [  ] Short Term  [  ] Long Term  [  ] Stairs       Elevator [  ]    FUNCTIONAL STATUS PTA: (Check all that apply)  Ambulation: [   ]Independent    [  ] Dependent     [  ] Non-Ambulatory  Assistive Device: [  ] Straight Cane  [  ]  Quad Cane  [  ] Walker  [  ]  Wheelchair  ADL: [  ] Independent  [  ]  Dependent       Vital Signs Last 24 Hrs  T(C): 36.2 (31 Jan 2020 05:00), Max: 36.7 (30 Jan 2020 13:51)  T(F): 97.1 (31 Jan 2020 05:00), Max: 98 (30 Jan 2020 13:51)  HR: 50 (31 Jan 2020 05:00) (50 - 69)  BP: 174/79 (31 Jan 2020 05:00) (150/68 - 177/79)  BP(mean): --  RR: 18 (31 Jan 2020 05:00) (18 - 20)  SpO2: --      PHYSICAL EXAM: Alert and oriented X 4  GENERAL: Well-developed, well-nourished, in NAD  HEAD:  Normocephalic, atraumatic  EYES: EOMI, PERRLA, sclerae anicteric, conjunctivae not injected  NECK: Supple, No JVD, Normal thyroid  CHEST/LUNG: Clear to auscultation bilaterally; No rales, rhonchi, wheezing  HEART: Regular rate and rhythm; No murmurs, gallops, or rubs  ABDOMEN: Soft, nontender, nondistended; Bowel sounds present  EXTREMITIES:  2+ Peripheral pulses; No clubbing, cyanosis, or edema    NERVOUS SYSTEM:  Cranial Nerves II-XII intact [  ] Abnormal  [  ]  ROM: WFL all extremities [  ]  Abnormal [  ]  Motor Strength: WFL all extremities  [  ]  Abnormal [  ]  Sensation: intact to light touch [  ] Abnormal [  ]  Reflexes: Symmetric [  ]  Abnormal [  ]    FUNCTIONAL STATUS:  Bed Mobility: Independent [  ]  Supervision [  ]  Needs Assistance [  ]  N/A [  ]  Transfers: Independent [  ]  Supervision [  ]  Needs Assistance [  ]  N/A [  ]   Ambulation: Independent [  ]  Supervision [  ]  Needs Assistance [  ]  N/A [  ]  ADLs: Independent [  ] Requires Assistance [  ] N/A [  ]      LABS:                        10.7   7.72  )-----------( 276      ( 31 Jan 2020 06:17 )             32.1     01-31    140  |  103  |  30<H>  ----------------------------<  155<H>  4.2   |  25  |  1.5    Ca    8.8      31 Jan 2020 06:17  Mg     1.7     01-30    TPro  6.2  /  Alb  3.4<L>  /  TBili  0.3  /  DBili  x   /  AST  13  /  ALT  57<H>  /  AlkPhos  274<H>  01-31          RADIOLOGY & ADDITIONAL STUDIES: HPI:  79 y.o. right-handed  white M PMHx HTN, DLD, DM2, BPH s/p SPC, CAD s/p stents, glaucoma, TIA, CKD 3b, AFib on Eliquis presented for altered mental status. Patient was found by wife crawling on the floor, called EMS, FS 30 so given juice and mental status improved. Patient denies taking insulin, however was discharged 5 days ago from [place] for cystitis and given cipro, also had his glipizide increased from OD to BID. His wife helps him with medications.  In ED patient was awake and alert. VS stable. Cr at baseline. Toxicology consulted and recommended octreotide q6hrs for 4 doses due to long half life of glipizide ER. FS q1hr. (28 Jan 2020 22:59)      PAST MEDICAL & SURGICAL HISTORY:  GERD (gastroesophageal reflux disease): intermittent  H/O ptosis of eyelid: right eye (sometimes wears patch)  Hematuria  Hyperlipidemia  Anemia  Diabetes  Renal insufficiency  Cardiomyopathy  Cholelithiasis  Hydrocele in adult  Glaucoma  BPH (benign prostatic hyperplasia)  CAD (coronary artery disease): CARD STENT X2 4/2018  TIA (transient ischemic attack): X2 JULY 2018  Dementia  CHF (congestive heart failure): COMBINED SYSTOLIC &amp; DIASTOLIC  Afib  Heart attack: 1/2018  Sepsis: 1/18 FROM INFECTED GALLBLADDER  Hypercholesteremia  HTN (hypertension)  DM (diabetes mellitus)  History of suprapubic catheter  H/O flexible sigmoidoscopy: 2015  H/O bilateral cataract extraction: LEFT- 1/18 RIGHT 6 YRS AGO  Encounter for biliary drainage tube placement: 1/2018  H/O coronary angioplasty: WITH STENT X2 (4/2018)  History of prostate surgery: 5/17  AICD (automatic cardioverter/defibrillator) present: [facility] COURSE:  Per hospitalist assessment of 1/30/3030 by Dr. RALPH Ford:  Patient is a 78yo male with a PMH of HTN, DLD, DM2, BPH s/p SPC, CAD s/p stents, glaucoma, TIA, CKD 3b, and afib on Eliquis who presented to the ED for altered mental status. His wife found him crawling on the floor at home, FS found to be 30. Previous admission 5 days ago for cystitis was given ciprofloxacin and glipizide was increased from OD to BID. Patient admitted for hypoglycemia secondary to sulfonylurea toxicity.  	  #Hypoglycemia  - due to sulfonylurea toxicity  - s/p octreotide 2 doses  - d/c insulin and metformin for now  - Toxicology and endocrine recs appreciated: avoid glipizide, can discharge on metformin 500mg bid and sitagliptin  - FS elevated this afternoon to 300s, will stop D5 and monitor Fs  - If FS elevated >400, administer insulin lispro 3 units and give metformin 500 mg po    #Hypomagnesemia  -1.7 this AM  -s/p IV 2mg   -start magnesium oxide 400mg tid     #HTN: well-controlled  - Continue Lisinopril 20mg qd, nifedipine 90mg qd  -continue with lasix 20mg qd    #Afib on Eliquis, rate controlled  - Continue metoprolol 100mg PO BID  - Continue Eliquis 5mg PO q12    #CAD  -continue with aspirin and lipitor 40mg    #CKD3  -baseline Scr 1.5  -CrCl: 39mL/min  -continue with sodium bicarb 650mg tid  -avoid nephrotoxic medications    GI ppx: Pantoprazole 40mg PO before breakfast  Diet: Consistent carb, calorie controlled 1200kcal   Activity: Increase as tolerated  Code Status: Full    DISPO: Continue monitoring FS; family requesting SNF, will order rehab consult          TODAY'S SUBJECTIVE & REVIEW OF SYMPTOMS:    Constitutional WNL  Cardiac WNL   Respiratory WNL  GI WNL   WNL  Endocrine WNL  Skin WNL  Musculoskeletal WNL  Neuro WNL  Cognitive WNL  Psych WNL      MEDICATIONS  (STANDING):  apixaban 5 milliGRAM(s) Oral every 12 hours  aspirin enteric coated 81 milliGRAM(s) Oral daily  atorvastatin 40 milliGRAM(s) Oral at bedtime  brimonidine 0.2% Ophthalmic Solution 1 Drop(s) Both EYES three times a day  chlorhexidine 4% Liquid 1 Application(s) Topical daily  dextrose 5%. 1000 milliLiter(s) (50 mL/Hr) IV Continuous <Continuous>  dextrose 5%. 1000 milliLiter(s) (50 mL/Hr) IV Continuous <Continuous>  dextrose 50% Injectable 12.5 Gram(s) IV Push once  dextrose 50% Injectable 25 Gram(s) IV Push once  dextrose 50% Injectable 25 Gram(s) IV Push once  furosemide    Tablet 20 milliGRAM(s) Oral daily  latanoprost 0.005% Ophthalmic Solution 1 Drop(s) Both EYES at bedtime  lisinopril 20 milliGRAM(s) Oral daily  magnesium oxide 400 milliGRAM(s) Oral three times a day with meals  melatonin 3 milliGRAM(s) Oral at bedtime  metoprolol tartrate 100 milliGRAM(s) Oral two times a day  NIFEdipine XL 30 milliGRAM(s) Oral once  pantoprazole    Tablet 40 milliGRAM(s) Oral before breakfast  senna 2 Tablet(s) Oral at bedtime  sodium bicarbonate 650 milliGRAM(s) Oral three times a day    MEDICATIONS  (PRN):  dextrose 40% Gel 15 Gram(s) Oral once PRN Blood Glucose LESS THAN 70 milliGRAM(s)/deciliter  glucagon  Injectable 1 milliGRAM(s) IntraMuscular once PRN Glucose LESS THAN 70 milligrams/deciliter  oxyCODONE    IR 10 milliGRAM(s) Oral every 8 hours PRN Severe Pain (7 - 10)      FAMILY HISTORY:  FHx: lung cancer: father  FH: type 2 diabetes      Allergies    sulfa drugs (Other)    Intolerances        SOCIAL HISTORY:    [  ] Smoking  [  ] EtOH  [  ] Substance abuse     Home Environment:  [  ] Alone  [ X ] Lives with Family--wife  [  ] Home Health Aide    Dwelling:  [ X ] Private House  [  ] Apartment  [  ] Adult Home/Assisted Living Facility  [  ] Skilled Nursing Facility      [  ] Short Term  [  ] Long Term  [ X ] Stairs 3-step entry, stairlift inside      Elevator [  ]    FUNCTIONAL STATUS PTA: (Check all that apply)  Ambulation: [ X  ]Independent    [  ] Dependent     [  ] Non-Ambulatory  Assistive Device: [ X ] Straight Cane  [  ]  Quad Cane  [ X ] owns rolling Walker  [  ]  Wheelchair  ADL: [ X ] Independent  [  ]  Dependent       Vital Signs Last 24 Hrs  T(C): 36.2 (31 Jan 2020 05:00), Max: 36.7 (30 Jan 2020 13:51)  T(F): 97.1 (31 Jan 2020 05:00), Max: 98 (30 Jan 2020 13:51)  HR: 50 (31 Jan 2020 05:00) (50 - 69)  BP: 174/79 (31 Jan 2020 05:00) (150/68 - 177/79)  BP(mean): --  RR: 18 (31 Jan 2020 05:00) (18 - 20)  SpO2: --      PHYSICAL EXAM: Alert, confused  GENERAL: Well-developed, well-nourished, in NAD  HEAD:  Normocephalic, atraumatic  EYES: EOMI, PERRLA, sclerae anicteric, conjunctivae not injected  NECK: Supple, No JVD, Normal thyroid  CHEST/LUNG: Clear to auscultation bilaterally; No rales, rhonchi, wheezing  HEART: Regular rate and rhythm; No murmurs, gallops, or rubs  ABDOMEN: + suprapubic tube  EXTREMITIES:  2+ Peripheral pulses; No clubbing, cyanosis, or edema    NERVOUS SYSTEM:  Cranial Nerves II-XII intact [ X ] Abnormal  [  ]  ROM: WFL all extremities [ X ]  Abnormal [  ]  Motor Strength: WFL all extremities  [  ]  Abnormal [ X ]  Sensation: intact to light touch [  ] Abnormal [  ]  Reflexes: Symmetric [  ]  Abnormal [  ]    FUNCTIONAL STATUS:  Bed Mobility: Independent [ X ]  Supervision [  ]  Needs Assistance [  ]  N/A [  ]  Transfers: Independent [  ]  Supervision [  ]  Needs Assistance [ X ]  N/A [  ]   Ambulation: Independent [  ]  Supervision [  ]  Needs Assistance [ X ]  N/A [  ]  ADLs: Independent [  ] Requires Assistance [ X ] N/A [  ]      LABS:                        10.7   7.72  )-----------( 276      ( 31 Jan 2020 06:17 )             32.1     01-31    140  |  103  |  30<H>  ----------------------------<  155<H>  4.2   |  25  |  1.5    Ca    8.8      31 Jan 2020 06:17  Mg     1.7     01-30    TPro  6.2  /  Alb  3.4<L>  /  TBili  0.3  /  DBili  x   /  AST  13  /  ALT  57<H>  /  AlkPhos  274<H>  01-31          RADIOLOGY & ADDITIONAL STUDIES:

## 2020-01-31 NOTE — PROGRESS NOTE ADULT - ASSESSMENT
Patient is a 80yo male with a PMH of HTN, DLD, DM2, BPH s/p SPC, CAD s/p stents, glaucoma, TIA, CKD 3b, and afib on Eliquis who presented to the ED for altered mental status. His wife found him crawling on the floor at home, FS found to be 30. Previous admission 5 days ago for cystitis was given ciprofloxacin and glipizide was increased from OD to BID. Patient admitted for hypoglycemia secondary to sulfonylurea toxicity.  	  #Hypoglycemia  - due to sulfonylurea toxicity  - s/p octreotide 2 doses  - d/c insulin and metformin for now  - Toxicology and endocrine recs appreciated: avoid glipizide, can discharge on metformin 500mg bid and sitagliptin      #HTN:   - Continue Lisinopril 20mg qd, nifedipine 90mg qd  -continue with lasix 20mg qd    #Afib on Eliquis, rate controlled  - Continue metoprolol 100mg PO BID  - Continue Eliquis 5mg PO q12    #CAD  -continue with aspirin and lipitor 40mg    #CKD3  -baseline Scr 1.5  -CrCl: 39mL/min  -continue with sodium bicarb 650mg tid  -avoid nephrotoxic medications    GI ppx: Pantoprazole 40mg PO before breakfast  Diet: Consistent carb, calorie controlled 1200kcal   Activity: Increase as tolerated  Code Status: Full    Discharge instructions discussed and patient knows when to seek immediate medical attention.  Patient has proper follow up.  All results discussed and patient aware they may require further work up.  Stressed importance of proper follow up.  Medications prescribed and changes discussed.  All questions and concerns from patient  addressed. Understanding of instructions verbalized.    Time spent in completing discharge process and coordinating care 45 minutes.    Discussed with housestaff, nursing, social work

## 2020-01-31 NOTE — PROGRESS NOTE ADULT - ATTENDING COMMENTS
recommence metformin 500 twice daily, add sitagliptin 100 mg. daily. send to [facility] where it is $ 50 cash. per month.

## 2020-01-31 NOTE — PROGRESS NOTE ADULT - SUBJECTIVE AND OBJECTIVE BOX
Patient is a 79y old  Male who presents with a chief complaint of Altered mental status (29 Jan 2020 16:54)    INTERVAL HPI/OVERNIGHT EVENTS: no complaints, feels better, wants to go home  ROS: Denies CP, SOB, AP, new weakness  All other systems reviewed and are within normal limits.  HPI:  80 yo M PMHx HTN, DLD, DM2, BPH s/p SPC, CAD s/p stents, glaucoma, TIA, CKD 3b, afib on eliquis presented for altered mental status. Patient was found by wife crawling on the floor, called EMS, FS 30 so given juice and mental status improved. Patient denies taking insulin, however was discharged 5 days ago from [place] for cystitis and given cipro, also had his glipizide increased from OD to BID. His wife helps him with medications.  In ED patient was awake and alert. VS stable. Cr at baseline. Toxicology consulted and recommended octreotide q6hrs for 4 doses due to long half life of glipizide ER. FS q1hr. (28 Jan 2020 22:59)    HYPOGLYCEMIA CKD CHRONIC KIDNEY DISEASE  ^MED EVAL  H/o or current diagnosis of HF- Contraindication to ACEI/ARBs  H/o or current diagnosis of HF- ACEI/ARB contraindication unknown  H/o or current diagnosis of HF- Contraindication to ACEI/ARBs  FHx: lung cancer  FH: type 2 diabetes  No pertinent family history in first degree relatives  Handoff  MEWS Score  GERD (gastroesophageal reflux disease)  H/O ptosis of eyelid  Hematuria  Hyperlipidemia  Anemia  Diabetes  Renal insufficiency  Cardiomyopathy  Cholelithiasis  CKD (chronic kidney disease)  Hydrocele in adult  Glaucoma  BPH (benign prostatic hyperplasia)  CAD (coronary artery disease)  Diplopia  TIA (transient ischemic attack)  Dementia  CHF (congestive heart failure)  Afib  Heart attack  Sepsis  Hypercholesteremia  HTN (hypertension)  DM (diabetes mellitus)  Hypoglycemia  History of suprapubic catheter  H/O flexible sigmoidoscopy  H/O bilateral cataract extraction  Encounter for biliary drainage tube placement  H/O coronary angioplasty  History of prostate surgery  AICD (automatic cardioverter/defibrillator) present  MED EVAL  5  CKD (chronic kidney disease)      General: NAD, AAO3  CV: S1 S2  Resp: decreased breath sounds at bases  GI: NT/ND/S +BS, +suprapubic cath  MS: no clubbing/cyanosis/edema, 2+ pulses b/l  Neuro: nonfocal, 2+reflexes thruout            MEDICATIONS  (STANDING):  apixaban 5 milliGRAM(s) Oral every 12 hours  aspirin enteric coated 81 milliGRAM(s) Oral daily  atorvastatin 40 milliGRAM(s) Oral at bedtime  brimonidine 0.2% Ophthalmic Solution 1 Drop(s) Both EYES three times a day  chlorhexidine 4% Liquid 1 Application(s) Topical daily  dextrose 5%. 1000 milliLiter(s) (50 mL/Hr) IV Continuous <Continuous>  dextrose 5%. 1000 milliLiter(s) (50 mL/Hr) IV Continuous <Continuous>  dextrose 50% Injectable 12.5 Gram(s) IV Push once  dextrose 50% Injectable 25 Gram(s) IV Push once  dextrose 50% Injectable 25 Gram(s) IV Push once  furosemide    Tablet 20 milliGRAM(s) Oral daily  latanoprost 0.005% Ophthalmic Solution 1 Drop(s) Both EYES at bedtime  lisinopril 20 milliGRAM(s) Oral daily  magnesium oxide 400 milliGRAM(s) Oral three times a day with meals  melatonin 3 milliGRAM(s) Oral at bedtime  metoprolol tartrate 100 milliGRAM(s) Oral two times a day  pantoprazole    Tablet 40 milliGRAM(s) Oral before breakfast  senna 2 Tablet(s) Oral at bedtime  sodium bicarbonate 650 milliGRAM(s) Oral three times a day    MEDICATIONS  (PRN):  dextrose 40% Gel 15 Gram(s) Oral once PRN Blood Glucose LESS THAN 70 milliGRAM(s)/deciliter  glucagon  Injectable 1 milliGRAM(s) IntraMuscular once PRN Glucose LESS THAN 70 milligrams/deciliter  oxyCODONE    IR 5 milliGRAM(s) Oral every 8 hours PRN Severe Pain (7 - 10)    Home Medications:  Alphagan P 0.1% ophthalmic solution: 1 drop(s) to each affected eye once a day (at bedtime) (22 Jan 2020 12:17)  apixaban 5 mg oral tablet: 1 tab(s) orally every 12 hours (31 Jan 2020 13:40)  Aspir 81 oral delayed release tablet: 1 tab(s) orally once a day (22 Jan 2020 12:17)  atorvastatin 40 mg oral tablet: 1 tab(s) orally once a day (at bedtime) (31 Jan 2020 13:40)  furosemide 20 mg oral tablet: 1 tab(s) orally once a day (31 Jan 2020 13:40)  latanoprost 0.005% ophthalmic solution: 1 drop(s) to each affected eye once a day (in the evening) ou (22 Jan 2020 12:17)  lisinopril 20 mg oral tablet: 1 tab(s) orally once a day (31 Jan 2020 13:40)  metoprolol tartrate 100 mg oral tablet: 1 tab(s) orally 2 times a day (31 Jan 2020 13:40)  NIFEdipine 60 mg oral tablet, extended release: 2 tab(s) orally once a day (31 Jan 2020 13:40)  oxyCODONE 5 mg oral tablet: 1 tab(s) orally every 12 hours, As Needed - 10) (31 Jan 2020 13:47)  pantoprazole 40 mg oral delayed release tablet: 1 tab(s) orally once a day (before a meal) (28 Jan 2020 23:03)  senna oral tablet: 2 tab(s) orally once a day (at bedtime) (31 Jan 2020 13:40)  sodium bicarbonate 650 mg oral tablet: 1 tab(s) orally 3 times a day (22 Jan 2020 12:17)    Vital Signs Last 24 Hrs  T(C): 36.7 (31 Jan 2020 13:39), Max: 36.7 (31 Jan 2020 13:39)  T(F): 98 (31 Jan 2020 13:39), Max: 98 (31 Jan 2020 13:39)  HR: 64 (31 Jan 2020 13:39) (50 - 64)  BP: 114/59 (31 Jan 2020 13:39) (114/59 - 177/79)  BP(mean): --  RR: 17 (31 Jan 2020 13:39) (17 - 18)  SpO2: --  CAPILLARY BLOOD GLUCOSE      POCT Blood Glucose.: 261 mg/dL (31 Jan 2020 11:05)  POCT Blood Glucose.: 144 mg/dL (31 Jan 2020 07:22)  POCT Blood Glucose.: 157 mg/dL (31 Jan 2020 02:02)  POCT Blood Glucose.: 222 mg/dL (30 Jan 2020 21:26)    LABS:                        10.7   7.72  )-----------( 276      ( 31 Jan 2020 06:17 )             32.1     01-31    140  |  103  |  30<H>  ----------------------------<  155<H>  4.2   |  25  |  1.5    Ca    8.8      31 Jan 2020 06:17  Mg     1.7     01-30    TPro  6.2  /  Alb  3.4<L>  /  TBili  0.3  /  DBili  x   /  AST  13  /  ALT  57<H>  /  AlkPhos  274<H>  01-31    LIVER FUNCTIONS - ( 31 Jan 2020 06:17 )  Alb: 3.4 g/dL / Pro: 6.2 g/dL / ALK PHOS: 274 U/L / ALT: 57 U/L / AST: 13 U/L / GGT: x                           Consultant Notes Reviewed:  [x ] YES  [ ] NO  Care Discussed with Consultants/Other Providers/ Housestaff [ x] YES  [ ] NO  Radiology, labs, new studies personally reviewed.

## 2020-01-31 NOTE — PROGRESS NOTE ADULT - SUBJECTIVE AND OBJECTIVE BOX
Reason for Endocrinology Consult: Diabetes    HPI: 79y Male    Diabetes Type:  Duration of Diabetes:  Diabetes Complications:  History of DKA?  Eye doctor within past year?  Current Therapy:      Home FSG:  Fasting:  Lunch:  Dinner:  Bedtime:    Hypoglycemia?    Neuroglycopenia within past year?    Outpatient Endocrinologist?    PAST MEDICAL & SURGICAL HISTORY:  GERD (gastroesophageal reflux disease): intermittent  H/O ptosis of eyelid: right eye (sometimes wears patch)  Hematuria  Hyperlipidemia  Anemia  Diabetes  Renal insufficiency  Cardiomyopathy  Cholelithiasis  Hydrocele in adult  Glaucoma  BPH (benign prostatic hyperplasia)  CAD (coronary artery disease): CARD STENT X2 4/2018  TIA (transient ischemic attack): X2 JULY 2018  Dementia  CHF (congestive heart failure): COMBINED SYSTOLIC &amp; DIASTOLIC  Afib  Heart attack: 1/2018  Sepsis: 1/18 FROM INFECTED GALLBLADDER  Hypercholesteremia  HTN (hypertension)  DM (diabetes mellitus)  History of suprapubic catheter  H/O flexible sigmoidoscopy: 2015  H/O bilateral cataract extraction: LEFT- 1/18 RIGHT 6 YRS AGO  Encounter for biliary drainage tube placement: 1/2018  H/O coronary angioplasty: WITH STENT X2 (4/2018)  History of prostate surgery: 5/17  AICD (automatic cardioverter/defibrillator) present: ALEXANDALEXA    FAMILY HISTORY:  FHx: lung cancer: father  FH: type 2 diabetes      SH:  Smoking  Etoh:  Recreational Drugs:    Home Medications:  Alphagan P 0.1% ophthalmic solution: 1 drop(s) to each affected eye once a day (at bedtime) (22 Jan 2020 12:17)  ammonium lactate 12% topical lotion: Apply topically to affected area 2 times a day, As Needed (22 Jan 2020 12:17)  apixaban 2.5 mg oral tablet: 1 tab(s) orally every 12 hours (22 Jan 2020 12:17)  Aspir 81 oral delayed release tablet: 1 tab(s) orally once a day (22 Jan 2020 12:17)  atorvastatin 40 mg oral tablet: 1 tab(s) orally once a day (at bedtime) (22 Jan 2020 12:17)  bacitracin 500 units/g topical ointment: 1 application topically 2 times a day, As needed, skin irritation (23 Jan 2020 12:18)  clotrimazole-betamethasone dipropionate 1%-0.05% topical cream: Apply topically to affected area 2 times a day, As Needed (22 Jan 2020 12:17)  dofetilide 500 mcg oral capsule: 1 cap(s) orally 2 times a day (22 Jan 2020 12:17)  furosemide 20 mg oral tablet: 1 tab(s) orally once a day (22 Jan 2020 12:17)  GlipiZIDE XL 10 mg oral tablet, extended release: 1 tab(s) orally once a day (22 Jan 2020 12:17)  Januvia 50 mg oral tablet: 1 tab(s) orally once a day (22 Jan 2020 12:17)  latanoprost 0.005% ophthalmic solution: 1 drop(s) to each affected eye once a day (in the evening) ou (22 Jan 2020 12:17)  lisinopril 20 mg oral tablet: 1 tab(s) orally once a day (22 Jan 2020 12:17)  metFORMIN 500 mg oral tablet, extended release: 4 tab(s) orally once a day (22 Jan 2020 12:17)  Metoprolol Tartrate 50 mg oral tablet: 2 tab(s) orally 2 times a day (22 Jan 2020 12:17)  NIFEdipine 90 mg oral tablet, extended release: 1 tab(s) orally once a day (22 Jan 2020 12:17)  oxyCODONE 10 mg oral tablet: 1 tab(s) orally every 8 hours (22 Jan 2020 12:17)  pantoprazole 40 mg oral delayed release tablet: 1 tab(s) orally once a day (before a meal) (28 Jan 2020 23:03)  potassium chloride 20 mEq oral tablet, extended release: 1 tab(s) orally once a day (22 Jan 2020 12:17)  sodium bicarbonate 650 mg oral tablet: 1 tab(s) orally 3 times a day (22 Jan 2020 12:17)      Current (Non-Endocrine) Meds:  apixaban 5 milliGRAM(s) Oral every 12 hours  aspirin enteric coated 81 milliGRAM(s) Oral daily  brimonidine 0.2% Ophthalmic Solution 1 Drop(s) Both EYES three times a day  chlorhexidine 4% Liquid 1 Application(s) Topical daily  dextrose 5%. 1000 milliLiter(s) IV Continuous <Continuous>  dextrose 5%. 1000 milliLiter(s) IV Continuous <Continuous>  furosemide    Tablet 20 milliGRAM(s) Oral daily  latanoprost 0.005% Ophthalmic Solution 1 Drop(s) Both EYES at bedtime  lisinopril 20 milliGRAM(s) Oral daily  magnesium oxide 400 milliGRAM(s) Oral three times a day with meals  melatonin 3 milliGRAM(s) Oral at bedtime  metoprolol tartrate 100 milliGRAM(s) Oral two times a day  NIFEdipine XL 90 milliGRAM(s) Oral daily  oxyCODONE    IR 10 milliGRAM(s) Oral every 8 hours PRN  pantoprazole    Tablet 40 milliGRAM(s) Oral before breakfast  senna 2 Tablet(s) Oral at bedtime  sodium bicarbonate 650 milliGRAM(s) Oral three times a day      Current Endocrine Meds:   atorvastatin 40 milliGRAM(s) Oral at bedtime  dextrose 40% Gel 15 Gram(s) Oral once PRN  dextrose 50% Injectable 12.5 Gram(s) IV Push once  dextrose 50% Injectable 25 Gram(s) IV Push once  dextrose 50% Injectable 25 Gram(s) IV Push once  glucagon  Injectable 1 milliGRAM(s) IntraMuscular once PRN      Allergies:  sulfa drugs (Other)      ROS:  Denies the following except as indicated.    General: weight loss/weight gain, decreased appetite, fatigue, fever  Eyes: blurry vision, double vision  ENT: neck swelling, dysphagia, voice changes   CV: palpitations, SOB, chest pain, cough  GI: nausea, vomiting, diarrhea, constipation, abdominal pain  : nocturia,  polyuria, dysuria  Endo: decreased libido, heat/cold intolerance, jitteriness  MSK: arthralgias, myalgias  Skin: rash, dryness, diaphoresis  Neuro: pedal numbness,pedal paresthesias, pedal pain    Height (cm): 175.26 (01-30 @ 17:50)  Weight (kg): 75.1 (01-30 @ 17:50)  BMI (kg/m2): 24.4 (01-30 @ 17:50)    Vital Signs Last 24 Hrs  T(C): 36.2 (31 Jan 2020 05:00), Max: 36.7 (30 Jan 2020 13:51)  T(F): 97.1 (31 Jan 2020 05:00), Max: 98 (30 Jan 2020 13:51)  HR: 50 (31 Jan 2020 05:00) (50 - 69)  BP: 174/79 (31 Jan 2020 05:00) (150/68 - 177/79)  BP(mean): --  RR: 18 (31 Jan 2020 05:00) (18 - 20)  SpO2: --  Constitutional: WN/WD in NAD.   Neck: no thyromegaly or palpable thyroid nodules   Respiratory: lungs CTAB.  Cardiovascular: regular rate and rhythm, normal S1 and S2, no audible murmurs  GI: soft, NT/ND, no masses/HSM appreciated.  Ext: no edema, no ulcers, pedal pulses palpable bilaterally  Neurology: no tremor, monofilament sensation intact in feet  Psychiatric: A&O x 3, normal affect/mood.        LABS:                        10.7   7.72  )-----------( 276      ( 31 Jan 2020 06:17 )             32.1     01-31    140  |  103  |  30<H>  ----------------------------<  155<H>  4.2   |  25  |  1.5    Ca    8.8      31 Jan 2020 06:17  Mg     1.7     01-30    TPro  6.2  /  Alb  3.4<L>  /  TBili  0.3  /  DBili  x   /  AST  13  /  ALT  57<H>  /  AlkPhos  274<H>  01-31        Hemoglobin A1C, Whole Blood: 5.9 (01-29 @ 05:25)                         Thyroid Stimulating Hormone, Serum: 2.12 (01-29 @ 05:25)          RADIOLOGY & ADDITIONAL STUDIES:    A/P:79yMale    1.     Pt can follow up at discharge with:    Above discussed with resident.

## 2020-01-31 NOTE — DISCHARGE NOTE PROVIDER - NSDCMRMEDTOKEN_GEN_ALL_CORE_FT
Alphagan P 0.1% ophthalmic solution: 1 drop(s) to each affected eye once a day (at bedtime)  apixaban 5 mg oral tablet: 1 tab(s) orally every 12 hours  Aspir 81 oral delayed release tablet: 1 tab(s) orally once a day  atorvastatin 40 mg oral tablet: 1 tab(s) orally once a day (at bedtime)  furosemide 20 mg oral tablet: 1 tab(s) orally once a day  latanoprost 0.005% ophthalmic solution: 1 drop(s) to each affected eye once a day (in the evening) ou  lisinopril 20 mg oral tablet: 1 tab(s) orally once a day  metFORMIN 500 mg oral tablet: 1 tab(s) orally 2 times a day  metoprolol tartrate 100 mg oral tablet: 1 tab(s) orally 2 times a day  NIFEdipine 60 mg oral tablet, extended release: 2 tab(s) orally once a day  oxyCODONE 5 mg oral tablet: 1 tab(s) orally every 12 hours, As Needed - 10)  pantoprazole 40 mg oral delayed release tablet: 1 tab(s) orally once a day (before a meal)  senna oral tablet: 2 tab(s) orally once a day (at bedtime)  SITagliptin 100 mg oral tablet: 1 tab(s) orally once a day  sodium bicarbonate 650 mg oral tablet: 1 tab(s) orally 3 times a day

## 2020-01-31 NOTE — DISCHARGE NOTE PROVIDER - HOSPITAL COURSE
80 yo M PMHx HTN, DLD, DM2, BPH s/p SPC, CAD s/p stents, glaucoma, TIA, CKD 3b, afib on eliquis presented for altered mental status. Patient was found by wife crawling on the floor, called EMS, FS 30 so given juice and mental status improved. Patient denies taking insulin, however was discharged 5 days ago from [facility] for cystitis and given cipro, also had his glipizide increased from OD to BID. His wife helps him with medications.    In ED patient was awake and alert. VS stable. Cr at baseline. Toxicology consulted and recommended octreotide q6hrs for 4 doses due to long half life of glipizide ER. FS q1hr.        Patient was initially treated with 2 doses of IV octreotide. His sugars improved. Toxicology and endocrine recommended discontinuing glipizide. endocrine recommended metformin 500mg bid and increasing Januvia to 100mg once a day. At this time, he is stable for discharge home. 78 yo M PMHx HTN, DLD, DM2, BPH s/p SPC, CAD s/p stents, glaucoma, TIA, CKD 3b, afib on eliquis presented for altered mental status. Patient was found by wife crawling on the floor, called EMS, FS 30 so given juice and mental status improved. Patient denies taking insulin, however was discharged 5 days ago from [facility] for cystitis and given cipro, also was on glipizide BID. His wife helps him with medications.    In ED patient was awake and alert. VS stable. Cr at baseline. Toxicology consulted and recommended octreotide q6hrs for 4 doses due to long half life of glipizide ER.         Patient was initially treated with 2 doses of IV octreotide. His sugars improved. Toxicology and endocrine recommended discontinuing glipizide. endocrine recommended metformin 500mg bid and increasing Januvia to 100mg once a day. A1c=5.9 At this time, he is stable for discharge home.

## 2020-01-31 NOTE — CHART NOTE - NSCHARTNOTEFT_GEN_A_CORE
RESIDENT DISCHARGE NOTE     Patient Name: ROCIO NATION  MRN-[id number]    VITAL SIGNS:  T(F): 98 (01-31-20 @ 13:39), Max: 98 (01-31-20 @ 13:39)  HR: 64 (01-31-20 @ 13:39)  BP: 114/59 (01-31-20 @ 13:39)  SpO2: --  Weight (kg): 75.1 (01-30-20 @ 17:50)  BMI (kg/m2): 24.4 (01-30-20 @ 17:50)    PHYSICAL EXAMINATION:  CONSTITUTIONAL: No acute distress, well-developed, well-groomed  HEAD: Atraumatic, normocephalic  EYES: EOM intact, PERRLA, conjunctiva and sclera clear  ENT: Supple, no masses, no thyromegaly, no bruits, no JVD; moist mucous membranes  PULMONARY: Clear to auscultation bilaterally; no wheezes, rales, or rhonchi  CARDIOVASCULAR: Regular rate and rhythm; no murmurs, rubs, or gallops  GASTROINTESTINAL: Soft, non-tender, non-distended; bowel sounds present  MUSCULOSKELETAL: 2+ peripheral pulses; no clubbing, no cyanosis, no edema  NEUROLOGY: non-focal. A+O x 2  SKIN: No rashes or lesions; warm and dry    TEST RESULTS:                        10.7   7.72  )-----------( 276      ( 31 Jan 2020 06:17 )             32.1     01-31    140  |  103  |  30<H>  ----------------------------<  155<H>  4.2   |  25  |  1.5    Ca    8.8      31 Jan 2020 06:17  Mg     1.7     01-30    TPro  6.2  /  Alb  3.4<L>  /  TBili  0.3  /  DBili  x   /  AST  13  /  ALT  57<H>  /  AlkPhos  274<H>  01-31      FINAL DISCHARGE INTERVIEW:  Resident Present: Mague Lehman MD  RN Present:     DISCHARGE MEDICATION RECONCILIATION:  Reviewed with Attending (Name:___________)    DISPOSITION:  [  ] Home  [  ] Home with Visiting Nursing Services  [ X ] SNF/ NH: [facility]   [  ] Acute Rehab (4A)  [  ] Other (Specify:_________)

## 2020-01-31 NOTE — DISCHARGE NOTE NURSING/CASE MANAGEMENT/SOCIAL WORK - PATIENT PORTAL LINK FT
You can access the FollowMyHealth Patient Portal offered by [facility] by registering at the following website: [email or web address]. By joining [facility]’s FollowMyHealth portal, you will also be able to view your health information using other applications (apps) compatible with our system.

## 2020-01-31 NOTE — DISCHARGE NOTE PROVIDER - NSDCCPCAREPLAN_GEN_ALL_CORE_FT
PRINCIPAL DISCHARGE DIAGNOSIS  Diagnosis: Hypoglycemia  Assessment and Plan of Treatment: You presented with altered mental status and were found to have a very low sugar level. This was likely due to your recently increased dose of glipizide. You were treated with a medication called octreotide which reverses the effect of glipizide. The endocrinologists recommended not continuing with glipizide. Instead, you will only take metformin and Januvia. Please take these medications as prescribed. Please follow-up with your endocrinologist for further management.  If you experience any confusion, lethargy, dizziness, seizures, sweating/chills, please check your finger stick and speak with a physician immediately.      SECONDARY DISCHARGE DIAGNOSES  Diagnosis: Diabetes mellitus  Assessment and Plan of Treatment: Your diabetes is very well controlled. Your most recent A1c was 5.9. Please continue taking the metformin and Januvia as precribed. Please stop taking glipizide. Please check your fingersticks regularly before meals. Please ensure you drink some juice if your fingerstick drops too low or you start feeling weak/lightheaded.    Diagnosis: CKD (chronic kidney disease)  Assessment and Plan of Treatment: Stable PRINCIPAL DISCHARGE DIAGNOSIS  Diagnosis: Hypoglycemia  Assessment and Plan of Treatment: You presented with altered mental status and were found to have a very low sugar level. This was likely due to glipizide. You were treated with a medication called octreotide which reverses the effect of glipizide. The endocrinologists recommended not continuing with glipizide. Instead, you will only take metformin and Januvia. Please take these medications as prescribed. Please follow-up with your endocrinologist for further management.  If you experience any confusion, lethargy, dizziness, seizures, sweating/chills, please check your finger stick and speak with a physician immediately.      SECONDARY DISCHARGE DIAGNOSES  Diagnosis: Diabetes mellitus  Assessment and Plan of Treatment: Your diabetes is very well controlled. Your most recent A1c was 5.9. Please continue taking the metformin and Januvia as precribed. Please stop taking glipizide. Please check your fingersticks regularly before meals. Please ensure you drink some juice if your fingerstick drops too low or you start feeling weak/lightheaded.    Diagnosis: CKD (chronic kidney disease)  Assessment and Plan of Treatment: Stable PRINCIPAL DISCHARGE DIAGNOSIS  Diagnosis: Hypoglycemia  Assessment and Plan of Treatment: You presented with altered mental status and were found to have a very low sugar level. This was likely due to glipizide. You were treated with a medication called octreotide which reverses the effect of glipizide. The endocrinologists recommended not continuing with glipizide. Instead, you will only take metformin and Januvia. Please take these medications as prescribed. Please follow-up with your endocrinologist for further management.  If you experience any confusion, lethargy, dizziness, seizures, sweating/chills, please check your finger stick and speak with a physician immediately.      SECONDARY DISCHARGE DIAGNOSES  Diagnosis: Common bile duct stone  Assessment and Plan of Treatment: please follow up with GI doctor as outpt for further workup    Diagnosis: Elevated alkaline phosphatase level  Assessment and Plan of Treatment: repeat liver enzymes in 1wk for resolution    Diagnosis: Diabetes mellitus  Assessment and Plan of Treatment: Your diabetes is very well controlled. Your most recent A1c was 5.9. Please continue taking the metformin and Januvia as precribed. Please stop taking glipizide. Please check your fingersticks regularly before meals. Please ensure you drink some juice if your fingerstick drops too low or you start feeling weak/lightheaded.    Diagnosis: CKD (chronic kidney disease)  Assessment and Plan of Treatment: Stable

## 2020-01-31 NOTE — CONSULT NOTE ADULT - ASSESSMENT
IMPRESSION: Rehabilitation for gait disorder    PRECAUTIONS: [  ] Cardiac  [  ] Respiratory  [  ] Seizures [  ] Contact Precautions  [  ] Droplet Isolation  [  ] Other:      Weight Bearing Status:  WBAT    RECOMMENDATION:    Out of bed to chair     DVT/decubitus ulcer prophylaxis    REHABILITATION PLAN:     [ X  ] Bedside PT 3-5 times a week   [   ]   Bedside OT  2-3 times a week             [   ] No Rehab Therapy Indicated                   [   ]  Speech Therapy   Conditioning/ROM                                    ADL  Bed Mobility                                               Conditioning/ROM  Transfers                                                     Bed Mobility  Sitting /Standing Balance                         Transfers                                        Gait Training                                               Sitting/Standing Balance  Stair Training  [   ] Applicable                    Home Equipment Evaluation                                                                        Splinting  [   ] Only      GOALS:   ADL   [   ]   Independent                    Transfers  [ X  ] Independent                          Ambulation  [  X ] Independent     [   X ] With device                            [   ]  CG                                                         [   ]  CG                                                                  [   ] CG                            [    ] Min A                                                   [   ] Min A                                                              [   ] Min  A          DISCHARGE PLAN:  [    ]  Good candidate for Intensive Rehabilitation/Hospital-based 4A [facility]                                             Will tolerate 3 hours of Intensive Rehab Daily                                       [  X  ]  Short Term Rehabilitation in Skilled Nursing Facility                                       [  X  ]  Home with Outpatient or VN services                                         [    ]  Possible Candidate for Intensive Hospital-Based Rehabilitation      Thank you. IMPRESSION: Rehabilitation for gait disorder, debility    PRECAUTIONS: [  ] Cardiac  [  ] Respiratory  [  ] Seizures [  ] Contact Precautions  [  ] Droplet Isolation  [  ] Other:      Weight Bearing Status:  WBAT    RECOMMENDATION:    Out of bed to chair     DVT/decubitus ulcer prophylaxis    REHABILITATION PLAN:     [ X  ] Bedside PT 3-5 times a week   [   ]   Bedside OT  2-3 times a week             [   ] No Rehab Therapy Indicated                   [   ]  Speech Therapy   Conditioning/ROM                                    ADL  Bed Mobility                                               Conditioning/ROM  Transfers                                                     Bed Mobility  Sitting /Standing Balance                         Transfers                                        Gait Training                                               Sitting/Standing Balance  Stair Training  [  X ] Applicable                    Home Equipment Evaluation                                                                        Splinting  [   ] Only      GOALS:   ADL   [   ]   Independent                    Transfers  [ X  ] Independent                          Ambulation  [  X ] Supervision/CG     [   X ] With device                            [   ]  CG                                                         [   ]  CG                                                                  [   ] CG                            [    ] Min A                                                   [   ] Min A                                                              [   ] Min  A          DISCHARGE PLAN:  [    ]  Good candidate for Intensive Rehabilitation/Hospital-based 4A [facility]                                             Will tolerate 3 hours of Intensive Rehab Daily                                       [  X  ]  Short Term Rehabilitation in Skilled Nursing Facility                                       [  X  ]  Home with Outpatient or  services                                         [    ]  Possible Candidate for Intensive Hospital-Based Rehabilitation      Thank you.

## 2020-01-31 NOTE — DISCHARGE NOTE PROVIDER - PROVIDER TOKENS
FREE:[LAST:[Sharon],FIRST:[Nimesh],PHONE:[(   )    -],FAX:[(   )    -],FOLLOWUP:[2 weeks],ESTABLISHEDPATIENT:[T]] FREE:[LAST:[Sharon],FIRST:[Nimesh],PHONE:[(   )    -],FAX:[(   )    -],FOLLOWUP:[1 week],ESTABLISHEDPATIENT:[T]],PROVIDER:[TOKEN:[[id number]],FOLLOWUP:[2 weeks]]

## 2020-02-19 PROBLEM — N40.1 BPH WITH OBSTRUCTION/LOWER URINARY TRACT SYMPTOMS: Status: ACTIVE | Noted: 2017-04-06

## 2020-02-19 PROBLEM — E53.8 VITAMIN B12 DEFICIENCY: Status: ACTIVE | Noted: 2017-04-18

## 2020-02-19 PROBLEM — D64.9 ANEMIA, UNSPECIFIED TYPE: Status: ACTIVE | Noted: 2017-04-04

## 2020-02-19 NOTE — REVIEW OF SYSTEMS
[Fatigue] : fatigue [Lower Ext Edema] : lower extremity edema [Joint Stiffness] : joint stiffness [Negative] : Heme/Lymph [Incontinence] : no incontinence [Joint Pain] : no joint pain [FreeTextEntry8] : Penile pain

## 2020-02-19 NOTE — ASSESSMENT
[FreeTextEntry1] : Normocytic anemia: multifactorial, improved .\par -- atient will remain on observation.\par --At least partially secondary to on/off persistent hematuria. \par --Completed 5 doses of Venofer on 6/11/2019 with modest improvement in Hb, also required 1 unit transfusion prior to procedure and also received 2 doses venofer in Aug 2019\par --Cystoscopy on 6/20/2019 revealed biopsy negative for malignancy\par --He is s/p suprapubic catheter changed on 12/12/19.\par --Pain medication prescribed Oxycodone 10 mg po every 8 hr helpful for penile pain, he was advised to follow up with pain management \par --Patient will follow up with pain management on 1/9/19. \par --May benefit from MM panel and/or BMBx in the future \par --Continue with B12 injections at home\par --Repeat iron studies, ferritin, B12, folate\par \par Follow up in 3 months .\par Patient seen and examined by Dr Padgett who agreed for the above plan of care. \par

## 2020-02-19 NOTE — HISTORY OF PRESENT ILLNESS
[Disease:__________________________] : Disease: [unfilled] [de-identified] : 6/11/2019: Billy is here for follow up. He still describes penile pain and currently reports bloody urine and passing clots. He is s/p 4 doses of Venofer, will get dose 5 today, there was not a significant improvement in Hb, other causes for anemia also must be looked into. I have discussed case with Dr. Munroe, who will require Billy's Hb to be above 10.0 for upcoming cycloscopy with possible TURBT on 6/20/2019. He already has been discussing this with cardiology. Dr. Walker, he will hold Eliquis for 5 days prior per cardiology and will confirm the days of hold for ASA (his stents were placed 4/2018). Will plan to transfuse 1 unit of PRBC on 6/18, if Hb is below 10.0.\par  \par 7/9/19 : Billy is here for a follow up, he reports feeling better. He is s/p cystoscopy with biopsy on  6/20/2019 pathology is benign. He was offered a few options by urology re incontinence/penile pain. We reviewed CBC with improving HGB 11.3/36.0. He is s/p 1 unit of blood preop, he seemed to have held on to transfusion. May also consider Procrit in the future. \par \par 10/16/19- Billy is here for follow up. He did get suprapubic catheter placement 3 weeks ago. He still has pain in the lower abdomen/penis area. He received venofer in Aug 2019 and is currently on vitamin b12 monthy injections IM.\par \par 8/8/2019: Billy reports ongoing on/off hematuria and urinary hesitancy occasionally associated with pain. CBC was reviewed, no transfusions required at this time. He continues with B12 injections. The pan is for suprapubic catheter insertion. \par \par 10/16/2019: Billy had suprapubic tube placed by IR on 9/26/2019, since then he reports pain at the site of insertion and continued penile pain. there is no urine coming out of the penis, urine in the bag has significant amount of sediment, abdomen is distended. CBC was reviewed, Hb is improved, leukocytosis was noted. Billy was advised to follow up with Dr. Camila RAMOS, they will plan to arrange for tomorrow visit. iBlly was provided with Dr. Phelps's name and information re pain management. \par S/p B12 injection today. \par \par 12/18/2019: Billy came for a follow up visit, he reports feeling well, pain is well controlled with current Opioid regiment, Billy is requesting refill. He is planning to see pain management. \par he is S/P changing of the suprapubic catheter on 12/12/19. He completed a 5 days course of Cipro.\par He is currently on Iron tablet once daily and continues with B12 injections. \par \par 2/19/2020: Billy came for a follow up visit, He came with [facility] , his wife is not around this week she is staying with her daughter in [place]. He reports feeling well  S/P fall at home 4 weeks ago, he was admitted to the hospital, fall was note related to Opioid use. He reports his pain is mostly controlled. \par We communicated CBC from today with HGB is 10.0/30.2, he reports some episodes of hematuria, but not currently. \par He reports his suprapubic catheter is functioning, being chenged every 6 weeks \par He is currently on Vitamin B12 injection monthly at home. He saw pain management at 10mg of Oxycodone was prescribed, he reports it is difficult for him to get to appointments while his wife is away and requests prescription, we will refill this one time, but he was advised pain management will be prescribing it from now on.  [de-identified] : Billy is a 75 yo male with history of atrial fibrillation s/p BIV, managed with Tykosyn and Pradaxa, CAD s/p PCI , enlarged prostate with symptomatic urethral obstruction s/p TURP complicated by urinary incontinence , is referred for anemia. \par Patient reported that since his TURP, he is constantly incontinent with recurrent UTIs and having intermittent hematuria, passing clots sometimes. CT abdomen/pelvis showed enlarged bladder with bilateral hydronephrosis L>R.  He follows with urology who are planning a cystoscopy to rule out bladder tumor, but were unable to perform it because of recurrent UTIs.\par He is also on iron pill once a day and folic acid. He follows with nephrology , his last creatinine from feb 2019 was 1.7 , never received erythropoietin in the past.

## 2020-02-19 NOTE — PHYSICAL EXAM
[Restricted in physically strenuous activity but ambulatory and able to carry out work of a light or sedentary nature] : Status 1- Restricted in physically strenuous activity but ambulatory and able to carry out work of a light or sedentary nature, e.g., light house work, office work [Obese] : obese [Normal] : grossly intact [de-identified] : + b/l lower extremity edema

## 2020-04-23 NOTE — PRE-ANESTHESIA EVALUATION ADULT - NSANTHALCOHOLSD_GEN_ALL_CORE
Chart reviewed.   Requested updates from Care Everywhere.  Immunizations reconciled.   HM updated.     No

## 2020-05-19 NOTE — ED PROVIDER NOTE - EYES, MLM
Clear bilaterally, pupils equal, round and reactive to light. Clear bilaterally, pupils equal, round and reactive to light. +ptosis right upper eyelid

## 2020-05-19 NOTE — H&P ADULT - PROBLEM SELECTOR PLAN 1
With known history of CKD (I suspect stage 3 though creatinine values in EMR are widely varying) With known history of CKD (I suspect stage 3 though creatinine values in EMR are widely varying)- For now Bicarb infusion with nephrology consult (called from ER) With known history of CKD (stage 3b per old chart entries though creatinine values in EMR are widely varying)- For now Bicarb infusion with nephrology consult (called from ER). Holding Lasix and lisinopril

## 2020-05-19 NOTE — H&P ADULT - NSHPLABSRESULTS_GEN_ALL_CORE
9.6    35.86 )-----------( 260      ( 19 May 2020 18:00 )             29.9         138  |  101  |  74<HH>  ----------------------------<  159<H>  5.0   |  15<L>  |  4.6<HH>    Ca    7.0<L>      19 May 2020 18:00  Mg     0.9         TPro  6.5  /  Alb  3.4<L>  /  TBili  0.7  /  DBili  x   /  AST  66<H>  /  ALT  109<H>  /  AlkPhos  786<H>            Urinalysis Basic - ( 19 May 2020 17:42 )    Color: Yellow / Appearance: Cloudy / S.025 / pH: x  Gluc: x / Ketone: Negative  / Bili: Negative / Urobili: 0.2 mg/dL   Blood: x / Protein: >=300 mg/dL / Nitrite: Negative   Leuk Esterase: Large / RBC: 6-10 /HPF / WBC >50 /HPF   Sq Epi: x / Non Sq Epi: Negative / Bacteria: Many      PT/INR - ( 19 May 2020 18:00 )   PT: 17.50 sec;   INR: 1.52 ratio         PTT - ( 19 May 2020 18:00 )  PTT:34.0 sec  Lactate Trend   @ 18:00 Lactate:2.4     CARDIAC MARKERS ( 19 May 2020 18:00 )  x     / 0.47 ng/mL / 517 U/L / x     / x          CAPILLARY BLOOD GLUCOSE      POCT Blood Glucose.: 107 mg/dL (19 May 2020 22:33)    < from: CT Abdomen and Pelvis No Cont (20 @ 19:21) >      EXAM:  CT ABDOMEN AND PELVIS          PROCEDURE DATE:  2020      < from: CT Abdomen and Pelvis No Cont (20 @ 19:21) >    IMPRESSION:     1.  Suprapubic catheter with under distention of the bladder and surrounding perivesicular inflammatory change suggestive of cystitis  2.   Unchanged mild left hydro moderate ureteronephrosis with surrounding inflammatory change, consistent with ascending left urinary tract infection. Correlate with urinalysis.  3.  Redemonstrated distal common bile duct calculus measuring 7 mm (previously measured 4 mm) with mild intrahepatic and extrahepatic biliary ductal dilatation.   4.  Distended gallbladder with cholelithiasis  5.  Small bilateral pleural effusions with adjacent atelectasis.      SHELBY ANDERSON M.D., RESIDENT RADIOLOGIST  This document has been electronically signed.  WES BALTAZAR M.D., ATTENDING RADIOLOGIST  Thisdocument has been electronically signed. May 19 2020  8:29PM        < end of copied text >    < from: CT Head No Cont (20 @ 19:21) >    EXAM:  CT BRAIN          PROCEDURE DATE:  2020      < from: CT Head No Cont (20 @ 19:21) >    IMPRESSION:     1.  No evidence of acute intracranial hemorrhage.    2.  Chronic lacunar infarcts and mild chronic microvascular ischemic changes as above.      SHELBY ANDERSON M.D., RESIDENT RADIOLOGIST  This document has been electronically signed.  WES BALTAZAR M.D.,ATTENDING RADIOLOGIST  This document has been electronically signed. May 19 2020  8:03PM      < end of copied text >

## 2020-05-19 NOTE — CONSULT NOTE ADULT - PROBLEM SELECTOR RECOMMENDATION 9
-Will discuss with Dr. Walsh  -IV hydration  -ISRRAEL consider renal consult  -Leukocytosis, IV abx  -GI and DVT prophylaxis Patient's hydronephrosis is chronic.  He has had negative workup for this in the past.  Renal function stable.    Will need to have SPT changed.  -GI and DVT prophylaxis

## 2020-05-19 NOTE — H&P ADULT - PROBLEM SELECTOR PLAN 5
I suspect he has chronic diastolic form but would verify with usual clinicians I suspect he has chronic diastolic form based on "patientkeeper" documents but would verify with usual clinicians He has chronic diastolic CHF  as documented on discharge instructions from Jan 23, 2020

## 2020-05-19 NOTE — CONSULT NOTE ADULT - SUBJECTIVE AND OBJECTIVE BOX
81 yo M , w/ PMH as noted below.  Pt comes in from home s/p-fall and was found w/ leukocytosis, (35K), w/ acute on chronic renal failure.  Pt also gives history diarrhea.  VSS afebrile , Case discussed w/ ICU MD Aquino pt can be admitted to low risk telemetry for now        PAST MEDICAL & SURGICAL HISTORY:  GERD (gastroesophageal reflux disease): intermittent  H/O ptosis of eyelid: right eye (sometimes wears patch)  Hematuria  Hyperlipidemia  Anemia  Diabetes  Renal insufficiency  Cardiomyopathy  Cholelithiasis  Hydrocele in adult  Glaucoma  BPH (benign prostatic hyperplasia)  CAD (coronary artery disease): CARD STENT X2 4/2018  TIA (transient ischemic attack): X2 JULY 2018  Dementia  CHF (congestive heart failure): COMBINED SYSTOLIC &amp; DIASTOLIC  Afib  Heart attack: 1/2018  Sepsis: 1/18 FROM INFECTED GALLBLADDER  Hypercholesteremia  HTN (hypertension)  DM (diabetes mellitus)  History of suprapubic catheter  H/O flexible sigmoidoscopy: 2015  H/O bilateral cataract extraction: LEFT- 1/18 RIGHT 6 YRS AGO  Encounter for biliary drainage tube placement: 1/2018  H/O coronary angioplasty: WITH STENT X2 (4/2018)  History of prostate surgery: 5/17  AICD (automatic cardioverter/defibrillator) present: [employer]    Allergies    sulfa drugs (Other)    Intolerances      FAMILY HISTORY:  FHx: lung cancer: father  FH: type 2 diabetes    Home Medications:  Alphagan P 0.1% ophthalmic solution: 1 drop(s) to each affected eye once a day (at bedtime) (19 May 2020 17:22)  apixaban 5 mg oral tablet: 1 tab(s) orally every 12 hours (19 May 2020 17:22)  Aspir 81 oral delayed release tablet: 1 tab(s) orally once a day (19 May 2020 17:22)  atorvastatin 40 mg oral tablet: 1 tab(s) orally once a day (at bedtime) (19 May 2020 17:22)  furosemide 20 mg oral tablet: 1 tab(s) orally once a day (19 May 2020 17:22)  latanoprost 0.005% ophthalmic solution: 1 drop(s) to each affected eye once a day (in the evening) ou (19 May 2020 17:22)  lisinopril 20 mg oral tablet: 1 tab(s) orally once a day (19 May 2020 17:22)  metoprolol tartrate 100 mg oral tablet: 1 tab(s) orally 2 times a day (19 May 2020 17:22)  NIFEdipine 60 mg oral tablet, extended release: 2 tab(s) orally once a day (19 May 2020 17:22)  oxyCODONE 5 mg oral tablet: 1 tab(s) orally every 12 hours, As Needed - 10) (19 May 2020 17:22)  pantoprazole 40 mg oral delayed release tablet: 1 tab(s) orally once a day (before a meal) (19 May 2020 17:22)  sodium bicarbonate 650 mg oral tablet: 1 tab(s) orally 3 times a day (19 May 2020 17:22)    Occupation:  Alochol: Denied  Smoking: Denied  Drug Use: Denied  Marital Status:         ROS: as in HPI; All other systems reviewed are negative    ICU Vital Signs Last 24 Hrs  T(C): 35.7 (19 May 2020 18:00), Max: 35.7 (19 May 2020 18:00)  T(F): 96.3 (19 May 2020 18:00), Max: 96.3 (19 May 2020 18:00)  HR: 64 (19 May 2020 21:28) (64 - 74)  BP: 194/86 (19 May 2020 21:28) (157/93 - 194/86)  BP(mean): --  ABP: --  ABP(mean): --  RR: 18 (19 May 2020 21:28) (18 - 18)  SpO2: 100% (19 May 2020 21:28) (97% - 100%)        Physical Examination:    General: elderly W male   Alert, oriented x 3 , NAD     HEENT: Pupils equal, reactive to light.  left eye ptosis    PULM: Clear to auscultation bilaterally, no significant sputum production    CVS: Regular rate and rhythm, no murmurs, rubs, or gallops    ABD: Soft, nondistended, nontender, normoactive bowel sounds, no masses, large reducible umbilical hernia    EXT: No edema, nontender    SKIN: Warm and well perfused, no rashes noted.              I&O's Detail        LABS:                        9.6    35.86 )-----------( 260      ( 19 May 2020 18:00 )             29.9     19 May 2020 18:00    138    |  101    |  74     ----------------------------<  159    5.0     |  15     |  4.6      Ca    7.0        19 May 2020 18:00  Mg     0.9       19 May 2020 18:00    TPro  6.5    /  Alb  3.4    /  TBili  0.7    /  DBili  x      /  AST  66     /  ALT  109    /  AlkPhos  786    19 May 2020 18:00  Amylase x     lipase 35         CARDIAC MARKERS ( 19 May 2020 18:00 )  x     / 0.47 ng/mL / 517 U/L / x     / x          CAPILLARY BLOOD GLUCOSE      POCT Blood Glucose.: 158 mg/dL (19 May 2020 17:12)    PT/INR - ( 19 May 2020 18:00 )   PT: 17.50 sec;   INR: 1.52 ratio         PTT - ( 19 May 2020 18:00 )  PTT:34.0 sec    Culture    Lactate, Blood: 2.4 mmol/L (05-19-20 @ 18:00)      MEDICATIONS  (STANDING):  sodium bicarbonate  Infusion 0.083 mEq/kG/Hr (75 mL/Hr) IV Continuous <Continuous>    MEDICATIONS  (PRN):        RADIOLOGY: ***   < from: CT Abdomen and Pelvis No Cont (05.19.20 @ 19:21) >    IMPRESSION:     1.  Suprapubic catheter with under distention of the bladder and surrounding perivesicular inflammatory change suggestive of cystitis  2.   Unchanged mild left hydro moderate ureteronephrosis with surrounding inflammatory change, consistent with ascending left urinary tract infection. Correlate with urinalysis.  3.  Redemonstrated distal common bile duct calculus measuring 7 mm (previously measured 4 mm) with mild intrahepatic and extrahepatic biliary ductal dilatation.   4.  Distended gallbladder with cholelithiasis  5.  Small bilateral pleural effusions with adjacent atelectasis.      < end of copied text >                  IMPRESSION:  1.  leukocytosis possibly secondary to UTI (suprapubic cath.)  2.  acute on chronic renal failure secondary to diuretic use & or diarrhea   3. Hx- afib CHF, CAD, MI, HTN, DM, TIA            PLAN:  No need for ICU @ this time. can go to low risk telemetry  IVF hydration Ns @ 75 ml/hr x 12 hr then reevaluate renal function  s/p- anitbx in ER follow up UA and cultures adjust antibx as needed   restart po NaHCO3 (pt ran out at home)  hold - diuretics , metformin, ACE inhibotors for now, until renal function goes to base line (creat 1.7- 2.0)   FS w/ lispro insulin coverage              CRITICAL CARE TIME SPENT: *** 81 yo M , w/ PMH as noted below.  Pt comes in from home s/p-fall and was found w/ leukocytosis, (35K), w/ acute on chronic renal failure.  Pt also gives history diarrhea.  VSS afebrile , Case discussed w/ ICU MD Aquino pt can be admitted to low risk telemetry for now        PAST MEDICAL & SURGICAL HISTORY:  GERD (gastroesophageal reflux disease): intermittent  H/O ptosis of eyelid: right eye (sometimes wears patch)  Hematuria  Hyperlipidemia  Anemia  Diabetes  Renal insufficiency  Cardiomyopathy  Cholelithiasis  Hydrocele in adult  Glaucoma  BPH (benign prostatic hyperplasia)  CAD (coronary artery disease): CARD STENT X2 4/2018  TIA (transient ischemic attack): X2 JULY 2018  Dementia  CHF (congestive heart failure): COMBINED SYSTOLIC &amp; DIASTOLIC  Afib  Heart attack: 1/2018  Sepsis: 1/18 FROM INFECTED GALLBLADDER  Hypercholesteremia  HTN (hypertension)  DM (diabetes mellitus)  History of suprapubic catheter  H/O flexible sigmoidoscopy: 2015  H/O bilateral cataract extraction: LEFT- 1/18 RIGHT 6 YRS AGO  Encounter for biliary drainage tube placement: 1/2018  H/O coronary angioplasty: WITH STENT X2 (4/2018)  History of prostate surgery: 5/17  AICD (automatic cardioverter/defibrillator) present: [employer]    Allergies    sulfa drugs (Other)    Intolerances      FAMILY HISTORY:  FHx: lung cancer: father  FH: type 2 diabetes    Home Medications:  Alphagan P 0.1% ophthalmic solution: 1 drop(s) to each affected eye once a day (at bedtime) (19 May 2020 17:22)  apixaban 5 mg oral tablet: 1 tab(s) orally every 12 hours (19 May 2020 17:22)  Aspir 81 oral delayed release tablet: 1 tab(s) orally once a day (19 May 2020 17:22)  atorvastatin 40 mg oral tablet: 1 tab(s) orally once a day (at bedtime) (19 May 2020 17:22)  furosemide 20 mg oral tablet: 1 tab(s) orally once a day (19 May 2020 17:22)  latanoprost 0.005% ophthalmic solution: 1 drop(s) to each affected eye once a day (in the evening) ou (19 May 2020 17:22)  lisinopril 20 mg oral tablet: 1 tab(s) orally once a day (19 May 2020 17:22)  metoprolol tartrate 100 mg oral tablet: 1 tab(s) orally 2 times a day (19 May 2020 17:22)  NIFEdipine 60 mg oral tablet, extended release: 2 tab(s) orally once a day (19 May 2020 17:22)  oxyCODONE 5 mg oral tablet: 1 tab(s) orally every 12 hours, As Needed - 10) (19 May 2020 17:22)  pantoprazole 40 mg oral delayed release tablet: 1 tab(s) orally once a day (before a meal) (19 May 2020 17:22)  sodium bicarbonate 650 mg oral tablet: 1 tab(s) orally 3 times a day (19 May 2020 17:22)    Occupation:  Alochol: Denied  Smoking: Denied  Drug Use: Denied  Marital Status:         ROS: as in HPI; All other systems reviewed are negative    ICU Vital Signs Last 24 Hrs  T(C): 35.7 (19 May 2020 18:00), Max: 35.7 (19 May 2020 18:00)  T(F): 96.3 (19 May 2020 18:00), Max: 96.3 (19 May 2020 18:00)  HR: 64 (19 May 2020 21:28) (64 - 74)  BP: 194/86 (19 May 2020 21:28) (157/93 - 194/86)  BP(mean): --  ABP: --  ABP(mean): --  RR: 18 (19 May 2020 21:28) (18 - 18)  SpO2: 100% (19 May 2020 21:28) (97% - 100%)        Physical Examination:    General: elderly W male   Alert, oriented x 3 , NAD     HEENT: Pupils equal, reactive to light.  left eye ptosis    PULM: Clear to auscultation bilaterally, no significant sputum production    CVS: Regular rate and rhythm, no murmurs, rubs, or gallops    ABD: Soft, nondistended, nontender, normoactive bowel sounds, no masses, large reducible umbilical hernia    EXT: No edema, nontender    SKIN: Warm and well perfused, no rashes noted.              I&O's Detail        LABS:                        9.6    35.86 )-----------( 260      ( 19 May 2020 18:00 )             29.9     19 May 2020 18:00    138    |  101    |  74     ----------------------------<  159    5.0     |  15     |  4.6      Ca    7.0        19 May 2020 18:00  Mg     0.9       19 May 2020 18:00    TPro  6.5    /  Alb  3.4    /  TBili  0.7    /  DBili  x      /  AST  66     /  ALT  109    /  AlkPhos  786    19 May 2020 18:00  Amylase x     lipase 35         CARDIAC MARKERS ( 19 May 2020 18:00 )  x     / 0.47 ng/mL / 517 U/L / x     / x          CAPILLARY BLOOD GLUCOSE      POCT Blood Glucose.: 158 mg/dL (19 May 2020 17:12)    PT/INR - ( 19 May 2020 18:00 )   PT: 17.50 sec;   INR: 1.52 ratio         PTT - ( 19 May 2020 18:00 )  PTT:34.0 sec    Culture    Lactate, Blood: 2.4 mmol/L (05-19-20 @ 18:00)      MEDICATIONS  (STANDING):  sodium bicarbonate  Infusion 0.083 mEq/kG/Hr (75 mL/Hr) IV Continuous <Continuous>    MEDICATIONS  (PRN):        RADIOLOGY: ***   < from: CT Abdomen and Pelvis No Cont (05.19.20 @ 19:21) >    IMPRESSION:     1.  Suprapubic catheter with under distention of the bladder and surrounding perivesicular inflammatory change suggestive of cystitis  2.   Unchanged mild left hydro moderate ureteronephrosis with surrounding inflammatory change, consistent with ascending left urinary tract infection. Correlate with urinalysis.  3.  Redemonstrated distal common bile duct calculus measuring 7 mm (previously measured 4 mm) with mild intrahepatic and extrahepatic biliary ductal dilatation.   4.  Distended gallbladder with cholelithiasis  5.  Small bilateral pleural effusions with adjacent atelectasis.      < end of copied text >                  IMPRESSION:  1.  leukocytosis possibly secondary to UTI (suprapubic cath.)  2.  acute on chronic renal failure secondary to diuretic use & or diarrhea   3. Hx- afib CHF, CAD, MI, HTN, DM, TIA            PLAN:  No need for ICU @ this time. can go to low risk telemetry  IVF hydration Ns @ 75 ml/hr x 12 hr then reevaluate renal function  s/p- anitbx in ER follow up UA and cultures adjust antibx as needed   pt w/ AGMA secondary to non compliance to Na Bicarb tabs (pt ran out out  meds) . pt was started on mild BIcarb drip in ER   hold - diuretics , metformin, ACE i  for now, until renal function goes to base line (creat 1.7- 2.0)   FS w/ lispro insulin coverage  discussed w/ hospitalist , Dr. Pike who will make addendum and changes as he sees fit               CRITICAL CARE TIME SPENT: ***

## 2020-05-19 NOTE — H&P ADULT - NSICDXPASTSURGICALHX_GEN_ALL_CORE_FT
PAST SURGICAL HISTORY:  AICD (automatic cardioverter/defibrillator) present [employer]    Encounter for biliary drainage tube placement 1/2018    H/O bilateral cataract extraction LEFT- 1/18 RIGHT 6 YRS AGO    H/O coronary angioplasty WITH STENT X2 (4/2018)    H/O flexible sigmoidoscopy 2015    History of prostate surgery 5/17    History of suprapubic catheter

## 2020-05-19 NOTE — ED PROVIDER NOTE - OBJECTIVE STATEMENT
81 y/o male with hx of HTN, CAD s/p AICD/PPM, a.fib on eliquis, TIA, CKD, dementia, BPH s/p suprapubic catheter presents BIBA for evaluation . patient states he was unable to get up from bed. patient c/o diarrhea and abdominal pain. patient with recent change of suprapubic catheter within last two weeks. patient was covid positive on 4/16/2020. patient denies any vomiting. patient lives alone x 3 months since wife went to live with daughter in [place]. Patient denies any neck of back pain.

## 2020-05-19 NOTE — ED PROVIDER NOTE - MUSCULOSKELETAL, MLM
Spine appears normal, no cervical or vertebral tenderness, +pelvis tenderness, range of motion is not limited, no muscle or joint tenderness

## 2020-05-19 NOTE — ED PROVIDER NOTE - CARE PLAN
Principal Discharge DX:	Sepsis  Secondary Diagnosis:	ARF (acute renal failure)  Secondary Diagnosis:	Abnormal LFTs  Secondary Diagnosis:	Elevated troponin

## 2020-05-19 NOTE — H&P ADULT - PROBLEM SELECTOR PLAN 6
etiology could be from Leukocytosis. etiology could be from skin or less likely GI With leukocytosis. Suspect  cause but could be skin (drainage around cath) or GI (has CBD stone)

## 2020-05-19 NOTE — CONSULT NOTE ADULT - ASSESSMENT
81 y/o male with hx of HTN, CAD s/p AICD/PPM, a.fib on eliquis, TIA, CKD, dementia, BPH s/p suprapubic catheter presents BIBA for evaluation due to generalized weakness, diarrhea along with abdominal discomfort. Pt with recent change of suprapubic catheter within last two weeks. Pt was covid positive on 4/16/2020. Denies any nausea/vomiting. Pt lives alone x 3 months since wife went to live with daughter in [place]. Denies neck or back pain.

## 2020-05-19 NOTE — ED ADULT NURSE NOTE - PSH
AICD (automatic cardioverter/defibrillator) present  [id number]  Encounter for biliary drainage tube placement  1/2018  H/O bilateral cataract extraction  LEFT- 1/18 RIGHT 6 YRS AGO  H/O coronary angioplasty  WITH STENT X2 (4/2018)  H/O flexible sigmoidoscopy  2015  History of prostate surgery  5/17  History of suprapubic catheter

## 2020-05-19 NOTE — ED PROVIDER NOTE - PSH
AICD (automatic cardioverter/defibrillator) present  [employer]  Encounter for biliary drainage tube placement  1/2018  H/O bilateral cataract extraction  LEFT- 1/18 RIGHT 6 YRS AGO  H/O coronary angioplasty  WITH STENT X2 (4/2018)  H/O flexible sigmoidoscopy  2015  History of prostate surgery  5/17  History of suprapubic catheter

## 2020-05-19 NOTE — H&P ADULT - PROBLEM SELECTOR PLAN 3
Repeat in  am but doubt cardiac (renal, sepsis?) Repeat in  am but doubt cardiac (renal, sepsis?) However patient seen by cardiology in past for levels lower then this. Will ask for their input (also has CHF)

## 2020-05-19 NOTE — ED PROVIDER NOTE - CLINICAL SUMMARY MEDICAL DECISION MAKING FREE TEXT BOX
Pt with weakness and diarrhea, PE as above, labs and studies reviewed, d/w Dr Thomas renal, will place on bicarb drip, d/w Dr Aquino, not an ICU candidate, will admi t lr tele

## 2020-05-19 NOTE — H&P ADULT - HISTORY OF PRESENT ILLNESS
[age]yo male with multiple chronic medical conditions (which includes atrial fibrillation on Eliquis, CKD, and DM) presents to the hospital because he was too weak to get out of bed this morning. Apparently associated with diarrhea (he was covered with feces on arrival to ER) but no fevers. He says he may have had a little abdominal pain, which is no longer present, but he has chronic pains to SPC site. Also denies chest pain 79yo male with multiple chronic medical conditions (which includes presence of AICD, atrial fibrillation on Eliquis, CKD, and DM) presents to the hospital because he was too weak to get out of bed this morning. Apparently associated with diarrhea (he was covered with feces on arrival to ER) but no fevers. He says he may have had a little abdominal pain, which is no longer present, but he has chronic pains to SPC site. Also denies chest pain 81yo male with multiple chronic medical conditions (which includes presence of AICD, atrial fibrillation on Eliquis, CKD, and DM) presents to the hospital because he was too weak to get out of bed this morning. Apparently associated with diarrhea (he was covered with feces on arrival to ER) but no fevers (patient says he never karla a fever). He says he may have had a little abdominal pain, which is no longer present, but he has chronic pains to SPC site. Also denies chest pain 79yo male with multiple chronic medical conditions (which includes presence of AICD, atrial fibrillation on Eliquis, CKD stage 3b, HTN, DLD, BPH, CAD (has stents), TIA, glaucoma and DM) presents to the hospital because he was too weak to get out of bed this morning. Apparently associated with diarrhea (he was covered with feces on arrival to ER) but no fevers (patient says he never karla a fever). He says he may have had a little abdominal pain, which is no longer present, but he has chronic pains to SPC site. Also denies chest pain

## 2020-05-19 NOTE — H&P ADULT - GENITOURINARY COMMENTS
SPC with drainage presentwas too weak to get out of bed this morning. SPC with drainage present (per RN), urine looks cloudy

## 2020-05-20 NOTE — CONSULT NOTE ADULT - ASSESSMENT
ASSESSMENT  80y M admitted with SEPSIS, ELEVATED TROPONIN    HPI:  79yo male with multiple chronic medical conditions (which includes presence of AICD, atrial fibrillation on Eliquis, CKD stage 3b, HTN, DLD, BPH, prostate surgery: 5/17, CAD (has stents), TIA, glaucoma and DM) presents to the hospital because he was too weak to get out of bed this morning. Apparently associated with diarrhea (he was covered with feces on arrival to ER) but no fevers (patient says he never karla a fever). He says he may have had a little abdominal pain, which is no longer present, but he has chronic pains to SPC site.    PROBLEMS  Pt with Severe Sepsis (T<96.8F, WBC>12), lactic acidosis, ISRRAEL due to suspected UTI     CT with suprapubic catheter with under distention of the bladder and surrounding perivesicular inflammatory change suggestive of cystitis Unchanged mild left hydro moderate ureteronephrosis with surrounding inflammatory change, consistent with ascending left urinary tract infection.    wbc 35.86 to 23.45 (7.2% immatures)  5/20 U/A  Nitrite: Negative /Leuk Esterase: Large / RBC: x / WBC x / Bacteria: x    New problem with additional W/U  acute illness with systemic symptoms     On cefepime   IVPB 1000 milliGRAM(s) IV Intermittent daily    2. Small bilateral pleural effusions with adjacent atelectasis on CT    3. Lactic acidosis    4. ISRRAEL    5.  to 579 and Diarrhea    6.  Pt was covid positive on 4/16/20 as per urology note  5/19 COVID-19 PCR negative    PLAN  - Await blood cultures, urine culture  - Continue Cefepime  - Repeat Cr, LFTs, wbc  Await GI & Cardiology consults  Urology follow-up

## 2020-05-20 NOTE — PROGRESS NOTE ADULT - SUBJECTIVE AND OBJECTIVE BOX
S: Pt seen earlier - c/o some penile pain and some spontaneous voiding via penis (which gets better with passing flatus - chronic) as well as some testicular discomfort. No overt abdominal pain. SPT has been draining well   O; Vital Signs Last 24 Hrs  T(C): 36.4 (20 May 2020 14:10), Max: 36.7 (19 May 2020 22:37)  T(F): 97.5 (20 May 2020 14:10), Max: 98.1 (19 May 2020 22:37)  HR: 60 (20 May 2020 17:35) (58 - 74)  BP: 120/59 (20 May 2020 17:35) (116/58 - 194/86)  BP(mean): --  RR: 17 (20 May 2020 06:30) (17 - 18)  SpO2: 99% (20 May 2020 06:30) (97% - 100%)    MEDICATIONS  (STANDING):  apixaban 2.5 milliGRAM(s) Oral every 12 hours  aspirin enteric coated 81 milliGRAM(s) Oral daily  cefepime   IVPB 1000 milliGRAM(s) IV Intermittent daily  dextrose 5% + sodium chloride 0.45%. 1000 milliLiter(s) (75 mL/Hr) IV Continuous <Continuous>  dextrose 5%. 1000 milliLiter(s) (50 mL/Hr) IV Continuous <Continuous>  dextrose 50% Injectable 12.5 Gram(s) IV Push once  hydrALAZINE 25 milliGRAM(s) Oral every 8 hours  insulin lispro (HumaLOG) corrective regimen sliding scale   SubCutaneous three times a day before meals  latanoprost 0.005% Ophthalmic Solution 1 Drop(s) Both EYES at bedtime  metoprolol tartrate 100 milliGRAM(s) Oral two times a day  NIFEdipine XL 60 milliGRAM(s) Oral daily  pantoprazole    Tablet 40 milliGRAM(s) Oral before breakfast    EXAM:  abd: soft, SPT in place without surrounding erythema/drainage and draining clear urine;  mild suprapubic tenderness; scrotum covered in cream but no swelling/ulcerations noted; (+) mild scrotal tenderness     Labs:                        8.7    23.45 )-----------( 213      ( 20 May 2020 06:44 )             27.2       Auto Immature Granulocyte %: 7.2 % (20 @ 06:44)  Auto Neutrophil %: 88.1 % (20 @ 06:44)        141  |  106  |  75<HH>  ----------------------------<  154<H>  4.3   |  18  |  4.5<HH>      Calcium, Total Serum: 6.5 mg/dL (20 @ 06:44)      LFTs:             5.6  | 0.4  | 32       ------------------[579     ( 20 May 2020 06:44 )  2.9  | x    | 70          Lipase:x      Amylase:x         Lactate, Blood: <0.2 mmol/L (20 @ 06:44)  Blood Gas Venous - Lactate: 1.1 mmoL/L (20 @ 19:00)  Lactate, Blood: 2.4 mmol/L (20 @ 18:00)      Coags:     17.50  ----< 1.52    ( 19 May 2020 18:00 )     34.0        CARDIAC MARKERS ( 20 May 2020 06:44 )  x     / 0.40 ng/mL / 332 U/L / x     / 6.3 ng/mL  CARDIAC MARKERS ( 19 May 2020 18:00 )  x     / 0.47 ng/mL / 517 U/L / x     / x          Urinalysis Basic - ( 20 May 2020 07:00 )    Color: Yellow / Appearance: Cloudy / S.025 / pH: x  Gluc: x / Ketone: Negative  / Bili: Negative / Urobili: 0.2 mg/dL   Blood: x / Protein: >=300 mg/dL / Nitrite: Negative   Leuk Esterase: Large / RBC: Many /HPF / WBC Loaded /HPF   Sq Epi: x / Non Sq Epi: Occasional /HPF / Bacteria: Many    RADIOLOGY:    US Abdomen Limited (20 @ 11:26) >  IMPRESSION:    Cholelithiasis with no sonographic evidence of acute cholecystitis.     Mild CBD dilatation, 8 mm.

## 2020-05-20 NOTE — PROGRESS NOTE ADULT - ASSESSMENT
80 yr old male admitted for weakness, diarrhea. Know to  service (Dr. Walsh) - has SPT, CKD (at baseline), now with likely UTI and leukocytosis. CHronic left hydronephrosis noted on CT scan    Case D/W Dr. Walsh:   - left hydronephrosis is chronic and currently unchanged with baseline renal function - no acute intervention warranted   - continue SPT   - f/u urine culture  - cont cefepime for now   - no further  work up warranted at this time

## 2020-05-20 NOTE — CONSULT NOTE ADULT - ASSESSMENT
Patient with above history. He denies chest pain. No sob. He has a infection . Being treated. With his history. Possible Type 2 mi. Rx infection . Continue meds. Medical rx Patient with above history. He denies chest pain. No sob. He has a infection . Being treated. With his history. Possible Type 2 mi. Rx infection . ISRRAEL CKD. Continue meds. Medical rx. Prognosis guarded

## 2020-05-20 NOTE — CONSULT NOTE ADULT - SUBJECTIVE AND OBJECTIVE BOX
Chief complaint/Reason for consult: Transaminitis     HPI:  81yo male with multiple chronic medical conditions (which includes presence of AICD, atrial fibrillation on Eliquis, CKD stage 3b, HTN, DLD, BPH, CAD (has stents), TIA, glaucoma and DM) presents to the hospital because he was too weak to get out of bed this morning. Apparently associated with diarrhea (he was covered with feces on arrival to ER) but no fevers (patient says he never karla a fever). He says he may have had a little abdominal pain, which is no longer present, but he has chronic pains to SPC site. Also denies chest pain (19 May 2020 21:30)    GI Updates: 80yMale pmh GERD, DM, BPH, MI 2018, A-Fib on eliquis, PPM/AICD, CKD stage 3B, CAD s/p stents x2 4/2018 presents with weakness unable to get out of bed this morning. Currently Patient denies nausea, vomiting, hematemesis, melena, blood in stool, diarrhea, constipation, abdominal pain.      PAST MEDICAL & SURGICAL HISTORY:   GERD (gastroesophageal reflux disease): intermittent  H/O ptosis of eyelid: right eye (sometimes wears patch)  Hematuria  Hyperlipidemia  Anemia  Diabetes  Renal insufficiency  Cardiomyopathy  Cholelithiasis  Hydrocele in adult  Glaucoma  BPH (benign prostatic hyperplasia)  CAD (coronary artery disease): CARD STENT X2 4/2018  TIA (transient ischemic attack): X2 JULY 2018  Dementia  CHF (congestive heart failure): COMBINED SYSTOLIC &amp; DIASTOLIC  Afib  Heart attack: 1/2018  Sepsis: 1/18 FROM INFECTED GALLBLADDER  Hypercholesteremia  HTN (hypertension)  DM (diabetes mellitus)  History of suprapubic catheter  H/O flexible sigmoidoscopy: 2015  H/O bilateral cataract extraction: LEFT- 1/18 RIGHT 6 YRS AGO  Encounter for biliary drainage tube placement: 1/2018  H/O coronary angioplasty: WITH STENT X2 (4/2018)  History of prostate surgery: 5/17  AICD (automatic cardioverter/defibrillator) present: [employer]        Family history:  FAMILY HISTORY:  FHx: lung cancer: father  FH: type 2 diabetes    No GI cancers in first or second degree relatives    Social History: No smoking. No alcohol. No illegal drug use.    Allergies:     sulfa drugs (Other)    MEDICATIONS: Home Medications:  Alphagan P 0.1% ophthalmic solution: 1 drop(s) to each affected eye once a day (at bedtime) (19 May 2020 17:22)  apixaban 5 mg oral tablet: 1 tab(s) orally every 12 hours (19 May 2020 17:22)  Aspir 81 oral delayed release tablet: 1 tab(s) orally once a day (19 May 2020 17:22)  atorvastatin 40 mg oral tablet: 1 tab(s) orally once a day (at bedtime) (19 May 2020 17:22)  furosemide 20 mg oral tablet: 1 tab(s) orally once a day (19 May 2020 17:22)  latanoprost 0.005% ophthalmic solution: 1 drop(s) to each affected eye once a day (in the evening) ou (19 May 2020 17:22)  lisinopril 20 mg oral tablet: 1 tab(s) orally once a day (19 May 2020 17:22)  metoprolol tartrate 100 mg oral tablet: 1 tab(s) orally 2 times a day (19 May 2020 17:22)  NIFEdipine 60 mg oral tablet, extended release: 2 tab(s) orally once a day (19 May 2020 17:22)  oxyCODONE 5 mg oral tablet: 1 tab(s) orally every 12 hours, As Needed - 10) (19 May 2020 17:22)  pantoprazole 40 mg oral delayed release tablet: 1 tab(s) orally once a day (before a meal) (19 May 2020 17:22)  sodium bicarbonate 650 mg oral tablet: 1 tab(s) orally 3 times a day (19 May 2020 17:22)    MEDICATIONS  (STANDING):  apixaban 2.5 milliGRAM(s) Oral every 12 hours  aspirin enteric coated 81 milliGRAM(s) Oral daily  cefepime   IVPB 1000 milliGRAM(s) IV Intermittent daily  dextrose 5% 1000 milliLiter(s) (75 mL/Hr) IV Continuous <Continuous>  dextrose 5%. 1000 milliLiter(s) (50 mL/Hr) IV Continuous <Continuous>  dextrose 50% Injectable 12.5 Gram(s) IV Push once  hydrALAZINE 25 milliGRAM(s) Oral every 8 hours  insulin lispro (HumaLOG) corrective regimen sliding scale   SubCutaneous three times a day before meals  latanoprost 0.005% Ophthalmic Solution 1 Drop(s) Both EYES at bedtime  metoprolol tartrate 100 milliGRAM(s) Oral two times a day  NIFEdipine XL 60 milliGRAM(s) Oral daily  pantoprazole    Tablet 40 milliGRAM(s) Oral before breakfast    MEDICATIONS  (PRN):  dextrose 40% Gel 15 Gram(s) Oral once PRN Blood Glucose LESS THAN 70 milliGRAM(s)/deciliter  glucagon  Injectable 1 milliGRAM(s) IntraMuscular once PRN Glucose LESS THAN 70 milligrams/deciliter  oxyCODONE    IR 5 milliGRAM(s) Oral every 12 hours PRN Severe Pain (7 - 10)        REVIEW OF SYSTEMS  General:  No weight loss, fevers, or chills.  Eyes:  No reported pain or visual changes  ENT:  No sore throat or runny nose.  NECK: No stiffness or lymphadenopathy  CV:  No chest pain or palpitations.  Resp:  No shortness of breath, cough, wheezing or hemoptysis  GI:  +suprapubic pain, No nausea, vomiting, dysphagia, diarrhea or constipation. No rectal bleeding, melena, or hematemesis.  Muscle:  No aches or weakness  Neuro:  No tingling, numbness       VITALS:   T(F): 96.1 (05-20-20 @ 05:30), Max: 98.1 (05-19-20 @ 22:37)  HR: 65 (05-20-20 @ 06:30) (64 - 74)  BP: 174/81 (05-20-20 @ 06:30) (157/93 - 194/86)  RR: 17 (05-20-20 @ 06:30) (17 - 18)  SpO2: 99% (05-20-20 @ 06:30) (97% - 100%)    PHYSICAL EXAM:  GENERAL: AAOx3, no acute distress.  HEAD:  Atraumatic, Normocephalic  EYES: conjunctiva and sclera clear  NECK: Supple, No thyromegaly   CHEST/LUNG: Clear to auscultation bilaterally; No wheeze, rhonchi, or rales  HEART: Regular rate and rhythm; normal S1, S2, No murmurs.  ABDOMEN: Soft, +suprapubic tenderness, nondistended; Bowel sounds present, no abdominal bruit, masses, or hepatosplenomegaly   NEUROLOGY: No asterixis or tremor  SKIN: Intact, no jaundice          LABS:  05-20    141  |  106  |  75<HH>  ----------------------------<  154<H>  4.3   |  18  |  4.5<HH>    Ca    6.5<L>      20 May 2020 06:44  Phos  4.5     05-20  Mg     1.3     05-20    TPro  5.6<L>  /  Alb  2.9<L>  /  TBili  0.4  /  DBili  x   /  AST  32  /  ALT  70<H>  /  AlkPhos  579<H>  05-20                          8.7    23.45 )-----------( 213      ( 20 May 2020 06:44 )             27.2     LIVER FUNCTIONS - ( 20 May 2020 06:44 )  Alb: 2.9 g/dL / Pro: 5.6 g/dL / ALK PHOS: 579 U/L / ALT: 70 U/L / AST: 32 U/L / GGT: x           PT/INR - ( 19 May 2020 18:00 )   PT: 17.50 sec;   INR: 1.52 ratio         PTT - ( 19 May 2020 18:00 )  PTT:34.0 sec    IMAGING:    < from: CT Abdomen and Pelvis No Cont (05.19.20 @ 19:21) >  EXAM:  CT ABDOMEN AND PELVIS            PROCEDURE DATE:  05/19/2020            INTERPRETATION:  CLINICAL STATEMENT: Abdominal pain.      TECHNIQUE: Contiguous axial CT images were obtained from the lower chest to the pubic symphysis without intravenous contrast.  Oral contrast was not administered.  Reformatted images in the coronal and sagittal planes were acquired.    COMPARISON CT: 1/20/2020    OTHER STUDIES USED FOR CORRELATION: None.       FINDINGS:    LOWER CHEST: Small bilateral pleural effusions with adjacent atelectasis.    HEPATOBILIARY: Redemonstrated distal common bile duct calculus measuring 7 mm (series 4 image 119, series 601 image 68, previously measured 4 mm) with mild intrahepatic and extrahepatic biliary ductal dilatation. Distended gallbladder with cholelithiasis.     SPLEEN: Unremarkable.    PANCREAS: Unremarkable.    ADRENAL GLANDS: Unremarkable.    KIDNEYS: Unchanged mild left hydroureteronephrosis with surrounding inflammatory change. No right hydronephrosis.    ABDOMINOPELVIC NODES: Unremarkable.    PELVIC ORGANS: Suprapubic Mccarthy catheter visualized within the collapsed urinary bladder.    Testicular inflammatory change without a drainable collection Unchanged prostatomegaly.    PERITONEUM/MESENTERY/BOWEL: Colonic diverticulosis. No bowel obstruction, ascites or pneumoperitoneum.    BONES/SOFT TISSUES: Degenerative changes of the spine. Unchanged chronic right 10th and bilateral 11th rib fractures.    OTHER: Atherosclerotic vascular calcifications.      IMPRESSION:     1.  Suprapubic catheter with under distention of the bladder and surrounding perivesicular inflammatory change suggestive of cystitis  2.   Unchanged mild left hydro moderate ureteronephrosis with surrounding inflammatory change, consistent with ascending left urinary tract infection. Correlate with urinalysis.  3.  Redemonstrated distal common bile duct calculus measuring 7 mm (previously measured 4 mm) with mild intrahepatic and extrahepatic biliary ductal dilatation.   4.  Distended gallbladder with cholelithiasis  5.  Small bilateral pleural effusions with adjacent atelectasis.              SHELBY ANDERSON M.D., RESIDENT RADIOLOGIST  This document has been electronically signed.  WES BALTAZAR M.D., ATTENDING RADIOLOGIST  Thisdocument has been electronically signed. May 19 2020  8:29PM              < end of copied text >      < from: US Abdomen Limited (05.20.20 @ 11:26) >  EXAM:  US ABDOMEN LIMITED            PROCEDURE DATE:  05/20/2020            INTERPRETATION:  CLINICAL HISTORY: Elevated LFTs..    COMPARISON: None CT abdomen pelvis 5/19/2020..    PROCEDURE: Ultrasound of the right upper quadrant was performed.    FINDINGS:    LIVER:  Normal in contour and echogenicity measuring 18.8 cm in length.     GALLBLADDER/BILIARY TREE:  Cholelithiasis is seen. No wall thickening or pericholecystic fluid. Reportedly negative sonographic Lacey's sign. No intrahepatic biliary ductal dilatation. The common bile duct measures 8 mm, which is mildly dilated.     PANCREAS:  Visualized head and body are unremarkable. Remainder obscured by overlying bowel gas.    KIDNEY:  Right kidney measures 14.5 cm in length. No hydronephrosis, calculi or solid mass.    AORTA/IVC:  Visualized proximal portions unremarkable.    ASCITES:  None.    OTHER: Right pleural effusion is noted.    IMPRESSION:    Cholelithiasis with no sonographic evidence of acute cholecystitis.     Mild CBD dilatation, 8 mm.              TAMI HELTON M.D., RESIDENT RADIOLOGIST  This document has been electronically signed.  ELIEZER TILLMAN M.D., ATTENDING RADIOLOGIST  This document has been electronically signed. May 20 2020 12:31PM              < end of copied text > Chief complaint/Reason for consult: Transaminitis +CBD stone     HPI:  81yo male with multiple chronic medical conditions (which includes presence of AICD, atrial fibrillation on Eliquis, CKD stage 3b, HTN, DLD, BPH, CAD (has stents), TIA, glaucoma and DM) presents to the hospital because he was too weak to get out of bed this morning. Apparently associated with diarrhea (he was covered with feces on arrival to ER) but no fevers (patient says he never karla a fever). He says he may have had a little abdominal pain, which is no longer present, but he has chronic pains to SPC site. Also denies chest pain (19 May 2020 21:30)    GI Updates: 80yMale pmh GERD, DM, BPH, MI 2018, A-Fib on eliquis, PPM/AICD, CKD stage 3B, CAD s/p stents x2 4/2018 presents with weakness unable to get out of bed this morning. Currently Patient denies nausea, vomiting, hematemesis, melena, blood in stool, diarrhea, constipation, abdominal pain.      PAST MEDICAL & SURGICAL HISTORY:   GERD (gastroesophageal reflux disease): intermittent  H/O ptosis of eyelid: right eye (sometimes wears patch)  Hematuria  Hyperlipidemia  Anemia  Diabetes  Renal insufficiency  Cardiomyopathy  Cholelithiasis  Hydrocele in adult  Glaucoma  BPH (benign prostatic hyperplasia)  CAD (coronary artery disease): CARD STENT X2 4/2018  TIA (transient ischemic attack): X2 JULY 2018  Dementia  CHF (congestive heart failure): COMBINED SYSTOLIC &amp; DIASTOLIC  Afib  Heart attack: 1/2018  Sepsis: 1/18 FROM INFECTED GALLBLADDER  Hypercholesteremia  HTN (hypertension)  DM (diabetes mellitus)  History of suprapubic catheter  H/O flexible sigmoidoscopy: 2015  H/O bilateral cataract extraction: LEFT- 1/18 RIGHT 6 YRS AGO  Encounter for biliary drainage tube placement: 1/2018  H/O coronary angioplasty: WITH STENT X2 (4/2018)  History of prostate surgery: 5/17  AICD (automatic cardioverter/defibrillator) present: [employer]        Family history:  FAMILY HISTORY:  FHx: lung cancer: father  FH: type 2 diabetes    No GI cancers in first or second degree relatives    Social History: No smoking. No alcohol. No illegal drug use.    Allergies:   sulfa drugs (Other)    MEDICATIONS: Home Medications:  Alphagan P 0.1% ophthalmic solution: 1 drop(s) to each affected eye once a day (at bedtime) (19 May 2020 17:22)  apixaban 5 mg oral tablet: 1 tab(s) orally every 12 hours (19 May 2020 17:22)  Aspir 81 oral delayed release tablet: 1 tab(s) orally once a day (19 May 2020 17:22)  atorvastatin 40 mg oral tablet: 1 tab(s) orally once a day (at bedtime) (19 May 2020 17:22)  furosemide 20 mg oral tablet: 1 tab(s) orally once a day (19 May 2020 17:22)  latanoprost 0.005% ophthalmic solution: 1 drop(s) to each affected eye once a day (in the evening) ou (19 May 2020 17:22)  lisinopril 20 mg oral tablet: 1 tab(s) orally once a day (19 May 2020 17:22)  metoprolol tartrate 100 mg oral tablet: 1 tab(s) orally 2 times a day (19 May 2020 17:22)  NIFEdipine 60 mg oral tablet, extended release: 2 tab(s) orally once a day (19 May 2020 17:22)  oxyCODONE 5 mg oral tablet: 1 tab(s) orally every 12 hours, As Needed - 10) (19 May 2020 17:22)  pantoprazole 40 mg oral delayed release tablet: 1 tab(s) orally once a day (before a meal) (19 May 2020 17:22)  sodium bicarbonate 650 mg oral tablet: 1 tab(s) orally 3 times a day (19 May 2020 17:22)    MEDICATIONS  (STANDING):  apixaban 2.5 milliGRAM(s) Oral every 12 hours  aspirin enteric coated 81 milliGRAM(s) Oral daily  cefepime   IVPB 1000 milliGRAM(s) IV Intermittent daily  dextrose 5% 1000 milliLiter(s) (75 mL/Hr) IV Continuous <Continuous>  dextrose 5%. 1000 milliLiter(s) (50 mL/Hr) IV Continuous <Continuous>  dextrose 50% Injectable 12.5 Gram(s) IV Push once  hydrALAZINE 25 milliGRAM(s) Oral every 8 hours  insulin lispro (HumaLOG) corrective regimen sliding scale   SubCutaneous three times a day before meals  latanoprost 0.005% Ophthalmic Solution 1 Drop(s) Both EYES at bedtime  metoprolol tartrate 100 milliGRAM(s) Oral two times a day  NIFEdipine XL 60 milliGRAM(s) Oral daily  pantoprazole    Tablet 40 milliGRAM(s) Oral before breakfast    MEDICATIONS  (PRN):  dextrose 40% Gel 15 Gram(s) Oral once PRN Blood Glucose LESS THAN 70 milliGRAM(s)/deciliter  glucagon  Injectable 1 milliGRAM(s) IntraMuscular once PRN Glucose LESS THAN 70 milligrams/deciliter  oxyCODONE    IR 5 milliGRAM(s) Oral every 12 hours PRN Severe Pain (7 - 10)        REVIEW OF SYSTEMS  General:  No weight loss, fevers, or chills.  Eyes:  No reported pain or visual changes  ENT:  No sore throat or runny nose.  NECK: No stiffness or lymphadenopathy  CV:  No chest pain or palpitations.  Resp:  No shortness of breath, cough, wheezing or hemoptysis  GI:  +suprapubic pain, No nausea, vomiting, dysphagia, diarrhea or constipation. No rectal bleeding, melena, or hematemesis.  Muscle:  No aches or weakness  Neuro:  No tingling, numbness       VITALS:   T(F): 96.1 (05-20-20 @ 05:30), Max: 98.1 (05-19-20 @ 22:37)  HR: 65 (05-20-20 @ 06:30) (64 - 74)  BP: 174/81 (05-20-20 @ 06:30) (157/93 - 194/86)  RR: 17 (05-20-20 @ 06:30) (17 - 18)  SpO2: 99% (05-20-20 @ 06:30) (97% - 100%)    PHYSICAL EXAM:  GENERAL: AAOx3, no acute distress.  HEAD:  Atraumatic, Normocephalic  EYES: conjunctiva and sclera clear  NECK: Supple, No thyromegaly   CHEST/LUNG: Clear to auscultation bilaterally; No wheeze, rhonchi, or rales  HEART: Regular rate and rhythm; normal S1, S2, No murmurs.  ABDOMEN: Soft, +suprapubic tenderness, nondistended; Bowel sounds present, no abdominal bruit, masses, or hepatosplenomegaly   NEUROLOGY: No asterixis or tremor  SKIN: Intact, no jaundice          LABS:  05-20    141  |  106  |  75<HH>  ----------------------------<  154<H>  4.3   |  18  |  4.5<HH>    Ca    6.5<L>      20 May 2020 06:44  Phos  4.5     05-20  Mg     1.3     05-20    TPro  5.6<L>  /  Alb  2.9<L>  /  TBili  0.4  /  DBili  x   /  AST  32  /  ALT  70<H>  /  AlkPhos  579<H>  05-20                          8.7    23.45 )-----------( 213      ( 20 May 2020 06:44 )             27.2     LIVER FUNCTIONS - ( 20 May 2020 06:44 )  Alb: 2.9 g/dL / Pro: 5.6 g/dL / ALK PHOS: 579 U/L / ALT: 70 U/L / AST: 32 U/L / GGT: x           PT/INR - ( 19 May 2020 18:00 )   PT: 17.50 sec;   INR: 1.52 ratio         PTT - ( 19 May 2020 18:00 )  PTT:34.0 sec    IMAGING:    < from: CT Abdomen and Pelvis No Cont (05.19.20 @ 19:21) >  EXAM:  CT ABDOMEN AND PELVIS            PROCEDURE DATE:  05/19/2020            INTERPRETATION:  CLINICAL STATEMENT: Abdominal pain.      TECHNIQUE: Contiguous axial CT images were obtained from the lower chest to the pubic symphysis without intravenous contrast.  Oral contrast was not administered.  Reformatted images in the coronal and sagittal planes were acquired.    COMPARISON CT: 1/20/2020    OTHER STUDIES USED FOR CORRELATION: None.       FINDINGS:    LOWER CHEST: Small bilateral pleural effusions with adjacent atelectasis.    HEPATOBILIARY: Redemonstrated distal common bile duct calculus measuring 7 mm (series 4 image 119, series 601 image 68, previously measured 4 mm) with mild intrahepatic and extrahepatic biliary ductal dilatation. Distended gallbladder with cholelithiasis.     SPLEEN: Unremarkable.    PANCREAS: Unremarkable.    ADRENAL GLANDS: Unremarkable.    KIDNEYS: Unchanged mild left hydroureteronephrosis with surrounding inflammatory change. No right hydronephrosis.    ABDOMINOPELVIC NODES: Unremarkable.    PELVIC ORGANS: Suprapubic Mccarthy catheter visualized within the collapsed urinary bladder.    Testicular inflammatory change without a drainable collection Unchanged prostatomegaly.    PERITONEUM/MESENTERY/BOWEL: Colonic diverticulosis. No bowel obstruction, ascites or pneumoperitoneum.    BONES/SOFT TISSUES: Degenerative changes of the spine. Unchanged chronic right 10th and bilateral 11th rib fractures.    OTHER: Atherosclerotic vascular calcifications.      IMPRESSION:     1.  Suprapubic catheter with under distention of the bladder and surrounding perivesicular inflammatory change suggestive of cystitis  2.   Unchanged mild left hydro moderate ureteronephrosis with surrounding inflammatory change, consistent with ascending left urinary tract infection. Correlate with urinalysis.  3.  Redemonstrated distal common bile duct calculus measuring 7 mm (previously measured 4 mm) with mild intrahepatic and extrahepatic biliary ductal dilatation.   4.  Distended gallbladder with cholelithiasis  5.  Small bilateral pleural effusions with adjacent atelectasis.              SHELBY ANDERSON M.D., RESIDENT RADIOLOGIST  This document has been electronically signed.  WES BALTAZAR M.D., ATTENDING RADIOLOGIST  Thisdocument has been electronically signed. May 19 2020  8:29PM              < end of copied text >      < from: US Abdomen Limited (05.20.20 @ 11:26) >  EXAM:  US ABDOMEN LIMITED            PROCEDURE DATE:  05/20/2020            INTERPRETATION:  CLINICAL HISTORY: Elevated LFTs..    COMPARISON: None CT abdomen pelvis 5/19/2020..    PROCEDURE: Ultrasound of the right upper quadrant was performed.    FINDINGS:    LIVER:  Normal in contour and echogenicity measuring 18.8 cm in length.     GALLBLADDER/BILIARY TREE:  Cholelithiasis is seen. No wall thickening or pericholecystic fluid. Reportedly negative sonographic Lacey's sign. No intrahepatic biliary ductal dilatation. The common bile duct measures 8 mm, which is mildly dilated.     PANCREAS:  Visualized head and body are unremarkable. Remainder obscured by overlying bowel gas.    KIDNEY:  Right kidney measures 14.5 cm in length. No hydronephrosis, calculi or solid mass.    AORTA/IVC:  Visualized proximal portions unremarkable.    ASCITES:  None.    OTHER: Right pleural effusion is noted.    IMPRESSION:    Cholelithiasis with no sonographic evidence of acute cholecystitis.     Mild CBD dilatation, 8 mm.              TAMI HELTON M.D., RESIDENT RADIOLOGIST  This document has been electronically signed.  ELIEZER TILLMAN M.D., ATTENDING RADIOLOGIST  This document has been electronically signed. May 20 2020 12:31PM              < end of copied text >

## 2020-05-20 NOTE — CONSULT NOTE ADULT - SUBJECTIVE AND OBJECTIVE BOX
79 y/o male with hx of HTN, CAD s/p AICD/PPM, a.fib on eliquis, TIA, CKD, dementia, BPH s/p suprapubic catheter BIBA for evaluation due to generalized weakness, diarrhea along with abdominal discomfort. Pt with recent change of suprapubic catheter within last two weeks by Dr. Walsh. Pt was covid positive on 4/16/2020. Denies any nausea/vomiting, cough, CP, SOB and palpitations. Pt lives alone for about 3 months since wife went to live with daughter in [place]. Denies neck or back pain.      PAST MEDICAL & SURGICAL HISTORY:  GERD (gastroesophageal reflux disease): intermittent  H/O ptosis of eyelid: right eye (sometimes wears patch)  Hematuria  Hyperlipidemia  Anemia  Diabetes  Renal insufficiency  Cardiomyopathy  Cholelithiasis  Hydrocele in adult  Glaucoma  BPH (benign prostatic hyperplasia)  CAD (coronary artery disease): CARD STENT X2 4/2018  TIA (transient ischemic attack): X2 JULY 2018  Dementia  CHF (congestive heart failure): COMBINED SYSTOLIC &amp; DIASTOLIC  Afib  Heart attack: 1/2018  Sepsis: 1/18 FROM INFECTED GALLBLADDER  Hypercholesteremia  HTN (hypertension)  DM (diabetes mellitus)  History of suprapubic catheter  H/O flexible sigmoidoscopy: 2015  H/O bilateral cataract extraction: LEFT- 1/18 RIGHT 6 YRS AGO  Encounter for biliary drainage tube placement: 1/2018  H/O coronary angioplasty: WITH STENT X2 (4/2018)  History of prostate surgery: 5/17  AICD (automatic cardioverter/defibrillator) present: [employer]    Allergies    sulfa drugs (Other)    Intolerances    FAMILY HISTORY:  FHx: lung cancer: father  FH: type 2 diabetes      Vital Signs Last 24 Hrs  T(C): 36.7 (19 May 2020 22:37), Max: 36.7 (19 May 2020 22:37)  T(F): 98.1 (19 May 2020 22:37), Max: 98.1 (19 May 2020 22:37)  HR: 71 (19 May 2020 22:37) (64 - 74)  BP: 174/92 (19 May 2020 22:37) (157/93 - 194/86)  BP(mean): --  RR: 18 (19 May 2020 22:37) (18 - 18)  SpO2: 100% (19 May 2020 21:28) (97% - 100%)      PHYSICAL EXAM:      Constitutional: NAD, A&O x3    Eyes: PERRLA, no conjuctivitis    Neck: no lymphadenopathy    Respiratory: +air entry, no rales, no rhonchi, no wheezes    Cardiovascular: +S1 and S2,  Gastrointestinal: +BS, soft, non-tender, not distended    Extremities:  no edema, no calf tenderness    Neurological: sensation intact, ROM equal B/L, CN II-XII intact    Skin: no rashes, normal turgor 79 y/o male with hx of HTN, CAD s/p AICD/PPM, a.fib on eliquis, TIA, CKD, dementia, BPH s/p suprapubic catheter BIBA for evaluation due to generalized weakness, diarrhea along with abdominal discomfort. Pt with recent change of suprapubic catheter within last two weeks by Dr. Walsh. Pt was covid positive on 2020. Denies any nausea/vomiting, cough, CP, SOB and palpitations. Pt lives alone for about 3 months since wife went to live with daughter in [place]. Denies neck or back pain.     WBC = 35.86, Tmax =98.1F, ALK-P= 786, AST = 66, ALT=109, NML TB = 0.7. CT A/P showed;  Redemonstrated distal common bile duct calculus measuring 7 mm (previously measured 4 mm) with mild intrahepatic and extrahepatic biliary ductal dilatation. Distended gallbladder with cholelithiasis      PAST MEDICAL & SURGICAL HISTORY:  GERD (gastroesophageal reflux disease): intermittent  H/O ptosis of eyelid: right eye (sometimes wears patch)  Hematuria  Hyperlipidemia  Anemia  Diabetes  Renal insufficiency  Cardiomyopathy  Cholelithiasis  Hydrocele in adult  Glaucoma  BPH (benign prostatic hyperplasia)  CAD (coronary artery disease): CARD STENT X2 2018  TIA (transient ischemic attack): X2 2018  Dementia  CHF (congestive heart failure): COMBINED SYSTOLIC &amp; DIASTOLIC  Afib  Heart attack: 2018  Sepsis:  FROM INFECTED GALLBLADDER  Hypercholesteremia  HTN (hypertension)  DM (diabetes mellitus)  History of suprapubic catheter  H/O flexible sigmoidoscopy:   H/O bilateral cataract extraction: LEFT-  RIGHT 6 YRS AGO  Encounter for biliary drainage tube placement: 2018  H/O coronary angioplasty: WITH STENT X2 (2018)  History of prostate surgery:   AICD (automatic cardioverter/defibrillator) present: [employer]    Allergies    sulfa drugs (Other)    Intolerances    FAMILY HISTORY:  FHx: lung cancer: father  FH: type 2 diabetes      Vital Signs Last 24 Hrs  T(C): 36.7 (19 May 2020 22:37), Max: 36.7 (19 May 2020 22:37)  T(F): 98.1 (19 May 2020 22:37), Max: 98.1 (19 May 2020 22:37)  HR: 71 (19 May 2020 22:37) (64 - 74)  BP: 174/92 (19 May 2020 22:37) (157/93 - 194/86)  BP(mean): --  RR: 18 (19 May 2020 22:37) (18 - 18)  SpO2: 100% (19 May 2020 21:28) (97% - 100%)      PHYSICAL EXAM:      Constitutional: NAD, A&O x3    Eyes: PERRLA, no conjuctivitis    Neck: no lymphadenopathy    Respiratory: +air entry, no rales, no rhonchi, no wheezes    Cardiovascular: +S1 and S2,  Gastrointestinal: +BS, soft, mild TTP RLQ>LLQ, no guarding, no rebound, not distended    Extremities:  no edema, no calf tenderness    Neurological: sensation intact, ROM equal B/L, CN II-XII intact    Skin: no rashes, normal turgor                            9.6    35.86 )-----------( 260      ( 19 May 2020 18:00 )             29.9     05-19    138  |  101  |  74<HH>  ----------------------------<  159<H>  5.0   |  15<L>  |  4.6<HH>    Ca    7.0<L>      19 May 2020 18:00  Mg     0.9         TPro  6.5  /  Alb  3.4<L>  /  TBili  0.7  /  DBili  x   /  AST  66<H>  /  ALT  109<H>  /  AlkPhos  786<H>  05-19          Urinalysis Basic - ( 19 May 2020 17:42 )    Color: Yellow / Appearance: Cloudy / S.025 / pH: x  Gluc: x / Ketone: Negative  / Bili: Negative / Urobili: 0.2 mg/dL   Blood: x / Protein: >=300 mg/dL / Nitrite: Negative   Leuk Esterase: Large / RBC: 6-10 /HPF / WBC >50 /HPF   Sq Epi: x / Non Sq Epi: Negative / Bacteria: Many      PT/INR - ( 19 May 2020 18:00 )   PT: 17.50 sec;   INR: 1.52 ratio         PTT - ( 19 May 2020 18:00 )  PTT:34.0 sec  CARDIAC MARKERS ( 19 May 2020 18:00 )  x     / 0.47 ng/mL / 517 U/L / x     / x          CAPILLARY BLOOD GLUCOSE      POCT Blood Glucose.: 107 mg/dL (19 May 2020 22:33)        < from: CT Abdomen and Pelvis No Cont (20 @ 19:21) >      IMPRESSION:     1.  Suprapubic catheter with under distention of the bladder and surrounding perivesicular inflammatory change suggestive of cystitis  2.   Unchanged mild left hydro moderate ureteronephrosis with surrounding inflammatory change, consistent with ascending left urinary tract infection. Correlate with urinalysis.  3.  Redemonstrated distal common bile duct calculus measuring 7 mm (previously measured 4 mm) with mild intrahepatic and extrahepatic biliary ductal dilatation.   4.  Distended gallbladder with cholelithiasis  5.  Small bilateral pleural effusions with adjacent atelectasis.        < end of copied text >

## 2020-05-20 NOTE — CONSULT NOTE ADULT - SUBJECTIVE AND OBJECTIVE BOX
[facility]  INITIAL CONSULT NOTE  --------------------------------------------------------------------------------  HPI:  79 yo white male w/ PMHx as below, including CKD w/ Screat 1.5 in January.  Denies actively seeing any Nephrologist.  Came w/ severe weakness, inability to stand up.  Had SPC changed 2 weeks ago.  No documented fever or low BP.  On Lisinopril and Lasix at home.      PAST HISTORY  --------------------------------------------------------------------------------  PAST MEDICAL & SURGICAL HISTORY:  GERD (gastroesophageal reflux disease): intermittent  H/O ptosis of eyelid: right eye (sometimes wears patch)  Hematuria  Hyperlipidemia  Anemia  Diabetes  Renal insufficiency  Cardiomyopathy  Cholelithiasis  Hydrocele in adult  Glaucoma  BPH (benign prostatic hyperplasia)  CAD (coronary artery disease): CARD STENT X2 4/2018  TIA (transient ischemic attack): X2 JULY 2018  Dementia  CHF (congestive heart failure): COMBINED SYSTOLIC &amp; DIASTOLIC  Afib  Heart attack: 1/2018  Sepsis: 1/18 FROM INFECTED GALLBLADDER  Hypercholesteremia  HTN (hypertension)  DM (diabetes mellitus)  History of suprapubic catheter  H/O flexible sigmoidoscopy: 2015  H/O bilateral cataract extraction: LEFT- 1/18 RIGHT 6 YRS AGO  Encounter for biliary drainage tube placement: 1/2018  H/O coronary angioplasty: WITH STENT X2 (4/2018)  History of prostate surgery: 5/17  AICD (automatic cardioverter/defibrillator) present: [employer]    FAMILY HISTORY:  FHx: lung cancer: father  FH: type 2 diabetes    PAST SOCIAL HISTORY:    ALLERGIES & MEDICATIONS  --------------------------------------------------------------------------------  Allergies    sulfa drugs (Other)    Intolerances      Standing Inpatient Medications  apixaban 2.5 milliGRAM(s) Oral every 12 hours  aspirin enteric coated 81 milliGRAM(s) Oral daily  cefepime   IVPB 1000 milliGRAM(s) IV Intermittent daily  dextrose 5% 1000 milliLiter(s) IV Continuous <Continuous>  dextrose 5%. 1000 milliLiter(s) IV Continuous <Continuous>  dextrose 50% Injectable 12.5 Gram(s) IV Push once  hydrALAZINE 25 milliGRAM(s) Oral every 8 hours  insulin lispro (HumaLOG) corrective regimen sliding scale   SubCutaneous three times a day before meals  latanoprost 0.005% Ophthalmic Solution 1 Drop(s) Both EYES at bedtime  metoprolol tartrate 100 milliGRAM(s) Oral two times a day  NIFEdipine XL 60 milliGRAM(s) Oral daily  pantoprazole    Tablet 40 milliGRAM(s) Oral before breakfast    PRN Inpatient Medications  dextrose 40% Gel 15 Gram(s) Oral once PRN  glucagon  Injectable 1 milliGRAM(s) IntraMuscular once PRN  oxyCODONE    IR 5 milliGRAM(s) Oral every 12 hours PRN      REVIEW OF SYSTEMS  --------------------------------------------------------------------------------  Gen: No weight changes, fatigue, fevers/chills, weakness  Skin: No rashes  Head/Eyes/Ears/Mouth: No headache; Normal hearing; Normal vision w/o blurriness; No sinus pain/discomfort, sore throat  Respiratory: No dyspnea, cough, wheezing, hemoptysis  CV: No chest pain, PND, orthopnea  GI: No abdominal pain, diarrhea, constipation, nausea, vomiting, melena, hematochezia  : No increased frequency, dysuria, hematuria, nocturia  MSK: No joint pain/swelling; no back pain; no edema  Neuro: No dizziness/lightheadedness, weakness, seizures, numbness, tingling  Heme: No easy bruising or bleeding  Endo: No heat/cold intolerance  Psych: No significant nervousness, anxiety, stress, depression    All other systems were reviewed and are negative, except as noted.    VITALS/PHYSICAL EXAM  --------------------------------------------------------------------------------  T(C): 36.4 (05-20-20 @ 14:10), Max: 36.7 (05-19-20 @ 22:37)  HR: 58 (05-20-20 @ 14:10) (58 - 74)  BP: 116/58 (05-20-20 @ 14:10) (116/58 - 194/86)  RR: 17 (05-20-20 @ 06:30) (17 - 18)  SpO2: 99% (05-20-20 @ 06:30) (97% - 100%)  Wt(kg): --  Height (cm): 175.3 (05-19-20 @ 22:37)  Weight (kg): 68.1 (05-19-20 @ 22:37)  BMI (kg/m2): 22.2 (05-19-20 @ 22:37)  BSA (m2): 1.83 (05-19-20 @ 22:37)      05-19-20 @ 07:01  -  05-20-20 @ 07:00  --------------------------------------------------------  IN: 675 mL / OUT: 600 mL / NET: 75 mL    05-20-20 @ 07:01  -  05-20-20 @ 14:44  --------------------------------------------------------  IN: 1700 mL / OUT: 275 mL / NET: 1425 mL      Physical Exam:  	Gen: NAD, well-appearing, thin  	HEENT: PERRL, supple neck, clear oropharynx  	Pulm: CTA B/L  	CV: RRR, S1S2; no rub  	Back: No spinal or CVA tenderness; no sacral edema  	Abd: +BS, soft, nontender/nondistended  	: No suprapubic tenderness  	UE: Warm, FROM, no clubbing, intact strength; no edema; no asterixis  	LE: Warm, FROM, no clubbing, intact strength; no edema  	Neuro: No focal deficits, intact gait  	Psych: Normal affect and mood  	Skin: Warm, without rashes  	Vascular access:    LABS/STUDIES  --------------------------------------------------------------------------------              8.7    23.45 >-----------<  213      [05-20-20 @ 06:44]              27.2     141  |  106  |  75  ----------------------------<  154      [05-20-20 @ 06:44]  4.3   |  18  |  4.5        Ca     6.5     [05-20-20 @ 06:44]      Mg     1.3     [05-20-20 @ 06:44]      Phos  4.5     [05-20-20 @ 06:44]    TPro  5.6  /  Alb  2.9  /  TBili  0.4  /  DBili  x   /  AST  32  /  ALT  70  /  AlkPhos  579  [05-20-20 @ 06:44]    PT/INR: PT 17.50, INR 1.52       [05-19-20 @ 18:00]  PTT: 34.0       [05-19-20 @ 18:00]    Troponin 0.40      [05-20-20 @ 06:44]        [05-20-20 @ 06:44]    Creatinine Trend:  SCr 4.5 [05-20 @ 06:44]  SCr 4.6 [05-19 @ 18:00]    Urinalysis - [05-20-20 @ 07:00]      Color Yellow / Appearance Cloudy / SG 1.025 / pH 6.0      Gluc Negative / Ketone Negative  / Bili Negative / Urobili 0.2       Blood Large / Protein >=300 / Leuk Est Large / Nitrite Negative      RBC Many / WBC Loaded / Hyaline  / Gran  / Sq Epi  / Non Sq Epi Occasional / Bacteria Many      HbA1c 5.9      [01-29-20 @ 05:25]  TSH 2.12      [01-29-20 @ 05:25]

## 2020-05-20 NOTE — CONSULT NOTE ADULT - ASSESSMENT
1)  Severe non-oliguric ISRRAEL.  Possible etiologies include intravascular volume depletion, sepsis syndrome, CBD stone (ISRRAEL due to bile salts leaking into systemic circulation).    2)  Evidence on CT suggestive of ascending UTI, likely related to recent manipulation of pt's abnormal urinary tract    3)  Hypocalcemia, after correcting for low albumin.  Likely multifactorial, including sepsis, possible Vit D deficiency, ? hyperphosphatemia, secondary HPT depending on true baseline GFR    Recommendations:    1)  Continue current Abx    2)  Agree w/ holding Lasix and Lisinopril.    3)  Continue IV volume expansion.  Eliminate NaHCO3 once serum HCO3- > 20

## 2020-05-20 NOTE — CONSULT NOTE ADULT - PROBLEM SELECTOR RECOMMENDATION 9
-Pain mgmt  -RUQ sono  -IV hydration  -IV abx  -Hypomagnesemia, replenish   -ISRRAEL consider renal   -Elevated Trop I, ACS protocol  -Repeat labs including LFTs  -GI and DVT prophylaxis -Pain mgmt  -NPO  -RUQ sono  -IV hydration  -IV abx  -Hypomagnesemia, replenish   -ISRRAEL consider renal   -Elevated Trop I, ACS protocol  -Repeat labs including LFTs  -GI and DVT prophylaxis -Pain mgmt  -NPO  -RUQ sono  -IV hydration  -Leukocytosis/sepsis; IV abx, consider ID consult  -Hypomagnesemia, replenish   -ISRRAEL consider renal   -Elevated Trop I, ACS protocol  -Repeat labs including LFTs  -GI and DVT prophylaxis

## 2020-05-20 NOTE — CONSULT NOTE ADULT - SUBJECTIVE AND OBJECTIVE BOX
CARDIOLOGY CONSULT NOTE     CHIEF COMPLAINT/REASON FOR CONSULT:    HPI:  81yo male with multiple chronic medical conditions (which includes presence of AICD, atrial fibrillation on Eliquis, CKD stage 3b, HTN, DLD, BPH, CAD (has stents), TIA, glaucoma and DM) presents to the hospital because he was too weak to get out of bed this morning. Apparently associated with diarrhea (he was covered with feces on arrival to ER) but no fevers (patient says he never karla a fever). He says he may have had a little abdominal pain, which is no longer present, but he has chronic pains to SPC site. Also denies chest pain (19 May 2020 21:30)      PAST MEDICAL & SURGICAL HISTORY:  GERD (gastroesophageal reflux disease): intermittent  H/O ptosis of eyelid: right eye (sometimes wears patch)  Hematuria  Hyperlipidemia  Anemia  Diabetes  Renal insufficiency  Cardiomyopathy  Cholelithiasis  Hydrocele in adult  Glaucoma  BPH (benign prostatic hyperplasia)  CAD (coronary artery disease): CARD STENT X2 4/2018  TIA (transient ischemic attack): X2 JULY 2018  Dementia  CHF (congestive heart failure): COMBINED SYSTOLIC &amp; DIASTOLIC  Afib  Heart attack: 1/2018  Sepsis: 1/18 FROM INFECTED GALLBLADDER  Hypercholesteremia  HTN (hypertension)  DM (diabetes mellitus)  History of suprapubic catheter  H/O flexible sigmoidoscopy: 2015  H/O bilateral cataract extraction: LEFT- 1/18 RIGHT 6 YRS AGO  Encounter for biliary drainage tube placement: 1/2018  H/O coronary angioplasty: WITH STENT X2 (4/2018)  History of prostate surgery: 5/17  AICD (automatic cardioverter/defibrillator) present: [employer]      Cardiac Risks:   [x ]HTN, [x ] DM, [ ] Smoking, [ ] FH,  [ x] Lipids        MEDICATIONS:  MEDICATIONS  (STANDING):  apixaban 2.5 milliGRAM(s) Oral every 12 hours  aspirin enteric coated 81 milliGRAM(s) Oral daily  cefepime   IVPB 1000 milliGRAM(s) IV Intermittent daily  dextrose 5% 1000 milliLiter(s) (75 mL/Hr) IV Continuous <Continuous>  dextrose 5%. 1000 milliLiter(s) (50 mL/Hr) IV Continuous <Continuous>  dextrose 50% Injectable 12.5 Gram(s) IV Push once  insulin lispro (HumaLOG) corrective regimen sliding scale   SubCutaneous three times a day before meals  latanoprost 0.005% Ophthalmic Solution 1 Drop(s) Both EYES at bedtime  metoprolol tartrate 100 milliGRAM(s) Oral two times a day  NIFEdipine XL 60 milliGRAM(s) Oral daily  pantoprazole    Tablet 40 milliGRAM(s) Oral before breakfast      FAMILY HISTORY:  FHx: lung cancer: father  FH: type 2 diabetes      SOCIAL HISTORY:      [ ] Marital status    Allergies    sulfa drugs (Other)        	    REVIEW OF SYSTEMS:  CONSTITUTIONAL: No fever, weight loss, or fatigue  EYES: No eye pain, visual disturbances, or discharge  ENMT:  No difficulty hearing, tinnitus, vertigo; No sinus or throat pain  NECK: No pain or stiffness  RESPIRATORY: No cough, wheezing, chills or hemoptysis; No Shortness of Breath  CARDIOVASCULAR: No chest pain, palpitations, passing out, dizziness, or leg swelling  GASTROINTESTINAL: No abdominal or epigastric pain. No nausea, vomiting, or hematemesis; No diarrhea or constipation. No melena or hematochezia.  GENITOURINARY: No dysuria, frequency, hematuria, or incontinence  NEUROLOGICAL: No headaches, memory loss, loss of strength, numbness, or tremors  SKIN: No itching, burning, rashes, or lesions   	        PHYSICAL EXAM:  T(C): 35.6 (05-20-20 @ 05:30), Max: 36.7 (05-19-20 @ 22:37)  HR: 65 (05-20-20 @ 06:30) (64 - 74)  BP: 174/81 (05-20-20 @ 06:30) (157/93 - 194/86)  RR: 17 (05-20-20 @ 06:30) (17 - 18)  SpO2: 99% (05-20-20 @ 06:30) (97% - 100%)  Wt(kg): --  I&O's Summary    19 May 2020 07:01  -  20 May 2020 07:00  --------------------------------------------------------  IN: 675 mL / OUT: 600 mL / NET: 75 mL        Appearance: Normal	  Psychiatry: A & O x 3, Mood & affect appropriate  HEENT:   Normal oral mucosa, PERRL, EOMI	  Lymphatic: No lymphadenopathy  Cardiovascular: Normal S1 S2,RRR, No JVD, No murmurs  Respiratory: Lungs clear to auscultation	  Gastrointestinal:  Soft, Non-tender, + BS	  Skin: No rashes, No ecchymoses, No cyanosis	  Neurologic: Non-focal  Extremities: Normal range of motion, No clubbing, cyanosis or edema  Vascular: Peripheral pulses palpable 2+ bilaterally      ECG:  	atrial sensed vent paced    	  LABS:	 	    CARDIAC MARKERS:                                    8.7    23.45 )-----------( 213      ( 20 May 2020 06:44 )             27.2     05-20    141  |  106  |  75<HH>  ----------------------------<  154<H>  4.3   |  18  |  4.5<HH>    Ca    6.5<L>      20 May 2020 06:44  Phos  4.5     05-20  Mg     1.3     05-20    TPro  5.6<L>  /  Alb  2.9<L>  /  TBili  0.4  /  DBili  x   /  AST  32  /  ALT  70<H>  /  AlkPhos  579<H>  05-20    PT/INR - ( 19 May 2020 18:00 )   PT: 17.50 sec;   INR: 1.52 ratio         PTT - ( 19 May 2020 18:00 )  PTT:34.0 sec

## 2020-05-20 NOTE — CONSULT NOTE ADULT - SUBJECTIVE AND OBJECTIVE BOX
ROCIO NATION  80y, Male  Allergy: sulfa drugs (Other)      CHIEF COMPLAINT: ISRRAEL (20 May 2020 05:48)      HPI:  81yo male with multiple chronic medical conditions (which includes presence of AICD, atrial fibrillation on Eliquis, CKD stage 3b, HTN, DLD, BPH, prostate surgery: , CAD (has stents), TIA, glaucoma and DM) presents to the hospital because he was too weak to get out of bed this morning. Apparently associated with diarrhea (he was covered with feces on arrival to ER) but no fevers (patient says he never karla a fever). He says he may have had a little abdominal pain, which is no longer present, but he has chronic pains to SPC site. Also denies chest pain (19 May 2020 21:30)    FAMILY HISTORY:  FHx: lung cancer: father  FH: type 2 diabetes    PAST MEDICAL & SURGICAL HISTORY:  GERD (gastroesophageal reflux disease): intermittent  H/O ptosis of eyelid: right eye (sometimes wears patch)  Hematuria  Hyperlipidemia  Anemia  Diabetes  Renal insufficiency  Cardiomyopathy  Cholelithiasis  Hydrocele in adult  Glaucoma  BPH (benign prostatic hyperplasia)  CAD (coronary artery disease): CARD STENT X2 2018  TIA (transient ischemic attack): X2 2018  Dementia  CHF (congestive heart failure): COMBINED SYSTOLIC &amp; DIASTOLIC  Afib  Heart attack: 2018  Sepsis:  FROM INFECTED GALLBLADDER  Hypercholesteremia  HTN (hypertension)  DM (diabetes mellitus)  History of suprapubic catheter  H/O flexible sigmoidoscopy:   H/O bilateral cataract extraction: LEFT-  RIGHT 6 YRS AGO  Encounter for biliary drainage tube placement: 2018  H/O coronary angioplasty: WITH STENT X2 (2018)  History of prostate surgery:   AICD (automatic cardioverter/defibrillator) present: [employer]      SOCIAL HISTORY  Social History:  no alcohol or tobacco use (19 May 2020 21:30)        ROS  General: Denies fevers, chills, nightsweats, weight loss  HEENT: Denies headache, rhinorrhea, sore throat, eye pain  CV: Denies CP, palpitations  PULM: Denies SOB, cough  : Denies dysuria, hematuria  MSK: Denies arthralgias  SKIN: Denies rash   NEURO: Denies paresthesias, weakness  PSYCH: Denies depression    VITALS:  T(F): 96.1, Max: 98.1 (20 @ 22:37)  HR: 65  BP: 174/81  RR: 17Vital Signs Last 24 Hrs  T(C): 35.6 (20 May 2020 05:30), Max: 36.7 (19 May 2020 22:37)  T(F): 96.1 (20 May 2020 05:30), Max: 98.1 (19 May 2020 22:37)  HR: 65 (20 May 2020 06:30) (64 - 74)  BP: 174/81 (20 May 2020 06:30) (157/93 - 194/86)  BP(mean): --  RR: 17 (20 May 2020 06:30) (17 - 18)  SpO2: 99% (20 May 2020 06:30) (97% - 100%)    PHYSICAL EXAM:  Gen: NAD, resting in bed  HEENT: Normocephalic, atraumatic  Neck: supple, no lymphadenopathy  CV: Regular rate & regular rhythm  Lungs: decreased BS at bases, no fremitus  Abdomen: Soft, BS present  Ext: Warm, well perfused  Neuro: non focal, awake  Skin: no rash, no erythema    TESTS & MEASUREMENTS:                        8.7    23.45 )-----------( 213      ( 20 May 2020 06:44 )             27.2         141  |  106  |  75<HH>  ----------------------------<  154<H>  4.3   |  18  |  4.5<HH>    Ca    6.5<L>      20 May 2020 06:44  Phos  4.5       Mg     1.3         TPro  5.6<L>  /  Alb  2.9<L>  /  TBili  0.4  /  DBili  x   /  AST  32  /  ALT  70<H>  /  AlkPhos  579<H>      eGFR if Non African American: 12 mL/min/1.73M2 (20 @ 06:44)  eGFR if : 14 mL/min/1.73M2 (20 @ 06:44)  eGFR if Non African American: 11 mL/min/1.73M2 (20 @ 18:00)  eGFR if : 13 mL/min/1.73M2 (20 @ 18:00)    LIVER FUNCTIONS - ( 20 May 2020 06:44 )  Alb: 2.9 g/dL / Pro: 5.6 g/dL / ALK PHOS: 579 U/L / ALT: 70 U/L / AST: 32 U/L / GGT: x           Urinalysis Basic - ( 20 May 2020 07:00 )    Color: Yellow / Appearance: Cloudy / S.025 / pH: x  Gluc: x / Ketone: Negative  / Bili: Negative / Urobili: 0.2 mg/dL   Blood: x / Protein: >=300 mg/dL / Nitrite: Negative   Leuk Esterase: Large / RBC: x / WBC x   Sq Epi: x / Non Sq Epi: x / Bacteria: x          Lactate, Blood: <0.2 mmol/L (20 @ 06:44)  Blood Gas Venous - Lactate: 1.1 mmoL/L (20 @ 19:00)  Lactate, Blood: 2.4 mmol/L (20 @ 18:00)      INFECTIOUS DISEASES TESTING      RADIOLOGY & ADDITIONAL TESTS:  I have personally reviewed the last Chest xray  CXR      CT  CT Abdomen and Pelvis No Cont:   EXAM:  CT ABDOMEN AND PELVIS            PROCEDURE DATE:  2020            INTERPRETATION:  CLINICAL STATEMENT: Abdominal pain.      TECHNIQUE: Contiguous axial CT images were obtained from the lower chest to the pubic symphysis without intravenous contrast.  Oral contrast was not administered.  Reformatted images in the coronal and sagittal planes were acquired.    COMPARISON CT: 2020    OTHER STUDIES USED FOR CORRELATION: None.       FINDINGS:    LOWER CHEST: Small bilateral pleural effusions with adjacent atelectasis.    HEPATOBILIARY: Redemonstrated distal common bile duct calculus measuring 7 mm (series 4 image 119, series 601 image 68, previously measured 4 mm) with mild intrahepatic and extrahepatic biliary ductal dilatation. Distended gallbladder with cholelithiasis.     SPLEEN: Unremarkable.    PANCREAS: Unremarkable.    ADRENAL GLANDS: Unremarkable.    KIDNEYS: Unchanged mild left hydroureteronephrosis with surrounding inflammatory change. No right hydronephrosis.    ABDOMINOPELVIC NODES: Unremarkable.    PELVIC ORGANS: Suprapubic Mccarthy catheter visualized within the collapsed urinary bladder.    Testicular inflammatory change without a drainable collection Unchanged prostatomegaly.    PERITONEUM/MESENTERY/BOWEL: Colonic diverticulosis. No bowel obstruction, ascites or pneumoperitoneum.    BONES/SOFT TISSUES: Degenerative changes of the spine. Unchanged chronic right 10th and bilateral 11th rib fractures.    OTHER: Atherosclerotic vascular calcifications.      IMPRESSION:     1.  Suprapubic catheter with under distention of the bladder and surrounding perivesicular inflammatory change suggestive of cystitis  2.   Unchanged mild left hydro moderate ureteronephrosis with surrounding inflammatory change, consistent with ascending left urinary tract infection. Correlate with urinalysis.  3.  Redemonstrated distal common bile duct calculus measuring 7 mm (previously measured 4 mm) with mild intrahepatic and extrahepatic biliary ductal dilatation.   4.  Distended gallbladder with cholelithiasis  5.  Small bilateral pleural effusions with adjacent atelectasis.              SHELBY ANDERSON M.D., RESIDENT RADIOLOGIST  This document has been electronically signed.  WES BALTAZAR M.D., ATTENDING RADIOLOGIST  Thisdocument has been electronically signed. May 19 2020  8:29PM             (20 @ 19:21)      CARDIOLOGY TESTING      MEDICATIONS  apixaban 2.5  aspirin enteric coated 81  cefepime   IVPB 1000  dextrose 5% 1000  dextrose 5%. 1000  dextrose 50% Injectable 12.5  insulin lispro (HumaLOG) corrective regimen sliding scale   latanoprost 0.005% Ophthalmic Solution 1  metoprolol tartrate 100  NIFEdipine XL 60  pantoprazole    Tablet 40      ANTIBIOTICS:  cefepime   IVPB 1000 milliGRAM(s) IV Intermittent daily      All available historical data has been reviewed

## 2020-05-20 NOTE — CONSULT NOTE ADULT - ASSESSMENT
80yMale pmh GERD, DM, BPH, MI 2018, A-Fib on eliquis, PPM/AICD, CKD stage 3B, CAD s/p stents x2 4/2018 presents with weakness unable to get out of bed this morning.      Problem 1- 80yMale pmh GERD, DM, BPH, MI 2018, A-Fib on eliquis, PPM/AICD, CKD stage 3B, CAD s/p stents x2 4/2018 presents with weakness unable to get out of bed this morning.      Problem 1-Transaminitis CBD stone from prior no RUQ pain A and O x3  DDX( cholestasis of sepsis secondary to Choledocholithiasis, infectious, DILI)  Rec  -At current source of infection suspected UTI WBC improving with cefepime  -Follow-up blood cultures, urine culture  -Trend LFTs and INR daily    -IF LFTs continue to improve and patient continues to show no signs of RUQ pain, and has no fevers and WBC continues to improve will consider outpatient ERCP however if LFTs 80yMale pmh GERD, DM, BPH, MI 2018, A-Fib on eliquis, PPM/AICD, CKD stage 3B, CAD s/p stents x2 4/2018 presents with weakness unable to get out of bed this morning.      Problem 1-Transaminitis Cholelithiasis +CBD stone from prior no RUQ pain A and O x3  DDX( cholestasis of sepsis secondary to Choledocholithiasis, infectious, DILI) Redemonstrated distal common bile duct calculus measuring 7 mm (previously measured 4 mm) with mild intrahepatic and extrahepatic biliary ductal dilatation.   Rec  -At current source of infection suspected UTI WBC improving with cefepime  -Follow-up blood cultures, urine culture  -Trend LFTs and INR daily    -IF LFTs continue to improve and patient continues to show no signs of RUQ pain, and has no fevers and WBC continues to improve will consider outpatient ERCP however if LFTs fail to improve or patient shows signs of clinical and hemodynamics and CBC worsen will consider inpatient ERCP more urgently  -Continue antibiotics  -Avoid hepatotoxic medications  -Maintain hemodynamic stability    Problem 2-Suprapubic catheter with under distention of the bladder and surrounding perivesicular inflammatory change suggestive of cystitis. Unchanged mild left hydro moderate ureteronephrosis with surrounding inflammatory change, consistent with ascending left urinary tract infection.   Rec  -Care as per primary team     Problem 3-Small bilateral pleural effusions with adjacent atelectasis.  Rec  -Care as per primary team

## 2020-05-20 NOTE — CONSULT NOTE ADULT - ATTENDING COMMENTS
Patient seen and examined with surgery team on rounds and discussed management plans. No significant RUQ tenderness, Patient lying in bed comfortable suprapubic catheter in place with turbid urine. Increased wbc may be related to UTI rather than cholecystitis. will follow
Patient seend

## 2020-05-20 NOTE — PROGRESS NOTE ADULT - ASSESSMENT
Patient is 81 yo male with hx of Afib, Anemia, BPH (benign prostatic hyperplasia), CAD (coronary artery disease) CARD STENT X2 4/2018, Cardiomyopathy, CHF (congestive heart failure) COMBINED SYSTOLIC & DIASTOLIC, Cholelithiasis, Dementia,   DM (diabetes mellitus), GERD (gastroesophageal reflux disease) intermittent, Glaucoma, H/O ptosis of eyelid right eye (sometimes wears patch), Heart attack 1/2018, Hematuria, HTN (hypertension), Hydrocele in adult, Hypercholesteremia, Renal insufficiency, Sepsis 1/18 FROM INFECTED GALLBLADDER, TIA (transient ischemic attack) X2 JULY 2018, S/P AICD (automatic cardioverter/defibrillator) present [facility], H/O bilateral cataract extraction LEFT- 1/18 RIGHT 6 YRS AGO, H/O coronary angioplasty WITH STENT X2 (4/2018), H/O flexible sigmoidoscopy 2015, History of prostate surgery 5/17,   and History of suprapubic catheter presenting with     1. Sepsis  -Seems to be secondary to UTI  -Abdm CT scan shows Suprapubic catheter with under distention of the bladder and surrounding perivesicular inflammatory change suggestive of cystitis.  Unchanged mild left hydro moderate ureteronephrosis with surrounding inflammatory change, consistent with ascending left urinary tract infection. Correlate with urinalysis.  Redemonstrated distal common bile duct calculus measuring 7 mm (previously measured 4 mm) with mild intrahepatic and extrahepatic biliary ductal dilatation.   Distended gallbladder with cholelithiasis  -Will obtain Abdm US  -Continue with current antibiotic pending UC/BC  -Leukocytosis trending down  -ID, and urology consult       2.   Abnormal Trop  -Seems to be secondary to demand mismatch ischemia in the setting of Sepsis, ISRRAEL  -TTE  -Trop level flat  -Cardiology to follow up     3. ISRRAEL  -Abdm CT scan shows hydronephrosis/?Chronic  -Continue with Bicarb drip  -Urology, and nephrology to follow up     4. Abnormal LFT  -Abdm CT scan shows distal common bile duct calculus measuring 7 mm (previously measured 4 mm) with mild intrahepatic and extrahepatic biliary ductal dilatation.   Distended gallbladder with cholelithiasis  -Abdm US pending  -Surgery to follow up    5. HTN  -Continue with metoprolol, nifedipine and will add hydralazine for elevated BP  -Monitor BP    6. CHF  -Stable  -No sign of volume overload    7.  DM2  -ISS  -Monitor POC    8.  A. Fib  -Continue with metoprolol for rate control  -Continue with current anticoagulation      #Progress Note Handoff  Pending (specify):  renal function  Family discussion:  plan of care was discussed with patient   in details.  all questions were answered.  seems to understand, and in agreement  Disposition:  unknown

## 2020-05-20 NOTE — PROGRESS NOTE ADULT - ASSESSMENT
79 y/o male with hx of HTN, CAD s/p AICD/PPM, a.fib on eliquis, TIA, CKD, dementia, BPH s/p suprapubic catheter BIBA for evaluation due to generalized weakness, diarrhea along with abdominal discomfort. LFT's mildly elevated; has 7 mm CBD stone     No acute surgical intervention - no evidence of acute cholecystitis  Per GI: if LFT's improve, will consider outpt ERCP   Can also consider IR eval for PTC removal of CBD stone

## 2020-05-20 NOTE — CONSULT NOTE ADULT - ASSESSMENT
79 y/o male with hx of HTN, CAD s/p AICD/PPM, a.fib on eliquis, TIA, CKD, dementia, BPH s/p suprapubic catheter BIBA for evaluation due to generalized weakness, diarrhea along with abdominal discomfort. Pt with recent change of suprapubic catheter within last two weeks by Dr. Walsh. Pt was covid positive on 4/16/2020. Denies any nausea/vomiting, cough, CP, SOB and palpitations. Pt lives alone for about 3 months since wife went to live with daughter in [place]. Denies neck or back pain.     WBC = 35.86, Tmax =98.1F, ALK-P= 786, AST = 66, ALT=109, NML TB = 0.7. CT A/P showed;  Redemonstrated distal common bile duct calculus measuring 7 mm (previously measured 4 mm) with mild intrahepatic and extrahepatic biliary ductal dilatation. Distended gallbladder with cholelithiasis

## 2020-05-20 NOTE — PROGRESS NOTE ADULT - SUBJECTIVE AND OBJECTIVE BOX
CC.  ISRRAEL  HPI.  Patient reports Abdm pain is improving.  offers no other complaints      Constitutional: No fever, fatigue or weight loss.  Skin: No rash.  Eyes: No recent vision problems or eye pain.  ENT: No congestion, ear pain, or sore throat.  Endocrine: No thyroid problems.  Cardiovascular: No chest pain or palpation.  Respiratory: No cough, shortness of breath, congestion, or wheezing.  Gastrointestinal: No  nausea, vomiting, or diarrhea.  Genitourinary: No dysuria.  Musculoskeletal: No joint swelling.  Neurologic: No headache.      Vital Signs Last 24 Hrs  T(C): 35.6 (20 @ 05:30), Max: 36.7 (0520 @ 22:37)  T(F): 96.1 (20 @ 05:30), Max: 98.1 (20 @ 22:37)  HR: 65 (20 @ 06:30) (64 - 74)  BP: 174/81 (20 @ 06:30) (157/93 - 194/86)  BP(mean): --  RR: 17 (20 @ 06:30) (17 - 18)  SpO2: 99% (20 @ 06:30) (97% - 100%)        PHYSICAL EXAM-  GENERAL: NAD, chronic ill appearing male  HEAD:  Atraumatic, Normocephalic  EYES: EOMI, PERRLA, conjunctiva and sclera clear  NECK: Supple, No JVD, Normal thyroid  NERVOUS SYSTEM:  Alert & Oriented X3, Moving all extremities  CHEST/LUNG: Clear to percussion bilaterally; No rales, rhonchi, wheezing, or rubs  HEART: Regular rate and rhythm; No murmurs, rubs, or gallops  ABDOMEN: Soft, Min tenderness Nondistended; Bowel sounds present  EXTREMITIES:   No clubbing, cyanosis, or edema  SKIN: No rashes or lesions                                  8.7    23.45 )-----------( 213      ( 20 May 2020 06:44 )             27.2     -    141  |  106  |  75<HH>  ----------------------------<  154<H>  4.3   |  18  |  4.5<HH>    Ca    6.5<L>      20 May 2020 06:44  Phos  4.5     05-20  Mg     1.3     05-20    TPro  5.6<L>  /  Alb  2.9<L>  /  TBili  0.4  /  DBili  x   /  AST  32  /  ALT  70<H>  /  AlkPhos  579<H>  05-20    CARDIAC MARKERS ( 20 May 2020 06:44 )  x     / 0.40 ng/mL / 332 U/L / x     / 6.3 ng/mL  CARDIAC MARKERS ( 19 May 2020 18:00 )  x     / 0.47 ng/mL / 517 U/L / x     / x            Urinalysis Basic - ( 20 May 2020 07:00 )    Color: Yellow / Appearance: Cloudy / S.025 / pH: x  Gluc: x / Ketone: Negative  / Bili: Negative / Urobili: 0.2 mg/dL   Blood: x / Protein: >=300 mg/dL / Nitrite: Negative   Leuk Esterase: Large / RBC: Many /HPF / WBC Loaded /HPF   Sq Epi: x / Non Sq Epi: Occasional /HPF / Bacteria: Many      PT/INR - ( 19 May 2020 18:00 )   PT: 17.50 sec;   INR: 1.52 ratio         PTT - ( 19 May 2020 18:00 )  PTT:34.0 sec        MEDICATIONS  (STANDING):  apixaban 2.5 milliGRAM(s) Oral every 12 hours  aspirin enteric coated 81 milliGRAM(s) Oral daily  cefepime   IVPB 1000 milliGRAM(s) IV Intermittent daily  dextrose 5% 1000 milliLiter(s) (75 mL/Hr) IV Continuous <Continuous>  dextrose 5%. 1000 milliLiter(s) (50 mL/Hr) IV Continuous <Continuous>  dextrose 50% Injectable 12.5 Gram(s) IV Push once  insulin lispro (HumaLOG) corrective regimen sliding scale   SubCutaneous three times a day before meals  latanoprost 0.005% Ophthalmic Solution 1 Drop(s) Both EYES at bedtime  metoprolol tartrate 100 milliGRAM(s) Oral two times a day  NIFEdipine XL 60 milliGRAM(s) Oral daily  pantoprazole    Tablet 40 milliGRAM(s) Oral before breakfast    MEDICATIONS  (PRN):  dextrose 40% Gel 15 Gram(s) Oral once PRN Blood Glucose LESS THAN 70 milliGRAM(s)/deciliter  glucagon  Injectable 1 milliGRAM(s) IntraMuscular once PRN Glucose LESS THAN 70 milligrams/deciliter  oxyCODONE    IR 5 milliGRAM(s) Oral every 12 hours PRN Severe Pain (7 - 10)          Imaging Personally Reviewed:     [x ] YES  [ ] NO    Consultant(s) Notes Reviewed:  [x ] YES  [ ] NO    Care Discussed with Consultants/Other Providers [x ] YES  [ ] NO

## 2020-05-20 NOTE — PROGRESS NOTE ADULT - SUBJECTIVE AND OBJECTIVE BOX
S;  Pt denies overt abdominal pain,N/V  O; Vital Signs Last 24 Hrs  T(C): 36.4 (20 May 2020 14:10), Max: 36.7 (19 May 2020 22:37)  T(F): 97.5 (20 May 2020 14:10), Max: 98.1 (19 May 2020 22:37)  HR: 58 (20 May 2020 14:10) (58 - 74)  BP: 116/58 (20 May 2020 14:10) (116/58 - 194/86)  BP(mean): --  RR: 17 (20 May 2020 06:30) (17 - 18)  SpO2: 99% (20 May 2020 06:30) (97% - 100%)    MEDICATIONS  (STANDING):  apixaban 2.5 milliGRAM(s) Oral every 12 hours  aspirin enteric coated 81 milliGRAM(s) Oral daily  cefepime   IVPB 1000 milliGRAM(s) IV Intermittent daily  dextrose 5% + sodium chloride 0.45%. 1000 milliLiter(s) (75 mL/Hr) IV Continuous <Continuous>  dextrose 5%. 1000 milliLiter(s) (50 mL/Hr) IV Continuous <Continuous>  dextrose 50% Injectable 12.5 Gram(s) IV Push once  hydrALAZINE 25 milliGRAM(s) Oral every 8 hours  insulin lispro (HumaLOG) corrective regimen sliding scale   SubCutaneous three times a day before meals  latanoprost 0.005% Ophthalmic Solution 1 Drop(s) Both EYES at bedtime  metoprolol tartrate 100 milliGRAM(s) Oral two times a day  NIFEdipine XL 60 milliGRAM(s) Oral daily  pantoprazole    Tablet 40 milliGRAM(s) Oral before breakfast    EXAM:  GEN: not jaundiced  abd: soft, mild RUQ tenderness; + SPT     Labs:  CAPILLARY BLOOD GLUCOSE      POCT Blood Glucose.: 263 mg/dL (20 May 2020 11:08)  POCT Blood Glucose.: 154 mg/dL (20 May 2020 07:31)  POCT Blood Glucose.: 107 mg/dL (19 May 2020 22:33)  POCT Blood Glucose.: 158 mg/dL (19 May 2020 17:12)                          8.7    23.45 )-----------( 213      ( 20 May 2020 06:44 )             27.2       Auto Immature Granulocyte %: 7.2 % (20 @ 06:44)  Auto Neutrophil %: 88.1 % (20 @ 06:44)  Auto Neutrophil %: 88.9 % (20 @ 18:00)  Auto Immature Granulocyte %: 7.1 % (20 @ 18:00)  Band Neutrophils %: 6.0 % (20 @ 18:00)        141  |  106  |  75<HH>  ----------------------------<  154<H>  4.3   |  18  |  4.5<HH>      Calcium, Total Serum: 6.5 mg/dL (20 @ 06:44)      LFTs:             5.6  | 0.4  | 32       ------------------[579     ( 20 May 2020 06:44 )  2.9  | x    | 70          Lipase:x      Amylase:x         Lactate, Blood: <0.2 mmol/L (20 @ 06:44)  Blood Gas Venous - Lactate: 1.1 mmoL/L (20 @ 19:00)  Lactate, Blood: 2.4 mmol/L (20 @ 18:00)      Coags:     17.50  ----< 1.52    ( 19 May 2020 18:00 )     34.0        CARDIAC MARKERS ( 20 May 2020 06:44 )  x     / 0.40 ng/mL / 332 U/L / x     / 6.3 ng/mL  CARDIAC MARKERS ( 19 May 2020 18:00 )  x     / 0.47 ng/mL / 517 U/L / x     / x          Urinalysis Basic - ( 20 May 2020 07:00 )    Color: Yellow / Appearance: Cloudy / S.025 / pH: x  Gluc: x / Ketone: Negative  / Bili: Negative / Urobili: 0.2 mg/dL   Blood: x / Protein: >=300 mg/dL / Nitrite: Negative   Leuk Esterase: Large / RBC: Many /HPF / WBC Loaded /HPF   Sq Epi: x / Non Sq Epi: Occasional /HPF / Bacteria: Many    RADIOLOGY:  US Abdomen Limited (20 @ 11:26) >  IMPRESSION:    Cholelithiasis with no sonographic evidence of acute cholecystitis.     Mild CBD dilatation, 8 mm.

## 2020-05-21 NOTE — PROGRESS NOTE ADULT - ASSESSMENT
80yMale pmh GERD, DM, BPH, MI 2018, A-Fib on eliquis, PPM/AICD, CKD stage 3B, CAD s/p stents x2 4/2018 presents with weakness unable to get out of bed this morning.      Problem 1-Transaminitis Cholelithiasis +CBD stone from prior no RUQ pain A and O x3  DDX( cholestasis of sepsis secondary to Choledocholithiasis, infectious, DILI) Redemonstrated distal common bile duct calculus measuring 7 mm (previously measured 4 mm) with mild intrahepatic and extrahepatic biliary ductal dilatation.   Rec  -At current source of infection suspected UTI WBC improving with cefepime, WBC continuing to improve along with LFTs  -Follow-up blood cultures, urine culture  -Trend LFTs and INR daily    -IF LFTs continue to improve and patient continues to show no signs of RUQ pain, and has no fevers and WBC continues to improve will consider outpatient ERCP however if LFTs fail to improve or patient shows signs of clinical and hemodynamics and CBC worsen will consider inpatient ERCP more urgently  -Continue antibiotics  -Avoid hepatotoxic medications  -Maintain hemodynamic stability    Problem 2-Suprapubic catheter with under distention of the bladder and surrounding perivesicular inflammatory change suggestive of cystitis. Unchanged mild left hydro moderate ureteronephrosis with surrounding inflammatory change, consistent with ascending left urinary tract infection.   Rec  -Care as per primary team     Problem 3-Small bilateral pleural effusions with adjacent atelectasis.  Rec  -Care as per primary team 80yMale pmh GERD, DM, BPH, MI 2018, A-Fib on eliquis, PPM/AICD, CKD stage 3B, CAD s/p stents x2 4/2018 presents with weakness unable to get out of bed this morning.      Problem 1-Transaminitis Cholelithiasis +CBD stone from prior no RUQ pain A and O x3  DDX( cholestasis of sepsis secondary to Choledocholithiasis, infectious, DILI) Redemonstrated distal common bile duct calculus measuring 7 mm (previously measured 4 mm) with mild intrahepatic and extrahepatic biliary ductal dilatation.   Rec  -At current source of infection suspected UTI WBC improving with cefepime, WBC continuing to improve along with LFTs  -Follow-up blood cultures, urine culture  -Trend LFTs and INR daily    - Follow up with our GI office located on [address] [place] [address], Phone number [phone] with Dr. Monroe 6/3/2020 office will reach out with info and timing   -IF LFTs continue to improve and patient continues to show no signs of RUQ pain, and has no fevers and WBC continues to improve will consider outpatient ERCP however if LFTs fail to improve or patient shows signs of clinical and hemodynamics and CBC worsen will consider inpatient ERCP more urgently  -Continue antibiotics  -Avoid hepatotoxic medications  -Maintain hemodynamic stability    Problem 2-Suprapubic catheter with under distention of the bladder and surrounding perivesicular inflammatory change suggestive of cystitis. Unchanged mild left hydro moderate ureteronephrosis with surrounding inflammatory change, consistent with ascending left urinary tract infection.   Rec  -Care as per primary team     Problem 3-Small bilateral pleural effusions with adjacent atelectasis.  Rec  -Care as per primary team 80yMale pmh GERD, DM, BPH, MI 2018, A-Fib on eliquis, PPM/AICD, CKD stage 3B, CAD s/p stents x2 4/2018 presents with weakness unable to get out of bed this morning.      Problem 1-Transaminitis Cholelithiasis +CBD stone from prior no RUQ pain A and O x3  DDX( cholestasis of sepsis secondary to Choledocholithiasis, infectious, DILI) Redemonstrated distal common bile duct calculus measuring 7 mm (previously measured 4 mm) with mild intrahepatic and extrahepatic biliary ductal dilatation.   Rec  -At current source of infection suspected UTI WBC improving with cefepime, WBC continuing to improve along with LFTs  -Follow-up blood cultures, urine culture  -Trend LFTs and INR daily    - At current Follow up with our GI office located on [address] [place] [address], Phone number [phone] with Dr. Monroe 6/3/2020 office will reach out with info and timing   -IF LFTs continue to improve and patient continues to show no signs of RUQ pain, and has no fevers and WBC continues to improve will consider outpatient ERCP however if LFTs fail to improve or patient shows signs of clinical and hemodynamics and CBC worsen will consider inpatient ERCP more urgently  -Continue antibiotics  -Avoid hepatotoxic medications  -Maintain hemodynamic stability    Problem 2-Suprapubic catheter with under distention of the bladder and surrounding perivesicular inflammatory change suggestive of cystitis. Unchanged mild left hydro moderate ureteronephrosis with surrounding inflammatory change, consistent with ascending left urinary tract infection.   Rec  -Care as per primary team     Problem 3-Small bilateral pleural effusions with adjacent atelectasis.  Rec  -Care as per primary team

## 2020-05-21 NOTE — PROGRESS NOTE ADULT - SUBJECTIVE AND OBJECTIVE BOX
ROCIO NATION  80y, Male  Allergy: sulfa drugs (Other)      CHIEF COMPLAINT: ISRRAEL (20 May 2020 18:01)      INTERVAL EVENTS/HPI  - No acute events overnight  - T(F): , Max: 97.8 (20 @ 21:00)  - Denies any worsening symptoms  - Tolerating medication    ROS  10 system review - weakness+     SOCIAL HISTORY  Social History:  no alcohol or tobacco use (19 May 2020 21:30)        FH noncontributory     VITALS:  T(F): 97.3, Max: 97.8 (20 @ 21:00)  HR: 72  BP: 137/63  RR: 16Vital Signs Last 24 Hrs  T(C): 36.3 (21 May 2020 05:00), Max: 36.6 (20 May 2020 21:00)  T(F): 97.3 (21 May 2020 05:00), Max: 97.8 (20 May 2020 21:00)  HR: 72 (21 May 2020 05:00) (58 - 72)  BP: 137/63 (21 May 2020 05:00) (116/58 - 174/81)  BP(mean): --  RR: 16 (21 May 2020 05:00) (16 - 17)  SpO2: 99% (20 May 2020 06:30) (99% - 99%)    PHYSICAL EXAM:  Gen: NAD, resting in bed  HEENT: Normocephalic, atraumatic  Neck: supple, no lymphadenopathy  CV: s1s2 +   Lungs: decreased BS   Abdomen: Soft, BS present. SPC +. Tender lower abdomen . No rebound   Ext: Warm, well perfused  Neuro: non focal, awake  Skin: no rash, no erythema      TESTS & MEASUREMENTS:                        8.7    23.45 )-----------( 213      ( 20 May 2020 06:44 )             27.2     05-20    141  |  106  |  75<HH>  ----------------------------<  154<H>  4.3   |  18  |  4.5<HH>    Ca    6.5<L>      20 May 2020 06:44  Phos  4.5     05-20  Mg     1.3     05-20    TPro  5.6<L>  /  Alb  2.9<L>  /  TBili  0.4  /  DBili  x   /  AST  32  /  ALT  70<H>  /  AlkPhos  579<H>      eGFR if Non African American: 12 mL/min/1.73M2 (20 @ 06:44)  eGFR if : 14 mL/min/1.73M2 (20 @ 06:44)    LIVER FUNCTIONS - ( 20 May 2020 06:44 )  Alb: 2.9 g/dL / Pro: 5.6 g/dL / ALK PHOS: 579 U/L / ALT: 70 U/L / AST: 32 U/L / GGT: x           Urinalysis Basic - ( 20 May 2020 07:00 )    Color: Yellow / Appearance: Cloudy / S.025 / pH: x  Gluc: x / Ketone: Negative  / Bili: Negative / Urobili: 0.2 mg/dL   Blood: x / Protein: >=300 mg/dL / Nitrite: Negative   Leuk Esterase: Large / RBC: Many /HPF / WBC Loaded /HPF   Sq Epi: x / Non Sq Epi: Occasional /HPF / Bacteria: Many        Culture - Urine (collected 20 @ 21:47)  Source: .Urine Catheterized  Preliminary Report (20 @ 02:19):    >100,000 CFU/ml Gram Negative Rods        Lactate, Blood: <0.2 mmol/L (20 @ 06:44)  Blood Gas Venous - Lactate: 1.1 mmoL/L (20 @ 19:00)  Lactate, Blood: 2.4 mmol/L (20 @ 18:00)      INFECTIOUS DISEASES TESTING      RADIOLOGY & ADDITIONAL TESTS:  I have personally reviewed the last Chest xray  CXR      CT  CT Abdomen and Pelvis No Cont:   EXAM:  CT ABDOMEN AND PELVIS            PROCEDURE DATE:  2020            INTERPRETATION:  CLINICAL STATEMENT: Abdominal pain.      TECHNIQUE: Contiguous axial CT images were obtained from the lower chest to the pubic symphysis without intravenous contrast.  Oral contrast was not administered.  Reformatted images in the coronal and sagittal planes were acquired.    COMPARISON CT: 2020    OTHER STUDIES USED FOR CORRELATION: None.       FINDINGS:    LOWER CHEST: Small bilateral pleural effusions with adjacent atelectasis.    HEPATOBILIARY: Redemonstrated distal common bile duct calculus measuring 7 mm (series 4 image 119, series 601 image 68, previously measured 4 mm) with mild intrahepatic and extrahepatic biliary ductal dilatation. Distended gallbladder with cholelithiasis.     SPLEEN: Unremarkable.    PANCREAS: Unremarkable.    ADRENAL GLANDS: Unremarkable.    KIDNEYS: Unchanged mild left hydroureteronephrosis with surrounding inflammatory change. No right hydronephrosis.    ABDOMINOPELVIC NODES: Unremarkable.    PELVIC ORGANS: Suprapubic Mccarthy catheter visualized within the collapsed urinary bladder.    Testicular inflammatory change without a drainable collection Unchanged prostatomegaly.    PERITONEUM/MESENTERY/BOWEL: Colonic diverticulosis. No bowel obstruction, ascites or pneumoperitoneum.    BONES/SOFT TISSUES: Degenerative changes of the spine. Unchanged chronic right 10th and bilateral 11th rib fractures.    OTHER: Atherosclerotic vascular calcifications.      IMPRESSION:     1.  Suprapubic catheter with under distention of the bladder and surrounding perivesicular inflammatory change suggestive of cystitis  2.   Unchanged mild left hydro moderate ureteronephrosis with surrounding inflammatory change, consistent with ascending left urinary tract infection. Correlate with urinalysis.  3.  Redemonstrated distal common bile duct calculus measuring 7 mm (previously measured 4 mm) with mild intrahepatic and extrahepatic biliary ductal dilatation.   4.  Distended gallbladder with cholelithiasis  5.  Small bilateral pleural effusions with adjacent atelectasis.              SHELBY ANDERSON M.D., RESIDENT RADIOLOGIST  This document has been electronically signed.  WES BALTAZAR M.D., ATTENDING RADIOLOGIST  Thisdocument has been electronically signed. May 19 2020  8:29PM             (20 @ 19:21)      CARDIOLOGY TESTING  12 Lead ECG:   Ventricular Rate 57 BPM    Atrial Rate 57 BPM    P-R Interval 140 ms    QRS Duration 160 ms    Q-T Interval 578 ms    QTC Calculation(Bezet) 562 ms    P Axis 62 degrees    R Axis 218 degrees    T Axis 68 degrees    Diagnosis Line Atrial-sensed ventricular-paced rhythm  Abnormal ECG    Confirmed by DREA OLGUIN MD ([id number]) on 2020 11:36:18 AM (20 @ 08:17)  12 Lead ECG:   Ventricular Rate 67 BPM    Atrial Rate 67 BPM    P-R Interval 136 ms    QRS Duration 154 ms    Q-T Interval 528 ms    QTC Calculation(Bezet) 557 ms    P Axis 80 degrees    R Axis -86 degrees    T Axis 82 degrees    Diagnosis Line Electronic ventricular pacemaker    Confirmed by DREA OLGUIN MD ([id number]) on 2020 11:35:25 AM (20 @ 17:55)      MEDICATIONS  apixaban 2.5  aspirin enteric coated 81  cefepime   IVPB 1000  dextrose 5% + sodium chloride 0.45%. 1000  dextrose 5%. 1000  dextrose 50% Injectable 12.5  hydrALAZINE 25  insulin lispro (HumaLOG) corrective regimen sliding scale   latanoprost 0.005% Ophthalmic Solution 1  metoprolol tartrate 100  NIFEdipine XL 60  pantoprazole    Tablet 40      ANTIBIOTICS:  cefepime   IVPB 1000 milliGRAM(s) IV Intermittent daily

## 2020-05-21 NOTE — CDI QUERY NOTE - NSCDIOTHERTXTBX_GEN_ALL_CORE_HH
Clinical indicators-   This patient was admitted with a UTI and also has a suprapubic catheter POA  Treatment: change catheter, maxipime, IVF  Based on your medical judgment, can you please clarify in the medical record –  Yes, there is a cause and effect relationship between the UTI and the suprapubic catheter  No , there is not a cause and effect relationship between the UTI and the suprapubic catheter  In responding to this request, please exercise your independent professional judgment.  The fact that a question is asked does not imply that any particular diagnosis is desired or expected.    Thank you!

## 2020-05-21 NOTE — PROGRESS NOTE ADULT - SUBJECTIVE AND OBJECTIVE BOX
80yMale  Being followed for LFts ,CBD stone   Interval history: Patient denies nausea, vomiting, hematemesis, melena, blood in stool, diarrhea, constipation. Patient reports suprapubic pain is improving from yesterday.      PAST MEDICAL & SURGICAL HISTORY:   GERD (gastroesophageal reflux disease): intermittent  H/O ptosis of eyelid: right eye (sometimes wears patch)  Hematuria  Hyperlipidemia  Anemia  Diabetes  Renal insufficiency  Cardiomyopathy  Cholelithiasis  Hydrocele in adult  Glaucoma  BPH (benign prostatic hyperplasia)  CAD (coronary artery disease): CARD STENT X2 4/2018  TIA (transient ischemic attack): X2 JULY 2018  Dementia  CHF (congestive heart failure): COMBINED SYSTOLIC &amp; DIASTOLIC  Afib  Heart attack: 1/2018  Sepsis: 1/18 FROM INFECTED GALLBLADDER  Hypercholesteremia  HTN (hypertension)  DM (diabetes mellitus)  History of suprapubic catheter  H/O flexible sigmoidoscopy: 2015  H/O bilateral cataract extraction: LEFT- 1/18 RIGHT 6 YRS AGO  Encounter for biliary drainage tube placement: 1/2018  H/O coronary angioplasty: WITH STENT X2 (4/2018)  History of prostate surgery: 5/17  AICD (automatic cardioverter/defibrillator) present: [employer]          Social History: No smoking. No alcohol. No illegal drug use.          MEDICATIONS:  MEDICATIONS  (STANDING):  apixaban 2.5 milliGRAM(s) Oral every 12 hours  aspirin enteric coated 81 milliGRAM(s) Oral daily  cefepime   IVPB 1000 milliGRAM(s) IV Intermittent daily  dextrose 5% + sodium chloride 0.45%. 1000 milliLiter(s) (75 mL/Hr) IV Continuous <Continuous>  dextrose 5%. 1000 milliLiter(s) (50 mL/Hr) IV Continuous <Continuous>  dextrose 50% Injectable 12.5 Gram(s) IV Push once  hydrALAZINE 25 milliGRAM(s) Oral every 8 hours  insulin lispro (HumaLOG) corrective regimen sliding scale   SubCutaneous three times a day before meals  latanoprost 0.005% Ophthalmic Solution 1 Drop(s) Both EYES at bedtime  metoprolol tartrate 100 milliGRAM(s) Oral two times a day  NIFEdipine XL 60 milliGRAM(s) Oral daily  pantoprazole    Tablet 40 milliGRAM(s) Oral before breakfast    MEDICATIONS  (PRN):  dextrose 40% Gel 15 Gram(s) Oral once PRN Blood Glucose LESS THAN 70 milliGRAM(s)/deciliter  glucagon  Injectable 1 milliGRAM(s) IntraMuscular once PRN Glucose LESS THAN 70 milligrams/deciliter  oxyCODONE    IR 5 milliGRAM(s) Oral every 12 hours PRN Severe Pain (7 - 10)      Allergies:    sulfa drugs (Other)              REVIEW OF SYSTEMS:  General:  No weight loss, fevers, or chills.  Eyes:  No reported pain or visual changes  ENT:  No sore throat or runny nose.  NECK: No stiffness   CV:  No chest pain or palpitations.  Resp:  No shortness of breath, cough  GI:  +supra-pubic abdominal pain, No nausea, vomiting, dysphagia, diarrhea or constipation. No rectal bleeding, melena, or hematemesis.  Muscle:  No aches or weakness  Neuro:  No tingling, numbness       VITAL SIGNS:   T(F): 97.3 (05-21-20 @ 05:00), Max: 97.8 (05-20-20 @ 21:00)  HR: 72 (05-21-20 @ 05:00) (58 - 72)  BP: 137/63 (05-21-20 @ 05:00) (116/58 - 137/63)  RR: 16 (05-21-20 @ 07:57) (16 - 17)  SpO2: 96% (05-21-20 @ 07:57) (96% - 96%)    PHYSICAL EXAM:  GENERAL: AAOx3, no acute distress.  HEAD:  Atraumatic, Normocephalic  EYES: conjunctiva and sclera clear  NECK: Supple, no JVD or thyromegaly  CHEST/LUNG: Clear to auscultation bilaterally; No wheeze, rhonchi, or rales  HEART: Regular rate and rhythm; normal S1, S2, No murmurs.  ABDOMEN: Soft,+mild suprapubic tenderness nondistended; Bowel sounds present, no abdominal bruit, masses, or hepatosplenomegaly  NEUROLOGY: No asterixis or tremor.   SKIN: Intact, no jaundice            LABS:                        8.3    12.64 )-----------( 199      ( 21 May 2020 06:43 )             25.7     05-21    140  |  104  |  68<HH>  ----------------------------<  164<H>  4.4   |  20  |  3.9<H>    Ca    6.6<L>      21 May 2020 06:43  Phos  4.5     05-20  Mg     1.3     05-20    TPro  5.5<L>  /  Alb  2.9<L>  /  TBili  0.4  /  DBili  x   /  AST  13  /  ALT  45<H>  /  AlkPhos  469<H>  05-21    LIVER FUNCTIONS - ( 21 May 2020 06:43 )  Alb: 2.9 g/dL / Pro: 5.5 g/dL / ALK PHOS: 469 U/L / ALT: 45 U/L / AST: 13 U/L / GGT: x           PT/INR - ( 19 May 2020 18:00 )   PT: 17.50 sec;   INR: 1.52 ratio         PTT - ( 19 May 2020 18:00 )  PTT:34.0 sec    IMAGING:      < from: CT Abdomen and Pelvis No Cont (05.19.20 @ 19:21) >  EXAM:  CT ABDOMEN AND PELVIS            PROCEDURE DATE:  05/19/2020            INTERPRETATION:  CLINICAL STATEMENT: Abdominal pain.      TECHNIQUE: Contiguous axial CT images were obtained from the lower chest to the pubic symphysis without intravenous contrast.  Oral contrast was not administered.  Reformatted images in the coronal and sagittal planes were acquired.    COMPARISON CT: 1/20/2020    OTHER STUDIES USED FOR CORRELATION: None.       FINDINGS:    LOWER CHEST: Small bilateral pleural effusions with adjacent atelectasis.    HEPATOBILIARY: Redemonstrated distal common bile duct calculus measuring 7 mm (series 4 image 119, series 601 image 68, previously measured 4 mm) with mild intrahepatic and extrahepatic biliary ductal dilatation. Distended gallbladder with cholelithiasis.     SPLEEN: Unremarkable.    PANCREAS: Unremarkable.    ADRENAL GLANDS: Unremarkable.    KIDNEYS: Unchanged mild left hydroureteronephrosis with surrounding inflammatory change. No right hydronephrosis.    ABDOMINOPELVIC NODES: Unremarkable.    PELVIC ORGANS: Suprapubic Mccarthy catheter visualized within the collapsed urinary bladder.    Testicular inflammatory change without a drainable collection Unchanged prostatomegaly.    PERITONEUM/MESENTERY/BOWEL: Colonic diverticulosis. No bowel obstruction, ascites or pneumoperitoneum.    BONES/SOFT TISSUES: Degenerative changes of the spine. Unchanged chronic right 10th and bilateral 11th rib fractures.    OTHER: Atherosclerotic vascular calcifications.      IMPRESSION:     1.  Suprapubic catheter with under distention of the bladder and surrounding perivesicular inflammatory change suggestive of cystitis  2.   Unchanged mild left hydro moderate ureteronephrosis with surrounding inflammatory change, consistent with ascending left urinary tract infection. Correlate with urinalysis.  3.  Redemonstrated distal common bile duct calculus measuring 7 mm (previously measured 4 mm) with mild intrahepatic and extrahepatic biliary ductal dilatation.   4.  Distended gallbladder with cholelithiasis  5.  Small bilateral pleural effusions with adjacent atelectasis.              SHELBY ANDERSON M.D., RESIDENT RADIOLOGIST  This document has been electronically signed.  WES BALTAZAR M.D., ATTENDING RADIOLOGIST  Thisdocument has been electronically signed. May 19 2020  8:29PM              < end of copied text >

## 2020-05-21 NOTE — PROGRESS NOTE ADULT - SUBJECTIVE AND OBJECTIVE BOX
HPI:  Pt denies abd pain n/v.    PAST MEDICAL & SURGICAL HISTORY:  GERD (gastroesophageal reflux disease): intermittent  H/O ptosis of eyelid: right eye (sometimes wears patch)  Hematuria  Hyperlipidemia  Anemia  Diabetes  Renal insufficiency  Cardiomyopathy  Cholelithiasis  Hydrocele in adult  Glaucoma  BPH (benign prostatic hyperplasia)  CAD (coronary artery disease): CARD STENT X2 2018  TIA (transient ischemic attack): X2 2018  Dementia  CHF (congestive heart failure): COMBINED SYSTOLIC &amp; DIASTOLIC  Afib  Heart attack: 2018  Sepsis:  FROM INFECTED GALLBLADDER  Hypercholesteremia  HTN (hypertension)  DM (diabetes mellitus)  History of suprapubic catheter  H/O flexible sigmoidoscopy:   H/O bilateral cataract extraction: LEFT-  RIGHT 6 YRS AGO  Encounter for biliary drainage tube placement: 2018  H/O coronary angioplasty: WITH STENT X2 (2018)  History of prostate surgery:   AICD (automatic cardioverter/defibrillator) present: [employer]      Allergies    sulfa drugs (Other)          MEDICATIONS  (STANDING):  apixaban 2.5 milliGRAM(s) Oral every 12 hours  aspirin enteric coated 81 milliGRAM(s) Oral daily  cefepime   IVPB 1000 milliGRAM(s) IV Intermittent daily  dextrose 5% + sodium chloride 0.45%. 1000 milliLiter(s) (75 mL/Hr) IV Continuous <Continuous>  dextrose 5%. 1000 milliLiter(s) (50 mL/Hr) IV Continuous <Continuous>  dextrose 50% Injectable 12.5 Gram(s) IV Push once  hydrALAZINE 25 milliGRAM(s) Oral every 8 hours  insulin lispro (HumaLOG) corrective regimen sliding scale   SubCutaneous three times a day before meals  latanoprost 0.005% Ophthalmic Solution 1 Drop(s) Both EYES at bedtime  metoprolol tartrate 100 milliGRAM(s) Oral two times a day  NIFEdipine XL 60 milliGRAM(s) Oral daily  pantoprazole    Tablet 40 milliGRAM(s) Oral before breakfast    MEDICATIONS  (PRN):  dextrose 40% Gel 15 Gram(s) Oral once PRN Blood Glucose LESS THAN 70 milliGRAM(s)/deciliter  glucagon  Injectable 1 milliGRAM(s) IntraMuscular once PRN Glucose LESS THAN 70 milligrams/deciliter  oxyCODONE    IR 5 milliGRAM(s) Oral every 12 hours PRN Severe Pain (7 - 10)    ROS:  General:	no fever, weight loss,  chills  Respiratory and Thorax: no cough, wheeze,  sob  Cardiovascular:	no chest pain, palpitations, dizziness            Vital Signs Last 24 Hrs  T(F): 97.3 (21 May 2020 05:00), Max: 97.8 (20 May 2020 21:00)  HR: 72 (21 May 2020 05:00) (58 - 72)  BP: 137/63 (21 May 2020 05:00) (116/58 - 137/63)  RR: 16 (21 May 2020 07:57) (16 - 17)  SpO2: 96% (21 May 2020 07:57) (96% - 96%)    PHYSICAL EXAM:      Constitutional: A&Ox4  Respiratory: cta b/l  Cardiovascular: s1 s2 rrr  Gastrointestinal: soft nt  nd + bs no rebound or guarding  Genitourinary: no cva tenderness  Extremities: normal rom, no edema, calf tenderness                            8.3    12.64 )-----------( 199      ( 21 May 2020 06:43 )             25.7       05-21    140  |  104  |  68<HH>  ----------------------------<  164<H>  4.4   |  20  |  3.9<H>    Ca    6.6<L>      21 May 2020 06:43  Phos  4.5     05-20  Mg     1.3     05-20    TPro  5.5<L>  /  Alb  2.9<L>  /  TBili  0.4  /  DBili  x   /  AST  13  /  ALT  45<H>  /  AlkPhos  469<H>  05-21      PT/INR - ( 19 May 2020 18:00 )   PT: 17.50 sec;   INR: 1.52 ratio         PTT - ( 19 May 2020 18:00 )  PTT:34.0 sec    Urinalysis Basic - ( 20 May 2020 07:00 )    Color: Yellow / Appearance: Cloudy / S.025 / pH: x  Gluc: x / Ketone: Negative  / Bili: Negative / Urobili: 0.2 mg/dL   Blood: x / Protein: >=300 mg/dL / Nitrite: Negative   Leuk Esterase: Large / RBC: Many /HPF / WBC Loaded /HPF   Sq Epi: x / Non Sq Epi: Occasional /HPF / Bacteria: Many        CT scan

## 2020-05-21 NOTE — CHART NOTE - NSCHARTNOTEFT_GEN_A_CORE
Patient seen and examined.   Patient states abdominal pain has improved. Denies ambulating.   States he had clears this am and was able to tolerate it without any difficulty   Denies Nausea/Vomiting, fevers, chills     Vitals  T(C): 36.3 (05-21-20 @ 05:00), Max: 36.6 (05-20-20 @ 21:00)  HR: 72 (05-21-20 @ 05:00) (58 - 72)  BP: 137/63 (05-21-20 @ 05:00) (116/58 - 137/63)  RR: 16 (05-21-20 @ 07:57) (16 - 17)  SpO2: 96% (05-21-20 @ 07:57) (96% - 96%)      Results of Labs/Imaging discussed as well as patient's plan.    All patient's/family questions answered.   Patient and family encouraged to contact PA with any further issues.

## 2020-05-21 NOTE — PROGRESS NOTE ADULT - ASSESSMENT
Patient is 79 yo male with hx of Afib, Anemia, BPH (benign prostatic hyperplasia), CAD (coronary artery disease) CARD STENT X2 4/2018, Cardiomyopathy, CHF (congestive heart failure) COMBINED SYSTOLIC & DIASTOLIC, Cholelithiasis, Dementia,   DM (diabetes mellitus), GERD (gastroesophageal reflux disease) intermittent, Glaucoma, H/O ptosis of eyelid right eye (sometimes wears patch), Heart attack 1/2018, Hematuria, HTN (hypertension), Hydrocele in adult, Hypercholesteremia, Renal insufficiency, Sepsis 1/18 FROM INFECTED GALLBLADDER, TIA (transient ischemic attack) X2 JULY 2018, S/P AICD (automatic cardioverter/defibrillator) present [employer], H/O bilateral cataract extraction LEFT- 1/18 RIGHT 6 YRS AGO, H/O coronary angioplasty WITH STENT X2 (4/2018), H/O flexible sigmoidoscopy 2015, History of prostate surgery 5/17,   and History of suprapubic catheter presenting with     1. Sepsis  -Seems to be secondary to UTI  -Abdm CT scan shows Suprapubic catheter with under distention of the bladder and surrounding perivesicular inflammatory change suggestive of cystitis.  Unchanged mild left hydro moderate ureteronephrosis with surrounding inflammatory change, consistent with ascending left urinary tract infection. Correlate with urinalysis.  Redemonstrated distal common bile duct calculus measuring 7 mm (previously measured 4 mm) with mild intrahepatic and extrahepatic biliary ductal dilatation.   Distended gallbladder with cholelithiasis  - Abdm US shows Cholelithiasis with no sonographic evidence of acute cholecystitis. Mild CBD dilatation, 8 mm.  -Continue with current antibiotic pending UC/BC  -Leukocytosis trending down  -Urology recommended outpatient follow up  -ID to follow up       2.   Abnormal Trop  -Seems to be secondary to demand mismatch ischemia in the setting of Sepsis, ISRRAEL  -TTE pending  -Trop level flat  -Cardiology to follow up     3. ISRRAEL  -Abdm CT scan shows hydronephrosis/?Chronic  -Continue IVF  -renal function improving  -Urology, and nephrology to follow up     4. Abnormal LFT  -Abdm CT scan shows distal common bile duct calculus measuring 7 mm (previously measured 4 mm) with mild intrahepatic and extrahepatic biliary ductal dilatation.   Distended gallbladder with cholelithiasis  -Abdm US noticed above  -Surgery to follow up    5. HTN  -Continue with metoprolol, nifedipine and will add hydralazine for elevated BP  -Monitor BP    6. CHF  -Stable  -No sign of volume overload    7.  DM2  -ISS  -Monitor POC    8.  A. Fib  -Continue with metoprolol for rate control  -Continue with current anticoagulation      #Progress Note Handoff  Pending (specify):  renal function/PT consult  Family discussion:  plan of care was discussed with patient   in details.  all questions were answered.  seems to understand, and in agreement  Disposition:  unknown

## 2020-05-21 NOTE — PROGRESS NOTE ADULT - ASSESSMENT
79 y/o male with hx of HTN, CAD s/p AICD/PPM, a.fib on eliquis, TIA, CKD, dementia, BPH s/p suprapubic catheter BIBA for evaluation due to generalized weakness, diarrhea along with abdominal discomfort. LFT's mildly elevated; has 7 mm CBD stone     No acute surgical intervention - no evidence of acute cholecystitis  Per GI: if LFT's improve, will consider outpt ERCP   Pt seen and examined with Dr Montoya

## 2020-05-21 NOTE — PROGRESS NOTE ADULT - ASSESSMENT
Patient with no chest pain . He had MI type 2. Medical rx for now. Continue meds. Rx infection. Follow cbc. Transfuse if needed. Prognosis guarded

## 2020-05-21 NOTE — PROGRESS NOTE ADULT - SUBJECTIVE AND OBJECTIVE BOX
Patient is a 80y old  Male who presents with a chief complaint of ISRRAEL (21 May 2020 08:57)      T(F): 97.3 (05-21-20 @ 05:00), Max: 97.8 (05-20-20 @ 21:00)  HR: 72 (05-21-20 @ 05:00)  BP: 137/63 (05-21-20 @ 05:00)  RR: 16 (05-21-20 @ 07:57)  SpO2: 96% (05-21-20 @ 07:57) (96% - 96%)    PHYSICAL EXAM:  GENERAL: NAD, well-groomed, well-developed  HEAD:  Atraumatic, Normocephalic  EYES: EOMI, PERRLA, conjunctiva and sclera clear  ENMT: No tonsillar erythema, exudates, or enlargement; Moist mucous membranes, Good dentition, No lesions  NECK: Supple, No JVD, Normal thyroid  NERVOUS SYSTEM:  Alert & Oriented X3,  Motor Strength 5/5 B/L upper and lower extremities  CHEST/LUNG: Clear to percussion bilaterally; No rales, rhonchi, wheezing, or rubs  HEART: Regular rate and rhythm; No murmurs, rubs, or gallops  ABDOMEN: Soft, Nontender, Nondistended; Bowel sounds present  EXTREMITIES:   No clubbing, cyanosis, or edema  LYMPH: No lymphadenopathy noted  SKIN: No rashes or lesions    labs  05-21    140  |  104  |  68<HH>  ----------------------------<  164<H>  4.4   |  20  |  3.9<H>    Ca    6.6<L>      21 May 2020 06:43  Phos  4.5     05-20  Mg     1.3     05-20    TPro  5.5<L>  /  Alb  2.9<L>  /  TBili  0.4  /  DBili  x   /  AST  13  /  ALT  45<H>  /  AlkPhos  469<H>  05-21                          8.3    12.64 )-----------( 199      ( 21 May 2020 06:43 )             25.7       Culture - Urine (collected 19 May 2020 21:47)  Source: .Urine Catheterized  Preliminary Report (21 May 2020 02:19):    >100,000 CFU/ml Gram Negative Rods      PT/INR - ( 19 May 2020 18:00 )   PT: 17.50 sec;   INR: 1.52 ratio         PTT - ( 19 May 2020 18:00 )  PTT:34.0 sec        apixaban 2.5 milliGRAM(s) Oral every 12 hours  aspirin enteric coated 81 milliGRAM(s) Oral daily  cefepime   IVPB 1000 milliGRAM(s) IV Intermittent daily  dextrose 40% Gel 15 Gram(s) Oral once PRN  dextrose 5% + sodium chloride 0.45%. 1000 milliLiter(s) IV Continuous <Continuous>  dextrose 5%. 1000 milliLiter(s) IV Continuous <Continuous>  dextrose 50% Injectable 12.5 Gram(s) IV Push once  glucagon  Injectable 1 milliGRAM(s) IntraMuscular once PRN  hydrALAZINE 25 milliGRAM(s) Oral every 8 hours  insulin lispro (HumaLOG) corrective regimen sliding scale   SubCutaneous three times a day before meals  latanoprost 0.005% Ophthalmic Solution 1 Drop(s) Both EYES at bedtime  metoprolol tartrate 100 milliGRAM(s) Oral two times a day  NIFEdipine XL 60 milliGRAM(s) Oral daily  oxyCODONE    IR 5 milliGRAM(s) Oral every 12 hours PRN  pantoprazole    Tablet 40 milliGRAM(s) Oral before breakfast

## 2020-05-21 NOTE — PROGRESS NOTE ADULT - ASSESSMENT
1)  Severe non-oliguric ISRRAEL.  Possible etiologies include intravascular volume depletion, sepsis syndrome, CBD stone (ISRRAEL due to bile salts leaking into systemic circulation).    2)  Evidence on CT suggestive of ascending UTI, likely related to recent manipulation of pt's abnormal urinary tract    3)  Hypocalcemia, after correcting for low albumin.  Likely multifactorial, including sepsis, possible Vit D deficiency, ? hyperphosphatemia, secondary HPT depending on true baseline GFR    Recommendations:    1)  Continue current Abx    2) cont  holding Lasix and Lisinopril.    3)  Continue IV volume expansion. can stop bicarb

## 2020-05-21 NOTE — PROGRESS NOTE ADULT - SUBJECTIVE AND OBJECTIVE BOX
Nephrology progress note    Patient was seen and examined, events over the last 24 h noted .  cr improving slightly     Allergies:  sulfa drugs (Other)    Hospital Medications:   MEDICATIONS  (STANDING):  apixaban 2.5 milliGRAM(s) Oral every 12 hours  aspirin enteric coated 81 milliGRAM(s) Oral daily  cefepime   IVPB 1000 milliGRAM(s) IV Intermittent daily  dextrose 5% + sodium chloride 0.45%. 1000 milliLiter(s) (75 mL/Hr) IV Continuous <Continuous>  dextrose 5%. 1000 milliLiter(s) (50 mL/Hr) IV Continuous <Continuous>  dextrose 50% Injectable 12.5 Gram(s) IV Push once  hydrALAZINE 25 milliGRAM(s) Oral every 8 hours  insulin lispro (HumaLOG) corrective regimen sliding scale   SubCutaneous three times a day before meals  latanoprost 0.005% Ophthalmic Solution 1 Drop(s) Both EYES at bedtime  metoprolol tartrate 100 milliGRAM(s) Oral two times a day  NIFEdipine XL 60 milliGRAM(s) Oral daily  pantoprazole    Tablet 40 milliGRAM(s) Oral before breakfast        VITALS:  T(F): 97.3 (20 @ 05:00), Max: 97.8 (20 @ 21:00)  HR: 72 (20 @ 05:00)  BP: 137/63 (20 @ 05:00)  RR: 16 (20 @ 07:57)  SpO2: 96% (20 @ 07:57)  Wt(kg): --    :  -   @ 07:00  --------------------------------------------------------  IN: 675 mL / OUT: 600 mL / NET: 75 mL     07:01  -   @ 07:00  --------------------------------------------------------  IN: 3375 mL / OUT: 1525 mL / NET: 1850 mL          PHYSICAL EXAM:  Constitutional: NAD  HEENT: anicteric sclera, oropharynx clear, MMM  Neck: No JVD  Respiratory: CTAB, no wheezes, rales or rhonchi  Cardiovascular: S1, S2, RRR  Gastrointestinal: BS+, soft, NT/ND  Extremities: No cyanosis or clubbing. No peripheral edema  :  No rondon.   Skin: No rashes    LABS:      140  |  104  |  68<HH>  ----------------------------<  164<H>  4.4   |  20  |  3.9<H>    Ca    6.6<L>      21 May 2020 06:43  Phos  4.5       Mg     1.3         TPro  5.5<L>  /  Alb  2.9<L>  /  TBili  0.4  /  DBili      /  AST  13  /  ALT  45<H>  /  AlkPhos  469<H>                            8.3    12.64 )-----------( 199      ( 21 May 2020 06:43 )             25.7       Urine Studies:  Urinalysis Basic - ( 20 May 2020 07:00 )    Color: Yellow / Appearance: Cloudy / S.025 / pH:   Gluc:  / Ketone: Negative  / Bili: Negative / Urobili: 0.2 mg/dL   Blood:  / Protein: >=300 mg/dL / Nitrite: Negative   Leuk Esterase: Large / RBC: Many /HPF / WBC Loaded /HPF   Sq Epi:  / Non Sq Epi: Occasional /HPF / Bacteria: Many        RADIOLOGY & ADDITIONAL STUDIES:

## 2020-05-22 NOTE — PROGRESS NOTE ADULT - SUBJECTIVE AND OBJECTIVE BOX
ROCIO NATION  80y, Male  Allergy: sulfa drugs (Other)      CHIEF COMPLAINT: ISRRAEL (21 May 2020 13:05)      INTERVAL EVENTS/HPI  - No acute events overnight  - T(F): , Max: 97 (20 @ 21:08)  - Denies any worsening symptoms  - Tolerating medication    ROS  10 system review - neg     SOCIAL HISTORY  Social History:  no alcohol or tobacco use (19 May 2020 21:30)        FH noncontributory     VITALS:  T(F): 96.6, Max: 97 (20 @ 21:08)  HR: 66  BP: 140/63  RR: 16Vital Signs Last 24 Hrs  T(C): 35.9 (22 May 2020 05:00), Max: 36.1 (21 May 2020 21:08)  T(F): 96.6 (22 May 2020 05:00), Max: 97 (21 May 2020 21:08)  HR: 66 (22 May 2020 05:00) (58 - 66)  BP: 140/63 (22 May 2020 05:00) (116/56 - 140/63)  BP(mean): --  RR: 16 (22 May 2020 05:00) (16 - 16)  SpO2: 96% (21 May 2020 07:57) (96% - 96%)    PHYSICAL EXAM:  Gen: NAD, resting in bed  HEENT: Normocephalic, atraumatic  Neck: supple, no lymphadenopathy  CV: s1s2 +   Lungs: decreased BS   Abdomen: Soft, BS present. SPC +   Ext: Warm, well perfused  Neuro: non focal, awake  Skin: no rash, no erythema      TESTS & MEASUREMENTS:                        8.3    12.64 )-----------( 199      ( 21 May 2020 06:43 )             25.7     05-21    140  |  104  |  68<HH>  ----------------------------<  164<H>  4.4   |  20  |  3.9<H>    Ca    6.6<L>      21 May 2020 06:43  Phos  4.5     05-20  Mg     1.3     05-20    TPro  5.5<L>  /  Alb  2.9<L>  /  TBili  0.4  /  DBili  x   /  AST  13  /  ALT  45<H>  /  AlkPhos  469<H>  05-21    eGFR if Non African American: 14 mL/min/1.73M2 (20 @ 06:43)  eGFR if African American: 16 mL/min/1.73M2 (20 @ 06:43)    LIVER FUNCTIONS - ( 21 May 2020 06:43 )  Alb: 2.9 g/dL / Pro: 5.5 g/dL / ALK PHOS: 469 U/L / ALT: 45 U/L / AST: 13 U/L / GGT: x           Urinalysis Basic - ( 20 May 2020 07:00 )    Color: Yellow / Appearance: Cloudy / S.025 / pH: x  Gluc: x / Ketone: Negative  / Bili: Negative / Urobili: 0.2 mg/dL   Blood: x / Protein: >=300 mg/dL / Nitrite: Negative   Leuk Esterase: Large / RBC: Many /HPF / WBC Loaded /HPF   Sq Epi: x / Non Sq Epi: Occasional /HPF / Bacteria: Many        Culture - Blood (collected 20 @ 15:18)  Source: .Blood None  Preliminary Report (20 @ 01:01):    No growth to date.    Culture - Blood (collected 20 @ 15:18)  Source: .Blood None  Preliminary Report (20 @ 01:01):    No growth to date.    Culture - Urine (collected 20 @ 21:47)  Source: .Urine Catheterized  Final Report (20 @ 21:59):    >100,000 CFU/ml Klebsiella pneumoniae ESBL  Organism: Klebsiella pneumoniae ESBL (20 @ 21:59)  Organism: Klebsiella pneumoniae ESBL (20 @ 21:59)      -  Amikacin: S <=16      -  Ampicillin: R >16 These ampicillin results predict results for amoxicillin      -  Ampicillin/Sulbactam: R >16/8 Enterobacter, Citrobacter, and Serratia may develop resistance during prolonged therapy (3-4 days)      -  Aztreonam: R 16      -  Cefazolin: R >16 (MIC_CL_[id number]_[id number]) For uncomplicated UTI with K. pneumoniae, E. coli, or P. mirablis: SATHYA <=16 is sensitive and SATHYA >=32 is resistant. This also predicts results for oral agents cefaclor, cefdinir, cefpodoxime, cefprozil, cefuroxime axetil, cephalexin and locarbef for uncomplicated UTI. Note that some isolates may be susceptible to these agents while testing resistant to cefazolin.      -  Cefepime: R >16      -  Cefoxitin: S <=8      -  Ceftriaxone: R >32 Enterobacter, Citrobacter, and Serratia may develop resistance during prolonged therapy      -  Ciprofloxacin: S <=1      -  Ertapenem: S <=1      -  Gentamicin: R >8      -  Imipenem: S <=1      -  Levofloxacin: S <=2      -  Meropenem: S <=1      -  Nitrofurantoin: I 64 Should not be used to treat pyelonephritis      -  Piperacillin/Tazobactam: R <=16      -  Tigecycline: S <=2      -  Tobramycin: R >8      -  Trimethoprim/Sulfamethoxazole: R >2/38      Method Type: SATHYA        Lactate, Blood: <0.2 mmol/L (20 @ 06:44)  Blood Gas Venous - Lactate: 1.1 mmoL/L (20 @ 19:00)  Lactate, Blood: 2.4 mmol/L (20 @ 18:00)      INFECTIOUS DISEASES TESTING      RADIOLOGY & ADDITIONAL TESTS:  I have personally reviewed the last Chest xray  CXR      CT  CT Abdomen and Pelvis No Cont:   EXAM:  CT ABDOMEN AND PELVIS            PROCEDURE DATE:  2020            INTERPRETATION:  CLINICAL STATEMENT: Abdominal pain.      TECHNIQUE: Contiguous axial CT images were obtained from the lower chest to the pubic symphysis without intravenous contrast.  Oral contrast was not administered.  Reformatted images in the coronal and sagittal planes were acquired.    COMPARISON CT: 2020    OTHER STUDIES USED FOR CORRELATION: None.       FINDINGS:    LOWER CHEST: Small bilateral pleural effusions with adjacent atelectasis.    HEPATOBILIARY: Redemonstrated distal common bile duct calculus measuring 7 mm (series 4 image 119, series 601 image 68, previously measured 4 mm) with mild intrahepatic and extrahepatic biliary ductal dilatation. Distended gallbladder with cholelithiasis.     SPLEEN: Unremarkable.    PANCREAS: Unremarkable.    ADRENAL GLANDS: Unremarkable.    KIDNEYS: Unchanged mild left hydroureteronephrosis with surrounding inflammatory change. No right hydronephrosis.    ABDOMINOPELVIC NODES: Unremarkable.    PELVIC ORGANS: Suprapubic Mccarthy catheter visualized within the collapsed urinary bladder.    Testicular inflammatory change without a drainable collection Unchanged prostatomegaly.    PERITONEUM/MESENTERY/BOWEL: Colonic diverticulosis. No bowel obstruction, ascites or pneumoperitoneum.    BONES/SOFT TISSUES: Degenerative changes of the spine. Unchanged chronic right 10th and bilateral 11th rib fractures.    OTHER: Atherosclerotic vascular calcifications.      IMPRESSION:     1.  Suprapubic catheter with under distention of the bladder and surrounding perivesicular inflammatory change suggestive of cystitis  2.   Unchanged mild left hydro moderate ureteronephrosis with surrounding inflammatory change, consistent with ascending left urinary tract infection. Correlate with urinalysis.  3.  Redemonstrated distal common bile duct calculus measuring 7 mm (previously measured 4 mm) with mild intrahepatic and extrahepatic biliary ductal dilatation.   4.  Distended gallbladder with cholelithiasis  5.  Small bilateral pleural effusions with adjacent atelectasis.              SHELBY ANDERSON M.D., RESIDENT RADIOLOGIST  This document has been electronically signed.  WES BALTAZAR M.D., ATTENDING RADIOLOGIST  Thisdocument has been electronically signed. May 19 2020  8:29PM             (20 @ 19:21)      CARDIOLOGY TESTING  12 Lead ECG:   Ventricular Rate 59 BPM    Atrial Rate 59 BPM    P-R Interval 130 ms    QRS Duration 164 ms    Q-T Interval 530 ms    QTC Calculation(Bezet) 524 ms    P Axis 62 degrees    R Axis 233 degrees    T Axis 61 degrees    Diagnosis Line Atrial-sensed ventricular-paced rhythm  Abnormal ECG    Confirmed by DREA OLGUIN MD ([id number]) on 2020 12:17:13 PM (20 @ 08:12)  12 Lead ECG:   Ventricular Rate 59 BPM    Atrial Rate 59 BPM    P-R Interval 144 ms    QRS Duration 164 ms    Q-T Interval 542 ms    QTC Calculation(Bezet) 536 ms    P Axis 64 degrees    R Axis 230 degrees    T Axis 63 degrees    Diagnosis Line Atrial-sensed ventricular-paced rhythm  Abnormal ECG    Confirmed by DREA OLGUIN MD ([id number]) on 2020 12:17:10 PM (20 @ 08:11)      MEDICATIONS  apixaban 2.5  aspirin enteric coated 81  ciprofloxacin     Tablet 250  dextrose 5% + sodium chloride 0.45%. 1000  dextrose 5%. 1000  dextrose 50% Injectable 12.5  insulin lispro (HumaLOG) corrective regimen sliding scale   latanoprost 0.005% Ophthalmic Solution 1  metoprolol tartrate 100  NIFEdipine XL 60  pantoprazole    Tablet 40      ANTIBIOTICS:  ciprofloxacin     Tablet 250 milliGRAM(s) Oral every 12 hours

## 2020-05-22 NOTE — PHYSICAL THERAPY INITIAL EVALUATION ADULT - PHYSICAL ASSIST/NONPHYSICAL ASSIST: SUPINE/SIT, REHAB EVAL
verbal cues/for hand placement and technique for safety/2 person assist for hand placement and technique for safety/1 person assist/verbal cues

## 2020-05-22 NOTE — PHYSICAL THERAPY INITIAL EVALUATION ADULT - GENERAL OBSERVATIONS, REHAB EVAL
9:05 - 9:30. Chart reviewed. Patient available to be seen for physical therapy, confirmed with nurse. Patient encountered semi-reclined in bed, +IV, suprapubic tube, +tele. Denies pain at rest, would like to walk with PT now.

## 2020-05-22 NOTE — PHYSICAL THERAPY INITIAL EVALUATION ADULT - GAIT DEVIATIONS NOTED, PT EVAL
decreased marquis/narrow base of support, decreased heel strike / push off, losing balance during turns / obstacles/decreased weight-shifting ability/decreased step length/increased time in double stance

## 2020-05-22 NOTE — PROGRESS NOTE ADULT - SUBJECTIVE AND OBJECTIVE BOX
Nephrology progress note    Patient is seen and examined, events over the last 24 h noted .  On IVF, not in distress  Allergies:  sulfa drugs (Other)    Hospital Medications:   MEDICATIONS  (STANDING):  apixaban 2.5 milliGRAM(s) Oral every 12 hours  aspirin enteric coated 81 milliGRAM(s) Oral daily  ciprofloxacin     Tablet 250 milliGRAM(s) Oral every 12 hours  dextrose 5% + sodium chloride 0.45%. 1000 milliLiter(s) (75 mL/Hr) IV Continuous <Continuous>  dextrose 5%. 1000 milliLiter(s) (50 mL/Hr) IV Continuous <Continuous>  dextrose 50% Injectable 12.5 Gram(s) IV Push once  insulin lispro (HumaLOG) corrective regimen sliding scale   SubCutaneous three times a day before meals  latanoprost 0.005% Ophthalmic Solution 1 Drop(s) Both EYES at bedtime  metoprolol tartrate 100 milliGRAM(s) Oral two times a day  NIFEdipine XL 60 milliGRAM(s) Oral daily  pantoprazole    Tablet 40 milliGRAM(s) Oral before breakfast        VITALS:  T(F): 96.6 (20 @ 05:00), Max: 97 (20 @ 21:08)  HR: 66 (20 @ 05:00)  BP: 140/63 (20 @ 05:00)  RR: 16 (20 @ 05:00)  SpO2: --  Wt(kg): --     @ 07:01  -   @ 07:00  --------------------------------------------------------  IN: 3375 mL / OUT: 1525 mL / NET: 1850 mL     @ 07:01  -   @ 07:00  --------------------------------------------------------  IN: 1820 mL / OUT: 2200 mL / NET: -380 mL          PHYSICAL EXAM:  Constitutional: NAD  HEENT: anicteric sclera, oropharynx clear, MMM  Neck: No JVD  Respiratory: CTAB, no wheezes, rales or rhonchi  Cardiovascular: S1, S2, RRR  Gastrointestinal: BS+, soft, NT/ND  Extremities:  No peripheral edema  Neurological: A/O x 3  : SPC present.   Skin: No rashes  Vascular Access:    LABS:      138  |  104  |  63<HH>  ----------------------------<  177<H>  4.0   |  20  |  3.5<H>  Creatinine Trend: 3.5<--, 3.9<--, 4.5<--, 4.6<--      Ca    6.5<L>      22 May 2020 05:45    TPro  5.3<L>  /  Alb  2.7<L>  /  TBili  0.3  /  DBili      /  AST  11  /  ALT  32  /  AlkPhos  389<H>                            8.1    8.49  )-----------( 167      ( 22 May 2020 05:45 )             25.3       Urine Studies:  Urinalysis Basic - ( 20 May 2020 07:00 )    Color: Yellow / Appearance: Cloudy / S.025 / pH:   Gluc:  / Ketone: Negative  / Bili: Negative / Urobili: 0.2 mg/dL   Blood:  / Protein: >=300 mg/dL / Nitrite: Negative   Leuk Esterase: Large / RBC: Many /HPF / WBC Loaded /HPF   Sq Epi:  / Non Sq Epi: Occasional /HPF / Bacteria: Many        RADIOLOGY & ADDITIONAL STUDIES:  < from: CT Abdomen and Pelvis No Cont (20 @ 19:21) >  1.  Suprapubic catheter with under distention of the bladder and surrounding perivesicular inflammatory change suggestive of cystitis  2.   Unchanged mild left hydro moderate ureteronephrosis with surrounding inflammatory change, consistent with ascending left urinary tract infection. Correlate with urinalysis.  3.  Redemonstrated distal common bile duct calculus measuring 7 mm (previously measured 4 mm) with mild intrahepatic and extrahepatic biliary ductal dilatation.   4.  Distended gallbladder with cholelithiasis  5.  Small bilateral pleural effusions with adjacent atelectasis.      < end of copied text >

## 2020-05-22 NOTE — PROGRESS NOTE ADULT - PROBLEM SELECTOR PROBLEM 1
Urinary tract infection without hematuria, site unspecified
Urinary tract infection without hematuria, site unspecified
Hydroureteronephrosis

## 2020-05-22 NOTE — PROGRESS NOTE ADULT - PROBLEM SELECTOR PLAN 1
ESBL UTI  acute   spc in situ     PO Cipro - 10 days   recall if needed
acute UTI   Urine cx - GNR     IV Cefepime - follow cx and taper abx - ? 10 days of abx
Hydronephrosis is chronic.  No need for intervention at this time.

## 2020-05-22 NOTE — PROGRESS NOTE ADULT - ASSESSMENT
80yMale pmh GERD, DM, BPH, MI 2018, A-Fib on eliquis, PPM/AICD, CKD stage 3B, CAD s/p stents x2 4/2018 presents with weakness unable to get out of bed     Problem 1-Transaminitis Cholelithiasis +CBD stone(seen previously) no RUQ pain A and O x3  DDX( cholestasis of sepsis secondary to Choledocholithiasis, infectious, DILI) Redemonstrated distal common bile duct calculus measuring 7 mm (previously measured 4 mm) with mild intrahepatic and extrahepatic biliary ductal dilatation.     LFTs improving  Rec  - of infection suspected UTI WBC improving with cefepime, WBC continuing to improve   -Follow-up blood cultures, urine culture  -Trend LFTs and INR daily    - At current Follow up with our GI office located on [address] [place] [address], Phone number [phone] with Dr. Monroe 6/3/2020 office will reach out with info and timing   -IF LFTs continue to improve and patient continues to show no signs of RUQ pain, and has no fevers and WBC continues to improve will consider outpatient ERCP however if LFTs  worsen or patient shows signs of clinical and hemodynamics and/or patient develops signs/symptoms of infection will consider inpatient ERCP more urgently  -Continue antibiotics  -Avoid hepatotoxic medications  -Maintain hemodynamic stability    Problem 2-Suprapubic catheter with under distention of the bladder and surrounding perivesicular inflammatory change suggestive of cystitis. Unchanged mild left hydro moderate ureteronephrosis with surrounding inflammatory change, consistent with ascending left urinary tract infection.   Rec  -Care as per primary team     Problem 3-Small bilateral pleural effusions with adjacent atelectasis.  Rec  -Care as per primary team 80yMale pmh GERD, DM, BPH, MI 2018, A-Fib on eliquis, PPM/AICD, CKD stage 3B, CAD s/p stents x2 4/2018 presents with weakness unable to get out of bed     Problem 1-Transaminitis Cholelithiasis +CBD stone(seen previously) no RUQ pain A and O x3  DDX( cholestasis of sepsis secondary to Choledocholithiasis, infectious, DILI) Redemonstrated distal common bile duct calculus measuring 7 mm (previously measured 4 mm) with mild intrahepatic and extrahepatic biliary ductal dilatation.     LFTs improving  Rec  - cause of infection suspected UTI WBC improved with cefepime   -Follow-up blood cultures, urine culture  -Trend LFTs and INR daily    -  t Follow up with our GI office located on [address] [place] [address], Phone number [phone] with Dr. Monroe 6/3/2020 office will reach out with info and timing   -IF LFTs continue to improve and patient continues to show no signs of RUQ pain, and has no fevers and WBC continues to improve will consider outpatient ERCP however if LFTs  worsen or patient shows signs of clinical and hemodynamics and/or patient develops signs/symptoms of infection will consider inpatient ERCP more urgently  -Continue antibiotics  -Avoid hepatotoxic medications  -Maintain hemodynamic stability    Problem 2-Suprapubic catheter with under distention of the bladder and surrounding perivesicular inflammatory change suggestive of cystitis. Unchanged mild left hydro moderate ureteronephrosis with surrounding inflammatory change, consistent with ascending left urinary tract infection.   Rec  -Care as per primary team     Problem 3-Small bilateral pleural effusions with adjacent atelectasis.  Rec  -Care as per primary team

## 2020-05-22 NOTE — PROGRESS NOTE ADULT - SUBJECTIVE AND OBJECTIVE BOX
THIS NOTE IS INCOMPLETE  	  Gastroenterology progress note:     Patient is a 80y old  Male who presents with a chief complaint of ISRRA[place] (22 May 2020 08:20)       Admitted on: 05-19-20    We are following the patient for: elevated LFTs, CBD stone     Interval History:    Patient's medical problems are improving/stable/not improving/unstable/   Prior records reviewed (Y/N):  History obtained from someone other than patient (Y/N):      PAST MEDICAL & SURGICAL HISTORY:  GERD (gastroesophageal reflux disease): intermittent  H/O ptosis of eyelid: right eye (sometimes wears patch)  Hematuria  Hyperlipidemia  Anemia  Diabetes  Renal insufficiency  Cardiomyopathy  Cholelithiasis  Hydrocele in adult  Glaucoma  BPH (benign prostatic hyperplasia)  CAD (coronary artery disease): CARD STENT X2 4/2018  TIA (transient ischemic attack): X2 JULY 2018  Dementia  CHF (congestive heart failure): COMBINED SYSTOLIC &amp; DIASTOLIC  Afib  Heart attack: 1/2018  Sepsis: 1/18 FROM INFECTED GALLBLADDER  Hypercholesteremia  HTN (hypertension)  DM (diabetes mellitus)  History of suprapubic catheter  H/O flexible sigmoidoscopy: 2015  H/O bilateral cataract extraction: LEFT- 1/18 RIGHT 6 YRS AGO  Encounter for biliary drainage tube placement: 1/2018  H/O coronary angioplasty: WITH STENT X2 (4/2018)  History of prostate surgery: 5/17  AICD (automatic cardioverter/defibrillator) present: [employer]      MEDICATIONS  (STANDING):  apixaban 2.5 milliGRAM(s) Oral every 12 hours  aspirin enteric coated 81 milliGRAM(s) Oral daily  ciprofloxacin     Tablet 250 milliGRAM(s) Oral every 12 hours  dextrose 5% + sodium chloride 0.45%. 1000 milliLiter(s) (75 mL/Hr) IV Continuous <Continuous>  dextrose 5%. 1000 milliLiter(s) (50 mL/Hr) IV Continuous <Continuous>  dextrose 50% Injectable 12.5 Gram(s) IV Push once  insulin lispro (HumaLOG) corrective regimen sliding scale   SubCutaneous three times a day before meals  latanoprost 0.005% Ophthalmic Solution 1 Drop(s) Both EYES at bedtime  metoprolol tartrate 100 milliGRAM(s) Oral two times a day  NIFEdipine XL 60 milliGRAM(s) Oral daily  pantoprazole    Tablet 40 milliGRAM(s) Oral before breakfast    MEDICATIONS  (PRN):  dextrose 40% Gel 15 Gram(s) Oral once PRN Blood Glucose LESS THAN 70 milliGRAM(s)/deciliter  glucagon  Injectable 1 milliGRAM(s) IntraMuscular once PRN Glucose LESS THAN 70 milligrams/deciliter  oxyCODONE    IR 5 milliGRAM(s) Oral every 12 hours PRN Severe Pain (7 - 10)      Allergies  sulfa drugs (Other)      Review of Systems:   Cardiovascular:  No Chest Pain, No Palpitations  Respiratory:  No Cough, No Dyspnea  Gastrointestinal:  As described in HPI    Physical Examination:  T(C): 35.9 (05-22-20 @ 05:00), Max: 36.1 (05-21-20 @ 21:08)  HR: 66 (05-22-20 @ 05:00) (58 - 66)  BP: 140/63 (05-22-20 @ 05:00) (116/56 - 140/63)  RR: 16 (05-22-20 @ 05:00) (16 - 16)  SpO2: --      05-21-20 @ 07:01  -  05-22-20 @ 07:00  --------------------------------------------------------  IN: 1820 mL / OUT: 2200 mL / NET: -380 mL      Constitutional: No acute distress.  Respiratory:  No signs of respiratory distress. Lung sounds are clear bilaterally.  Cardiovascular:  S1 S2, Regular rate and rhythm.  Abdominal: Abdomen is soft, symmetric, and non-tender without distention. There are no visible lesions. Bowel sounds are present and normoactive in all four quadrants. No masses, hepatomegaly, or splenomegaly are noted.   Skin: No rashes, No Jaundice.        Data: (reviewed by attending)                        8.1    8.49  )-----------( 167      ( 22 May 2020 05:45 )             25.3     Hgb trend:  8.1  05-22-20 @ 05:45  8.3  05-21-20 @ 06:43  8.7  05-20-20 @ 06:44  9.6  05-19-20 @ 18:00        05-22    138  |  104  |  63<HH>  ----------------------------<  177<H>  4.0   |  20  |  3.5<H>    Ca    6.5<L>      22 May 2020 05:45    TPro  5.3<L>  /  Alb  2.7<L>  /  TBili  0.3  /  DBili  x   /  AST  11  /  ALT  32  /  AlkPhos  389<H>  05-22    Liver panel trend:  TBili 0.3   /   AST 11   /   ALT 32   /   AlkP 389   /   Tptn 5.3   /   Alb 2.7    /   DBili --      05-22  TBili 0.4   /   AST 13   /   ALT 45   /   AlkP 469   /   Tptn 5.5   /   Alb 2.9    /   DBili --      05-21  TBili 0.4   /   AST 32   /   ALT 70   /   AlkP 579   /   Tptn 5.6   /   Alb 2.9    /   DBili --      05-20  TBili 0.7   /   AST 66   /      /   AlkP 786   /   Tptn 6.5   /   Alb 3.4    /   DBili --      05-19          Culture - Blood (collected 20 May 2020 15:18)  Source: .Blood None  Preliminary Report (22 May 2020 01:01):    No growth to date.    Culture - Blood (collected 20 May 2020 15:18)  Source: .Blood None  Preliminary Report (22 May 2020 01:01):    No growth to date.    Culture - Urine (collected 19 May 2020 21:47)  Source: .Urine Catheterized  Final Report (21 May 2020 21:59):    >100,000 CFU/ml Klebsiella pneumoniae ESBL  Organism: Klebsiella pneumoniae ESBL (21 May 2020 21:59)  Organism: Klebsiella pneumoniae ESBL (21 May 2020 21:59)         Radiology: (reviewed by attending)    US Abdomen Limited:   EXAM:  US ABDOMEN LIMITED            PROCEDURE DATE:  05/20/2020            INTERPRETATION:  CLINICAL HISTORY: Elevated LFTs..    COMPARISON: None CT abdomen pelvis 5/19/2020..    PROCEDURE: Ultrasound of the right upper quadrant was performed.    FINDINGS:    LIVER:  Normal in contour and echogenicity measuring 18.8 cm in length.     GALLBLADDER/BILIARY TREE:  Cholelithiasis is seen. No wall thickening or pericholecystic fluid. Reportedly negative sonographic Lacey's sign. No intrahepatic biliary ductal dilatation. The common bile duct measures 8 mm, which is mildly dilated.     PANCREAS:  Visualized head and body are unremarkable. Remainder obscured by overlying bowel gas.    KIDNEY:  Right kidney measures 14.5 cm in length. No hydronephrosis, calculi or solid mass.    AORTA/IVC:  Visualized proximal portions unremarkable.    ASCITES:  None.    OTHER: Right pleural effusion is noted.    IMPRESSION:    Cholelithiasis with no sonographic evidence of acute cholecystitis.     Mild CBD dilatation, 8 mm.              TAMI HELTON M.D., RESIDENT RADIOLOGIST  This document has been electronically signed.  ELIEZER TILLMAN M.D., ATTENDING RADIOLOGIST  This document has been electronically signed. May 20 2020 12:31PM             (05-20-20 @ 11:26) THIS NOTE IS INCOMPLETE  	  Gastroenterology progress note:     Patient is a 80y old  Male who presents with a chief complaint of ISRRA[place] (22 May 2020 08:20)       Admitted on: 05-19-20    We are following the patient for: elevated LFTs, CBD stone     Interval History: No Abdominal pain     Patient's medical problems are improving   Prior records reviewed (Y/N): Y  History obtained from someone other than patient (Y/N): N      PAST MEDICAL & SURGICAL HISTORY:  GERD (gastroesophageal reflux disease): intermittent  H/O ptosis of eyelid: right eye (sometimes wears patch)  Hematuria  Hyperlipidemia  Anemia  Diabetes  Renal insufficiency  Cardiomyopathy  Cholelithiasis  Hydrocele in adult  Glaucoma  BPH (benign prostatic hyperplasia)  CAD (coronary artery disease): CARD STENT X2 4/2018  TIA (transient ischemic attack): X2 JULY 2018  Dementia  CHF (congestive heart failure): COMBINED SYSTOLIC &amp; DIASTOLIC  Afib  Heart attack: 1/2018  Sepsis: 1/18 FROM INFECTED GALLBLADDER  Hypercholesteremia  HTN (hypertension)  DM (diabetes mellitus)  History of suprapubic catheter  H/O flexible sigmoidoscopy: 2015  H/O bilateral cataract extraction: LEFT- 1/18 RIGHT 6 YRS AGO  Encounter for biliary drainage tube placement: 1/2018  H/O coronary angioplasty: WITH STENT X2 (4/2018)  History of prostate surgery: 5/17  AICD (automatic cardioverter/defibrillator) present: [employer]      MEDICATIONS  (STANDING):  apixaban 2.5 milliGRAM(s) Oral every 12 hours  aspirin enteric coated 81 milliGRAM(s) Oral daily  ciprofloxacin     Tablet 250 milliGRAM(s) Oral every 12 hours  dextrose 5% + sodium chloride 0.45%. 1000 milliLiter(s) (75 mL/Hr) IV Continuous <Continuous>  dextrose 5%. 1000 milliLiter(s) (50 mL/Hr) IV Continuous <Continuous>  dextrose 50% Injectable 12.5 Gram(s) IV Push once  insulin lispro (HumaLOG) corrective regimen sliding scale   SubCutaneous three times a day before meals  latanoprost 0.005% Ophthalmic Solution 1 Drop(s) Both EYES at bedtime  metoprolol tartrate 100 milliGRAM(s) Oral two times a day  NIFEdipine XL 60 milliGRAM(s) Oral daily  pantoprazole    Tablet 40 milliGRAM(s) Oral before breakfast    MEDICATIONS  (PRN):  dextrose 40% Gel 15 Gram(s) Oral once PRN Blood Glucose LESS THAN 70 milliGRAM(s)/deciliter  glucagon  Injectable 1 milliGRAM(s) IntraMuscular once PRN Glucose LESS THAN 70 milligrams/deciliter  oxyCODONE    IR 5 milliGRAM(s) Oral every 12 hours PRN Severe Pain (7 - 10)      Allergies  sulfa drugs (Other)      Review of Systems:   Cardiovascular:  No Chest Pain, No Palpitations  Respiratory:  No Cough, No Dyspnea  Gastrointestinal:  As described in HPI    Physical Examination:  T(C): 35.9 (05-22-20 @ 05:00), Max: 36.1 (05-21-20 @ 21:08)  HR: 66 (05-22-20 @ 05:00) (58 - 66)  BP: 140/63 (05-22-20 @ 05:00) (116/56 - 140/63)  RR: 16 (05-22-20 @ 05:00) (16 - 16)  SpO2: --      05-21-20 @ 07:01  -  05-22-20 @ 07:00  --------------------------------------------------------  IN: 1820 mL / OUT: 2200 mL / NET: -380 mL      Constitutional: No acute distress.  Respiratory:  No signs of respiratory distress. Lung sounds are clear bilaterally.  Cardiovascular:  S1 S2, Regular rate and rhythm.  Abdominal: Abdomen is soft, symmetric, and non-tender without distention. There are no visible lesions. Bowel sounds are present and normoactive in all four quadrants. No masses, hepatomegaly, or splenomegaly are noted.   Skin: No rashes, No Jaundice.        Data: (reviewed by attending)                        8.1    8.49  )-----------( 167      ( 22 May 2020 05:45 )             25.3     Hgb trend:  8.1  05-22-20 @ 05:45  8.3  05-21-20 @ 06:43  8.7  05-20-20 @ 06:44  9.6  05-19-20 @ 18:00        05-22    138  |  104  |  63<HH>  ----------------------------<  177<H>  4.0   |  20  |  3.5<H>    Ca    6.5<L>      22 May 2020 05:45    TPro  5.3<L>  /  Alb  2.7<L>  /  TBili  0.3  /  DBili  x   /  AST  11  /  ALT  32  /  AlkPhos  389<H>  05-22    Liver panel trend:  TBili 0.3   /   AST 11   /   ALT 32   /   AlkP 389   /   Tptn 5.3   /   Alb 2.7    /   DBili --      05-22  TBili 0.4   /   AST 13   /   ALT 45   /   AlkP 469   /   Tptn 5.5   /   Alb 2.9    /   DBili --      05-21  TBili 0.4   /   AST 32   /   ALT 70   /   AlkP 579   /   Tptn 5.6   /   Alb 2.9    /   DBili --      05-20  TBili 0.7   /   AST 66   /      /   AlkP 786   /   Tptn 6.5   /   Alb 3.4    /   DBili --      05-19          Culture - Blood (collected 20 May 2020 15:18)  Source: .Blood None  Preliminary Report (22 May 2020 01:01):    No growth to date.    Culture - Blood (collected 20 May 2020 15:18)  Source: .Blood None  Preliminary Report (22 May 2020 01:01):    No growth to date.    Culture - Urine (collected 19 May 2020 21:47)  Source: .Urine Catheterized  Final Report (21 May 2020 21:59):    >100,000 CFU/ml Klebsiella pneumoniae ESBL  Organism: Klebsiella pneumoniae ESBL (21 May 2020 21:59)  Organism: Klebsiella pneumoniae ESBL (21 May 2020 21:59)         Radiology: (reviewed by attending)    US Abdomen Limited:   EXAM:  US ABDOMEN LIMITED            PROCEDURE DATE:  05/20/2020            INTERPRETATION:  CLINICAL HISTORY: Elevated LFTs..    COMPARISON: None CT abdomen pelvis 5/19/2020..    PROCEDURE: Ultrasound of the right upper quadrant was performed.    FINDINGS:    LIVER:  Normal in contour and echogenicity measuring 18.8 cm in length.     GALLBLADDER/BILIARY TREE:  Cholelithiasis is seen. No wall thickening or pericholecystic fluid. Reportedly negative sonographic Lacey's sign. No intrahepatic biliary ductal dilatation. The common bile duct measures 8 mm, which is mildly dilated.     PANCREAS:  Visualized head and body are unremarkable. Remainder obscured by overlying bowel gas.    KIDNEY:  Right kidney measures 14.5 cm in length. No hydronephrosis, calculi or solid mass.    AORTA/IVC:  Visualized proximal portions unremarkable.    ASCITES:  None.    OTHER: Right pleural effusion is noted.    IMPRESSION:    Cholelithiasis with no sonographic evidence of acute cholecystitis.     Mild CBD dilatation, 8 mm.              TAMI HELTON M.D., RESIDENT RADIOLOGIST  This document has been electronically signed.  ELIEZER TILLMAN M.D., ATTENDING RADIOLOGIST  This document has been electronically signed. May 20 2020 12:31PM             (05-20-20 @ 11:26) Gastroenterology progress note:     Patient is a 80y old  Male who presents with a chief complaint of [place] (22 May 2020 08:20)       Admitted on: 05-19-20    We are following the patient for: elevated LFTs, CBD stone     Interval History: No Abdominal pain. tolerating diet    Patient's medical problems are improving   Prior records reviewed (Y/N): Y  History obtained from someone other than patient (Y/N): N      PAST MEDICAL & SURGICAL HISTORY:  GERD (gastroesophageal reflux disease): intermittent  H/O ptosis of eyelid: right eye (sometimes wears patch)  Hematuria  Hyperlipidemia  Anemia  Diabetes  Renal insufficiency  Cardiomyopathy  Cholelithiasis  Hydrocele in adult  Glaucoma  BPH (benign prostatic hyperplasia)  CAD (coronary artery disease): CARD STENT X2 4/2018  TIA (transient ischemic attack): X2 JULY 2018  Dementia  CHF (congestive heart failure): COMBINED SYSTOLIC &amp; DIASTOLIC  Afib  Heart attack: 1/2018  Sepsis: 1/18 FROM INFECTED GALLBLADDER  Hypercholesteremia  HTN (hypertension)  DM (diabetes mellitus)  History of suprapubic catheter  H/O flexible sigmoidoscopy: 2015  H/O bilateral cataract extraction: LEFT- 1/18 RIGHT 6 YRS AGO  Encounter for biliary drainage tube placement: 1/2018  H/O coronary angioplasty: WITH STENT X2 (4/2018)  History of prostate surgery: 5/17  AICD (automatic cardioverter/defibrillator) present: [employer]      MEDICATIONS  (STANDING):  apixaban 2.5 milliGRAM(s) Oral every 12 hours  aspirin enteric coated 81 milliGRAM(s) Oral daily  ciprofloxacin     Tablet 250 milliGRAM(s) Oral every 12 hours  dextrose 5% + sodium chloride 0.45%. 1000 milliLiter(s) (75 mL/Hr) IV Continuous <Continuous>  dextrose 5%. 1000 milliLiter(s) (50 mL/Hr) IV Continuous <Continuous>  dextrose 50% Injectable 12.5 Gram(s) IV Push once  insulin lispro (HumaLOG) corrective regimen sliding scale   SubCutaneous three times a day before meals  latanoprost 0.005% Ophthalmic Solution 1 Drop(s) Both EYES at bedtime  metoprolol tartrate 100 milliGRAM(s) Oral two times a day  NIFEdipine XL 60 milliGRAM(s) Oral daily  pantoprazole    Tablet 40 milliGRAM(s) Oral before breakfast    MEDICATIONS  (PRN):  dextrose 40% Gel 15 Gram(s) Oral once PRN Blood Glucose LESS THAN 70 milliGRAM(s)/deciliter  glucagon  Injectable 1 milliGRAM(s) IntraMuscular once PRN Glucose LESS THAN 70 milligrams/deciliter  oxyCODONE    IR 5 milliGRAM(s) Oral every 12 hours PRN Severe Pain (7 - 10)      Allergies  sulfa drugs (Other)      Review of Systems:   Cardiovascular:  No Chest Pain, No Palpitations  Respiratory:  No Cough, No Dyspnea  Gastrointestinal:  As described in HPI    Physical Examination:  T(C): 35.9 (05-22-20 @ 05:00), Max: 36.1 (05-21-20 @ 21:08)  HR: 66 (05-22-20 @ 05:00) (58 - 66)  BP: 140/63 (05-22-20 @ 05:00) (116/56 - 140/63)  RR: 16 (05-22-20 @ 05:00) (16 - 16)  SpO2: --      05-21-20 @ 07:01  -  05-22-20 @ 07:00  --------------------------------------------------------  IN: 1820 mL / OUT: 2200 mL / NET: -380 mL      Constitutional: No acute distress.  Respiratory:  No signs of respiratory distress. Lung sounds are clear bilaterally.  Cardiovascular:  S1 S2, Regular rate and rhythm.  Abdominal: Abdomen is soft, symmetric, and non-tender without distention. There are no visible lesions. Bowel sounds are present and normoactive in all four quadrants. No masses, hepatomegaly, or splenomegaly are noted.   Skin: No rashes, No Jaundice.        Data: (reviewed by attending)                        8.1    8.49  )-----------( 167      ( 22 May 2020 05:45 )             25.3     Hgb trend:  8.1  05-22-20 @ 05:45  8.3  05-21-20 @ 06:43  8.7  05-20-20 @ 06:44  9.6  05-19-20 @ 18:00        05-22    138  |  104  |  63<HH>  ----------------------------<  177<H>  4.0   |  20  |  3.5<H>    Ca    6.5<L>      22 May 2020 05:45    TPro  5.3<L>  /  Alb  2.7<L>  /  TBili  0.3  /  DBili  x   /  AST  11  /  ALT  32  /  AlkPhos  389<H>  05-22    Liver panel trend:  TBili 0.3   /   AST 11   /   ALT 32   /   AlkP 389   /   Tptn 5.3   /   Alb 2.7    /   DBili --      05-22  TBili 0.4   /   AST 13   /   ALT 45   /   AlkP 469   /   Tptn 5.5   /   Alb 2.9    /   DBili --      05-21  TBili 0.4   /   AST 32   /   ALT 70   /   AlkP 579   /   Tptn 5.6   /   Alb 2.9    /   DBili --      05-20  TBili 0.7   /   AST 66   /      /   AlkP 786   /   Tptn 6.5   /   Alb 3.4    /   DBili --      05-19          Culture - Blood (collected 20 May 2020 15:18)  Source: .Blood None  Preliminary Report (22 May 2020 01:01):    No growth to date.    Culture - Blood (collected 20 May 2020 15:18)  Source: .Blood None  Preliminary Report (22 May 2020 01:01):    No growth to date.    Culture - Urine (collected 19 May 2020 21:47)  Source: .Urine Catheterized  Final Report (21 May 2020 21:59):    >100,000 CFU/ml Klebsiella pneumoniae ESBL  Organism: Klebsiella pneumoniae ESBL (21 May 2020 21:59)  Organism: Klebsiella pneumoniae ESBL (21 May 2020 21:59)         Radiology: (reviewed by attending)    US Abdomen Limited:   EXAM:  US ABDOMEN LIMITED            PROCEDURE DATE:  05/20/2020            INTERPRETATION:  CLINICAL HISTORY: Elevated LFTs..    COMPARISON: None CT abdomen pelvis 5/19/2020..    PROCEDURE: Ultrasound of the right upper quadrant was performed.    FINDINGS:    LIVER:  Normal in contour and echogenicity measuring 18.8 cm in length.     GALLBLADDER/BILIARY TREE:  Cholelithiasis is seen. No wall thickening or pericholecystic fluid. Reportedly negative sonographic Lacey's sign. No intrahepatic biliary ductal dilatation. The common bile duct measures 8 mm, which is mildly dilated.     PANCREAS:  Visualized head and body are unremarkable. Remainder obscured by overlying bowel gas.    KIDNEY:  Right kidney measures 14.5 cm in length. No hydronephrosis, calculi or solid mass.    AORTA/IVC:  Visualized proximal portions unremarkable.    ASCITES:  None.    OTHER: Right pleural effusion is noted.    IMPRESSION:    Cholelithiasis with no sonographic evidence of acute cholecystitis.     Mild CBD dilatation, 8 mm.              TAMI HELTON M.D., RESIDENT RADIOLOGIST  This document has been electronically signed.  ELIEZER TILLMAN M.D., ATTENDING RADIOLOGIST  This document has been electronically signed. May 20 2020 12:31PM             (05-20-20 @ 11:26)

## 2020-05-22 NOTE — PROGRESS NOTE ADULT - PROBLEM SELECTOR PLAN 2
established issue   on rx   mx per pMd
established issue   renal follow up
Please change SPT in light of infection.

## 2020-05-22 NOTE — PROGRESS NOTE ADULT - ASSESSMENT
Patient is 79 yo male with hx of Afib, Anemia, BPH (benign prostatic hyperplasia), CAD (coronary artery disease) CARD STENT X2 4/2018, Cardiomyopathy, CHF (congestive heart failure) COMBINED SYSTOLIC & DIASTOLIC, Cholelithiasis, Dementia,   DM (diabetes mellitus), GERD (gastroesophageal reflux disease) intermittent, Glaucoma, H/O ptosis of eyelid right eye (sometimes wears patch), Heart attack 1/2018, Hematuria, HTN (hypertension), Hydrocele in adult, Hypercholesteremia, Renal insufficiency, Sepsis 1/18 FROM INFECTED GALLBLADDER, TIA (transient ischemic attack) X2 JULY 2018, S/P AICD (automatic cardioverter/defibrillator) present [employer], H/O bilateral cataract extraction LEFT- 1/18 RIGHT 6 YRS AGO, H/O coronary angioplasty WITH STENT X2 (4/2018), H/O flexible sigmoidoscopy 2015, History of prostate surgery 5/17,   and History of suprapubic catheter presenting with     1. Sepsis  -Seems to be secondary to UTI  -Abdm CT scan shows Suprapubic catheter with under distention of the bladder and surrounding perivesicular inflammatory change suggestive of cystitis.  Unchanged mild left hydro moderate ureteronephrosis with surrounding inflammatory change, consistent with ascending left urinary tract infection. Correlate with urinalysis.  Redemonstrated distal common bile duct calculus measuring 7 mm (previously measured 4 mm) with mild intrahepatic and extrahepatic biliary ductal dilatation.   Distended gallbladder with cholelithiasis  - Abdm US shows Cholelithiasis with no sonographic evidence of acute cholecystitis. Mild CBD dilatation, 8 mm.  -Continue with current antibiotic pending UC/BC  -Leukocytosis resolved  -Urology recommended outpatient follow up  -BC negative.  Urine culture shows klebsiella.  continue with current antibiotic  -ID to follow up       2.   Abnormal Trop  -Seems to be secondary to demand mismatch ischemia in the setting of Sepsis, ISRRAEL  -TTE pending  -Trop level flat  -Cardiology to follow up     3. ISRRAEL  -Abdm CT scan shows hydronephrosis/?Chronic  -Continue IVF  -renal function improving  -Urology, and nephrology to follow up     4. Abnormal LFT  -Abdm CT scan shows distal common bile duct calculus measuring 7 mm (previously measured 4 mm) with mild intrahepatic and extrahepatic biliary ductal dilatation.   Distended gallbladder with cholelithiasis  -Abdm US noticed above  -Surgery to follow up    5. HTN  -Continue with metoprolol, nifedipine and will add hydralazine for elevated BP  -Monitor BP    6. CHF  -Stable  -No sign of volume overload    7.  DM2  -ISS  -Monitor POC    8.  A. Fib  -Continue with metoprolol for rate control  -Continue with current anticoagulation      #Progress Note Handoff  Pending (specify):  renal function/PT consult  Family discussion:  plan of care was discussed with patient   in details.  all questions were answered.  seems to understand, and in agreement  Disposition:  unknown Patient is 81 yo male with hx of Afib, Anemia, BPH (benign prostatic hyperplasia), CAD (coronary artery disease) CARD STENT X2 4/2018, Cardiomyopathy, CHF (congestive heart failure) COMBINED SYSTOLIC & DIASTOLIC, Cholelithiasis, Dementia,   DM (diabetes mellitus), GERD (gastroesophageal reflux disease) intermittent, Glaucoma, H/O ptosis of eyelid right eye (sometimes wears patch), Heart attack 1/2018, Hematuria, HTN (hypertension), Hydrocele in adult, Hypercholesteremia, Renal insufficiency, Sepsis 1/18 FROM INFECTED GALLBLADDER, TIA (transient ischemic attack) X2 JULY 2018, S/P AICD (automatic cardioverter/defibrillator) present [employer], H/O bilateral cataract extraction LEFT- 1/18 RIGHT 6 YRS AGO, H/O coronary angioplasty WITH STENT X2 (4/2018), H/O flexible sigmoidoscopy 2015, History of prostate surgery 5/17,   and History of suprapubic catheter presenting with     1. Sepsis  -Seems to be secondary to UTI  -Abdm CT scan shows Suprapubic catheter with under distention of the bladder and surrounding perivesicular inflammatory change suggestive of cystitis.  Unchanged mild left hydro moderate ureteronephrosis with surrounding inflammatory change, consistent with ascending left urinary tract infection. Correlate with urinalysis.  Redemonstrated distal common bile duct calculus measuring 7 mm (previously measured 4 mm) with mild intrahepatic and extrahepatic biliary ductal dilatation.   Distended gallbladder with cholelithiasis  - Abdm US shows Cholelithiasis with no sonographic evidence of acute cholecystitis. Mild CBD dilatation, 8 mm.  -Leukocytosis resolved  -Urology recommended outpatient follow up  -BC negative.  Urine culture shows klebsiella.  continue with current antibiotic  -ID to follow up       2.   Abnormal Trop  -Seems to be secondary to demand mismatch ischemia in the setting of Sepsis, ISRRAEL  -TTE pending  -Trop level flat  -Cardiology to follow up     3. ISRRAEL  -Abdm CT scan shows hydronephrosis/?Chronic  -Continue IVF  -renal function improving  -Urology, and nephrology to follow up     4. Abnormal LFT  -Abdm CT scan shows distal common bile duct calculus measuring 7 mm (previously measured 4 mm) with mild intrahepatic and extrahepatic biliary ductal dilatation.   Distended gallbladder with cholelithiasis  -Abdm US noticed above  -Surgery to follow up    5. HTN  -Continue with metoprolol, nifedipine and will add hydralazine for elevated BP  -Monitor BP    6. CHF  -Stable  -No sign of volume overload    7.  DM2  -ISS  -Monitor POC    8.  A. Fib  -Continue with metoprolol for rate control  -Continue with current anticoagulation      #Progress Note Handoff  Pending (specify):  renal function/PT consult  Family discussion:  plan of care was discussed with patient   in details.  all questions were answered.  seems to understand, and in agreement  Disposition:  unknown

## 2020-05-22 NOTE — PHYSICAL THERAPY INITIAL EVALUATION ADULT - IMPAIRMENTS FOUND, PT EVAL
aerobic capacity/endurance/muscle strength/gait, locomotion, and balance/posture/ergonomics and body mechanics

## 2020-05-22 NOTE — PROGRESS NOTE ADULT - SUBJECTIVE AND OBJECTIVE BOX
CC.  ISRRAEL  HPI.  Patient reports Abdm pain resolved.  offers no other complaints  feels better    Constitutional: No fever, fatigue or weight loss.  Skin: No rash.  Eyes: No recent vision problems or eye pain.  ENT: No congestion, ear pain, or sore throat.  Endocrine: No thyroid problems.  Cardiovascular: No chest pain or palpation.  Respiratory: No cough, shortness of breath, congestion, or wheezing.  Gastrointestinal: No  nausea, vomiting, or diarrhea.  Genitourinary: No dysuria.  Musculoskeletal: No joint swelling.  Neurologic: No headache.    Vital Signs Last 24 Hrs  T(C): 35.9 (22 May 2020 05:00), Max: 36.1 (21 May 2020 21:08)  T(F): 96.6 (22 May 2020 05:00), Max: 97 (21 May 2020 21:08)  HR: 66 (22 May 2020 05:00) (58 - 66)  BP: 140/63 (22 May 2020 05:00) (116/56 - 140/63)  BP(mean): --  RR: 16 (22 May 2020 05:00) (16 - 16)  SpO2: --        PHYSICAL EXAM-  GENERAL: NAD, chronic ill appearing male  HEAD:  Atraumatic, Normocephalic  EYES: EOMI, PERRLA, conjunctiva and sclera clear  NECK: Supple, No JVD, Normal thyroid  NERVOUS SYSTEM:  Alert & Oriented X3, Moving all extremities  CHEST/LUNG: Clear to percussion bilaterally; No rales, rhonchi, wheezing, or rubs  HEART: Regular rate and rhythm; No murmurs, rubs, or gallops  ABDOMEN: Soft, Min tenderness Nondistended; Bowel sounds present  EXTREMITIES:   No clubbing, cyanosis, or edema  SKIN: No rashes or lesions                            8.1    8.49  )-----------( 167      ( 22 May 2020 05:45 )             25.3     05-22    138  |  104  |  63<HH>  ----------------------------<  177<H>  4.0   |  20  |  3.5<H>    Ca    6.5<L>      22 May 2020 05:45    TPro  5.3<L>  /  Alb  2.7<L>  /  TBili  0.3  /  DBili  x   /  AST  11  /  ALT  32  /  AlkPhos  389<H>  05-22                                     MEDICATIONS  (STANDING):  apixaban 2.5 milliGRAM(s) Oral every 12 hours  aspirin enteric coated 81 milliGRAM(s) Oral daily  cefepime   IVPB 1000 milliGRAM(s) IV Intermittent daily  dextrose 5% 1000 milliLiter(s) (75 mL/Hr) IV Continuous <Continuous>  dextrose 5%. 1000 milliLiter(s) (50 mL/Hr) IV Continuous <Continuous>  dextrose 50% Injectable 12.5 Gram(s) IV Push once  insulin lispro (HumaLOG) corrective regimen sliding scale   SubCutaneous three times a day before meals  latanoprost 0.005% Ophthalmic Solution 1 Drop(s) Both EYES at bedtime  metoprolol tartrate 100 milliGRAM(s) Oral two times a day  NIFEdipine XL 60 milliGRAM(s) Oral daily  pantoprazole    Tablet 40 milliGRAM(s) Oral before breakfast    MEDICATIONS  (PRN):  dextrose 40% Gel 15 Gram(s) Oral once PRN Blood Glucose LESS THAN 70 milliGRAM(s)/deciliter  glucagon  Injectable 1 milliGRAM(s) IntraMuscular once PRN Glucose LESS THAN 70 milligrams/deciliter  oxyCODONE    IR 5 milliGRAM(s) Oral every 12 hours PRN Severe Pain (7 - 10)          Imaging Personally Reviewed:     [x ] YES  [ ] NO    Consultant(s) Notes Reviewed:  [x ] YES  [ ] NO    Care Discussed with Consultants/Other Providers [x ] YES  [ ] NO

## 2020-05-22 NOTE — PROGRESS NOTE ADULT - ASSESSMENT
1)  Severe non-oliguric ISRRAEL.   Old creat from 2018 was 1.5, no more recent creat is available.  Noted to have mild Lt hydro, has SPC, followed by Dr Walsh.  Creat is improving with hydration  2)  Left mild hydronephrosis with Evidence on CT suggestive of ascending UTI, likely related to recent manipulation of pt's abnormal urinary tract. UTI with Klebsiella pn ESBL    3)  Hypocalcemia, after correcting for low albumin.  Likely multifactorial, including sepsis, possible Vit D deficiency, check albumin, phosphorus 25 VD, iPTH    4) HTN BP acceptable, cont Nifedipine, no ACe or ARb  5) Choledocholithiasis - followed by GI, may need ERCP    Recommendations:    1)  Continue current Abx Cipro renally adjusted    2) cont  holding Lasix and Lisinopril.    3)  Continue IV volume expansion. can stop bicarb    4) anemia - check iron stores  will follow

## 2020-05-22 NOTE — PHYSICAL THERAPY INITIAL EVALUATION ADULT - LIVES WITH, PROFILE
however, patient reports spouse is in [place] and unable to return home at this time secondary to Covi-19 pandemic/spouse

## 2020-05-23 NOTE — PROGRESS NOTE ADULT - ASSESSMENT
Patient is 79 yo male with hx of Afib, Anemia, BPH (benign prostatic hyperplasia), CAD (coronary artery disease) CARD STENT X2 4/2018, Cardiomyopathy, CHF (congestive heart failure) COMBINED SYSTOLIC & DIASTOLIC, Cholelithiasis, Dementia,   DM (diabetes mellitus), GERD (gastroesophageal reflux disease) intermittent, Glaucoma, H/O ptosis of eyelid right eye (sometimes wears patch), Heart attack 1/2018, Hematuria, HTN (hypertension), Hydrocele in adult, Hypercholesteremia, Renal insufficiency, Sepsis 1/18 FROM INFECTED GALLBLADDER, TIA (transient ischemic attack) X2 JULY 2018, S/P AICD (automatic cardioverter/defibrillator) present [employer], H/O bilateral cataract extraction LEFT- 1/18 RIGHT 6 YRS AGO, H/O coronary angioplasty WITH STENT X2 (4/2018), H/O flexible sigmoidoscopy 2015, History of prostate surgery 5/17,   and History of suprapubic catheter presenting with     1. Sepsis  -Seems to be secondary to UTI  -Abdm CT scan shows Suprapubic catheter with under distention of the bladder and surrounding perivesicular inflammatory change suggestive of cystitis.  Unchanged mild left hydro moderate ureteronephrosis with surrounding inflammatory change, consistent with ascending left urinary tract infection. Correlate with urinalysis.  Redemonstrated distal common bile duct calculus measuring 7 mm (previously measured 4 mm) with mild intrahepatic and extrahepatic biliary ductal dilatation.   Distended gallbladder with cholelithiasis  - Abdm US shows Cholelithiasis with no sonographic evidence of acute cholecystitis. Mild CBD dilatation, 8 mm.  -Leukocytosis resolved  -Urology recommended outpatient follow up  -BC negative.  Urine culture shows klebsiella.  continue with current antibiotic  -ID to follow up   -Outpatient follow up with GI for ERCP      2.   Abnormal Trop  -Seems to be secondary to demand mismatch ischemia in the setting of Sepsis, ISRRAEL  -TTE pending  -Trop level flat  -Cardiology to follow up     3. ISRRAEL  -Abdm CT scan shows hydronephrosis/?Chronic  -Continue IVF  -renal function improving  -Urology, and nephrology to follow up     4. Abnormal LFT  -Abdm CT scan shows distal common bile duct calculus measuring 7 mm (previously measured 4 mm) with mild intrahepatic and extrahepatic biliary ductal dilatation.   Distended gallbladder with cholelithiasis  -Abdm US noticed above  -Surgery to follow up    5. HTN  -Continue with metoprolol, nifedipine and will add hydralazine for elevated BP  -Monitor BP    6. CHF  -Stable  -No sign of volume overload    7.  DM2  -ISS  -Monitor POC    8.  A. Fib  -Continue with metoprolol for rate control  -Continue with current anticoagulation      #Progress Note Handoff  Pending (specify):  renal function/PT consult  Family discussion:  plan of care was discussed with patient   in details.  all questions were answered.  seems to understand, and in agreement  Disposition:  unknown Patient is 79 yo male with hx of Afib, Anemia, BPH (benign prostatic hyperplasia), CAD (coronary artery disease) CARD STENT X2 4/2018, Cardiomyopathy, CHF (congestive heart failure) COMBINED SYSTOLIC & DIASTOLIC, Cholelithiasis, Dementia,   DM (diabetes mellitus), GERD (gastroesophageal reflux disease) intermittent, Glaucoma, H/O ptosis of eyelid right eye (sometimes wears patch), Heart attack 1/2018, Hematuria, HTN (hypertension), Hydrocele in adult, Hypercholesteremia, Renal insufficiency, Sepsis 1/18 FROM INFECTED GALLBLADDER, TIA (transient ischemic attack) X2 JULY 2018, S/P AICD (automatic cardioverter/defibrillator) present [employer], H/O bilateral cataract extraction LEFT- 1/18 RIGHT 6 YRS AGO, H/O coronary angioplasty WITH STENT X2 (4/2018), H/O flexible sigmoidoscopy 2015, History of prostate surgery 5/17,   and History of suprapubic catheter presenting with     1. Sepsis  -Seems to be secondary to UTI  -Abdm CT scan shows Suprapubic catheter with under distention of the bladder and surrounding perivesicular inflammatory change suggestive of cystitis.  Unchanged mild left hydro moderate ureteronephrosis with surrounding inflammatory change, consistent with ascending left urinary tract infection. Correlate with urinalysis.  Redemonstrated distal common bile duct calculus measuring 7 mm (previously measured 4 mm) with mild intrahepatic and extrahepatic biliary ductal dilatation.   Distended gallbladder with cholelithiasis  - Abdm US shows Cholelithiasis with no sonographic evidence of acute cholecystitis. Mild CBD dilatation, 8 mm.  -Leukocytosis resolved  -Urology recommended outpatient follow up  -BC negative.  Urine culture shows klebsiella.  continue with current antibiotic  -ID to follow up   -Outpatient follow up with GI for ERCP      2.   Abnormal Trop  -Seems to be secondary to demand mismatch ischemia in the setting of Sepsis, ISRRAEL  -TTE pending  -Trop level flat  -Cardiology to follow up     3. ISRRAEL  -Abdm CT scan shows hydronephrosis/?Chronic  -Continue IVF  -renal function improving  -Urology, and nephrology to follow up     4. Abnormal LFT  -Abdm CT scan shows distal common bile duct calculus measuring 7 mm (previously measured 4 mm) with mild intrahepatic and extrahepatic biliary ductal dilatation.   Distended gallbladder with cholelithiasis  -Abdm US noticed above  -Surgery to follow up    5. HTN  -Continue with metoprolol, nifedipine and will add hydralazine for elevated BP  -Monitor BP    6. CHF  -Stable  -No sign of volume overload    7.  DM2  -ISS  -Monitor POC    8.  A. Fib  -Continue with metoprolol for rate control  -Continue with current anticoagulation      #Progress Note Handoff  Pending (specify):  renal function/PT consult  Family discussion:  plan of care was discussed with patient and daughter in details.  all questions were answered.  seems to understand, and in agreement  Disposition:  unknown

## 2020-05-23 NOTE — DISCHARGE NOTE PROVIDER - PROVIDER TOKENS
PROVIDER:[TOKEN:[[id number]],FOLLOWUP:[1 week]],PROVIDER:[TOKEN:[[id number]],FOLLOWUP:[1 week]],PROVIDER:[TOKEN:[[id number]],FOLLOWUP:[2 weeks]] PROVIDER:[TOKEN:[[id number]],FOLLOWUP:[1 week]],PROVIDER:[TOKEN:[[id number]],FOLLOWUP:[1 week]],PROVIDER:[TOKEN:[[id number]],FOLLOWUP:[2 weeks]],PROVIDER:[TOKEN:[[id number]]]

## 2020-05-23 NOTE — PROGRESS NOTE ADULT - ATTENDING COMMENTS
UTI POA, and suprapubic cath increased his risk of infection
UTI POA, and suprapubic cath increased his risk of infection
Patient seen and examined with surgery team on rounds and discussed management plans withy patient. LFT improving no jaundice wbc improved Diet advanced. GI to follow for CBD stone
Patient seen and examined with surgery team on rounds and discussed management plans. patient comfortable tolerating liquids with no significant abd pain. Diet advanced wbc down. Patient will need outpatient followup for cholelithiasis and if symptomatic outpatient laparoscopic cholecystectomy.
Patient with CBD stone. Not cholangitic now. But will need ERCP and removal of stone as soon as possible.

## 2020-05-23 NOTE — DISCHARGE NOTE PROVIDER - NSDCMRMEDTOKEN_GEN_ALL_CORE_FT
Alphagan P 0.1% ophthalmic solution: 1 drop(s) to each affected eye once a day (at bedtime)  apixaban 2.5 mg oral tablet: 1 tab(s) orally every 12 hours  apixaban 5 mg oral tablet: 1 tab(s) orally every 12 hours  Aspir 81 oral delayed release tablet: 1 tab(s) orally once a day  atorvastatin 40 mg oral tablet: 1 tab(s) orally once a day (at bedtime)  furosemide 20 mg oral tablet: 1 tab(s) orally once a day  insulin lispro: insulin sliding scale  latanoprost 0.005% ophthalmic solution: 1 drop(s) to each affected eye once a day (in the evening) ou  lisinopril 20 mg oral tablet: 1 tab(s) orally once a day  melatonin 5 mg oral tablet: 1 tab(s) orally once a day (at bedtime), As needed, Insomnia  metFORMIN 500 mg oral tablet: 1 tab(s) orally 2 times a day  metoprolol tartrate 100 mg oral tablet: 1 tab(s) orally 2 times a day  NIFEdipine 60 mg oral tablet, extended release: 2 tab(s) orally once a day  oxyCODONE 5 mg oral tablet: 1 tab(s) orally every 12 hours, As Needed - 10)  pantoprazole 40 mg oral delayed release tablet: 1 tab(s) orally once a day (before a meal)  SITagliptin 100 mg oral tablet: 1 tab(s) orally once a day  sodium bicarbonate 650 mg oral tablet: 1 tab(s) orally 3 times a day Alphagan P 0.1% ophthalmic solution: 1 drop(s) to each affected eye once a day (at bedtime)  apixaban 2.5 mg oral tablet: 1 tab(s) orally every 12 hours  apixaban 5 mg oral tablet: 1 tab(s) orally every 12 hours  Aspir 81 oral delayed release tablet: 1 tab(s) orally once a day  atorvastatin 40 mg oral tablet: 1 tab(s) orally once a day (at bedtime)  ciprofloxacin 250 mg oral tablet: 1 tab(s) orally every 12 hours  furosemide 20 mg oral tablet: 1 tab(s) orally once a day  hydrALAZINE 25 mg oral tablet: 1 tab(s) orally 3 times a day  insulin lispro: insulin sliding scale  latanoprost 0.005% ophthalmic solution: 1 drop(s) to each affected eye once a day (in the evening) ou  lisinopril 20 mg oral tablet: 1 tab(s) orally once a day  melatonin 5 mg oral tablet: 1 tab(s) orally once a day (at bedtime), As needed, Insomnia  metFORMIN 500 mg oral tablet: 1 tab(s) orally 2 times a day  metoprolol tartrate 100 mg oral tablet: 1 tab(s) orally 2 times a day  NIFEdipine 60 mg oral tablet, extended release: 2 tab(s) orally once a day  oxyCODONE 5 mg oral tablet: 1 tab(s) orally every 12 hours, As Needed - 10)  pantoprazole 40 mg oral delayed release tablet: 1 tab(s) orally once a day (before a meal)  SITagliptin 100 mg oral tablet: 1 tab(s) orally once a day  sodium bicarbonate 650 mg oral tablet: 1 tab(s) orally 3 times a day Alphagan P 0.1% ophthalmic solution: 1 drop(s) to each affected eye once a day (at bedtime)  apixaban 2.5 mg oral tablet: 1 tab(s) orally every 12 hours  Aspir 81 oral delayed release tablet: 1 tab(s) orally once a day  atorvastatin 40 mg oral tablet: 1 tab(s) orally once a day (at bedtime)  ciprofloxacin 250 mg oral tablet: 1 tab(s) orally every 12 hours TO END 5/31  hydrALAZINE 50 mg oral tablet: 1 tab(s) orally every 8 hours  latanoprost 0.005% ophthalmic solution: 1 drop(s) to each affected eye once a day (in the evening) ou  lidocaine 2% topical gel with applicator: 1 application topically once a day  metoprolol tartrate 100 mg oral tablet: 1 tab(s) orally 2 times a day  NIFEdipine 90 mg oral tablet, extended release: 1 tab(s) orally once a day  oxyCODONE 5 mg oral tablet: 1 tab(s) orally every 12 hours, As Needed - 10)  pantoprazole 40 mg oral delayed release tablet: 1 tab(s) orally once a day (before a meal)  sodium bicarbonate 650 mg oral tablet: 1 tab(s) orally 3 times a day

## 2020-05-23 NOTE — DISCHARGE NOTE PROVIDER - CARE PROVIDER_API CALL
Zach Monroe  GASTROENTEROLOGY  [address] [place] [address]  Phone: [phone]  Fax: [phone]  Follow Up Time: 1 week    Naty Mccabe  NEPHROLOGY  [address] [place] [address]  Phone: [phone]  Fax: [phone]  Follow Up Time: 1 week    DREA OLGUIN  Cardiovascular Disease  [address] [place] [address]  Phone: [phone]  Fax: [phone]  Follow Up Time: 2 weeks Zach Monroe  GASTROENTEROLOGY  [address] [place] [address]  Phone: [phone]  Fax: [phone]  Follow Up Time: 1 week    Naty Mccabe  NEPHROLOGY  [address] [place] [address]  Phone: [phone]  Fax: [phone]  Follow Up Time: 1 week    DREA OLGUIN  Cardiovascular Disease  [address] [place] [address]  Phone: [phone]  Fax: [phone]  Follow Up Time: 2 weeks    Nimesh Moran  INTERNAL MEDICINE  [address] [place] [address]  Phone: [phone]  Fax: [phone]  Follow Up Time:

## 2020-05-23 NOTE — DISCHARGE NOTE PROVIDER - NSDCFUADDAPPT_GEN_ALL_CORE_FT
Please follow up with your doctor in 2-3 days  Please follow up with gastroenterology in one week  Please follow up with cardiology, and nephrology in 1-2 weeks  Please come back to the hospital if you develop any new symptoms or concerns Please follow up with your doctor in 2-3 days  Please follow up with gastroenterology in one week  Please follow up with cardiology, and nephrology in 1-2 weeks  Please follow up with urology in one week  Please come back to the hospital if you develop any new symptoms or concerns

## 2020-05-23 NOTE — DISCHARGE NOTE PROVIDER - NSDCFUSCHEDAPPT_GEN_ALL_CORE_FT
[facility] ; 06/03/2020 ; [facility] [id number] [facility] ; 06/03/2020 ; [facility] [id number] [facility] ; 06/03/2020 ; [facility] [id number]

## 2020-05-23 NOTE — DISCHARGE NOTE PROVIDER - HOSPITAL COURSE
Patient is 81 yo male with hx of Afib, Anemia, BPH (benign prostatic hyperplasia), CAD (coronary artery disease) CARD STENT X2 4/2018, Cardiomyopathy, CHF (congestive heart failure) COMBINED SYSTOLIC & DIASTOLIC, Cholelithiasis, Dementia,     DM (diabetes mellitus), GERD (gastroesophageal reflux disease) intermittent, Glaucoma, H/O ptosis of eyelid right eye (sometimes wears patch), Heart attack 1/2018, Hematuria, HTN (hypertension), Hydrocele in adult, Hypercholesteremia, Renal insufficiency, Sepsis 1/18 FROM INFECTED GALLBLADDER, TIA (transient ischemic attack) X2 JULY 2018, S/P AICD (automatic cardioverter/defibrillator) present [facility], H/O bilateral cataract extraction LEFT- 1/18 RIGHT 6 YRS AGO, H/O coronary angioplasty WITH STENT X2 (4/2018), H/O flexible sigmoidoscopy 2015, History of prostate surgery 5/17,     and History of suprapubic catheter presenting with         1. Sepsis    -Seems to be secondary to UTI    -Abdm CT scan shows Suprapubic catheter with under distention of the bladder and surrounding perivesicular inflammatory change suggestive of cystitis.  Unchanged mild left hydro moderate ureteronephrosis with surrounding inflammatory change, consistent with ascending left urinary tract infection. Correlate with urinalysis.  Redemonstrated distal common bile duct calculus measuring 7 mm (previously measured 4 mm) with mild intrahepatic and extrahepatic biliary ductal dilatation.   Distended gallbladder with cholelithiasis    - Abdm US shows Cholelithiasis with no sonographic evidence of acute cholecystitis. Mild CBD dilatation, 8 mm.    -Leukocytosis resolved    -Urology recommended outpatient follow up    -BC negative.  Urine culture shows klebsiella.  continue with current antibiotic                 2.   Abnormal Trop    -Seems to be secondary to demand mismatch ischemia in the setting of Sepsis, ISRRAEL    -TTE pending    -Trop level flat    -Outpatient follow up with cardiology        3. ISRRAEL    -Abdm CT scan shows hydronephrosis/?Chronic    -Continue IVF    -renal function improving    -Urology, and nephrology outpatient follow up         4. Abnormal LFT    -Abdm CT scan shows distal common bile duct calculus measuring 7 mm (previously measured 4 mm) with mild intrahepatic and extrahepatic biliary ductal dilatation.   Distended gallbladder with cholelithiasis    -Abdm US noticed above            5. HTN    -Continue with metoprolol, nifedipine and will add hydralazine for elevated BP    -Monitor BP        6. CHF    -Stable    -No sign of volume overload        7.  DM2    -ISS    -Monitor POC        8.  A. Fib    -Continue with metoprolol for rate control    -Continue with current anticoagulation        Hospital course and discharge planning were discussed with patient, and daughter in details, all questions were answered    seems to understand, and in agreement    time 70 min Patient is 79 yo male with hx of Afib, Anemia, BPH (benign prostatic hyperplasia), CAD (coronary artery disease) CARD STENT X2 4/2018, Cardiomyopathy, CHF (congestive heart failure) COMBINED SYSTOLIC & DIASTOLIC, Cholelithiasis, Dementia,     DM (diabetes mellitus), GERD (gastroesophageal reflux disease) intermittent, Glaucoma, H/O ptosis of eyelid right eye (sometimes wears patch), Heart attack 1/2018, Hematuria, HTN (hypertension), Hydrocele in adult, Hypercholesteremia, Renal insufficiency, Sepsis 1/18 FROM INFECTED GALLBLADDER, TIA (transient ischemic attack) X2 JULY 2018, S/P AICD (automatic cardioverter/defibrillator) present [facility], H/O bilateral cataract extraction LEFT- 1/18 RIGHT 6 YRS AGO, H/O coronary angioplasty WITH STENT X2 (4/2018), H/O flexible sigmoidoscopy 2015, History of prostate surgery 5/17,     and History of suprapubic catheter presenting with         1. Sepsis    -Seems to be secondary to UTI    -Abdm CT scan shows Suprapubic catheter with under distention of the bladder and surrounding perivesicular inflammatory change suggestive of cystitis.  Unchanged mild left hydro moderate ureteronephrosis with surrounding inflammatory change, consistent with ascending left urinary tract infection. Correlate with urinalysis.  Redemonstrated distal common bile duct calculus measuring 7 mm (previously measured 4 mm) with mild intrahepatic and extrahepatic biliary ductal dilatation.   Distended gallbladder with cholelithiasis    - Abdm US shows Cholelithiasis with no sonographic evidence of acute cholecystitis. Mild CBD dilatation, 8 mm.    -Leukocytosis resolved    -Urology recommended outpatient follow up    -BC negative.  Urine culture shows klebsiella.  continue with current antibiotic    -Outpatient follow up with GI for ERCP    2.   Abnormal Trop    -Seems to be secondary to demand mismatch ischemia in the setting of Sepsis, ISRRAEL    -TTE pending    -Trop level flat    -Cardiology to follow up     3. ISRRAEL    -Abdm CT scan shows hydronephrosis/?Chronic    -Continue with bicarb    -renal stable    -Urology, and nephrology to follow up     4. Abnormal LFT    -Abdm CT scan shows distal common bile duct calculus measuring 7 mm (previously measured 4 mm) with mild intrahepatic and extrahepatic biliary ductal dilatation.   Distended gallbladder with cholelithiasis    -Abdm US noticed above    -Surgery to follow up    5. HTN    -Continue with metoprolol, nifedipine and hydralazine     -Monitor BP        6. CHF    -Stable    -No sign of volume overload        7.  DM2    -ISS    -Monitor POC        8.  A. Fib    -Continue with metoprolol for rate control    -Continue with current anticoagulation        9.  Anemia    -acute on Chronic    -Iron study, b12, and folate level.  Occult/  pending    -S/P 1 unit of RBC transfusion.  No sign of bleeding.      -Might consider epo pending iron study    -Monitor CBC        Hosptial course, and discharge planning were discussed with patient, and daughter in details, all questions were answered.  seems to understand, and in agreement    time 70 min 80M PMHx AFib, BPH s/p spc, CAD s/p stent 4/2018, HFrEF s/p AICD, dementia, DM2, HTN, TIA 7/2018 here with sepsis, present on admission. Found to have positive ua, started on abx. Ucx demonstrated esbl ecoli, continued on cipro. Noted to have cristino, suspected pre-renal, improved with ivf but plateaued around 2.9. Seen by renal, noted to have L hydro appeared due to inflammation. Incidentally noted to have choledocolithiasis with sl cbd dilitation, mild intra/extra hepatic duct dilitation. Seen by PT, will need rehab. He was stable for d/c on 5/27, needs close outpt f/u pmd, renal, gi in one week. needs to monitor bmp.         31 minutes spent on discharge planning.

## 2020-05-23 NOTE — DISCHARGE NOTE PROVIDER - NSDCACTIVITY_GEN_ALL_CORE
Pt states she was told to call Dr. Padilla Lo office back via message. I could not find any reason for us reaching out to contact her,  could not find any messages either. Message sent to Dr. Sabrina Ann. Were you trying to reach the patient?
Spoke to patient advised that they received request from her insurance [place] will not be covered, she is in agreement to try different inhalers whenever referred by the plan, will send prescription for Symbicort inhaler
Do not drive or operate machinery/Do not make important decisions/No heavy lifting/straining

## 2020-05-23 NOTE — DISCHARGE NOTE PROVIDER - NSDCCPCAREPLAN_GEN_ALL_CORE_FT
PRINCIPAL DISCHARGE DIAGNOSIS  Diagnosis: Sepsis  Assessment and Plan of Treatment: resolved.  continue with current antibiotic      SECONDARY DISCHARGE DIAGNOSES  Diagnosis: Elevated troponin  Assessment and Plan of Treatment: outpatient follow up with cardiology    Diagnosis: Abnormal LFTs  Assessment and Plan of Treatment: resolved    Diagnosis: ARF (acute renal failure)  Assessment and Plan of Treatment: outpatient follow up with nephrology PRINCIPAL DISCHARGE DIAGNOSIS  Diagnosis: Sepsis  Assessment and Plan of Treatment: resolved.  continue with current antibiotic      SECONDARY DISCHARGE DIAGNOSES  Diagnosis: Elevated troponin  Assessment and Plan of Treatment: outpatient follow up with cardiology    Diagnosis: Abnormal LFTs  Assessment and Plan of Treatment: resolved.  Outpatient follow up with gastroenterology in 1-2 weeks for possible ERCP    Diagnosis: ARF (acute renal failure)  Assessment and Plan of Treatment: outpatient follow up with nephrology and urology in one week

## 2020-05-23 NOTE — PROGRESS NOTE ADULT - SUBJECTIVE AND OBJECTIVE BOX
CC.  ISRRAEL  HPI.  Patient reports Abdm pain resolved.  offers no other complaints  feels better    Constitutional: No fever, fatigue or weight loss.  Skin: No rash.  Eyes: No recent vision problems or eye pain.  ENT: No congestion, ear pain, or sore throat.  Endocrine: No thyroid problems.  Cardiovascular: No chest pain or palpation.  Respiratory: No cough, shortness of breath, congestion, or wheezing.  Gastrointestinal: No  nausea, vomiting, or diarrhea.  Genitourinary: No dysuria.  Musculoskeletal: No joint swelling.  Neurologic: No headache.    Vital Signs Last 24 Hrs  T(C): 36.4 (23 May 2020 06:29), Max: 36.8 (22 May 2020 21:00)  T(F): 97.6 (23 May 2020 06:29), Max: 98.2 (22 May 2020 21:00)  HR: 61 (23 May 2020 06:29) (61 - 77)  BP: 152/65 (23 May 2020 06:29) (122/58 - 161/68)  BP(mean): --  RR: 16 (23 May 2020 06:29) (16 - 16)  SpO2: --        PHYSICAL EXAM-  GENERAL: NAD, chronic ill appearing male  HEAD:  Atraumatic, Normocephalic  EYES: EOMI, PERRLA, conjunctiva and sclera clear  NECK: Supple, No JVD, Normal thyroid  NERVOUS SYSTEM:  Alert & Oriented X3, Moving all extremities  CHEST/LUNG: Clear to percussion bilaterally; No rales, rhonchi, wheezing, or rubs  HEART: Regular rate and rhythm; No murmurs, rubs, or gallops  ABDOMEN: Soft, Min tenderness Nondistended; Bowel sounds present  EXTREMITIES:   No clubbing, cyanosis, or edema  SKIN: No rashes or lesions    lab pending                      MEDICATIONS  (STANDING):  apixaban 2.5 milliGRAM(s) Oral every 12 hours  aspirin enteric coated 81 milliGRAM(s) Oral daily  brimonidine 0.2% Ophthalmic Solution 1 Drop(s) Both EYES two times a day  ciprofloxacin     Tablet 250 milliGRAM(s) Oral every 12 hours  dextrose 5% + sodium chloride 0.45%. 1000 milliLiter(s) (75 mL/Hr) IV Continuous <Continuous>  dextrose 5%. 1000 milliLiter(s) (50 mL/Hr) IV Continuous <Continuous>  dextrose 50% Injectable 12.5 Gram(s) IV Push once  insulin lispro (HumaLOG) corrective regimen sliding scale   SubCutaneous three times a day before meals  latanoprost 0.005% Ophthalmic Solution 1 Drop(s) Both EYES at bedtime  metoprolol tartrate 100 milliGRAM(s) Oral two times a day  NIFEdipine XL 60 milliGRAM(s) Oral daily  pantoprazole    Tablet 40 milliGRAM(s) Oral before breakfast    MEDICATIONS  (PRN):  aluminum hydroxide/magnesium hydroxide/simethicone Suspension 30 milliLiter(s) Oral every 4 hours PRN Dyspepsia  dextrose 40% Gel 15 Gram(s) Oral once PRN Blood Glucose LESS THAN 70 milliGRAM(s)/deciliter  glucagon  Injectable 1 milliGRAM(s) IntraMuscular once PRN Glucose LESS THAN 70 milligrams/deciliter  melatonin 5 milliGRAM(s) Oral at bedtime PRN Insomnia  oxyCODONE    IR 5 milliGRAM(s) Oral every 12 hours PRN Severe Pain (7 - 10)          Imaging Personally Reviewed:     [x ] YES  [ ] NO    Consultant(s) Notes Reviewed:  [x ] YES  [ ] NO    Care Discussed with Consultants/Other Providers [x ] YES  [ ] NO

## 2020-05-24 NOTE — PROGRESS NOTE ADULT - ASSESSMENT
1)  Severe non-oliguric ISRRAEL.   Old creat from 2018 was 1.5, no more recent creat is available.  Noted to have mild Lt hydro, has SPC, followed by Dr Walsh.  Creat is improving with hydration  2)  Left mild hydronephrosis with Evidence on CT suggestive of ascending UTI, likely related to recent manipulation of pt's abnormal urinary tract. UTI with Klebsiella pn ESBL    3)  Hypocalcemia, after correcting for low albumin.  Likely multifactorial, including sepsis, possible Vit D deficiency, check albumin, phosphorus 25 VD, iPTH    4) HTN BP acceptable, cont Nifedipine, no ACe or ARb  5) Choledocholithiasis - followed by GI, may need ERCP    Recommendations:    - cr stable ~3 may be new baseline (had Cr 1.5 over year ago not impossible to have worseoned since then, or severe ISRRAEL has not fully recovered  HTN - BP elevated increase nifedipine  D/c planning noted  needs outpt renal f/u

## 2020-05-24 NOTE — PROGRESS NOTE ADULT - SUBJECTIVE AND OBJECTIVE BOX
CC.  ISRRAEL  HPI.  Patient reports Abdm pain resolved.  offers no other complaints  feels better    Constitutional: No fever, fatigue or weight loss.  Skin: No rash.  Eyes: No recent vision problems or eye pain.  ENT: No congestion, ear pain, or sore throat.  Endocrine: No thyroid problems.  Cardiovascular: No chest pain or palpation.  Respiratory: No cough, shortness of breath, congestion, or wheezing.  Gastrointestinal: No  nausea, vomiting, or diarrhea.  Genitourinary: No dysuria.  Musculoskeletal: No joint swelling.  Neurologic: No headache.    Vital Signs Last 24 Hrs  T(C): 36.3 (24 May 2020 05:16), Max: 36.7 (23 May 2020 14:03)  T(F): 97.3 (24 May 2020 05:16), Max: 98 (23 May 2020 14:03)  HR: 57 (24 May 2020 05:16) (57 - 75)  BP: 162/71 (24 May 2020 05:16) (142/76 - 162/71)  BP(mean): --  RR: 16 (24 May 2020 05:16) (16 - 18)  SpO2: --        PHYSICAL EXAM-  GENERAL: NAD, chronic ill appearing male  HEAD:  Atraumatic, Normocephalic  EYES: EOMI, PERRLA, conjunctiva and sclera clear  NECK: Supple, No JVD, Normal thyroid  NERVOUS SYSTEM:  Alert & Oriented X3, Moving all extremities  CHEST/LUNG: Clear to percussion bilaterally; No rales, rhonchi, wheezing, or rubs  HEART: Regular rate and rhythm; No murmurs, rubs, or gallops  ABDOMEN: Soft, Min tenderness Nondistended; Bowel sounds present  EXTREMITIES:   No clubbing, cyanosis, or edema  SKIN: No rashes or lesions                            7.6    4.86  )-----------( 164      ( 23 May 2020 11:58 )             23.7     05-23    134<L>  |  103  |  55<H>  ----------------------------<  327<H>  4.0   |  18  |  3.1<H>    Ca    6.9<L>      23 May 2020 11:58                                    MEDICATIONS  (STANDING):  apixaban 2.5 milliGRAM(s) Oral every 12 hours  aspirin enteric coated 81 milliGRAM(s) Oral daily  brimonidine 0.2% Ophthalmic Solution 1 Drop(s) Both EYES two times a day  ciprofloxacin     Tablet 250 milliGRAM(s) Oral every 12 hours  dextrose 5% + sodium chloride 0.45%. 1000 milliLiter(s) (75 mL/Hr) IV Continuous <Continuous>  dextrose 5%. 1000 milliLiter(s) (50 mL/Hr) IV Continuous <Continuous>  dextrose 50% Injectable 12.5 Gram(s) IV Push once  insulin lispro (HumaLOG) corrective regimen sliding scale   SubCutaneous three times a day before meals  latanoprost 0.005% Ophthalmic Solution 1 Drop(s) Both EYES at bedtime  metoprolol tartrate 100 milliGRAM(s) Oral two times a day  NIFEdipine XL 60 milliGRAM(s) Oral daily  pantoprazole    Tablet 40 milliGRAM(s) Oral before breakfast    MEDICATIONS  (PRN):  aluminum hydroxide/magnesium hydroxide/simethicone Suspension 30 milliLiter(s) Oral every 4 hours PRN Dyspepsia  dextrose 40% Gel 15 Gram(s) Oral once PRN Blood Glucose LESS THAN 70 milliGRAM(s)/deciliter  glucagon  Injectable 1 milliGRAM(s) IntraMuscular once PRN Glucose LESS THAN 70 milligrams/deciliter  melatonin 5 milliGRAM(s) Oral at bedtime PRN Insomnia  oxyCODONE    IR 5 milliGRAM(s) Oral every 12 hours PRN Severe Pain (7 - 10)          Imaging Personally Reviewed:     [x ] YES  [ ] NO    Consultant(s) Notes Reviewed:  [x ] YES  [ ] NO    Care Discussed with Consultants/Other Providers [x ] YES  [ ] NO CC.  ISRRAEL  HPI.  Patient reports Abdm pain resolved.  offers no other complaints  feels better    Constitutional: No fever, fatigue or weight loss.  Skin: No rash.  Eyes: No recent vision problems or eye pain.  ENT: No congestion, ear pain, or sore throat.  Endocrine: No thyroid problems.  Cardiovascular: No chest pain or palpation.  Respiratory: No cough, shortness of breath, congestion, or wheezing.  Gastrointestinal: No  nausea, vomiting, or diarrhea.  Genitourinary: No dysuria.  Musculoskeletal: No joint swelling.  Neurologic: No headache.    Vital Signs Last 24 Hrs  T(C): 36.3 (24 May 2020 05:16), Max: 36.7 (23 May 2020 14:03)  T(F): 97.3 (24 May 2020 05:16), Max: 98 (23 May 2020 14:03)  HR: 57 (24 May 2020 05:16) (57 - 75)  BP: 162/71 (24 May 2020 05:16) (142/76 - 162/71)  BP(mean): --  RR: 16 (24 May 2020 05:16) (16 - 18)  SpO2: --        PHYSICAL EXAM-  GENERAL: NAD, chronic ill appearing male  HEAD:  Atraumatic, Normocephalic  EYES: EOMI, PERRLA, conjunctiva and sclera clear  NECK: Supple, No JVD, Normal thyroid  NERVOUS SYSTEM:  Alert & Oriented X3, Moving all extremities  CHEST/LUNG: Clear to percussion bilaterally; No rales, rhonchi, wheezing, or rubs  HEART: Regular rate and rhythm; No murmurs, rubs, or gallops  ABDOMEN: Soft, Min tenderness Nondistended; Bowel sounds present  EXTREMITIES:   No clubbing, cyanosis, or edema  SKIN: No rashes or lesions                              7.2    5.16  )-----------( 165      ( 24 May 2020 07:11 )             22.0     05-24    138  |  110  |  56<H>  ----------------------------<  169<H>  4.3   |  20  |  3.0<H>    Ca    7.1<L>      24 May 2020 07:11                                                MEDICATIONS  (STANDING):  apixaban 2.5 milliGRAM(s) Oral every 12 hours  aspirin enteric coated 81 milliGRAM(s) Oral daily  brimonidine 0.2% Ophthalmic Solution 1 Drop(s) Both EYES two times a day  ciprofloxacin     Tablet 250 milliGRAM(s) Oral every 12 hours  dextrose 5% + sodium chloride 0.45%. 1000 milliLiter(s) (75 mL/Hr) IV Continuous <Continuous>  dextrose 5%. 1000 milliLiter(s) (50 mL/Hr) IV Continuous <Continuous>  dextrose 50% Injectable 12.5 Gram(s) IV Push once  insulin lispro (HumaLOG) corrective regimen sliding scale   SubCutaneous three times a day before meals  latanoprost 0.005% Ophthalmic Solution 1 Drop(s) Both EYES at bedtime  metoprolol tartrate 100 milliGRAM(s) Oral two times a day  NIFEdipine XL 60 milliGRAM(s) Oral daily  pantoprazole    Tablet 40 milliGRAM(s) Oral before breakfast    MEDICATIONS  (PRN):  aluminum hydroxide/magnesium hydroxide/simethicone Suspension 30 milliLiter(s) Oral every 4 hours PRN Dyspepsia  dextrose 40% Gel 15 Gram(s) Oral once PRN Blood Glucose LESS THAN 70 milliGRAM(s)/deciliter  glucagon  Injectable 1 milliGRAM(s) IntraMuscular once PRN Glucose LESS THAN 70 milligrams/deciliter  melatonin 5 milliGRAM(s) Oral at bedtime PRN Insomnia  oxyCODONE    IR 5 milliGRAM(s) Oral every 12 hours PRN Severe Pain (7 - 10)          Imaging Personally Reviewed:     [x ] YES  [ ] NO    Consultant(s) Notes Reviewed:  [x ] YES  [ ] NO    Care Discussed with Consultants/Other Providers [x ] YES  [ ] NO

## 2020-05-24 NOTE — PROGRESS NOTE ADULT - SUBJECTIVE AND OBJECTIVE BOX
Nephrology progress note    Patient was seen and examined, events over the last 24 h noted .  cr stable from yesterday    Allergies:  sulfa drugs (Other)    Hospital Medications:   MEDICATIONS  (STANDING):  apixaban 2.5 milliGRAM(s) Oral every 12 hours  aspirin enteric coated 81 milliGRAM(s) Oral daily  brimonidine 0.2% Ophthalmic Solution 1 Drop(s) Both EYES two times a day  ciprofloxacin     Tablet 250 milliGRAM(s) Oral every 12 hours  dextrose 5%. 1000 milliLiter(s) (50 mL/Hr) IV Continuous <Continuous>  dextrose 50% Injectable 12.5 Gram(s) IV Push once  insulin lispro (HumaLOG) corrective regimen sliding scale   SubCutaneous three times a day before meals  latanoprost 0.005% Ophthalmic Solution 1 Drop(s) Both EYES at bedtime  metoprolol tartrate 100 milliGRAM(s) Oral two times a day  NIFEdipine XL 60 milliGRAM(s) Oral daily  pantoprazole    Tablet 40 milliGRAM(s) Oral before breakfast        VITALS:  T(F): 97.3 (20 @ 05:16), Max: 98 (20 @ 14:03)  HR: 57 (20 @ 05:16)  BP: 162/71 (20 @ 05:16)  RR: 16 (20 @ 09:47)  SpO2: 96% (20 @ 09:47)  Wt(kg): --     @ 07:01  -   @ 07:00  --------------------------------------------------------  IN: 0 mL / OUT: 700 mL / NET: -700 mL     @ 07:01  -   @ 07:00  --------------------------------------------------------  IN: 1000 mL / OUT: 2800 mL / NET: -1800 mL          PHYSICAL EXAM:  Constitutional: NAD  HEENT: anicteric sclera, oropharynx clear, MMM  Neck: No JVD  Respiratory: CTAB, no wheezes, rales or rhonchi  Cardiovascular: S1, S2, RRR  Gastrointestinal: BS+, soft, NT/ND  Extremities: No cyanosis or clubbing. No peripheral edema  :  No rondon.   Skin: No rashes    LABS:      138  |  110  |  56<H>  ----------------------------<  169<H>  4.3   |  20  |  3.0<H>    Ca    7.1<L>      24 May 2020 07:11                            7.2    5.16  )-----------( 165      ( 24 May 2020 07:11 )             22.0       Urine Studies:  Urinalysis Basic - ( 20 May 2020 07:00 )    Color: Yellow / Appearance: Cloudy / S.025 / pH:   Gluc:  / Ketone: Negative  / Bili: Negative / Urobili: 0.2 mg/dL   Blood:  / Protein: >=300 mg/dL / Nitrite: Negative   Leuk Esterase: Large / RBC: Many /HPF / WBC Loaded /HPF   Sq Epi:  / Non Sq Epi: Occasional /HPF / Bacteria: Many        RADIOLOGY & ADDITIONAL STUDIES:

## 2020-05-24 NOTE — PROGRESS NOTE ADULT - ASSESSMENT
Patient is 81 yo male with hx of Afib, Anemia, BPH (benign prostatic hyperplasia), CAD (coronary artery disease) CARD STENT X2 4/2018, Cardiomyopathy, CHF (congestive heart failure) COMBINED SYSTOLIC & DIASTOLIC, Cholelithiasis, Dementia,   DM (diabetes mellitus), GERD (gastroesophageal reflux disease) intermittent, Glaucoma, H/O ptosis of eyelid right eye (sometimes wears patch), Heart attack 1/2018, Hematuria, HTN (hypertension), Hydrocele in adult, Hypercholesteremia, Renal insufficiency, Sepsis 1/18 FROM INFECTED GALLBLADDER, TIA (transient ischemic attack) X2 JULY 2018, S/P AICD (automatic cardioverter/defibrillator) present [facility], H/O bilateral cataract extraction LEFT- 1/18 RIGHT 6 YRS AGO, H/O coronary angioplasty WITH STENT X2 (4/2018), H/O flexible sigmoidoscopy 2015, History of prostate surgery 5/17,   and History of suprapubic catheter presenting with     1. Sepsis  -Seems to be secondary to UTI  -Abdm CT scan shows Suprapubic catheter with under distention of the bladder and surrounding perivesicular inflammatory change suggestive of cystitis.  Unchanged mild left hydro moderate ureteronephrosis with surrounding inflammatory change, consistent with ascending left urinary tract infection. Correlate with urinalysis.  Redemonstrated distal common bile duct calculus measuring 7 mm (previously measured 4 mm) with mild intrahepatic and extrahepatic biliary ductal dilatation.   Distended gallbladder with cholelithiasis  - Abdm US shows Cholelithiasis with no sonographic evidence of acute cholecystitis. Mild CBD dilatation, 8 mm.  -Leukocytosis resolved  -Urology recommended outpatient follow up  -BC negative.  Urine culture shows klebsiella.  continue with current antibiotic  -ID to follow up   -Outpatient follow up with GI for ERCP      2.   Abnormal Trop  -Seems to be secondary to demand mismatch ischemia in the setting of Sepsis, ISRRAEL  -TTE pending  -Trop level flat  -Cardiology to follow up     3. ISRRAEL  -Abdm CT scan shows hydronephrosis/?Chronic  -Continue IVF  -renal function improving  -Urology, and nephrology to follow up     4. Abnormal LFT  -Abdm CT scan shows distal common bile duct calculus measuring 7 mm (previously measured 4 mm) with mild intrahepatic and extrahepatic biliary ductal dilatation.   Distended gallbladder with cholelithiasis  -Abdm US noticed above  -Surgery to follow up    5. HTN  -Continue with metoprolol, nifedipine and will add hydralazine for elevated BP  -Monitor BP    6. CHF  -Stable  -No sign of volume overload    7.  DM2  -ISS  -Monitor POC    8.  A. Fib  -Continue with metoprolol for rate control  -Continue with current anticoagulation    9.  Anemia  -Chronic  -Mild drop in H/H noticed  -?Dilutional  -Repeat CBC pending      #Progress Note Handoff  Pending (specify):  renal function/PT consult  Family discussion:  plan of care was discussed with patient and daughter in details.  all questions were answered.  seems to understand, and in agreement  Disposition:  unknown Patient is 81 yo male with hx of Afib, Anemia, BPH (benign prostatic hyperplasia), CAD (coronary artery disease) CARD STENT X2 4/2018, Cardiomyopathy, CHF (congestive heart failure) COMBINED SYSTOLIC & DIASTOLIC, Cholelithiasis, Dementia,   DM (diabetes mellitus), GERD (gastroesophageal reflux disease) intermittent, Glaucoma, H/O ptosis of eyelid right eye (sometimes wears patch), Heart attack 1/2018, Hematuria, HTN (hypertension), Hydrocele in adult, Hypercholesteremia, Renal insufficiency, Sepsis 1/18 FROM INFECTED GALLBLADDER, TIA (transient ischemic attack) X2 JULY 2018, S/P AICD (automatic cardioverter/defibrillator) present [employer], H/O bilateral cataract extraction LEFT- 1/18 RIGHT 6 YRS AGO, H/O coronary angioplasty WITH STENT X2 (4/2018), H/O flexible sigmoidoscopy 2015, History of prostate surgery 5/17,   and History of suprapubic catheter presenting with     1. Sepsis  -Seems to be secondary to UTI  -Abdm CT scan shows Suprapubic catheter with under distention of the bladder and surrounding perivesicular inflammatory change suggestive of cystitis.  Unchanged mild left hydro moderate ureteronephrosis with surrounding inflammatory change, consistent with ascending left urinary tract infection. Correlate with urinalysis.  Redemonstrated distal common bile duct calculus measuring 7 mm (previously measured 4 mm) with mild intrahepatic and extrahepatic biliary ductal dilatation.   Distended gallbladder with cholelithiasis  - Abdm US shows Cholelithiasis with no sonographic evidence of acute cholecystitis. Mild CBD dilatation, 8 mm.  -Leukocytosis resolved  -Urology recommended outpatient follow up  -BC negative.  Urine culture shows klebsiella.  continue with current antibiotic  -ID to follow up   -Outpatient follow up with GI for ERCP      2.   Abnormal Trop  -Seems to be secondary to demand mismatch ischemia in the setting of Sepsis, ISRRAEL  -TTE pending  -Trop level flat  -Cardiology to follow up     3. ISRRAEL  -Abdm CT scan shows hydronephrosis/?Chronic  -Continue IVF  -renal function improving  -Urology, and nephrology to follow up     4. Abnormal LFT  -Abdm CT scan shows distal common bile duct calculus measuring 7 mm (previously measured 4 mm) with mild intrahepatic and extrahepatic biliary ductal dilatation.   Distended gallbladder with cholelithiasis  -Abdm US noticed above  -Surgery to follow up    5. HTN  -Continue with metoprolol, nifedipine and will add hydralazine for elevated BP  -Monitor BP    6. CHF  -Stable  -No sign of volume overload    7.  DM2  -ISS  -Monitor POC    8.  A. Fib  -Continue with metoprolol for rate control  -Continue with current anticoagulation    9.  Anemia  -acute on Chronic  -Iron study, b12, and folate level.  Occult  -Mild drop in H/H noticed  -Will transfuse one unit of RBC, risks, benefits, and alternative were discussed with patient in details.  seems to understand, and in agreement  -Monitor CBC      #Progress Note Handoff  Pending (specify):  renal function/PT consult  Family discussion:  plan of care was discussed with patient and daughter in details.  all questions were answered.  seems to understand, and in agreement  Disposition:  unknown

## 2020-05-25 NOTE — PROGRESS NOTE ADULT - ASSESSMENT
1)  Severe non-oliguric ISRRAEL.   Old creat from 2018 was 1.5,   Noted to have mild Lt hydro, has SPC, followed by Dr Walsh.  Creat is improving with hydration - down to 3.0 mg%  2)  Left mild hydronephrosis with Evidence on CT suggestive of ascending UTI, likely related to recent manipulation of pt's abnormal urinary tract. UTI with Klebsiella pn ESBL - on Cipro renal dose    3)  Hypocalcemia, after correcting for low albumin.  Likely multifactorial, including sepsis, possible Vit D deficiency, check albumin, phosphorus 25 VD, iPTH    4) HTN BP remains high, increase Nifedipine and Hydralazine if needed (one at a time)  5) Choledocholithiasis - followed by GI  6) Met acidosis - add Na bicarb 650 po tid  7) Anemia - iron stores, Procrit 10,000 sq qwek    Needs renal and  f/u as OP

## 2020-05-25 NOTE — PROGRESS NOTE ADULT - ASSESSMENT
Patient is 79 yo male with hx of Afib, Anemia, BPH (benign prostatic hyperplasia), CAD (coronary artery disease) CARD STENT X2 4/2018, Cardiomyopathy, CHF (congestive heart failure) COMBINED SYSTOLIC & DIASTOLIC, Cholelithiasis, Dementia,   DM (diabetes mellitus), GERD (gastroesophageal reflux disease) intermittent, Glaucoma, H/O ptosis of eyelid right eye (sometimes wears patch), Heart attack 1/2018, Hematuria, HTN (hypertension), Hydrocele in adult, Hypercholesteremia, Renal insufficiency, Sepsis 1/18 FROM INFECTED GALLBLADDER, TIA (transient ischemic attack) X2 JULY 2018, S/P AICD (automatic cardioverter/defibrillator) present [employer], H/O bilateral cataract extraction LEFT- 1/18 RIGHT 6 YRS AGO, H/O coronary angioplasty WITH STENT X2 (4/2018), H/O flexible sigmoidoscopy 2015, History of prostate surgery 5/17,   and History of suprapubic catheter presenting with     1. Sepsis  -Seems to be secondary to UTI  -Abdm CT scan shows Suprapubic catheter with under distention of the bladder and surrounding perivesicular inflammatory change suggestive of cystitis.  Unchanged mild left hydro moderate ureteronephrosis with surrounding inflammatory change, consistent with ascending left urinary tract infection. Correlate with urinalysis.  Redemonstrated distal common bile duct calculus measuring 7 mm (previously measured 4 mm) with mild intrahepatic and extrahepatic biliary ductal dilatation.   Distended gallbladder with cholelithiasis  - Abdm US shows Cholelithiasis with no sonographic evidence of acute cholecystitis. Mild CBD dilatation, 8 mm.  -Leukocytosis resolved  -Urology recommended outpatient follow up  -BC negative.  Urine culture shows klebsiella.  continue with current antibiotic  -ID to follow up   -Outpatient follow up with GI for ERCP      2.   Abnormal Trop  -Seems to be secondary to demand mismatch ischemia in the setting of Sepsis, ISRRAEL  -TTE pending  -Trop level flat  -Cardiology to follow up     3. ISRRAEL  -Abdm CT scan shows hydronephrosis/?Chronic  -Continue with bicarb  -renal stable  -Urology, and nephrology to follow up     4. Abnormal LFT  -Abdm CT scan shows distal common bile duct calculus measuring 7 mm (previously measured 4 mm) with mild intrahepatic and extrahepatic biliary ductal dilatation.   Distended gallbladder with cholelithiasis  -Abdm US noticed above  -Surgery to follow up    5. HTN  -Continue with metoprolol, nifedipine and will add hydralazine for elevated BP  -Monitor BP    6. CHF  -Stable  -No sign of volume overload    7.  DM2  -ISS  -Monitor POC    8.  A. Fib  -Continue with metoprolol for rate control  -Continue with current anticoagulation    9.  Anemia  -acute on Chronic  -Iron study, b12, and folate level.  Occult/  pending  -S/P 1 unit of RBC transfusion.  No sign of bleeding.    -Might consider epo pending iron study  -Monitor CBC      #Progress Note Handoff  Pending (specify):  rehab/covid swab  Family discussion:  plan of care was discussed with patient and daughter in details.  all questions were answered.  seems to understand, and in agreement.  family agreeable to rehab, will discuss with   Disposition:  unknown

## 2020-05-25 NOTE — PROGRESS NOTE ADULT - SUBJECTIVE AND OBJECTIVE BOX
Nephrology progress note    Patient is seen and examined, events over the last 24 h noted .    Allergies:  sulfa drugs (Other)    Hospital Medications:   MEDICATIONS  (STANDING):  apixaban 2.5 milliGRAM(s) Oral every 12 hours  aspirin enteric coated 81 milliGRAM(s) Oral daily  brimonidine 0.2% Ophthalmic Solution 1 Drop(s) Both EYES two times a day  ciprofloxacin     Tablet 250 milliGRAM(s) Oral every 12 hours  dextrose 5%. 1000 milliLiter(s) (50 mL/Hr) IV Continuous <Continuous>  dextrose 50% Injectable 12.5 Gram(s) IV Push once  hydrALAZINE 25 milliGRAM(s) Oral three times a day  insulin lispro (HumaLOG) corrective regimen sliding scale   SubCutaneous three times a day before meals  latanoprost 0.005% Ophthalmic Solution 1 Drop(s) Both EYES at bedtime  metoprolol tartrate 100 milliGRAM(s) Oral two times a day  NIFEdipine XL 60 milliGRAM(s) Oral daily  pantoprazole    Tablet 40 milliGRAM(s) Oral before breakfast        VITALS:  T(F): 96.6 (20 @ 05:15), Max: 98.4 (20 @ 17:00)  HR: 55 (20 @ 05:15)  BP: 174/79 (20 @ 05:15)  RR: 16 (20 @ 05:15)  SpO2: 99% (20 @ 19:47)  Wt(kg): --     @ :01  -   @ 07:00  --------------------------------------------------------  IN: 1000 mL / OUT: 2800 mL / NET: -1800 mL     @ 07:01  -   @ 07:00  --------------------------------------------------------  IN: 520 mL / OUT: 3100 mL / NET: -2580 mL          PHYSICAL EXAM:  Constitutional: NAD  HEENT: anicteric sclera, oropharynx clear, MMM  Neck: No JVD  Respiratory: CTA  Cardiovascular: S1, S2, RRR  Gastrointestinal: BS+, soft, NT/ND  Extremities: No peripheral edema  Neurological: A/O x 3  : +SPC.   Skin: No rashes  Vascular Access:    LABS:      138  |  110  |  56<H>  ----------------------------<  169<H>  4.3   |  20  |  3.0<H>  Creatinine Trend: 3.0<--, 3.1<--, 3.5<--, 3.9<--, 4.5<--, 4.6<--    Ca    7.1<L>      24 May 2020 07:11                            8.5    5.90  )-----------( 174      ( 25 May 2020 06:54 )             26.2       Urine Studies:  Urinalysis Basic - ( 20 May 2020 07:00 )    Color: Yellow / Appearance: Cloudy / S.025 / pH:   Gluc:  / Ketone: Negative  / Bili: Negative / Urobili: 0.2 mg/dL   Blood:  / Protein: >=300 mg/dL / Nitrite: Negative   Leuk Esterase: Large / RBC: Many /HPF / WBC Loaded /HPF   Sq Epi:  / Non Sq Epi: Occasional /HPF / Bacteria: Many        RADIOLOGY & ADDITIONAL STUDIES:  < from: US Abdomen Limited (20 @ 11:26) >    KIDNEY:  Right kidney measures 14.5 cm in length. No hydronephrosis, calculi or solid mass.    AORTA/IVC:  Visualized proximal portions unremarkable.    ASCITES:  None.    OTHER: Right pleural effusion is noted.    IMPRESSION:    Cholelithiasis with no sonographic evidence of acute cholecystitis.     Mild CBD dilatation, 8 mm.    < end of copied text >

## 2020-05-25 NOTE — PROGRESS NOTE ADULT - SUBJECTIVE AND OBJECTIVE BOX
CC.  ISRRAEL  HPI.  Patient reports Abdm pain resolved.  offers no other complaints  feels better    Constitutional: No fever, fatigue or weight loss.  Skin: No rash.  Eyes: No recent vision problems or eye pain.  ENT: No congestion, ear pain, or sore throat.  Endocrine: No thyroid problems.  Cardiovascular: No chest pain or palpation.  Respiratory: No cough, shortness of breath, congestion, or wheezing.  Gastrointestinal: No  nausea, vomiting, or diarrhea.  Genitourinary: No dysuria.  Musculoskeletal: No joint swelling.  Neurologic: No headache.    Vital Signs Last 24 Hrs  T(C): 35.9 (25 May 2020 05:15), Max: 36.9 (24 May 2020 17:00)  T(F): 96.6 (25 May 2020 05:15), Max: 98.4 (24 May 2020 17:00)  HR: 55 (25 May 2020 05:15) (54 - 68)  BP: 174/79 (25 May 2020 05:15) (145/65 - 191/76)  BP(mean): --  RR: 16 (25 May 2020 05:15) (16 - 17)  SpO2: 99% (24 May 2020 19:47) (96% - 99%)      PHYSICAL EXAM-  GENERAL: NAD, chronic ill appearing male  HEAD:  Atraumatic, Normocephalic  EYES: EOMI, PERRLA, conjunctiva and sclera clear  NECK: Supple, No JVD, Normal thyroid  NERVOUS SYSTEM:  Alert & Oriented X3, Moving all extremities  CHEST/LUNG: Clear to percussion bilaterally; No rales, rhonchi, wheezing, or rubs  HEART: Regular rate and rhythm; No murmurs, rubs, or gallops  ABDOMEN: Soft, Min tenderness Nondistended; Bowel sounds present  EXTREMITIES:   No clubbing, cyanosis, or edema  SKIN: No rashes or lesions        lab pending              MEDICATIONS  (STANDING):  apixaban 2.5 milliGRAM(s) Oral every 12 hours  aspirin enteric coated 81 milliGRAM(s) Oral daily  brimonidine 0.2% Ophthalmic Solution 1 Drop(s) Both EYES two times a day  ciprofloxacin     Tablet 250 milliGRAM(s) Oral every 12 hours  dextrose 5%. 1000 milliLiter(s) (50 mL/Hr) IV Continuous <Continuous>  dextrose 50% Injectable 12.5 Gram(s) IV Push once  hydrALAZINE 25 milliGRAM(s) Oral three times a day  insulin lispro (HumaLOG) corrective regimen sliding scale   SubCutaneous three times a day before meals  latanoprost 0.005% Ophthalmic Solution 1 Drop(s) Both EYES at bedtime  metoprolol tartrate 100 milliGRAM(s) Oral two times a day  NIFEdipine XL 60 milliGRAM(s) Oral daily  pantoprazole    Tablet 40 milliGRAM(s) Oral before breakfast  sodium bicarbonate 650 milliGRAM(s) Oral three times a day    MEDICATIONS  (PRN):  aluminum hydroxide/magnesium hydroxide/simethicone Suspension 30 milliLiter(s) Oral every 4 hours PRN Dyspepsia  dextrose 40% Gel 15 Gram(s) Oral once PRN Blood Glucose LESS THAN 70 milliGRAM(s)/deciliter  glucagon  Injectable 1 milliGRAM(s) IntraMuscular once PRN Glucose LESS THAN 70 milligrams/deciliter  melatonin 5 milliGRAM(s) Oral at bedtime PRN Insomnia  oxyCODONE    IR 5 milliGRAM(s) Oral every 12 hours PRN Severe Pain (7 - 10)  senna 1 Tablet(s) Oral daily PRN Constipation                              Imaging Personally Reviewed:     [x ] YES  [ ] NO    Consultant(s) Notes Reviewed:  [x ] YES  [ ] NO    Care Discussed with Consultants/Other Providers [x ] YES  [ ] NO

## 2020-05-25 NOTE — CHART NOTE - NSCHARTNOTEFT_GEN_A_CORE
/77,  HR 59    Persistently Hypertensive/Bradycardic.    Plan:  Hydralazine 25mg X 1 dose now.           Increase Hydralazine from 25mg to 50mg Q8H.           discussed with Dr Snell.

## 2020-05-26 NOTE — PROGRESS NOTE ADULT - SUBJECTIVE AND OBJECTIVE BOX
80yMale  Being followed for Transaminitis, CBD stone   Interval history: Patient denies nausea, vomiting, hematemesis, melena, blood in stool, diarrhea, constipation, abdominal pain. Patient feeling much better. Patient tolerating diet.       PAST MEDICAL & SURGICAL HISTORY:  GERD (gastroesophageal reflux disease): intermittent  H/O ptosis of eyelid: right eye (sometimes wears patch)  Hematuria  Hyperlipidemia  Anemia  Diabetes  Renal insufficiency  Cardiomyopathy  Cholelithiasis  Hydrocele in adult  Glaucoma  BPH (benign prostatic hyperplasia)  CAD (coronary artery disease): CARD STENT X2 4/2018  TIA (transient ischemic attack): X2 JULY 2018  Dementia  CHF (congestive heart failure): COMBINED SYSTOLIC &amp; DIASTOLIC  Afib  Heart attack: 1/2018  Sepsis: 1/18 FROM INFECTED GALLBLADDER  Hypercholesteremia  HTN (hypertension)  DM (diabetes mellitus)  History of suprapubic catheter  H/O flexible sigmoidoscopy: 2015  H/O bilateral cataract extraction: LEFT- 1/18 RIGHT 6 YRS AGO  Encounter for biliary drainage tube placement: 1/2018  H/O coronary angioplasty: WITH STENT X2 (4/2018)  History of prostate surgery: 5/17  AICD (automatic cardioverter/defibrillator) present: [employer]          Social History: No smoking. No alcohol. No illegal drug use.          MEDICATIONS  (STANDING):  apixaban 2.5 milliGRAM(s) Oral every 12 hours  aspirin enteric coated 81 milliGRAM(s) Oral daily  brimonidine 0.2% Ophthalmic Solution 1 Drop(s) Both EYES two times a day  ciprofloxacin     Tablet 250 milliGRAM(s) Oral every 12 hours  dextrose 5%. 1000 milliLiter(s) (50 mL/Hr) IV Continuous <Continuous>  dextrose 50% Injectable 12.5 Gram(s) IV Push once  hydrALAZINE 50 milliGRAM(s) Oral every 8 hours  insulin lispro (HumaLOG) corrective regimen sliding scale   SubCutaneous three times a day before meals  latanoprost 0.005% Ophthalmic Solution 1 Drop(s) Both EYES at bedtime  metoprolol tartrate 100 milliGRAM(s) Oral two times a day  NIFEdipine XL 60 milliGRAM(s) Oral daily  pantoprazole    Tablet 40 milliGRAM(s) Oral before breakfast  sodium bicarbonate 650 milliGRAM(s) Oral three times a day  sodium chloride 0.9%. 1000 milliLiter(s) (70 mL/Hr) IV Continuous <Continuous>    MEDICATIONS  (PRN):  aluminum hydroxide/magnesium hydroxide/simethicone Suspension 30 milliLiter(s) Oral every 4 hours PRN Dyspepsia  dextrose 40% Gel 15 Gram(s) Oral once PRN Blood Glucose LESS THAN 70 milliGRAM(s)/deciliter  glucagon  Injectable 1 milliGRAM(s) IntraMuscular once PRN Glucose LESS THAN 70 milligrams/deciliter  melatonin 5 milliGRAM(s) Oral at bedtime PRN Insomnia  oxyCODONE    IR 5 milliGRAM(s) Oral every 12 hours PRN Severe Pain (7 - 10)  senna 1 Tablet(s) Oral daily PRN Constipation      Allergies:   sulfa drugs (Other)          REVIEW OF SYSTEMS:  General:  No weight loss, fevers, or chills.  Eyes:  No reported pain or visual changes  ENT:  No sore throat or runny nose.  NECK: No stiffness   CV:  No chest pain or palpitations.  Resp:  No shortness of breath, cough  GI:  No abdominal pain, nausea, vomiting, dysphagia, diarrhea or constipation. No rectal bleeding, melena, or hematemesis.  Neuro:  No tingling, numbness         VITAL SIGNS:   T(F): 97.1 (05-26-20 @ 05:40), Max: 97.5 (05-25-20 @ 21:00)  HR: 55 (05-26-20 @ 07:48) (55 - 59)  BP: 135/62 (05-26-20 @ 07:48) (135/62 - 197/89)  RR: 18 (05-26-20 @ 05:40) (16 - 18)  SpO2: --    PHYSICAL EXAM:  GENERAL: AAOx3, no acute distress.  HEAD:  Atraumatic, Normocephalic  EYES: conjunctiva and sclera clear  NECK: Supple, no JVD or thyromegaly  CHEST/LUNG: Clear to auscultation bilaterally; No wheeze, rhonchi, or rales  HEART: Regular rate and rhythm; normal S1, S2, No murmurs.  ABDOMEN: Soft, nontender, nondistended; Bowel sounds present, no abdominal bruit, masses, or hepatosplenomegaly  NEUROLOGY: No asterixis or tremor.   SKIN: Intact, no jaundice            LABS:                        8.5    6.96  )-----------( 185      ( 26 May 2020 06:16 )             25.6     05-26    138  |  107  |  55<H>  ----------------------------<  162<H>  4.4   |  20  |  2.8<H>    Ca    7.7<L>      26 May 2020 06:16  Phos  3.1     05-26  Mg     1.3     05-26    TPro  5.2<L>  /  Alb  2.7<L>  /  TBili  0.3  /  DBili  x   /  AST  15  /  ALT  23  /  AlkPhos  305<H>  05-26    LIVER FUNCTIONS - ( 26 May 2020 06:16 )  Alb: 2.7 g/dL / Pro: 5.2 g/dL / ALK PHOS: 305 U/L / ALT: 23 U/L / AST: 15 U/L / GGT: x               IMAGING:      < from: CT Abdomen and Pelvis No Cont (05.19.20 @ 19:21) >  EXAM:  CT ABDOMEN AND PELVIS            PROCEDURE DATE:  05/19/2020            INTERPRETATION:  CLINICAL STATEMENT: Abdominal pain.      TECHNIQUE: Contiguous axial CT images were obtained from the lower chest to the pubic symphysis without intravenous contrast.  Oral contrast was not administered.  Reformatted images in the coronal and sagittal planes were acquired.    COMPARISON CT: 1/20/2020    OTHER STUDIES USED FOR CORRELATION: None.       FINDINGS:    LOWER CHEST: Small bilateral pleural effusions with adjacent atelectasis.    HEPATOBILIARY: Redemonstrated distal common bile duct calculus measuring 7 mm (series 4 image 119, series 601 image 68, previously measured 4 mm) with mild intrahepatic and extrahepatic biliary ductal dilatation. Distended gallbladder with cholelithiasis.     SPLEEN: Unremarkable.    PANCREAS: Unremarkable.    ADRENAL GLANDS: Unremarkable.    KIDNEYS: Unchanged mild left hydroureteronephrosis with surrounding inflammatory change. No right hydronephrosis.    ABDOMINOPELVIC NODES: Unremarkable.    PELVIC ORGANS: Suprapubic Mccarthy catheter visualized within the collapsed urinary bladder.    Testicular inflammatory change without a drainable collection Unchanged prostatomegaly.    PERITONEUM/MESENTERY/BOWEL: Colonic diverticulosis. No bowel obstruction, ascites or pneumoperitoneum.    BONES/SOFT TISSUES: Degenerative changes of the spine. Unchanged chronic right 10th and bilateral 11th rib fractures.    OTHER: Atherosclerotic vascular calcifications.      IMPRESSION:     1.  Suprapubic catheter with under distention of the bladder and surrounding perivesicular inflammatory change suggestive of cystitis  2.   Unchanged mild left hydro moderate ureteronephrosis with surrounding inflammatory change, consistent with ascending left urinary tract infection. Correlate with urinalysis.  3.  Redemonstrated distal common bile duct calculus measuring 7 mm (previously measured 4 mm) with mild intrahepatic and extrahepatic biliary ductal dilatation.   4.  Distended gallbladder with cholelithiasis  5.  Small bilateral pleural effusions with adjacent atelectasis.              SHELBY ANDERSON M.D., RESIDENT RADIOLOGIST  This document has been electronically signed.  WES BALTAZAR M.D., ATTENDING RADIOLOGIST  Thisdocument has been electronically signed. May 19 2020  8:29PM              < end of copied text >

## 2020-05-26 NOTE — PROGRESS NOTE ADULT - SUBJECTIVE AND OBJECTIVE BOX
Nephrology progress note    Patient was seen and examined, events over the last 24 h noted .    Allergies:  sulfa drugs (Other)    Hospital Medications:   MEDICATIONS  (STANDING):  apixaban 2.5 milliGRAM(s) Oral every 12 hours  aspirin enteric coated 81 milliGRAM(s) Oral daily  brimonidine 0.2% Ophthalmic Solution 1 Drop(s) Both EYES two times a day  ciprofloxacin     Tablet 250 milliGRAM(s) Oral every 12 hours  dextrose 5%. 1000 milliLiter(s) (50 mL/Hr) IV Continuous <Continuous>  dextrose 50% Injectable 12.5 Gram(s) IV Push once  hydrALAZINE 50 milliGRAM(s) Oral every 8 hours  insulin lispro (HumaLOG) corrective regimen sliding scale   SubCutaneous three times a day before meals  latanoprost 0.005% Ophthalmic Solution 1 Drop(s) Both EYES at bedtime  metoprolol tartrate 100 milliGRAM(s) Oral two times a day  NIFEdipine XL 60 milliGRAM(s) Oral daily  pantoprazole    Tablet 40 milliGRAM(s) Oral before breakfast  sodium bicarbonate 650 milliGRAM(s) Oral three times a day  sodium chloride 0.9%. 1000 milliLiter(s) (70 mL/Hr) IV Continuous <Continuous>        VITALS:  T(F): 97.1 (20 @ 05:40), Max: 97.5 (20 @ 21:00)  HR: 55 (20 @ 07:48)  BP: 135/62 (20 @ 07:48)  RR: 18 (20 @ 05:40)  SpO2: --  Wt(kg): --     @ 07:  -   @ 07:00  --------------------------------------------------------  IN: 520 mL / OUT: 3100 mL / NET: -2580 mL     @ 07:01  -   @ 07:00  --------------------------------------------------------  IN: 200 mL / OUT: 1950 mL / NET: -1750 mL     @ 07:01  -   @ 11:35  --------------------------------------------------------  IN: 1000 mL / OUT: 0 mL / NET: 1000 mL          PHYSICAL EXAM:  Constitutional: NAD  HEENT: anicteric sclera, oropharynx clear, MMM  Neck: No JVD  Respiratory: CTA  Cardiovascular: S1, S2, RRR  Gastrointestinal: BS+, soft, NT/ND  Extremities: No peripheral edema  Neurological: A/O x 3  : +SPC.   Skin: No rashes  Vascular Access:  LABS:      138  |  107  |  55<H>  ----------------------------<  162<H>  4.4   |  20  |  2.8<H>    Ca    7.7<L>      26 May 2020 06:16  Phos  3.1       Mg     1.3         TPro  5.2<L>  /  Alb  2.7<L>  /  TBili  0.3  /  DBili      /  AST  15  /  ALT  23  /  AlkPhos  305<H>                            8.5    6.96  )-----------( 185      ( 26 May 2020 06:16 )             25.6       Urine Studies:  Urinalysis Basic - ( 20 May 2020 07:00 )    Color: Yellow / Appearance: Cloudy / S.025 / pH:   Gluc:  / Ketone: Negative  / Bili: Negative / Urobili: 0.2 mg/dL   Blood:  / Protein: >=300 mg/dL / Nitrite: Negative   Leuk Esterase: Large / RBC: Many /HPF / WBC Loaded /HPF   Sq Epi:  / Non Sq Epi: Occasional /HPF / Bacteria: Many        RADIOLOGY & ADDITIONAL STUDIES:

## 2020-05-26 NOTE — DIETITIAN INITIAL EVALUATION ADULT. - PERTINENT LABORATORY DATA
5/26: RBC-2.80, H/H-8.5/25.6, POCT gluc-307 (16:36) vs. 236 (11:46) vs. 151 (7:31), BUN-55, creat-2.8, gluc-162, Mg-1.3; 5/20: AjuV6U-5.2%

## 2020-05-26 NOTE — DIETITIAN INITIAL EVALUATION ADULT. - OTHER INFO
Pertinent Medical Information: ISRRAEL noted upon admission. Anemia - acute on chronic noted. Sepsis noted. Cholelithiasis noted.    Pertinent Subjective Information: Unable to obtain nutrition hx at this time - pt unable to provide nutrition hx at this time. No response from contact numbers in chart. NKFA per chart.

## 2020-05-26 NOTE — DIETITIAN INITIAL EVALUATION ADULT. - PROBLEM SELECTOR PLAN 1
With known history of CKD (stage 3b per old chart entries though creatinine values in EMR are widely varying)- For now Bicarb infusion with nephrology consult (called from ER). Holding Lasix and lisinopril

## 2020-05-26 NOTE — PROGRESS NOTE ADULT - ASSESSMENT
80yMale pmh GERD, DM, BPH, MI 2018, A-Fib on eliquis, PPM/AICD, CKD stage 3B, CAD s/p stents x2 4/2018 presents with weakness unable to get out of bed     Problem 1-Transaminitis Cholelithiasis +CBD stone(seen previously) no RUQ pain A and O x3  DDX( cholestasis of sepsis secondary to Choledocholithiasis, infectious, DILI) Redemonstrated distal common bile duct calculus measuring 7 mm (previously measured 4 mm) with mild intrahepatic and extrahepatic biliary ductal dilatation.   LFTs improving  Rec  - cause of infection suspected UTI WBC improved with cefepime   -Follow-up blood cultures, urine culture  -Trend LFTs and INR daily    - Follow up with our GI office located on [address] [place] [address], Phone number [phone] with Dr. Monroe 6/3/2020 office will reach out with info and timing   -IF LFTs continue to improve and patient continues to show no signs of RUQ pain, and has no fevers and WBC continues to improve will consider outpatient ERCP however if LFTs  worsen or patient shows signs of clinical and hemodynamics and/or patient develops signs/symptoms of infection will consider inpatient ERCP more urgently  -Continue antibiotics  -Avoid hepatotoxic medications  -Maintain hemodynamic stability    Problem 2-Suprapubic catheter with under distention of the bladder and surrounding perivesicular inflammatory change suggestive of cystitis. Unchanged mild left hydro moderate ureteronephrosis with surrounding inflammatory change, consistent with ascending left urinary tract infection.   Rec  -Care as per primary team     Problem 3-Small bilateral pleural effusions with adjacent atelectasis.  Rec  -Care as per primary team 80yMale pmh GERD, DM, BPH, MI 2018, A-Fib on eliquis, PPM/AICD, CKD stage 3B, CAD s/p stents x2 4/2018 presents with weakness unable to get out of bed     Problem 1-Transaminitis Cholelithiasis +CBD stone(seen previously) no RUQ pain A and O x3  DDX( cholestasis of sepsis secondary to Choledocholithiasis, infectious, DILI) Redemonstrated distal common bile duct calculus measuring 7 mm (previously measured 4 mm) with mild intrahepatic and extrahepatic biliary ductal dilatation.   LFTs improving  Rec  - cause of infection suspected UTI WBC improved with cefepime   -Trend LFTs and INR daily    - Follow up with our GI office located on [address] [place] [address], Phone number [phone] with Dr. Monroe 6/3/2020 office will reach out with info and timing   -IF LFTs continue to improve and patient continues to show no signs of RUQ pain, and has no fevers and WBC continues to improve will consider outpatient ERCP however if LFTs  worsen or patient shows signs of clinical and hemodynamics and/or patient develops signs/symptoms of infection will consider inpatient ERCP more urgently  -Continue antibiotics  -Avoid hepatotoxic medications  -Maintain hemodynamic stability    Problem 2-Suprapubic catheter with under distention of the bladder and surrounding perivesicular inflammatory change suggestive of cystitis. Unchanged mild left hydro moderate ureteronephrosis with surrounding inflammatory change, consistent with ascending left urinary tract infection.   Rec  -Care as per primary team     Problem 3-Small bilateral pleural effusions with adjacent atelectasis.  Rec  -Care as per primary team

## 2020-05-26 NOTE — DIETITIAN INITIAL EVALUATION ADULT. - DIET TYPE
Consistent Carbohydrate diet + DASH/TLC diet + Renal Replacement no protein restriction. Good appetite reported per staff, consuming % of meals.

## 2020-05-26 NOTE — DIETITIAN INITIAL EVALUATION ADULT. - ENERGY NEEDS
Energy: 1989-2452 kcal/day (MSJx1.2-1.3 AF)    Protein: 68-82 g/day (1-1.2 g/kg ABW)    Fluids: 1 mL/kcal

## 2020-05-26 NOTE — DIETITIAN INITIAL EVALUATION ADULT. - PHYSICAL APPEARANCE
BMI 22.2. Last BM reported 5/19 - LIP notified. No chewing/swallowing difficulty reported. Skin: noted WDL.

## 2020-05-26 NOTE — PROGRESS NOTE ADULT - ASSESSMENT
1)  Severe non-oliguric ISRRAEL.   Old creat from 2018 was 1.5,   Noted to have mild Lt hydro, has SPC, followed by Dr Walsh.  Creat is improving with hydration - down to 2.8 mg%  2)  Left mild hydronephrosis with Evidence on CT suggestive of ascending UTI, likely related to recent manipulation of pt's abnormal urinary tract. UTI with Klebsiella pn ESBL - on Cipro renal dose    3)  Hypocalcemia, after correcting for low albumin.  Likely multifactorial, including sepsis, possible Vit D deficiency, check albumin, phosphorus 25 VD, iPTH    4) HTN BP remains high, increase Nifedipine and Hydralazine if needed (one at a time)  5) Choledocholithiasis - followed by GI  6) Met acidosis - cont  Na bicarb 650 po tid  7) Anemia - iron stores, Procrit 10,000 sq qwek    Needs renal and  f/u as OP

## 2020-05-26 NOTE — DIETITIAN INITIAL EVALUATION ADULT. - PROBLEM SELECTOR PLAN 3
Repeat in  am but doubt cardiac (renal, sepsis?) However patient seen by cardiology in past for levels lower then this. Will ask for their input (also has CHF)

## 2020-05-26 NOTE — PROGRESS NOTE ADULT - SUBJECTIVE AND OBJECTIVE BOX
CHIEF COMPLAINT:    Patient is a 80y old  Male who presents with a chief complaint of ISRRAEL     INTERVAL HPI/OVERNIGHT EVENTS:    Patient seen and examined at bedside. No acute overnight events occurred.    ROS: Denies SOB, abdominal pain, dysuria. All other systems are negative.    Vital Signs:    T(F): 97.1 (20 @ 05:40), Max: 97.5 (20 @ 21:00)  HR: 55 (20 @ 07:48) (55 - 59)  BP: 135/62 (20 @ 07:48) (135/62 - 197/89)  RR: 18 (20 @ 05:40) (16 - 18)  SpO2: --  I&O's Summary    25 May 2020 07:01  -  26 May 2020 07:00  --------------------------------------------------------  IN: 200 mL / OUT: 1950 mL / NET: -1750 mL      Daily     Daily Weight in k.3 (26 May 2020 05:40)  CAPILLARY BLOOD GLUCOSE      POCT Blood Glucose.: 151 mg/dL (26 May 2020 07:31)  POCT Blood Glucose.: 241 mg/dL (25 May 2020 21:20)  POCT Blood Glucose.: 261 mg/dL (25 May 2020 16:24)  POCT Blood Glucose.: 240 mg/dL (25 May 2020 11:10)      PHYSICAL EXAM:  GENERAL:  NAD  SKIN: No rashes or lesions  HEENT: Atraumatic. Normocephalic. Anicteric  NECK:  No JVD.   PULMONARY: Clear to ausculation bilaterally. No wheezing. No rales  CVS: Normal S1, S2. Regular rate and rhythm. No murmurs.  ABDOMEN/GI: Soft, Nontender, Nondistended; Bowel sounds are present  EXTREMITIES:  No edema B/L LE.  NEUROLOGIC:  No motor deficit.  PSYCH: Alert & oriented x 3, normal affect        LABS:                        8.5    6.96  )-----------( 185      ( 26 May 2020 06:16 )             25.6     05-26    138  |  107  |  55<H>  ----------------------------<  162<H>  4.4   |  20  |  2.8<H>    Ca    7.7<L>      26 May 2020 06:16  Phos  3.1       Mg     1.3         TPro  5.2<L>  /  Alb  2.7<L>  /  TBili  0.3  /  DBili  x   /  AST  15  /  ALT  23  /  AlkPhos  305<H>                RADIOLOGY & ADDITIONAL TESTS:  No new images    Medications:  Standing  apixaban 2.5 milliGRAM(s) Oral every 12 hours  aspirin enteric coated 81 milliGRAM(s) Oral daily  brimonidine 0.2% Ophthalmic Solution 1 Drop(s) Both EYES two times a day  ciprofloxacin     Tablet 250 milliGRAM(s) Oral every 12 hours  dextrose 5%. 1000 milliLiter(s) IV Continuous <Continuous>  dextrose 50% Injectable 12.5 Gram(s) IV Push once  hydrALAZINE 50 milliGRAM(s) Oral every 8 hours  insulin lispro (HumaLOG) corrective regimen sliding scale   SubCutaneous three times a day before meals  latanoprost 0.005% Ophthalmic Solution 1 Drop(s) Both EYES at bedtime  metoprolol tartrate 100 milliGRAM(s) Oral two times a day  NIFEdipine XL 60 milliGRAM(s) Oral daily  pantoprazole    Tablet 40 milliGRAM(s) Oral before breakfast  sodium bicarbonate 650 milliGRAM(s) Oral three times a day    PRN Meds  aluminum hydroxide/magnesium hydroxide/simethicone Suspension 30 milliLiter(s) Oral every 4 hours PRN  dextrose 40% Gel 15 Gram(s) Oral once PRN  glucagon  Injectable 1 milliGRAM(s) IntraMuscular once PRN  melatonin 5 milliGRAM(s) Oral at bedtime PRN  oxyCODONE    IR 5 milliGRAM(s) Oral every 12 hours PRN  senna 1 Tablet(s) Oral daily PRN      Case discussed with resident  Care discussed with pt

## 2020-05-26 NOTE — DIETITIAN INITIAL EVALUATION ADULT. - RD TO REMAIN AVAILABLE
Nutrition goals: Pt to maintain tolerance to diet order, with at least 75% po intake maintained over next 7 days. Nutrition Intervention: Meals & Snacks. Monitor: Energy intake, glucose profile, renal profile, nutrition focused physical findings, body composition./yes

## 2020-05-26 NOTE — PROGRESS NOTE ADULT - ASSESSMENT
81 yo M PMHx Afib, Anemia, BPH, CAD s/p stent in 4/2018, Cardiomyopathy, Chronic systolic and diastolic CHF, Cholelithiasis, Dementia, DM II, GERD, Glaucoma, H/O ptosis of eyelid right eye (sometimes wears patch), Hematuria, HTN, Hydrocele, Hypercholesteremia, Renal insufficiency, sepsis from cholecystits (2018), TIA 7/2018, JULY 2018, S/P AICD (automatic cardioverter/defibrillator) present [employer], H/O bilateral cataract extraction, supapubic catheter presented for evaluation of abdominal pain. Found to have sepsis 2/2 uti.    Sepsis  -Seems to be secondary to UTI  -ct abdomen showed inflammatory changes suggestive of cystitis with unchanged mild left hydro  -Leukocytosis resolved  -Urology recommended outpatient follow up  -BC negative.  Urine culture shows klebsiella.  continue with cipro      Cholelithiasis  -Outpatient follow up with GI for ERCP      Troponemia  - likely type 2 MI 2/2 sepsis  - no chest pain   - cardio on board    ISRRAEL  -Abdm CT scan shows hydronephrosis/?Chronic  -Continue with bicarb  -renal stable  -Urology, and nephrology to follow up   - has fluids ordered but not currently hanging, restart fluids      HTN  -Continue with metoprolol, nifedipine and will add hydralazine for elevated BP  -Monitor BP    Chronic systolic and diastolic CHF  -Stable  -No sign of volume overload    DM2  - monitor FS  - FS goal 110-180    A. Fib  -Continue with metoprolol for rate control  -Continue with eliquis    Anemia  -acute on Chronic  -Iron study, b12, and folate level.  Occult/  pending  -S/P 1 unit of RBC transfusion.  No sign of bleeding.    -Might consider epo pending iron study  -Monitor CBC      #Progress Note Handoff  Pending (specify):  rehab/covid swab  Family discussion: discussed monitoring renal function  Disposition:  unknown

## 2020-05-27 NOTE — PROGRESS NOTE ADULT - ASSESSMENT
1)  Severe non-oliguric ISRRAEL.   Old creat from 2018 was 1.5,   Noted to have mild Lt hydro, has SPC, followed by Dr Walsh.  Creat is improving with hydration - down to 2.8 mg%  2)  Left mild hydronephrosis with Evidence on CT suggestive of ascending UTI, likely related to recent manipulation of pt's abnormal urinary tract. UTI with Klebsiella pn ESBL - on Cipro renal dose    3)  Hypocalcemia, after correcting for low albumin.  Likely multifactorial, including sepsis, possible Vit D deficiency, hypomagnesemia, check albumin, phosphorus 25 VD,   - iPTH 79, check vit D    4) HTN BP remains high, increase Nifedipine to 90 mg    Hydralazine increased  CHANGE NS TO 1/2 NS to reduce salt load  5) Choledocholithiasis - followed by GI  6) Met acidosis - cont  Na bicarb 650 po tid  7) Anemia - iron stores, Procrit 10,000 sq qwek  8) Hypomagnesemia - replete more aggressively with IV MAg rider    Needs renal and  f/u as OP

## 2020-05-27 NOTE — PROGRESS NOTE ADULT - SUBJECTIVE AND OBJECTIVE BOX
Nephrology progress note    Patient is seen and examined, events over the last 24 h noted .  On IVF, denies complaints  Allergies:  sulfa drugs (Other)    Hospital Medications:   MEDICATIONS  (STANDING):  apixaban 2.5 milliGRAM(s) Oral every 12 hours  aspirin enteric coated 81 milliGRAM(s) Oral daily  brimonidine 0.2% Ophthalmic Solution 1 Drop(s) Both EYES two times a day  ciprofloxacin     Tablet 250 milliGRAM(s) Oral every 12 hours  dextrose 5%. 1000 milliLiter(s) (50 mL/Hr) IV Continuous <Continuous>  dextrose 50% Injectable 12.5 Gram(s) IV Push once  hydrALAZINE 50 milliGRAM(s) Oral every 8 hours  insulin lispro (HumaLOG) corrective regimen sliding scale   SubCutaneous three times a day before meals  latanoprost 0.005% Ophthalmic Solution 1 Drop(s) Both EYES at bedtime  metoprolol tartrate 100 milliGRAM(s) Oral two times a day  NIFEdipine XL 60 milliGRAM(s) Oral daily  pantoprazole    Tablet 40 milliGRAM(s) Oral before breakfast  sodium bicarbonate 650 milliGRAM(s) Oral three times a day  sodium chloride 0.9%. 1000 milliLiter(s) (70 mL/Hr) IV Continuous <Continuous>        VITALS:  T(F): 97.3 (05-27-20 @ 05:36), Max: 98.8 (05-26-20 @ 14:08)  HR: 55 (05-27-20 @ 05:36)  BP: 183/81 (05-27-20 @ 05:36)  RR: 18 (05-27-20 @ 05:36)  SpO2: --  Wt(kg): --    05-25 @ 07:01  -  05-26 @ 07:00  --------------------------------------------------------  IN: 200 mL / OUT: 1950 mL / NET: -1750 mL    05-26 @ 07:01  -  05-27 @ 07:00  --------------------------------------------------------  IN: 1770 mL / OUT: 1690 mL / NET: 80 mL      Height (cm): 175.3 (05-26 @ 19:17)    PHYSICAL EXAM:  Constitutional: NAD  HEENT: anicteric sclera, oropharynx clear, MMM  Neck: No JVD  Respiratory: CTAB, no wheezes, rales or rhonchi  Cardiovascular: S1, S2, RRR  Gastrointestinal: BS+, soft, NT/ND  Extremities: No peripheral edema  Neurological: A/O x 3  : has rondon.   Skin: No rashes  Vascular Access:    LABS:  05-27    140  |  110  |  59<H>  ----------------------------<  147<H>  4.2   |  20  |  2.9<H>    Ca    7.6<L>      27 May 2020 05:45  Phos  3.1     05-26  Mg     1.3     05-27    TPro  5.2<L>  /  Alb  2.7<L>  /  TBili  0.3  /  DBili      /  AST  15  /  ALT  23  /  AlkPhos  305<H>  05-26                          8.5    6.96  )-----------( 185      ( 26 May 2020 06:16 )             25.6       Urine Studies:      RADIOLOGY & ADDITIONAL STUDIES:

## 2020-05-27 NOTE — PROGRESS NOTE ADULT - SUBJECTIVE AND OBJECTIVE BOX
INTERVAL HPI/OVERNIGHT EVENTS:    SUBJECTIVE: Patient seen and examined at bedside.     no cp, sob, abd pain, fever  no dysuria, no discharge, hematuria, no burning with urination    OBJECTIVE:    VITAL SIGNS:  Vital Signs Last 24 Hrs  T(C): 36.3 (27 May 2020 05:36), Max: 37.1 (26 May 2020 14:08)  T(F): 97.3 (27 May 2020 05:36), Max: 98.8 (26 May 2020 14:08)  HR: 55 (27 May 2020 05:36) (54 - 70)  BP: 183/81 (27 May 2020 05:36) (132/62 - 183/81)  BP(mean): --  RR: 18 (27 May 2020 05:36) (17 - 18)  SpO2: --      PHYSICAL EXAM:    General: NAD  HEENT: NC/AT; PERRL, clear conjunctiva  Neck: supple  Respiratory: CTA b/l  Cardiovascular: +S1/S2; RRR  Abdomen: soft, NT/ND; +BS x4  Extremities: WWP, 2+ peripheral pulses b/l; no LE edema  Skin: normal color and turgor; no rash  Neurological:    MEDICATIONS:  MEDICATIONS  (STANDING):  apixaban 2.5 milliGRAM(s) Oral every 12 hours  aspirin enteric coated 81 milliGRAM(s) Oral daily  brimonidine 0.2% Ophthalmic Solution 1 Drop(s) Both EYES two times a day  ciprofloxacin     Tablet 250 milliGRAM(s) Oral every 12 hours  dextrose 5%. 1000 milliLiter(s) (50 mL/Hr) IV Continuous <Continuous>  dextrose 50% Injectable 12.5 Gram(s) IV Push once  hydrALAZINE 50 milliGRAM(s) Oral every 8 hours  insulin lispro (HumaLOG) corrective regimen sliding scale   SubCutaneous three times a day before meals  latanoprost 0.005% Ophthalmic Solution 1 Drop(s) Both EYES at bedtime  lidocaine 2% Gel 1 Application(s) Topical daily  magnesium sulfate  IVPB 2 Gram(s) IV Intermittent every 2 hours  metoprolol tartrate 100 milliGRAM(s) Oral two times a day  NIFEdipine XL 90 milliGRAM(s) Oral daily  pantoprazole    Tablet 40 milliGRAM(s) Oral before breakfast  sodium bicarbonate 650 milliGRAM(s) Oral three times a day    MEDICATIONS  (PRN):  aluminum hydroxide/magnesium hydroxide/simethicone Suspension 30 milliLiter(s) Oral every 4 hours PRN Dyspepsia  dextrose 40% Gel 15 Gram(s) Oral once PRN Blood Glucose LESS THAN 70 milliGRAM(s)/deciliter  glucagon  Injectable 1 milliGRAM(s) IntraMuscular once PRN Glucose LESS THAN 70 milligrams/deciliter  melatonin 5 milliGRAM(s) Oral at bedtime PRN Insomnia  oxyCODONE    IR 5 milliGRAM(s) Oral every 6 hours PRN Severe Pain (7 - 10)  senna 1 Tablet(s) Oral daily PRN Constipation      ALLERGIES:  Allergies    sulfa drugs (Other)    Intolerances        LABS:                        8.5    6.96  )-----------( 185      ( 26 May 2020 06:16 )             25.6     Hemoglobin: 8.5 g/dL (05-26 @ 06:16)  Hemoglobin: 8.5 g/dL (05-25 @ 06:54)  Hemoglobin: 7.2 g/dL (05-24 @ 07:11)  Hemoglobin: 7.6 g/dL (05-23 @ 11:58)    CBC Full  -  ( 26 May 2020 06:16 )  WBC Count : 6.96 K/uL  RBC Count : 2.80 M/uL  Hemoglobin : 8.5 g/dL  Hematocrit : 25.6 %  Platelet Count - Automated : 185 K/uL  Mean Cell Volume : 91.4 fL  Mean Cell Hemoglobin : 30.4 pg  Mean Cell Hemoglobin Concentration : 33.2 g/dL  Auto Neutrophil # : x  Auto Lymphocyte # : x  Auto Monocyte # : x  Auto Eosinophil # : x  Auto Basophil # : x  Auto Neutrophil % : x  Auto Lymphocyte % : x  Auto Monocyte % : x  Auto Eosinophil % : x  Auto Basophil % : x    05-27    140  |  110  |  59<H>  ----------------------------<  147<H>  4.2   |  20  |  2.9<H>    Ca    7.6<L>      27 May 2020 05:45  Phos  3.1     05-26  Mg     1.3     05-27    TPro  5.2<L>  /  Alb  2.7<L>  /  TBili  0.3  /  DBili  x   /  AST  15  /  ALT  23  /  AlkPhos  305<H>  05-26    Creatinine Trend: 2.9<--, 2.8<--, 2.8<--, 3.0<--, 3.1<--, 3.5<--  LIVER FUNCTIONS - ( 26 May 2020 06:16 )  Alb: 2.7 g/dL / Pro: 5.2 g/dL / ALK PHOS: 305 U/L / ALT: 23 U/L / AST: 15 U/L / GGT: x               hs Troponin:              CSF:                      EKG:   MICROBIOLOGY:    IMAGING:      Labs, imaging, EKG personally reviewed    RADIOLOGY & ADDITIONAL TESTS: Reviewed.

## 2020-05-27 NOTE — PROGRESS NOTE ADULT - REASON FOR ADMISSION
ISRRAEL

## 2020-05-27 NOTE — DISCHARGE NOTE NURSING/CASE MANAGEMENT/SOCIAL WORK - NSDCFUADDAPPT_GEN_ALL_CORE_FT
Please follow up with your doctor in 2-3 days  Please follow up with gastroenterology in one week  Please follow up with cardiology, and nephrology in 1-2 weeks  Please follow up with urology in one week  Please come back to the hospital if you develop any new symptoms or concerns

## 2020-05-27 NOTE — DISCHARGE NOTE NURSING/CASE MANAGEMENT/SOCIAL WORK - PATIENT PORTAL LINK FT
You can access the FollowMyHealth Patient Portal offered by [facility] by registering at the following website: [email or web address]. By joining [facility]’s FollowMyHealth portal, you will also be able to view your health information using other applications (apps) compatible with our system.

## 2020-05-27 NOTE — PROGRESS NOTE ADULT - SUBJECTIVE AND OBJECTIVE BOX
80yMale  Being followed for CBD, altered liver chemistries   Interval history: Patient denies nausea, vomiting, hematemesis, melena, blood in stool, diarrhea, constipation, abdominal pain. Patient comfortable states he has mild pain in his penis that is also improving. Patient reports no RUQ pain at all, no fevers, no chills.      PAST MEDICAL & SURGICAL HISTORY:   GERD (gastroesophageal reflux disease): intermittent  H/O ptosis of eyelid: right eye (sometimes wears patch)  Hematuria  Hyperlipidemia  Anemia  Diabetes  Renal insufficiency  Cardiomyopathy  Cholelithiasis  Hydrocele in adult  Glaucoma  BPH (benign prostatic hyperplasia)  CAD (coronary artery disease): CARD STENT X2 4/2018  TIA (transient ischemic attack): X2 JULY 2018  Dementia  CHF (congestive heart failure): COMBINED SYSTOLIC &amp; DIASTOLIC  Afib  Heart attack: 1/2018  Sepsis: 1/18 FROM INFECTED GALLBLADDER  Hypercholesteremia  HTN (hypertension)  DM (diabetes mellitus)  History of suprapubic catheter  H/O flexible sigmoidoscopy: 2015  H/O bilateral cataract extraction: LEFT- 1/18 RIGHT 6 YRS AGO  Encounter for biliary drainage tube placement: 1/2018  H/O coronary angioplasty: WITH STENT X2 (4/2018)  History of prostate surgery: 5/17  AICD (automatic cardioverter/defibrillator) present: [employer]          Social History: No smoking. No alcohol. No illegal drug use.            MEDICATIONS  (STANDING):  apixaban 2.5 milliGRAM(s) Oral every 12 hours  aspirin enteric coated 81 milliGRAM(s) Oral daily  brimonidine 0.2% Ophthalmic Solution 1 Drop(s) Both EYES two times a day  ciprofloxacin     Tablet 250 milliGRAM(s) Oral every 12 hours  dextrose 5%. 1000 milliLiter(s) (50 mL/Hr) IV Continuous <Continuous>  dextrose 50% Injectable 12.5 Gram(s) IV Push once  hydrALAZINE 50 milliGRAM(s) Oral every 8 hours  insulin lispro (HumaLOG) corrective regimen sliding scale   SubCutaneous three times a day before meals  latanoprost 0.005% Ophthalmic Solution 1 Drop(s) Both EYES at bedtime  lidocaine 2% Gel 1 Application(s) Topical daily  magnesium sulfate  IVPB 2 Gram(s) IV Intermittent every 2 hours  metoprolol tartrate 100 milliGRAM(s) Oral two times a day  NIFEdipine XL 90 milliGRAM(s) Oral daily  pantoprazole    Tablet 40 milliGRAM(s) Oral before breakfast  sodium bicarbonate 650 milliGRAM(s) Oral three times a day    MEDICATIONS  (PRN):  aluminum hydroxide/magnesium hydroxide/simethicone Suspension 30 milliLiter(s) Oral every 4 hours PRN Dyspepsia  dextrose 40% Gel 15 Gram(s) Oral once PRN Blood Glucose LESS THAN 70 milliGRAM(s)/deciliter  glucagon  Injectable 1 milliGRAM(s) IntraMuscular once PRN Glucose LESS THAN 70 milligrams/deciliter  melatonin 5 milliGRAM(s) Oral at bedtime PRN Insomnia  oxyCODONE    IR 5 milliGRAM(s) Oral every 6 hours PRN Severe Pain (7 - 10)  senna 1 Tablet(s) Oral daily PRN Constipation      Allergies:   sulfa drugs (Other)              REVIEW OF SYSTEMS:  General:  No weight loss, fevers, or chills.  Eyes:  No reported pain or visual changes  ENT:  No sore throat or runny nose.  NECK: No stiffness   CV:  No chest pain or palpitations.  Resp:  No shortness of breath, cough  GI:  No abdominal pain, nausea, vomiting, dysphagia, diarrhea or constipation. No rectal bleeding, melena, or hematemesis.  Neuro:  No tingling, numbness         VITAL SIGNS:   T(F): 97.3 (05-27-20 @ 05:36), Max: 98.8 (05-26-20 @ 14:08)  HR: 55 (05-27-20 @ 05:36) (54 - 70)  BP: 183/81 (05-27-20 @ 05:36) (132/62 - 183/81)  RR: 18 (05-27-20 @ 05:36) (17 - 18)  SpO2: --    PHYSICAL EXAM:  GENERAL: AAOx3, no acute distress.  HEAD:  Atraumatic, Normocephalic  EYES: conjunctiva and sclera clear  NECK: Supple, no JVD or thyromegaly  CHEST/LUNG: Clear to auscultation bilaterally; No wheeze, rhonchi, or rales  HEART: Regular rate and rhythm; normal S1, S2, No murmurs.  ABDOMEN: Soft, nontender, nondistended; Bowel sounds present, no abdominal bruit, masses, or hepatosplenomegaly  NEUROLOGY: No asterixis or tremor.   SKIN: Intact, no jaundice            LABS:                        8.5    6.96  )-----------( 185      ( 26 May 2020 06:16 )             25.6     05-27    140  |  110  |  59<H>  ----------------------------<  147<H>  4.2   |  20  |  2.9<H>    Ca    7.6<L>      27 May 2020 05:45  Phos  3.1     05-26  Mg     1.3     05-27    TPro  5.2<L>  /  Alb  2.7<L>  /  TBili  0.3  /  DBili  x   /  AST  15  /  ALT  23  /  AlkPhos  305<H>  05-26    LIVER FUNCTIONS - ( 26 May 2020 06:16 )  Alb: 2.7 g/dL / Pro: 5.2 g/dL / ALK PHOS: 305 U/L / ALT: 23 U/L / AST: 15 U/L / GGT: x               IMAGING:      < from: CT Abdomen and Pelvis No Cont (05.19.20 @ 19:21) >  EXAM:  CT ABDOMEN AND PELVIS            PROCEDURE DATE:  05/19/2020            INTERPRETATION:  CLINICAL STATEMENT: Abdominal pain.      TECHNIQUE: Contiguous axial CT images were obtained from the lower chest to the pubic symphysis without intravenous contrast.  Oral contrast was not administered.  Reformatted images in the coronal and sagittal planes were acquired.    COMPARISON CT: 1/20/2020    OTHER STUDIES USED FOR CORRELATION: None.       FINDINGS:    LOWER CHEST: Small bilateral pleural effusions with adjacent atelectasis.    HEPATOBILIARY: Redemonstrated distal common bile duct calculus measuring 7 mm (series 4 image 119, series 601 image 68, previously measured 4 mm) with mild intrahepatic and extrahepatic biliary ductal dilatation. Distended gallbladder with cholelithiasis.     SPLEEN: Unremarkable.    PANCREAS: Unremarkable.    ADRENAL GLANDS: Unremarkable.    KIDNEYS: Unchanged mild left hydroureteronephrosis with surrounding inflammatory change. No right hydronephrosis.    ABDOMINOPELVIC NODES: Unremarkable.    PELVIC ORGANS: Suprapubic Mccarthy catheter visualized within the collapsed urinary bladder.    Testicular inflammatory change without a drainable collection Unchanged prostatomegaly.    PERITONEUM/MESENTERY/BOWEL: Colonic diverticulosis. No bowel obstruction, ascites or pneumoperitoneum.    BONES/SOFT TISSUES: Degenerative changes of the spine. Unchanged chronic right 10th and bilateral 11th rib fractures.    OTHER: Atherosclerotic vascular calcifications.      IMPRESSION:     1.  Suprapubic catheter with under distention of the bladder and surrounding perivesicular inflammatory change suggestive of cystitis  2.   Unchanged mild left hydro moderate ureteronephrosis with surrounding inflammatory change, consistent with ascending left urinary tract infection. Correlate with urinalysis.  3.  Redemonstrated distal common bile duct calculus measuring 7 mm (previously measured 4 mm) with mild intrahepatic and extrahepatic biliary ductal dilatation.   4.  Distended gallbladder with cholelithiasis  5.  Small bilateral pleural effusions with adjacent atelectasis.              SHELBY ANDERSON M.D., RESIDENT RADIOLOGIST  This document has been electronically signed.  WES BALTAZAR M.D., ATTENDING RADIOLOGIST  Thisdocument has been electronically signed. May 19 2020  8:29PM              < end of copied text >

## 2020-05-27 NOTE — PROGRESS NOTE ADULT - ASSESSMENT
80yMale pmh GERD, DM, BPH, MI 2018, A-Fib on eliquis, PPM/AICD, CKD stage 3B, CAD s/p stents x2 4/2018 presents with weakness unable to get out of bed     Problem 1-Transaminitis Cholelithiasis +CBD stone(seen previously) no RUQ pain A and O x3  DDX( cholestasis of sepsis secondary to Choledocholithiasis, infectious, DILI) Redemonstrated distal common bile duct calculus measuring 7 mm (previously measured 4 mm) with mild intrahepatic and extrahepatic biliary ductal dilatation.   LFTs improving  Rec  - cause of infection suspected UTI WBC improved with cefepime   -Follow-up blood cultures, urine culture  -Trend LFTs and INR daily    - Follow up with our GI office located on [address] [place] [address], Phone number [phone] with Dr. Monroe 6/3/2020 office will reach out with info and timing   -IF LFTs continue to improve and patient continues to show no signs of RUQ pain, and has no fevers and WBC continues to improve will consider outpatient ERCP however if LFTs  worsen or patient shows signs of clinical and hemodynamics and/or patient develops signs/symptoms of infection will consider inpatient ERCP more urgently  -Continue antibiotics  -Avoid hepatotoxic medications  -Maintain hemodynamic stability    Problem 2-Suprapubic catheter with under distention of the bladder and surrounding perivesicular inflammatory change suggestive of cystitis. Unchanged mild left hydro moderate ureteronephrosis with surrounding inflammatory change, consistent with ascending left urinary tract infection.   Rec  -Care as per primary team     Problem 3-Small bilateral pleural effusions with adjacent atelectasis.  Rec  -Care as per primary team 80yMale pmh GERD, DM, BPH, MI 2018, A-Fib on eliquis, PPM/AICD, CKD stage 3B, CAD s/p stents x2 4/2018 presents with weakness unable to get out of bed     Problem 1-Transaminitis Cholelithiasis +CBD stone(seen previously) no RUQ pain A and O x3  DDX( cholestasis of sepsis secondary to Choledocholithiasis, infectious, DILI) Redemonstrated distal common bile duct calculus measuring 7 mm (previously measured 4 mm) with mild intrahepatic and extrahepatic biliary ductal dilatation.   LFTs improving  Rec  -cause of infection suspected UTI WBC improved with cefepime   -Trend LFTs and INR daily    - Follow up with our GI office located on [address] [place] [address], Phone number [phone] with Dr. Monroe 6/3/2020 office will reach out with info and timing   -IF LFTs continue to improve and patient continues to show no signs of RUQ pain, and has no fevers and WBC continues to improve will consider outpatient ERCP however if LFTs  worsen or patient shows signs of clinical and hemodynamics and/or patient develops signs/symptoms of infection will consider inpatient ERCP more urgently  -Continue antibiotics  -Avoid hepatotoxic medications  -Maintain hemodynamic stability    Problem 2-Suprapubic catheter with under distention of the bladder and surrounding perivesicular inflammatory change suggestive of cystitis. Unchanged mild left hydro moderate ureteronephrosis with surrounding inflammatory change, consistent with ascending left urinary tract infection.   Rec  -Care as per primary team     Problem 3-Small bilateral pleural effusions with adjacent atelectasis.  Rec  -Care as per primary team 80yMale pmh GERD, DM, BPH, MI 2018, A-Fib on eliquis, PPM/AICD, CKD stage 3B, CAD s/p stents x2 4/2018 presents with weakness unable to get out of bed     Problem 1-Transaminitis Cholelithiasis +CBD stone(seen previously) no RUQ pain A and O x3  DDX( cholestasis of sepsis secondary to Choledocholithiasis, infectious, DILI) Redemonstrated distal common bile duct calculus measuring 7 mm (previously measured 4 mm) with mild intrahepatic and extrahepatic biliary ductal dilatation.   LFTs improving  Rec  -cause of infection suspected UTI WBC improved with cefepime   -Trend LFTs and INR daily    - Follow up with our GI office located on [address] [place] [address], Phone number [phone] with Dr. Monroe 6/3/2020 office will reach out with info and timing   -IF LFTs continue to improve and patient continues to show no signs of RUQ pain, and has no fevers and WBC continues to improve will consider outpatient ERCP however if LFTs  worsen or patient shows signs of clinical and hemodynamics and/or patient develops signs/symptoms of infection will consider inpatient ERCP more urgently  -Continue antibiotics  -Avoid hepatotoxic medications  -Maintain hemodynamic stability    Problem 2-Suprapubic catheter with under distention of the bladder and surrounding perivesicular inflammatory change suggestive of cystitis. Unchanged mild left hydro moderate ureteronephrosis with surrounding inflammatory change, consistent with ascending left urinary tract infection.   Rec  -Care as per primary team     Problem 3-Small bilateral pleural effusions with adjacent atelectasis.  Rec  -Care as per primary team     GI to sign-off Recall GI if needed

## 2020-05-27 NOTE — PROGRESS NOTE ADULT - ASSESSMENT
80M PMHx AFib, BPH s/p spc, CAD s/p stent 4/2018, HFrEF s/p AICD, dementia, DM2, HTN, TIA 7/2018 here with sepsis, present on admission, due to ecoli esbl uti. ISRRAEL.    #ESBL ecoli uti  cipro, plan for 10 days total  sepsis resolved  bcx ntd  c/b L hydro, isrrael  outpt uro f/u  #Choledocolithiasis  cbd stone noted on ct with dilation  lft near resolved  outpt gi f/u  #ISRRAEL, presumed prerenal possible progression into atn  scr plateuaed, atn vs. new baseline  appreciate renal  us with stable hydro  trend scr  needs close f/u outpt  #AFib  eliquis  lopressor 100 bid  #BPH  suprapubic cath in place  #CAD  asa  outpt f/u for statin  #HFrEF  euvolemic on exam  lopressor as above  #DM2  ssi  #HTN  nifedipine 90  hydralazine 50 tid  lopressor as above  #TIA  asa  f/u outpt for statin  #DVT ppx  eliquis    #Progress Note Handoff:  Pending (specify):  Consults_________, Tests________, Test Results_______, Other___d/c planning______  Family discussion:d/w pt at bedside re: treatment plan, primary dx  Disposition: Home___/SNF__x_/Other________/Unknown at this time________

## 2020-07-10 NOTE — ED PROVIDER NOTE - CLINICAL SUMMARY MEDICAL DECISION MAKING FREE TEXT BOX
80M pw hematuria and penile pain since his prostate surgery. 80M pw weakness, hematuria and penile pain since his prostate surgery. has suprapubic cath. Suprapubic cath in place, with foul smelling hematuria. initial review of labs noted to have an leukocytosis, elevated AG, acute or chronic ckd. metabolic acidosis-started on broad spectrum abx. started on bicarb drip for the acidosis. nl laccate. case discussed with hospitalist who will fu CT abd pelvis. currently pt is stable for the floor- over is anox3, well appearing clinically with stable vitals.

## 2020-07-10 NOTE — ED PROVIDER NOTE - NS ED ROS FT
Constitutional: + weakness no fever, chills, no recent weight loss, change in appetite  Eyes: no redness/discharge/pain/vision changes  ENT: no rhinorrhea/ear pain/sore throat  Cardiac: No chest pain, SOB or edema.  Respiratory: No cough or respiratory distress  GI: + nausea, vomiting No diarrhea or abdominal pain.  : + hematuria No dysuria, frequency, urgency   MS: no pain to back or extremities, no loss of ROM, no weakness  Neuro: No headache or weakness. No LOC.  Skin: No skin rash.  Endocrine: No history of thyroid disease or diabetes.

## 2020-07-10 NOTE — ED PROVIDER NOTE - PROGRESS NOTE DETAILS
Spoke with Dr Vu Verde (Nephrologist), recommend BiCarb Drip (D5 3Amp biCarb) and CT abd/pelvic non-contrast 2/2 hx of hydro. Case d/w Dr Pike and he will follow up CT result.

## 2020-07-10 NOTE — ED PROVIDER NOTE - CARE PLAN
Principal Discharge DX:	Acute on chronic kidney failure  Secondary Diagnosis:	UTI (urinary tract infection)  Secondary Diagnosis:	Sepsis  Secondary Diagnosis:	Metabolic acidosis

## 2020-07-10 NOTE — ED PROVIDER NOTE - PHYSICAL EXAMINATION
CONSTITUTIONAL: + chronic ill male; in no apparent distress.   EYES: PERRL; EOM intact.   ENT: normal nose; no rhinorrhea; normal pharynx with no tonsillar hypertrophy.   NECK: Supple; non-tender; no cervical lymphadenopathy. No JVD.   CARDIOVASCULAR: Normal S1, S2; no murmurs, rubs, or gallops.   RESPIRATORY: Normal chest excursion with respiration; breath sounds clear and equal bilaterally; no wheezes, rhonchi, or rales.  GI/: Normal bowel sounds; non-tender; no palpable organomegaly. + suprapubic catheter noted. no bleeding. + foul smelling hematuria. easily reducible ventral abdominal hernia noted.  MS: No evidence of trauma or deformity to extremities.  SKIN: Normal for age and race; warm; dry; good turgor; no apparent lesions or exudate.   NEURO/PSYCH: A & O x 4; grossly unremarkable.

## 2020-07-10 NOTE — ED PROVIDER NOTE - PSH
AICD (automatic cardioverter/defibrillator) present  [employer]  Encounter for biliary drainage tube placement  1/2018  H/O bilateral cataract extraction  LEFT- 1/18 RIGHT 6 YRS AGO  H/O coronary angioplasty  WITH STENT X2 (4/2018)  H/O flexible sigmoidoscopy  2015  History of prostate surgery  5/17  History of suprapubic catheter

## 2020-07-10 NOTE — ED PROVIDER NOTE - OBJECTIVE STATEMENT
81 yo male hx of HTN/HLD/DM/CKD/CAD/CHF/TIA/ BPH s/p suprapubic catheter 2 months ago present c/o generalized/hematuria/penile pain (since the prostate surgery)/ nausea and few episode of NB/NB vomitus since yesterday. reported he felt so weak that he couldn't get off the bed so he called EMS to bring him to ED for evaluation. Hematuria started yesterday. deneis fever/chill/coughing/sore throat/vomiting/diarrhea/chest pain and abd pain. Had COVID-19 on 04/16/2020.

## 2020-07-11 NOTE — H&P ADULT - PROBLEM SELECTOR PLAN 5
metoprolol ordered, concerned about continuing apixaban in view of current ISRRAEL values. Permanent?, metoprolol ordered, concerned about continuing apixaban in view of current ISRRAEL values.

## 2020-07-11 NOTE — CONSULT NOTE ADULT - ASSESSMENT
1)  Severe recurrent ISRRAEL, oliguric, superimposed on CKD.  There may be an element of intravascular volume depletion, but doesn't appear to be enough to explain creat up to 9.5.  Likewise, obstructed urinary tract doesn't seem to be the case, unless obstruction is B/L and above level of bladder, as bladder nearly empty when SPC changed.  No evidence of sepsis or hemodynamic instability.  Once concern is what IV Abx pt received at [facility], perhaps something known to cause nephrotoxic ATN (eg, Aminoglycosides) or AIN (eg, beta lactams).  Doubt acute GN; I suspect protein and blood in urine more from urinary tract, less likely glomerular.  Myeloma kidney always a possibility.    2)  Underlying CKD.  ? if pt now has new baseline compared to 2018, due to incomplete recovery from recent episode of ISRRAEL    3)  Hypocalcemia, corrected to ~ 6.9 to 7.0, which was present last admission.  Likely multifactorial, including 25-OH Vit D3 and  1,25-OH Vit D3 deficiency, plus possible hyperphosphatemia    4)  Metabolic acidosis w/ elevated AG.  Pt has underlying chronic acidosis on NaHCO3, now worse due to severe renal failure    Recommendations:    1)  Can continue IV NaHCO3 drip @ 75cc/hr    2)  Will need to try and find out which IV Abx pt received at [facility]    3)  Check Phos, iPTH, 25-OH Vit D3    4)  If Phos not exceptionally high, can start empiric Calcitriol awaiting iPTH results    5)  No absolute indication for HD now, but need may soon arise if no improvement in renal function    6)  Spor urine Na+, creat

## 2020-07-11 NOTE — CONSULT NOTE ADULT - SUBJECTIVE AND OBJECTIVE BOX
R[place]IO NATION  80y, Male  Allergy: sulfa drugs (Other)      CHIEF COMPLAINT: ISRRAEL, UTI (2020 02:36)      LOS      HPI:  81yo male extensive PMH (based on EMR review) includes BPH (SPC 2 months ago),  AICD, atrial fibrillation (on DOAC), HTN, CAD (stents), combined systolic and diastolic CHF, CKD- stage 3b, DM, glaucoma DLD and TIA presents to the ER due to lack of urine output all day along with not eating and weakness so bad, he couldn't get out of bed. Adds that he has pain to SPC site present since surgery) not improving with Tylenol (says oxycodone is effective). Says that he is now hungry (2020 02:36)      INFECTIOUS DISEASE HISTORY:   2020 hx ESBL Ecoli UCX    PAST MEDICAL & SURGICAL HISTORY:  GERD (gastroesophageal reflux disease): intermittent  H/O ptosis of eyelid: right eye (sometimes wears patch)  Hematuria  Hyperlipidemia  Anemia  Diabetes  Renal insufficiency  Cardiomyopathy  Cholelithiasis  Hydrocele in adult  Glaucoma  BPH (benign prostatic hyperplasia)  CAD (coronary artery disease): CARD STENT X2 2018  TIA (transient ischemic attack): X2 2018  Dementia  CHF (congestive heart failure): COMBINED SYSTOLIC &amp; DIASTOLIC  Afib  Heart attack: 2018  Sepsis:  FROM INFECTED GALLBLADDER  Hypercholesteremia  HTN (hypertension)  DM (diabetes mellitus)  History of suprapubic catheter  H/O flexible sigmoidoscopy:   H/O bilateral cataract extraction: LEFT-  RIGHT 6 YRS AGO  Encounter for biliary drainage tube placement: 2018  H/O coronary angioplasty: WITH STENT X2 (2018)  History of prostate surgery:   AICD (automatic cardioverter/defibrillator) present: [facility]      FAMILY HISTORY  FHx: lung cancer  FH: type 2 diabetes  No pertinent family history in first degree relatives      SOCIAL HISTORY  Social History:  denies tobacco, alcohol use (2020 02:36)        ROS  ***    VITALS:  T(F): 96.1, Max: 99.1 (07-10-20 @ 21:52)  HR: 88  BP: 148/79  RR: 18Vital Signs Last 24 Hrs  T(C): 35.6 (2020 05:40), Max: 37.3 (10 Jul 2020 21:52)  T(F): 96.1 (2020 05:40), Max: 99.1 (10 Jul 2020 21:52)  HR: 88 (2020 05:40) (63 - 88)  BP: 148/79 (2020 05:40) (148/79 - 196/80)  BP(mean): --  RR: 18 (2020 05:40) (18 - 18)  SpO2: 99% (2020 01:15) (95% - 99%)    PHYSICAL EXAM:  ***    TESTS & MEASUREMENTS:                        8.3    18.11 )-----------( 262      ( 10 Jul 2020 22:53 )             24.8     07-10    131<L>  |  94<L>  |  113<HH>  ----------------------------<  75  5.1<H>   |  11<L>  |  9.6<HH>    Ca    6.5<L>      10 Jul 2020 22:53    TPro  6.3  /  Alb  3.5  /  TBili  0.5  /  DBili  x   /  AST  14  /  ALT  32  /  AlkPhos  303<H>  07-10    eGFR if Non African American: 5 mL/min/1.73M2 (07-10-20 @ 22:53)  eGFR if African American: 5 mL/min/1.73M2 (07-10-20 @ 22:53)    LIVER FUNCTIONS - ( 10 Jul 2020 22:53 )  Alb: 3.5 g/dL / Pro: 6.3 g/dL / ALK PHOS: 303 U/L / ALT: 32 U/L / AST: 14 U/L / GGT: x           Urinalysis Basic - ( 2020 00:08 )    Color: Yellow / Appearance: Turbid / S.025 / pH: x  Gluc: x / Ketone: Trace  / Bili: Negative / Urobili: 0.2 mg/dL   Blood: x / Protein: >=300 mg/dL / Nitrite: Negative   Leuk Esterase: Large / RBC: 11-25 /HPF / WBC >50 /HPF   Sq Epi: x / Non Sq Epi: Few /HPF / Bacteria: Many        Culture - Blood (collected 20 @ 15:18)  Source: .Blood None  Final Report (20 @ 01:00):    No Growth Final    Culture - Blood (collected 20 @ 15:18)  Source: .Blood None  Final Report (20 @ 01:00):    No Growth Final    Culture - Urine (collected 20 @ 21:47)  Source: .Urine Catheterized  Final Report (20 @ 21:59):    >100,000 CFU/ml Klebsiella pneumoniae ESBL  Organism: Klebsiella pneumoniae ESBL (20 @ 21:59)  Organism: Klebsiella pneumoniae ESBL (20 @ 21:59)      -  Amikacin: S <=16      -  Ampicillin: R >16 These ampicillin results predict results for amoxicillin      -  Ampicillin/Sulbactam: R >16/8 Enterobacter, Citrobacter, and Serratia may develop resistance during prolonged therapy (3-4 days)      -  Aztreonam: R 16      -  Cefazolin: R >16 (MIC_CL_COM_ENTERIC_CEFAZU) For uncomplicated UTI with K. pneumoniae, E. coli, or P. mirablis: SATHYA <=16 is sensitive and SATHYA >=32 is resistant. This also predicts results for oral agents cefaclor, cefdinir, cefpodoxime, cefprozil, cefuroxime axetil, cephalexin and locarbef for uncomplicated UTI. Note that some isolates may be susceptible to these agents while testing resistant to cefazolin.      -  Cefepime: R >16      -  Cefoxitin: S <=8      -  Ceftriaxone: R >32 Enterobacter, Citrobacter, and Serratia may develop resistance during prolonged therapy      -  Ciprofloxacin: S <=1      -  Ertapenem: S <=1      -  Gentamicin: R >8      -  Imipenem: S <=1      -  Levofloxacin: S <=2      -  Meropenem: S <=1      -  Nitrofurantoin: I 64 Should not be used to treat pyelonephritis      -  Piperacillin/Tazobactam: R <=16      -  Tigecycline: S <=2      -  Tobramycin: R >8      -  Trimethoprim/Sulfamethoxazole: R >2/38      Method Type: SATHYA    Culture - Urine (collected 20 @ 00:40)  Source: .Urine Catheterized  Final Report (20 @ 19:35):    50,000 - 99,000 CFU/mL Escherichia coli    50,000 - 99,000 CFU/mL Coag Negative Staphylococcus "Susceptibilities not    performed"  Organism: Escherichia coli (20 @ 19:35)  Organism: Escherichia coli (20 @ 19:35)      -  Amikacin: S <=16      -  Ampicillin: R >16 These ampicillin results predict results for amoxicillin      -  Ampicillin/Sulbactam: I 16/8 Enterobacter, Citrobacter, and Serratia may develop resistance during prolonged therapy (3-4 days)      -  Aztreonam: S <=4      -  Cefazolin: S <=8 (MIC_CL_COM_ENTERIC_CEFAZU) For uncomplicated UTI with K. pneumoniae, E. coli, or P. mirablis: SATHYA <=16 is sensitive and SATHYA >=32 is resistant. This also predicts results for oral agents cefaclor, cefdinir, cefpodoxime, cefprozil, cefuroxime axetil, cephalexin and locarbef for uncomplicated UTI. Note that some isolates may be susceptible to these agents while testing resistant to cefazolin.      -  Cefepime: S <=4      -  Cefoxitin: S <=8      -  Ceftriaxone: S <=1 Enterobacter, Citrobacter, and Serratia may develop resistance during prolonged therapy      -  Ciprofloxacin: S <=1      -  Gentamicin: S <=4      -  Imipenem: S <=1      -  Levofloxacin: S <=2      -  Meropenem: S <=1      -  Nitrofurantoin: S <=32 Should not be used to treat pyelonephritis      -  Piperacillin/Tazobactam: S <=16      -  Tigecycline: S <=2      -  Tobramycin: S <=4      -  Trimethoprim/Sulfamethoxazole: S <=2/38      Method Type: SATHYA    Culture - Urine (collected 19 @ 19:00)  Source: .Urine Clean Catch (Midstream)  Final Report (19 @ 00:30):    >=3 organisms. Probable collection contamination.        Blood Gas Venous - Lactate: 1.7 mmoL/L (20 @ 00:13)  Lactate, Blood: 1.5 mmol/L (07-10-20 @ 22:53)      INFECTIOUS DISEASES TESTING  COVID-19 PCR: NotDetec (20 @ 09:01)  COVID-19 PCR: NotDetec (20 @ 18:38)      RADIOLOGY & ADDITIONAL TESTS:  I have personally reviewed the last Chest xray  CXR      CT  CT Abdomen and Pelvis No Cont:   EXAM:  CT ABDOMEN AND PELVIS            PROCEDURE DATE:  2020            INTERPRETATION:  CLINICAL HISTORY / REASON FOR EXAM: Hydronephrosis.    TECHNIQUE: Contiguous axial CT images were obtained from the lower chest to the pubic symphysis without intravenous contrast. Oral contrast was not administered. Reformatted images in the coronal and sagittal planes were acquired.    COMPARISON CT: CT abdomen and pelvis from May 19, 2020    FINDINGS:    LOWER CHEST: Small bilateral pleural effusions. Bilateral lower lobe patchy airspace opacity, much greater in the left lower lobe. Cardiac leads    HEPATOBILIARY: The liver is normal in appearance. Cholelithiasis. Unchanged position of distal common bile duct calculus measuring approximately 7mm.    SPLEEN: Unremarkable.    PANCREAS: Unremarkable.    ADRENAL GLANDS: Unremarkable.    KIDNEYS: Unchanged left hydroureter and hydronephrosis extending to the collapsed urinary bladder. No evidence of right hydroureter or hydronephrosis.    ABDOMINOPELVIC NODES: No enlarged abdominal or pelvic lymph nodes.    PELVIC ORGANS: Suprapubic Mccarthy balloon within decompressed urinary bladder. Prostatomegaly with several flecks of air (series 4, image 281). Air is also noted in the right seminal vesicle.    PERITONEUM/MESENTERY/BOWEL: Sigmoid diverticulosis. No obstruction, ascites or intraperitoneal free air.     BONES/SOFT TISSUES: Chronic fractures of right ribs #10 and bilateral #11.    VASCULAR: Calcified and noncalcified atherosclerotic disease of the aorta.      IMPRESSION:      Trace locules of air within the prostate and right seminal vesicle. This finding can represents prostatitis as well as sequela of recent intervention.    Unchanged appearance of the dilated left ureter with associated wall thickening. Superimposed infectious process is not excluded.    Partially visualized small bilateral pleural effusions and airspace consolidation predominantly in the left. Findings can represent early pneumonia in the appropriate clinical setting.    Cholelithiasis and unchanged choledocholithiasis within the common bile duct.              CHRIS DIAZ M.D., RESIDENT RADIOLOGIST  This document has been electronically signed.  PAMELA VALDEZ M.D., ATTENDING RADIOLOGIST  This document has been electronically signed. 2020  5:24AM             (20 @ 02:59)      CARDIOLOGY TESTING      MEDICATIONS  apixaban 2.5 Oral two times a day  aspirin enteric coated 81 Oral daily  atorvastatin 40 Oral at bedtime  brimonidine 0.2% Ophthalmic Solution 1 Both EYES at bedtime  chlorhexidine 4% Liquid 1 Topical <User Schedule>  hydrALAZINE 50 Oral three times a day  latanoprost 0.005% Ophthalmic Solution 1 Both EYES at bedtime  metoprolol tartrate 100 Oral two times a day  NIFEdipine XL 90 Oral daily  pantoprazole    Tablet 40 Oral before breakfast  sodium bicarbonate  Infusion 0.16 IV Continuous <Continuous>  sodium chloride 0.9%. 1200 IV Continuous <Continuous>      Weight  Weight (kg): 79.1 (20 @ 03:11)    ANTIBIOTICS:      ALLERGIES:  sulfa drugs (Other) [place]  80y, Male  Allergy: sulfa drugs (Other)      CHIEF COMPLAINT: ISRRAEL, UTI (2020 02:36)      LOS      HPI:  81yo male extensive PMH (based on EMR review) includes BPH (SPC 2 months ago),  AICD, atrial fibrillation (on DOAC), HTN, CAD (stents), combined systolic and diastolic CHF, CKD- stage 3b, DM, glaucoma DLD and TIA presents to the ER due to lack of urine output all day along with not eating and weakness so bad, he couldn't get out of bed. Adds that he has pain to SPC site present since surgery) not improving with Tylenol (says oxycodone is effective). Says that he is now hungry (2020 02:36)      INFECTIOUS DISEASE HISTORY:   2020 hx ESBL Ecoli UCX  reports some drainage from his SPC, abd pain    PAST MEDICAL & SURGICAL HISTORY:  GERD (gastroesophageal reflux disease): intermittent  H/O ptosis of eyelid: right eye (sometimes wears patch)  Hematuria  Hyperlipidemia  Anemia  Diabetes  Renal insufficiency  Cardiomyopathy  Cholelithiasis  Hydrocele in adult  Glaucoma  BPH (benign prostatic hyperplasia)  CAD (coronary artery disease): CARD STENT X2 2018  TIA (transient ischemic attack): X2 2018  Dementia  CHF (congestive heart failure): COMBINED SYSTOLIC &amp; DIASTOLIC  Afib  Heart attack: 2018  Sepsis:  FROM INFECTED GALLBLADDER  Hypercholesteremia  HTN (hypertension)  DM (diabetes mellitus)  History of suprapubic catheter  H/O flexible sigmoidoscopy:   H/O bilateral cataract extraction: LEFT-  RIGHT 6 YRS AGO  Encounter for biliary drainage tube placement: 2018  H/O coronary angioplasty: WITH STENT X2 (2018)  History of prostate surgery:   AICD (automatic cardioverter/defibrillator) present: [place]      FAMILY HISTORY  FHx: lung cancer  FH: type 2 diabetes  No pertinent family history in first degree relatives      SOCIAL HISTORY  Social History:  denies tobacco, alcohol use (2020 02:36)        ROS  General: Denies rigors, nightsweats  HEENT: Denies headache, rhinorrhea, sore throat, eye pain  CV: Denies CP, palpitations  PULM: Denies wheezing, hemoptysis  GI: Denies hematemesis, hematochezia, melena  : as noted above   MSK: Denies arthralgias, myalgias  SKIN: Denies rash, lesions  NEURO: Denies paresthesias, weakness  PSYCH: Denies depression, anxiety     VITALS:  T(F): 96.1, Max: 99.1 (07-10-20 @ 21:52)  HR: 88  BP: 148/79  RR: 18Vital Signs Last 24 Hrs  T(C): 35.6 (2020 05:40), Max: 37.3 (10 Jul 2020 21:52)  T(F): 96.1 (2020 05:40), Max: 99.1 (10 Jul 2020 21:52)  HR: 88 (2020 05:40) (63 - 88)  BP: 148/79 (2020 05:40) (148/79 - 196/80)  BP(mean): --  RR: 18 (2020 05:40) (18 - 18)  SpO2: 99% (2020 01:15) (95% - 99%)    PHYSICAL EXAM:  Gen: NAD, resting in bed  HEENT: Normocephalic, atraumatic  Neck: supple, no lymphadenopathy  CV: Regular rate & regular rhythm  Lungs: decreased BS at bases, no fremitus  Abdomen: distended, SPC with some drainage, no CVAT  Ext: Warm, well perfused  Neuro: non focal, awake  Skin: no rash, no erythema  Lines: no phlebitis     TESTS & MEASUREMENTS:                        8.3    18.11 )-----------( 262      ( 10 Jul 2020 22:53 )             24.8     07-10    131<L>  |  94<L>  |  113<HH>  ----------------------------<  75  5.1<H>   |  11<L>  |  9.6<HH>    Ca    6.5<L>      10 Jul 2020 22:53    TPro  6.3  /  Alb  3.5  /  TBili  0.5  /  DBili  x   /  AST  14  /  ALT  32  /  AlkPhos  303<H>  10    eGFR if Non African American: 5 mL/min/1.73M2 (07-10-20 @ 22:53)  eGFR if African American: 5 mL/min/1.73M2 (07-10-20 @ 22:53)    LIVER FUNCTIONS - ( 10 Jul 2020 22:53 )  Alb: 3.5 g/dL / Pro: 6.3 g/dL / ALK PHOS: 303 U/L / ALT: 32 U/L / AST: 14 U/L / GGT: x           Urinalysis Basic - ( 2020 00:08 )    Color: Yellow / Appearance: Turbid / S.025 / pH: x  Gluc: x / Ketone: Trace  / Bili: Negative / Urobili: 0.2 mg/dL   Blood: x / Protein: >=300 mg/dL / Nitrite: Negative   Leuk Esterase: Large / RBC: 11-25 /HPF / WBC >50 /HPF   Sq Epi: x / Non Sq Epi: Few /HPF / Bacteria: Many        Culture - Blood (collected 20 @ 15:18)  Source: .Blood None  Final Report (20 @ 01:00):    No Growth Final    Culture - Blood (collected 20 @ 15:18)  Source: .Blood None  Final Report (20 @ 01:00):    No Growth Final    Culture - Urine (collected 20 @ 21:47)  Source: .Urine Catheterized  Final Report (20 @ 21:59):    >100,000 CFU/ml Klebsiella pneumoniae ESBL  Organism: Klebsiella pneumoniae ESBL (20 @ 21:59)  Organism: Klebsiella pneumoniae ESBL (20 @ 21:59)      -  Amikacin: S <=16      -  Ampicillin: R >16 These ampicillin results predict results for amoxicillin      -  Ampicillin/Sulbactam: R >16/8 Enterobacter, Citrobacter, and Serratia may develop resistance during prolonged therapy (3-4 days)      -  Aztreonam: R 16      -  Cefazolin: R >16 (MIC_CL_COM_ENTERIC_CEFAZU) For uncomplicated UTI with K. pneumoniae, E. coli, or P. mirablis: SATHYA <=16 is sensitive and SATHYA >=32 is resistant. This also predicts results for oral agents cefaclor, cefdinir, cefpodoxime, cefprozil, cefuroxime axetil, cephalexin and locarbef for uncomplicated UTI. Note that some isolates may be susceptible to these agents while testing resistant to cefazolin.      -  Cefepime: R >16      -  Cefoxitin: S <=8      -  Ceftriaxone: R >32 Enterobacter, Citrobacter, and Serratia may develop resistance during prolonged therapy      -  Ciprofloxacin: S <=1      -  Ertapenem: S <=1      -  Gentamicin: R >8      -  Imipenem: S <=1      -  Levofloxacin: S <=2      -  Meropenem: S <=1      -  Nitrofurantoin: I 64 Should not be used to treat pyelonephritis      -  Piperacillin/Tazobactam: R <=16      -  Tigecycline: S <=2      -  Tobramycin: R >8      -  Trimethoprim/Sulfamethoxazole: R >2/38      Method Type: SATHYA    Culture - Urine (collected 20 @ 00:40)  Source: .Urine Catheterized  Final Report (20 @ 19:35):    50,000 - 99,000 CFU/mL Escherichia coli    50,000 - 99,000 CFU/mL Coag Negative Staphylococcus "Susceptibilities not    performed"  Organism: Escherichia coli (20 @ 19:35)  Organism: Escherichia coli (20 @ 19:35)      -  Amikacin: S <=16      -  Ampicillin: R >16 These ampicillin results predict results for amoxicillin      -  Ampicillin/Sulbactam: I 16/8 Enterobacter, Citrobacter, and Serratia may develop resistance during prolonged therapy (3-4 days)      -  Aztreonam: S <=4      -  Cefazolin: S <=8 (MIC_CL_COM_ENTERIC_CEFAZU) For uncomplicated UTI with K. pneumoniae, E. coli, or P. mirablis: SATHYA <=16 is sensitive and SATHYA >=32 is resistant. This also predicts results for oral agents cefaclor, cefdinir, cefpodoxime, cefprozil, cefuroxime axetil, cephalexin and locarbef for uncomplicated UTI. Note that some isolates may be susceptible to these agents while testing resistant to cefazolin.      -  Cefepime: S <=4      -  Cefoxitin: S <=8      -  Ceftriaxone: S <=1 Enterobacter, Citrobacter, and Serratia may develop resistance during prolonged therapy      -  Ciprofloxacin: S <=1      -  Gentamicin: S <=4      -  Imipenem: S <=1      -  Levofloxacin: S <=2      -  Meropenem: S <=1      -  Nitrofurantoin: S <=32 Should not be used to treat pyelonephritis      -  Piperacillin/Tazobactam: S <=16      -  Tigecycline: S <=2      -  Tobramycin: S <=4      -  Trimethoprim/Sulfamethoxazole: S <=2/38      Method Type: SATHYA    Culture - Urine (collected 19 @ 19:00)  Source: .Urine Clean Catch (Midstream)  Final Report (19 @ 00:30):    >=3 organisms. Probable collection contamination.        Blood Gas Venous - Lactate: 1.7 mmoL/L (20 @ 00:13)  Lactate, Blood: 1.5 mmol/L (07-10-20 @ 22:53)      INFECTIOUS DISEASES TESTING  COVID- PCR: NotDetec (20 @ 09:01)  COVID-19 PCR: NotDetec (20 @ 18:38)      RADIOLOGY & ADDITIONAL TESTS:  I have personally reviewed the last Chest xray  CXR      CT  CT Abdomen and Pelvis No Cont:   EXAM:  CT ABDOMEN AND PELVIS            PROCEDURE DATE:  2020            INTERPRETATION:  CLINICAL HISTORY / REASON FOR EXAM: Hydronephrosis.    TECHNIQUE: Contiguous axial CT images were obtained from the lower chest to the pubic symphysis without intravenous contrast. Oral contrast was not administered. Reformatted images in the coronal and sagittal planes were acquired.    COMPARISON CT: CT abdomen and pelvis from May 19, 2020    FINDINGS:    LOWER CHEST: Small bilateral pleural effusions. Bilateral lower lobe patchy airspace opacity, much greater in the left lower lobe. Cardiac leads    HEPATOBILIARY: The liver is normal in appearance. Cholelithiasis. Unchanged position of distal common bile duct calculus measuring approximately 7mm.    SPLEEN: Unremarkable.    PANCREAS: Unremarkable.    ADRENAL GLANDS: Unremarkable.    KIDNEYS: Unchanged left hydroureter and hydronephrosis extending to the collapsed urinary bladder. No evidence of right hydroureter or hydronephrosis.    ABDOMINOPELVIC NODES: No enlarged abdominal or pelvic lymph nodes.    PELVIC ORGANS: Suprapubic Mccarthy balloon within decompressed urinary bladder. Prostatomegaly with several flecks of air (series 4, image 281). Air is also noted in the right seminal vesicle.    PERITONEUM/MESENTERY/BOWEL: Sigmoid diverticulosis. No obstruction, ascites or intraperitoneal free air.     BONES/SOFT TISSUES: Chronic fractures of right ribs #10 and bilateral #11.    VASCULAR: Calcified and noncalcified atherosclerotic disease of the aorta.      IMPRESSION:      Trace locules of air within the prostate and right seminal vesicle. This finding can represents prostatitis as well as sequela of recent intervention.    Unchanged appearance of the dilated left ureter with associated wall thickening. Superimposed infectious process is not excluded.    Partially visualized small bilateral pleural effusions and airspace consolidation predominantly in the left. Findings can represent early pneumonia in the appropriate clinical setting.    Cholelithiasis and unchanged choledocholithiasis within the common bile duct.              CHRIS DIAZ M.D., RESIDENT RADIOLOGIST  This document has been electronically signed.  PAMELA VALDEZ M.D., ATTENDING RADIOLOGIST  This document has been electronically signed. 2020  5:24AM             (20 @ 02:59)      CARDIOLOGY TESTING      MEDICATIONS  apixaban 2.5 Oral two times a day  aspirin enteric coated 81 Oral daily  atorvastatin 40 Oral at bedtime  brimonidine 0.2% Ophthalmic Solution 1 Both EYES at bedtime  chlorhexidine 4% Liquid 1 Topical <User Schedule>  hydrALAZINE 50 Oral three times a day  latanoprost 0.005% Ophthalmic Solution 1 Both EYES at bedtime  metoprolol tartrate 100 Oral two times a day  NIFEdipine XL 90 Oral daily  pantoprazole    Tablet 40 Oral before breakfast  sodium bicarbonate  Infusion 0.16 IV Continuous <Continuous>  sodium chloride 0.9%. 1200 IV Continuous <Continuous>      Weight  Weight (kg): 79.1 (20 @ 03:11)    ANTIBIOTICS:Rylan       ALLERGIES:  sulfa drugs (Other)

## 2020-07-11 NOTE — PHARMACOTHERAPY INTERVENTION NOTE - COMMENTS
Spoke with Dr. Pike [phone]. Discussed GFR is 5, SCr on admission was 9.6. Merrem renal dosage adjustment is 500mg every 24hours. ID consult in morning.

## 2020-07-11 NOTE — CONSULT NOTE ADULT - ASSESSMENT
ASSESSMENT  79yo male extensive PMH (based on EMR review) includes BPH (SPC 2 months ago),  AICD, atrial fibrillation (on DOAC), HTN, CAD (stents), combined systolic and diastolic CHF, CKD- stage 3b, DM, glaucoma DLD and TIA presents to the ER with urinary retention      IMPRESSION  #    UA Blood: x / Protein: >=300 mg/dL / Nitrite: Negative Leuk Esterase: Large / RBC: 11-25 /HPF / WBC >50 /HPF Sq Epi: x / Non Sq Epi: Few /HPF / Bacteria: Many    5/2020 hx ESBL Ecoli UCX    CTAP Trace locules of air within the prostate and right seminal vesicle. This finding can represents prostatitis as well as sequela of recent intervention.  Unchanged appearance of the dilated left ureter with associated wall thickening. Superimposed infectious process is not excluded.  #ISRRAEL Cr 9  #CT Partially visualized small bilateral pleural effusions and airspace consolidation predominantly in the left    No clinical evidence to suggest PNA  #CT Cholelithiasis and unchanged choledocholithiasis within the common bile duct    elevated Alk Ph  #Hyponatremia      RECOMMENDATIONS  This is an incomplete consult note. All recommendations to follow. ASSESSMENT  81yo male extensive PMH (based on EMR review) includes BPH (SPC 2 months ago),  AICD, atrial fibrillation (on DOAC), HTN, CAD (stents), combined systolic and diastolic CHF, CKD- stage 3b, DM, glaucoma DLD and TIA presents to the ER with urinary retention      IMPRESSION  #Rule out complicated cystitis in the setting of SPC and urinary retention    UA Blood: x / Protein: >=300 mg/dL / Nitrite: Negative Leuk Esterase: Large / RBC: 11-25 /HPF / WBC >50 /HPF Sq Epi: x / Non Sq Epi: Few /HPF / Bacteria: Many    5/2020 hx ESBL Ecoli UCX    CTAP Trace locules of air within the prostate and right seminal vesicle. This finding can represents prostatitis as well as sequela of recent intervention.  Unchanged appearance of the dilated left ureter with associated wall thickening. Superimposed infectious process is not excluded.  #ISRRAEL Cr 9  #CT Partially visualized small bilateral pleural effusions and airspace consolidation predominantly in the left    No clinical evidence to suggest PNA  #CT Cholelithiasis and unchanged choledocholithiasis within the common bile duct    elevated Alk Ph  #Hyponatremia      RECOMMENDATIONS  - Rylan 500mg q24h IV  - No need for contact iso for ESBL  - f/u UCX, BCX  - Urology/Renal

## 2020-07-11 NOTE — H&P ADULT - NSHPLABSRESULTS_GEN_ALL_CORE
< from: CT Abdomen and Pelvis No Cont (20 @ 02:59) >      ******PRELIMINARY REPORT******    ******PRELIMINARY REPORT******          EXAM:  CT ABDOMEN AND PELVIS            PROCEDURE DATE:  2020          ******PRELIMINARY REPORT******    ******PRELIMINARY REPORT******          < from: CT Abdomen and Pelvis No Cont (20 @ 02:59) >    IMPRESSION:      Trace locules of air within the prostate. This finding can represents prostatitis.    Partially visualized small bilateral pleural effusions and airspace consolidation. Findings can represent early pneumonia in the appropriate clinical setting.    Unchanged calculi within the common bile duct.    ******PRELIMINARY REPORT******    ******PRELIMINARY REPORT******          CHRIS DIAZ M.D., RESIDENT RADIOLOGIST    < end of copied text >                          8.3    18.11 )-----------( 262      ( 10 Jul 2020 22:53 )             24.8     07-10    131<L>  |  94<L>  |  113<HH>  ----------------------------<  75  5.1<H>   |  11<L>  |  9.6<HH>    Ca    6.5<L>      10 Jul 2020 22:53    TPro  6.3  /  Alb  3.5  /  TBili  0.5  /  DBili  x   /  AST  14  /  ALT  32  /  AlkPhos  303<H>  07-10          Urinalysis Basic - ( 2020 00:08 )    Color: Yellow / Appearance: Turbid / S.025 / pH: x  Gluc: x / Ketone: Trace  / Bili: Negative / Urobili: 0.2 mg/dL   Blood: x / Protein: >=300 mg/dL / Nitrite: Negative   Leuk Esterase: Large / RBC: 11-25 /HPF / WBC >50 /HPF   Sq Epi: x / Non Sq Epi: Few /HPF / Bacteria: Many      PT/INR - ( 10 Jul 2020 22:53 )   PT: 14.70 sec;   INR: 1.28 ratio         PTT - ( 10 Jul 2020 22:53 )  PTT:29.5 sec  Lactate Trend  07-10 @ 22:53 Lactate:1.5         CAPILLARY BLOOD GLUCOSE

## 2020-07-11 NOTE — H&P ADULT - NSICDXPASTSURGICALHX_GEN_ALL_CORE_FT
PAST SURGICAL HISTORY:  AICD (automatic cardioverter/defibrillator) present [email or web address]    Encounter for biliary drainage tube placement 1/2018    H/O bilateral cataract extraction LEFT- 1/18 RIGHT 6 YRS AGO    H/O coronary angioplasty WITH STENT X2 (4/2018)    H/O flexible sigmoidoscopy 2015    History of prostate surgery 5/17    History of suprapubic catheter

## 2020-07-11 NOTE — H&P ADULT - PROBLEM SELECTOR PLAN 2
As noted below, concern for UTI, prostatitis and early pneumonia (caused by gram negative infection)

## 2020-07-11 NOTE — H&P ADULT - PROBLEM SELECTOR PLAN 3
for now continue meropenen Along with concern for prostatitis, early pneumonia (see CAT scan), for now continue Meropenem at renal doses pending ID review

## 2020-07-11 NOTE — H&P ADULT - PROBLEM SELECTOR PLAN 1
(Chart review lists CKD stage 3b) (Chart review lists CKD stage 3b)- For now bicarb infusion as started by ER (case discussed with renal on call)

## 2020-07-11 NOTE — CONSULT NOTE ADULT - SUBJECTIVE AND OBJECTIVE BOX
[facility]  INITIAL CONSULT NOTE  --------------------------------------------------------------------------------  HPI:  79 yo white male w/extensive PMHx as below, including CKD (creat 1.5 2018----> mid-2.0's last month), obstructive uropathy s/p SPC ~ 2 months ago.  Recently in hospital w/ what sounded like urosepsis, complicated by ISRRAEL w/ decrease in creat to ~ 2.8.  Sent to [facility] on PO Cipro, but pt states it had to be changed to IV (doesn't know name of medications).  Came to ER last night w/ severe weakness.  Frankly hypertensive, afebrile in ER.  SPC changed in ER, note indicates only 10cc urine drained.  States he is eating but not as much as usual.  Denies SOB, N/V, diarrhea, NSAID use.        PAST HISTORY  --------------------------------------------------------------------------------  PAST MEDICAL & SURGICAL HISTORY:  GERD (gastroesophageal reflux disease): intermittent  H/O ptosis of eyelid: right eye (sometimes wears patch)  Hematuria  Hyperlipidemia  Anemia  Diabetes  Renal insufficiency  Cardiomyopathy  Cholelithiasis  Hydrocele in adult  Glaucoma  BPH (benign prostatic hyperplasia)  CAD (coronary artery disease): CARD STENT X2 4/2018  TIA (transient ischemic attack): X2 JULY 2018  Dementia  CHF (congestive heart failure): COMBINED SYSTOLIC &amp; DIASTOLIC  Afib  Heart attack: 1/2018  Sepsis: 1/18 FROM INFECTED GALLBLADDER  Hypercholesteremia  HTN (hypertension)  DM (diabetes mellitus)  History of suprapubic catheter  H/O flexible sigmoidoscopy: 2015  H/O bilateral cataract extraction: LEFT- 1/18 RIGHT 6 YRS AGO  Encounter for biliary drainage tube placement: 1/2018  H/O coronary angioplasty: WITH STENT X2 (4/2018)  History of prostate surgery: 5/17  AICD (automatic cardioverter/defibrillator) present: [employer]    FAMILY HISTORY:  FHx: lung cancer: father  FH: type 2 diabetes    PAST SOCIAL HISTORY:    ALLERGIES & MEDICATIONS  --------------------------------------------------------------------------------  Allergies    sulfa drugs (Other)    Intolerances      Standing Inpatient Medications  apixaban 2.5 milliGRAM(s) Oral two times a day  aspirin enteric coated 81 milliGRAM(s) Oral daily  atorvastatin 40 milliGRAM(s) Oral at bedtime  brimonidine 0.2% Ophthalmic Solution 1 Drop(s) Both EYES at bedtime  chlorhexidine 4% Liquid 1 Application(s) Topical <User Schedule>  hydrALAZINE 50 milliGRAM(s) Oral three times a day  latanoprost 0.005% Ophthalmic Solution 1 Drop(s) Both EYES at bedtime  metoprolol tartrate 100 milliGRAM(s) Oral two times a day  NIFEdipine XL 90 milliGRAM(s) Oral daily  pantoprazole    Tablet 40 milliGRAM(s) Oral before breakfast  sodium bicarbonate  Infusion 0.16 mEq/kG/Hr IV Continuous <Continuous>  sodium chloride 0.9%. 1200 milliLiter(s) IV Continuous <Continuous>    PRN Inpatient Medications  acetaminophen   Tablet .. 650 milliGRAM(s) Oral every 6 hours PRN  oxyCODONE    IR 5 milliGRAM(s) Oral four times a day PRN      REVIEW OF SYSTEMS  --------------------------------------------------------------------------------  Gen: No weight changes, fatigue, fevers/chills, weakness  Skin: No rashes  Head/Eyes/Ears/Mouth: No headache; Normal hearing; Normal vision w/o blurriness; No sinus pain/discomfort, sore throat  Respiratory: No dyspnea, cough, wheezing, hemoptysis  CV: No chest pain, PND, orthopnea  GI: No abdominal pain, diarrhea, constipation, nausea, vomiting, melena, hematochezia  : No increased frequency, dysuria, hematuria, nocturia  MSK: No joint pain/swelling; no back pain; no edema  Neuro: No dizziness/lightheadedness, weakness, seizures, numbness, tingling  Heme: No easy bruising or bleeding  Endo: No heat/cold intolerance  Psych: No significant nervousness, anxiety, stress, depression    All other systems were reviewed and are negative, except as noted.    VITALS/PHYSICAL EXAM  --------------------------------------------------------------------------------  T(C): 35.7 (07-11-20 @ 14:02), Max: 37.3 (07-10-20 @ 21:52)  HR: 68 (07-11-20 @ 14:02) (63 - 88)  BP: 146/73 (07-11-20 @ 14:02) (146/73 - 196/80)  RR: 16 (07-11-20 @ 14:02) (16 - 18)  SpO2: 99% (07-11-20 @ 01:15) (95% - 99%)  Wt(kg): --  Height (cm): 175.3 (07-11-20 @ 03:11)  Weight (kg): 79.1 (07-11-20 @ 03:11)  BMI (kg/m2): 25.7 (07-11-20 @ 03:11)  BSA (m2): 1.95 (07-11-20 @ 03:11)      07-10-20 @ 07:01  -  07-11-20 @ 07:00  --------------------------------------------------------  IN: 0 mL / OUT: 20 mL / NET: -20 mL    07-11-20 @ 07:01  -  07-11-20 @ 18:25  --------------------------------------------------------  IN: 220 mL / OUT: 100 mL / NET: 120 mL      Physical Exam:  	Gen: NAD, well-appearing  	Pulm: CTA B/L  	CV: RRR, S1S2; no rub  	Back: No spinal or CVA tenderness; no sacral edema  	Abd: +BS, soft, nontender/nondistended  	: No suprapubic tenderness  	UE: Warm, FROM, no clubbing, intact strength; no edema; no asterixis  	LE: Warm, FROM, no clubbing, intact strength; no edema  	Neuro: No focal deficits, intact gait  	Psych: Normal affect and mood  	Skin: Warm, without rashes  	Vascular access:    LABS/STUDIES  --------------------------------------------------------------------------------              8.0    13.84 >-----------<  247      [07-11-20 @ 06:45]              24.6     131  |  96  |  112  ----------------------------<  117      [07-11-20 @ 06:45]  4.4   |  12  |  9.5        Ca     6.3     [07-11-20 @ 06:45]      Mg     0.9     [07-11-20 @ 06:45]      Phos  7.7     [07-11-20 @ 06:45]    TPro  5.6  /  Alb  3.2  /  TBili  0.4  /  DBili  x   /  AST  11  /  ALT  26  /  AlkPhos  275  [07-11-20 @ 06:45]    PT/INR: PT 14.70, INR 1.28       [07-10-20 @ 22:53]  PTT: 29.5       [07-10-20 @ 22:53]      Creatinine Trend:  SCr 9.5 [07-11 @ 06:45]  SCr 9.6 [07-10 @ 22:53]    Urinalysis - [07-11-20 @ 00:08]      Color Yellow / Appearance Turbid / SG 1.025 / pH 6.0      Gluc Negative / Ketone Trace  / Bili Negative / Urobili 0.2       Blood Large / Protein >=300 / Leuk Est Large / Nitrite Negative      RBC 11-25 / WBC >50 / Hyaline  / Gran  / Sq Epi  / Non Sq Epi Few / Bacteria Many      Iron 63, TIBC 154, %sat 41      [05-25-20 @ 06:54]  Ferritin 549      [05-25-20 @ 06:54]  PTH -- (Ca 7.4)      [05-25-20 @ 06:54]   79  HbA1c 5.9      [01-29-20 @ 05:25]  TSH 2.12      [01-29-20 @ 05:25]

## 2020-07-11 NOTE — H&P ADULT - HISTORY OF PRESENT ILLNESS
81yo male whose extensive PMH (based on EMR review) includes BPH (SPC 2 months ago), presence of an AICD, atrial fibrillation (has been on DOAC), HTN, CAD (stents), HFpEF, CKD- stage 3b, DM, glaucoma DLD and TIA presents to the ER due to lack of urine output all day along with not eating and weakness so bad, he couldn't get out of bed. Adds that he has pain to SPC site present since surgery) not improving with Tylenol (says oxycodone is effective). 79yo male whose extensive PMH (based on EMR review) includes BPH (SPC 2 months ago), presence of an AICD, atrial fibrillation (has been on DOAC), HTN, CAD (stents), combined systolic and diastolic CHF, CKD- stage 3b, DM, glaucoma DLD and TIA presents to the ER due to lack of urine output all day along with not eating and weakness so bad, he couldn't get out of bed. Adds that he has pain to SPC site present since surgery) not improving with Tylenol (says oxycodone is effective). Says that he is now hungry

## 2020-07-12 NOTE — PROGRESS NOTE ADULT - SUBJECTIVE AND OBJECTIVE BOX
Nephrology progress note    Patient was seen and examined, events over the last 24 h noted .  cr same as yesterday    Allergies:  sulfa drugs (Other)    Hospital Medications:   MEDICATIONS  (STANDING):  apixaban 2.5 milliGRAM(s) Oral two times a day  aspirin enteric coated 81 milliGRAM(s) Oral daily  atorvastatin 40 milliGRAM(s) Oral at bedtime  brimonidine 0.2% Ophthalmic Solution 1 Drop(s) Both EYES at bedtime  chlorhexidine 4% Liquid 1 Application(s) Topical <User Schedule>  dextrose 5%. 1000 milliLiter(s) (50 mL/Hr) IV Continuous <Continuous>  dextrose 50% Injectable 12.5 Gram(s) IV Push once  dextrose 50% Injectable 25 Gram(s) IV Push once  dextrose 50% Injectable 25 Gram(s) IV Push once  hydrALAZINE 50 milliGRAM(s) Oral three times a day  insulin lispro (HumaLOG) corrective regimen sliding scale   SubCutaneous three times a day before meals  insulin lispro (HumaLOG) corrective regimen sliding scale   SubCutaneous at bedtime  latanoprost 0.005% Ophthalmic Solution 1 Drop(s) Both EYES at bedtime  magnesium sulfate  IVPB 2 Gram(s) IV Intermittent every 2 hours  meropenem  IVPB 500 milliGRAM(s) IV Intermittent every 24 hours  metoprolol tartrate 100 milliGRAM(s) Oral two times a day  NIFEdipine XL 90 milliGRAM(s) Oral daily  pantoprazole    Tablet 40 milliGRAM(s) Oral before breakfast  sodium chloride 0.45% 1000 milliLiter(s) (100 mL/Hr) IV Continuous <Continuous>        VITALS:  T(F): 96.4 (20 @ 05:54), Max: 96.4 (20 @ 05:54)  HR: 55 (20 @ 05:54)  BP: 103/57 (20 @ 05:54)  RR: 16 (20 @ 05:54)  SpO2: --  Wt(kg): --    07-10 @ 07:01  -   @ 07:00  --------------------------------------------------------  IN: 0 mL / OUT: 20 mL / NET: -20 mL     @ 07:01  -   @ 07:00  --------------------------------------------------------  IN: 220 mL / OUT: 100 mL / NET: 120 mL          PHYSICAL EXAM:  	Gen: NAD, well-appearing  	Pulm: CTA B/L  	CV: RRR, S1S2; no rub  	Back: No spinal or CVA tenderness; no sacral edema  	Abd: +BS, soft, nontender/nondistended  	: No suprapubic tenderness  	UE: Warm, FROM, no clubbing, intact strength; no edema; no asterixis  	LE: Warm, FROM, no clubbing, intact strength; no edema  	Neuro: No focal deficits, intact gait  	Psych: Normal affect and mood  	Skin: Warm, without rashes  	Vascular access:  LABS:      131<L>  |  94<L>  |  116<HH>  ----------------------------<  136<H>  4.2   |  16<L>  |  9.5<HH>    Ca    6.0<L>      2020 07:11  Phos  6.7       Mg     1.0         TPro  5.6<L>  /  Alb  3.2<L>  /  TBili  0.4  /  DBili      /  AST  11  /  ALT  26  /  AlkPhos  275<H>                            7.5    12.34 )-----------( 276      ( 2020 07:11 )             22.5       Urine Studies:  Urinalysis Basic - ( 2020 00:08 )    Color: Yellow / Appearance: Turbid / S.025 / pH:   Gluc:  / Ketone: Trace  / Bili: Negative / Urobili: 0.2 mg/dL   Blood:  / Protein: >=300 mg/dL / Nitrite: Negative   Leuk Esterase: Large / RBC: 11-25 /HPF / WBC >50 /HPF   Sq Epi:  / Non Sq Epi: Few /HPF / Bacteria: Many        RADIOLOGY & ADDITIONAL STUDIES: large/soft

## 2020-07-12 NOTE — PROGRESS NOTE ADULT - SUBJECTIVE AND OBJECTIVE BOX
INTERVAL HPI/OVERNIGHT EVENTS:    SUBJECTIVE: Patient seen and examined at bedside.     no cp, sob, abd pain, fever  +penile pain, near urethra, no discharge, no hematuria, no flank pain    OBJECTIVE:    VITAL SIGNS:  Vital Signs Last 24 Hrs  T(C): 35.8 (2020 05:54), Max: 35.8 (2020 05:54)  T(F): 96.4 (2020 05:54), Max: 96.4 (2020 05:54)  HR: 55 (2020 05:54) (55 - 68)  BP: 103/57 (2020 05:54) (103/57 - 146/73)  BP(mean): --  RR: 16 (2020 05:54) (16 - 16)  SpO2: --      PHYSICAL EXAM:    General: NAD  HEENT: NC/AT; PERRL, clear conjunctiva  Neck: supple  Respiratory: CTA b/l  Cardiovascular: +S1/S2; RRR  Abdomen: soft, NT/ND; +BS x4; spc c/d/i  Extremities: WWP, 2+ peripheral pulses b/l; no LE edema  Skin: sl erythema penile meatus  Neurological:    MEDICATIONS:  MEDICATIONS  (STANDING):  apixaban 2.5 milliGRAM(s) Oral two times a day  aspirin enteric coated 81 milliGRAM(s) Oral daily  atorvastatin 40 milliGRAM(s) Oral at bedtime  brimonidine 0.2% Ophthalmic Solution 1 Drop(s) Both EYES at bedtime  chlorhexidine 4% Liquid 1 Application(s) Topical <User Schedule>  hydrALAZINE 50 milliGRAM(s) Oral three times a day  latanoprost 0.005% Ophthalmic Solution 1 Drop(s) Both EYES at bedtime  magnesium sulfate  IVPB 2 Gram(s) IV Intermittent every 2 hours  meropenem  IVPB 500 milliGRAM(s) IV Intermittent every 24 hours  metoprolol tartrate 100 milliGRAM(s) Oral two times a day  NIFEdipine XL 90 milliGRAM(s) Oral daily  pantoprazole    Tablet 40 milliGRAM(s) Oral before breakfast  sodium chloride 0.45% 1000 milliLiter(s) (100 mL/Hr) IV Continuous <Continuous>    MEDICATIONS  (PRN):  acetaminophen   Tablet .. 650 milliGRAM(s) Oral every 6 hours PRN Mild Pain (1 - 3)  oxyCODONE    IR 5 milliGRAM(s) Oral four times a day PRN Severe Pain (7 - 10)      ALLERGIES:  Allergies    sulfa drugs (Other)    Intolerances        LABS:                        7.5    12.34 )-----------( 276      ( 2020 07:11 )             22.5     Hemoglobin: 7.5 g/dL ( @ 07:11)  Hemoglobin: 8.0 g/dL ( @ 06:45)  Hemoglobin: 8.3 g/dL (07-10 @ 22:53)    CBC Full  -  ( 2020 07:11 )  WBC Count : 12.34 K/uL  RBC Count : 2.39 M/uL  Hemoglobin : 7.5 g/dL  Hematocrit : 22.5 %  Platelet Count - Automated : 276 K/uL  Mean Cell Volume : 94.1 fL  Mean Cell Hemoglobin : 31.4 pg  Mean Cell Hemoglobin Concentration : 33.3 g/dL  Auto Neutrophil # : x  Auto Lymphocyte # : x  Auto Monocyte # : x  Auto Eosinophil # : x  Auto Basophil # : x  Auto Neutrophil % : x  Auto Lymphocyte % : x  Auto Monocyte % : x  Auto Eosinophil % : x  Auto Basophil % : x        131<L>  |  94<L>  |  116<HH>  ----------------------------<  136<H>  4.2   |  16<L>  |  9.5<HH>    Ca    6.0<L>      2020 07:11  Phos  6.7     12  Mg     1.0         TPro  5.6<L>  /  Alb  3.2<L>  /  TBili  0.4  /  DBili  x   /  AST  11  /  ALT  26  /  AlkPhos  275<H>  11    Creatinine Trend: 9.5<--, 9.5<--, 9.6<--  LIVER FUNCTIONS - ( 2020 06:45 )  Alb: 3.2 g/dL / Pro: 5.6 g/dL / ALK PHOS: 275 U/L / ALT: 26 U/L / AST: 11 U/L / GGT: x           PT/INR - ( 10 Jul 2020 22:53 )   PT: 14.70 sec;   INR: 1.28 ratio         PTT - ( 10 Jul 2020 22:53 )  PTT:29.5 sec    hs Troponin:            Urinalysis Basic - ( 2020 00:08 )    Color: Yellow / Appearance: Turbid / S.025 / pH: x  Gluc: x / Ketone: Trace  / Bili: Negative / Urobili: 0.2 mg/dL   Blood: x / Protein: >=300 mg/dL / Nitrite: Negative   Leuk Esterase: Large / RBC: 11-25 /HPF / WBC >50 /HPF   Sq Epi: x / Non Sq Epi: Few /HPF / Bacteria: Many      CSF:                      EKG:   MICROBIOLOGY:    IMAGING:      Labs, imaging, EKG personally reviewed    RADIOLOGY & ADDITIONAL TESTS: Reviewed.

## 2020-07-12 NOTE — PROGRESS NOTE ADULT - ASSESSMENT
ASSESSMENT  79yo male extensive PMH (based on EMR review) includes BPH (SPC 2 months ago),  AICD, atrial fibrillation (on DOAC), HTN, CAD (stents), combined systolic and diastolic CHF, CKD- stage 3b, DM, glaucoma DLD and TIA presents to the ER with urinary retention      IMPRESSION  #Rule out complicated cystitis in the setting of SPC and urinary retention    UA Blood: x / Protein: >=300 mg/dL / Nitrite: Negative Leuk Esterase: Large / RBC: 11-25 /HPF / WBC >50 /HPF Sq Epi: x / Non Sq Epi: Few /HPF / Bacteria: Many    5/2020 hx ESBL Ecoli UCX    CTAP Trace locules of air within the prostate and right seminal vesicle. This finding can represents prostatitis as well as sequela of recent intervention.  Unchanged appearance of the dilated left ureter with associated wall thickening. Superimposed infectious process is not excluded.  #ISRRAEL Cr 9  #CT Partially visualized small bilateral pleural effusions and airspace consolidation predominantly in the left    No clinical evidence to suggest PNA  #CT Cholelithiasis and unchanged choledocholithiasis within the common bile duct    elevated Alk Ph      would recommend:    1. Monitor WBC count  2. Continue current Abx        Attending Attestation:   45 minutes spent on total encounter; more than 50% of the visit was spent counseling and/or coordinating care by the attending physician. ASSESSMENT  81yo male extensive PMH (based on EMR review) includes BPH (SPC 2 months ago),  AICD, atrial fibrillation (on DOAC), HTN, CAD (stents), combined systolic and diastolic CHF, CKD- stage 3b, DM, glaucoma DLD and TIA presents to the ER with urinary retention      IMPRESSION  #Rule out complicated cystitis in the setting of SPC and urinary retention    UA Blood: x / Protein: >=300 mg/dL / Nitrite: Negative Leuk Esterase: Large / RBC: 11-25 /HPF / WBC >50 /HPF Sq Epi: x / Non Sq Epi: Few /HPF / Bacteria: Many    5/2020 hx ESBL Ecoli UCX    CTAP Trace locules of air within the prostate and right seminal vesicle. This finding can represents prostatitis as well as sequela of recent intervention.  Unchanged appearance of the dilated left ureter with associated wall thickening. Superimposed infectious process is not excluded.  #ISRRAEL Cr 9  #CT Partially visualized small bilateral pleural effusions and airspace consolidation predominantly in the left    No clinical evidence to suggest PNA  #CT Cholelithiasis and unchanged choledocholithiasis within the common bile duct    elevated Alk Ph      would recommend:    1. Monitor WBC count  2. Follow up Final Urine culture  3. Continue Meropenem until sensitivity of Pseudomonas  and Klebsiella is available   4. Anti-emetic agents since c/o nausea    d/w patient and Family at the bed side       Attending Attestation:     45 minutes spent on total encounter; more than 50% of the visit was spent counseling and/or coordinating care by the attending physician.

## 2020-07-12 NOTE — PROGRESS NOTE ADULT - ASSESSMENT
80M PMHx BPH s/p spc, AFib, HTN, CAD s/p stent, HFrEF s/p AICD, CKD III, DM2, h/o TIA here with ISRRAEL c/b metabolic acidosis. UTI.    #ISRRAEL on CKD III  c/b significant acidosis  bicarb gtt  trend scr  L hydrouretonephrosis on ct, chronic  f/u renal if need for HD  uop appears minimal, strict i/o  #UTI  ct with wall thickening L ureter  octaviano  appreciate id  f/u ucx, bcx  #BPH  s/p spc  #AFib  eliquis  lopressor as below  #HTN  hydralazine 50 tid  lopressor 100 bid  nifedipine 90  #CAD  asa  lipitor 40  #HFrEF  lopressor as above  #DM2  ssi  #H/o TIA  asa  lipitor 40  #DVT ppx  eliquis    #Progress Note Handoff:  Pending (specify):  Consults_________, Tests________, Test Results_______, Other___aki______  Family discussion: d/w pt at bedside re: treatment plan, primary dx  Disposition: Home_x__/SNF___/Other________/Unknown at this time________

## 2020-07-12 NOTE — PROGRESS NOTE ADULT - ASSESSMENT
1)  Severe recurrent ISRRAEL, oliguric, superimposed on CKD.  There may be an element of intravascular volume depletion, but doesn't appear to be enough to explain creat up to 9.5.  Likewise, obstructed urinary tract doesn't seem to be the case, unless obstruction is B/L and above level of bladder, as bladder nearly empty when SPC changed.  No evidence of sepsis or hemodynamic instability.  Once concern is what IV Abx pt received at [facility], perhaps something known to cause nephrotoxic ATN (eg, Aminoglycosides) or AIN (eg, beta lactams).  Doubt acute GN;  protein and blood in urine more from urinary tract, less likely glomerular.  Myeloma kidney always a possibility.    2)  Underlying CKD.  ? if pt now has new baseline compared to 2018, due to incomplete recovery from recent episode of ISRRAEL    3)  Hypocalcemia, corrected to ~ 6.9 to 7.0, which was present last admission.  Likely multifactorial, including 25-OH Vit D3 and  1,25-OH Vit D3 deficiency, plus possible hyperphosphatemia    4)  Metabolic acidosis w/ elevated AG.  Pt has underlying chronic acidosis on NaHCO3, now worse due to severe renal failure    Recommendations:    1)  Can continue IV NaHCO3 drip @ 75cc/hr    2)  Will need to try and find out which IV Abx pt received at [facility]    3)  Phos 6.7, check  iPTH, 25-OH Vit D3        4)  No absolute indication for HD now, but need may soon arise if no improvement in renal function    6)  Spor urine Na+, creat

## 2020-07-12 NOTE — PROGRESS NOTE ADULT - SUBJECTIVE AND OBJECTIVE BOX
Patient is seen and examined at the bed side, is afebrile.        REVIEW OF SYSTEMS:      Vital Signs Last 24 Hrs  T(C): 36.3 (2020 14:15), Max: 36.3 (2020 14:15)  T(F): 97.3 (2020 14:15), Max: 97.3 (2020 14:15)  HR: 59 (2020 14:15) (55 - 60)  BP: 118/56 (2020 14:15) (103/57 - 128/57)  BP(mean): --  RR: 16 (2020 05:54) (16 - 16)  SpO2: --        PHYSICAL EXAM:  GENERAL: Not in distress  CHEST/LUNG: Not using accessory muscles  HEART: Regular rate and rhythm; No murmurs, rubs, or gallops  ABDOMEN: Soft, Nontender, Nondistended; Bowel sounds present  EXTREMITIES:  2+ Peripheral Pulses, No clubbing, cyanosis, or edema  LYMPH: No lymphadenopathy noted  SKIN: No rashes or lesions        MEDICATIONS  (STANDING):  apixaban 2.5 milliGRAM(s) Oral two times a day  aspirin enteric coated 81 milliGRAM(s) Oral daily  atorvastatin 40 milliGRAM(s) Oral at bedtime  brimonidine 0.2% Ophthalmic Solution 1 Drop(s) Both EYES at bedtime  chlorhexidine 4% Liquid 1 Application(s) Topical <User Schedule>  hydrALAZINE 50 milliGRAM(s) Oral three times a day  insulin lispro (HumaLOG) corrective regimen sliding scale   SubCutaneous three times a day before meals  insulin lispro (HumaLOG) corrective regimen sliding scale   SubCutaneous at bedtime  latanoprost 0.005% Ophthalmic Solution 1 Drop(s) Both EYES at bedtime  magnesium sulfate  IVPB 2 Gram(s) IV Intermittent every 2 hours  meropenem  IVPB 500 milliGRAM(s) IV Intermittent every 24 hours  metoprolol tartrate 100 milliGRAM(s) Oral two times a day  NIFEdipine XL 90 milliGRAM(s) Oral daily  pantoprazole    Tablet 40 milliGRAM(s) Oral before breakfast  sodium chloride 0.45% 1000 milliLiter(s) (100 mL/Hr) IV Continuous <Continuous>    MEDICATIONS  (PRN):  acetaminophen   Tablet .. 650 milliGRAM(s) Oral every 6 hours PRN Mild Pain (1 - 3)  dextrose 40% Gel 15 Gram(s) Oral once PRN Blood Glucose LESS THAN 70 milliGRAM(s)/deciliter  glucagon  Injectable 1 milliGRAM(s) IntraMuscular once PRN Glucose LESS THAN 70 milligrams/deciliter  oxyCODONE    IR 5 milliGRAM(s) Oral four times a day PRN Severe Pain (7 - 10)        Allergies    sulfa drugs (Other)        LABS:                        7.5    12.34 )-----------( 276      ( 2020 07:11 )             22.5         07-12    131<L>  |  94<L>  |  116<HH>  ----------------------------<  136<H>  4.2   |  16<L>  |  9.5<HH>    Ca    6.0<L>      2020 07:11  Phos  6.7     07-12  Mg     1.0     12    TPro  5.6<L>  /  Alb  3.2<L>  /  TBili  0.4  /  DBili  x   /  AST  11  /  ALT  26  /  AlkPhos  275<H>  07-11    PT/INR - ( 10 Jul 2020 22:53 )   PT: 14.70 sec;   INR: 1.28 ratio       PTT - ( 10 Jul 2020 22:53 )  PTT:29.5 sec        Urinalysis Basic - ( 2020 00:08 )  Color: Yellow / Appearance: Turbid / S.025 / pH: x  Gluc: x / Ketone: Trace  / Bili: Negative / Urobili: 0.2 mg/dL   Blood: x / Protein: >=300 mg/dL / Nitrite: Negative   Leuk Esterase: Large / RBC: 11-25 /HPF / WBC >50 /HPF   Sq Epi: x / Non Sq Epi: Few /HPF / Bacteria: Many        CAPILLARY BLOOD GLUCOSE  POCT Blood Glucose.: 207 mg/dL (2020 11:28)  POCT Blood Glucose.: 141 mg/dL (2020 07:44)  POCT Blood Glucose.: 215 mg/dL (2020 22:14)  POCT Blood Glucose.: 224 mg/dL (2020 20:40)  POCT Blood Glucose.: 190 mg/dL (2020 16:53)          RADIOLOGY & ADDITIONAL TESTS:    < from: US Kidney and Bladder (07.12.20 @ 09:44) >  Mild left hydronephrosis. No right hydronephrosis    < end of copied text >    < from: CT Abdomen and Pelvis No Cont (20 @ 02:59) >    Trace locules of air within the prostate and right seminal vesicle. This finding can represents prostatitis as well as sequela of recent intervention.    Unchanged appearance of the dilated left ureter with associated wall thickening. Superimposed infectious process is not excluded.    Partially visualized small bilateral pleural effusions and airspace consolidation predominantly in the left. Findings can represent early pneumonia in the appropriate clinical setting.    Cholelithiasis and unchanged choledocholithiasis within the common bile duct.    < end of copied text > Patient is seen and examined at the bed side, is afebrile. He c/o feeling tired and nauseated, family at the bed side.        REVIEW OF SYSTEMS: All other review systems are negative        Vital Signs Last 24 Hrs  T(C): 36.3 (2020 14:15), Max: 36.3 (2020 14:15)  T(F): 97.3 (2020 14:15), Max: 97.3 (2020 14:15)  HR: 59 (2020 14:15) (55 - 60)  BP: 118/56 (2020 14:15) (103/57 - 128/57)  BP(mean): --  RR: 16 (2020 05:54) (16 - 16)  SpO2: --        PHYSICAL EXAM:  GENERAL: Not in distress  CHEST/LUNG: Not using accessory muscles  HEART: s1 and s2 present   ABDOMEN: Nontender and  Nondistended  EXTREMITIES: No pedal edema  CNS: Awake and Alert          MEDICATIONS  (STANDING):  apixaban 2.5 milliGRAM(s) Oral two times a day  aspirin enteric coated 81 milliGRAM(s) Oral daily  atorvastatin 40 milliGRAM(s) Oral at bedtime  brimonidine 0.2% Ophthalmic Solution 1 Drop(s) Both EYES at bedtime  chlorhexidine 4% Liquid 1 Application(s) Topical <User Schedule>  hydrALAZINE 50 milliGRAM(s) Oral three times a day  insulin lispro (HumaLOG) corrective regimen sliding scale   SubCutaneous three times a day before meals  insulin lispro (HumaLOG) corrective regimen sliding scale   SubCutaneous at bedtime  latanoprost 0.005% Ophthalmic Solution 1 Drop(s) Both EYES at bedtime  magnesium sulfate  IVPB 2 Gram(s) IV Intermittent every 2 hours  meropenem  IVPB 500 milliGRAM(s) IV Intermittent every 24 hours  metoprolol tartrate 100 milliGRAM(s) Oral two times a day  NIFEdipine XL 90 milliGRAM(s) Oral daily  pantoprazole    Tablet 40 milliGRAM(s) Oral before breakfast  sodium chloride 0.45% 1000 milliLiter(s) (100 mL/Hr) IV Continuous <Continuous>    MEDICATIONS  (PRN):  acetaminophen   Tablet .. 650 milliGRAM(s) Oral every 6 hours PRN Mild Pain (1 - 3)  dextrose 40% Gel 15 Gram(s) Oral once PRN Blood Glucose LESS THAN 70 milliGRAM(s)/deciliter  glucagon  Injectable 1 milliGRAM(s) IntraMuscular once PRN Glucose LESS THAN 70 milligrams/deciliter  oxyCODONE    IR 5 milliGRAM(s) Oral four times a day PRN Severe Pain (7 - 10)        Allergies    sulfa drugs (Other)          LABS:                        7.5    12.34 )-----------( 276      ( 2020 07:11 )             22.5         07-12    131<L>  |  94<L>  |  116<HH>  ----------------------------<  136<H>  4.2   |  16<L>  |  9.5<HH>    Ca    6.0<L>      2020 07:11  Phos  6.7     07-12  Mg     1.0     12    TPro  5.6<L>  /  Alb  3.2<L>  /  TBili  0.4  /  DBili  x   /  AST  11  /  ALT  26  /  AlkPhos  275<H>  07-11    PT/INR - ( 10 Jul 2020 22:53 )   PT: 14.70 sec;   INR: 1.28 ratio       PTT - ( 10 Jul 2020 22:53 )  PTT:29.5 sec        Urinalysis Basic - ( 2020 00:08 )  Color: Yellow / Appearance: Turbid / S.025 / pH: x  Gluc: x / Ketone: Trace  / Bili: Negative / Urobili: 0.2 mg/dL   Blood: x / Protein: >=300 mg/dL / Nitrite: Negative   Leuk Esterase: Large / RBC: 11-25 /HPF / WBC >50 /HPF   Sq Epi: x / Non Sq Epi: Few /HPF / Bacteria: Many        CAPILLARY BLOOD GLUCOSE  POCT Blood Glucose.: 207 mg/dL (2020 11:28)  POCT Blood Glucose.: 141 mg/dL (2020 07:44)  POCT Blood Glucose.: 215 mg/dL (2020 22:14)  POCT Blood Glucose.: 224 mg/dL (2020 20:40)  POCT Blood Glucose.: 190 mg/dL (2020 16:53)          RADIOLOGY & ADDITIONAL TESTS:    < from: US Kidney and Bladder (07.12.20 @ 09:44) >  Mild left hydronephrosis. No right hydronephrosis    < end of copied text >    < from: CT Abdomen and Pelvis No Cont (20 @ 02:59) >    Trace locules of air within the prostate and right seminal vesicle. This finding can represents prostatitis as well as sequela of recent intervention.    Unchanged appearance of the dilated left ureter with associated wall thickening. Superimposed infectious process is not excluded.    Partially visualized small bilateral pleural effusions and airspace consolidation predominantly in the left. Findings can represent early pneumonia in the appropriate clinical setting.    Cholelithiasis and unchanged choledocholithiasis within the common bile duct.          MICROBIOLOGY DATA:      Culture - Urine (20 @ 00:08)    Specimen Source: .Urine Suprapubic    Culture Results:   Numerous Pseudomonas aeruginosa  Few Klebsiella pneumoniae

## 2020-07-13 NOTE — PROGRESS NOTE ADULT - ASSESSMENT
ASSESSMENT  81yo male extensive PMH (based on EMR review) includes BPH (SPC 2 months ago),  AICD, atrial fibrillation (on DOAC), HTN, CAD (stents), combined systolic and diastolic CHF, CKD- stage 3b, DM, glaucoma DLD and TIA presents to the ER with urinary retention      IMPRESSION  #Rule out complicated cystitis in the setting of SPC and urinary retention    UA Blood: x / Protein: >=300 mg/dL / Nitrite: Negative Leuk Esterase: Large / RBC: 11-25 /HPF / WBC >50 /HPF Sq Epi: x / Non Sq Epi: Few /HPF / Bacteria: Many    5/2020 hx ESBL Ecoli UCX  - Urine Cx from 7/11 grew Pseudomonas, CRE and ESBL Klebsiella    CTAP Trace locules of air within the prostate and right seminal vesicle. This finding can represents prostatitis as well as sequela of recent intervention.  Unchanged appearance of the dilated left ureter with associated wall thickening. Superimposed infectious process is not excluded.  #[facility] Cr 9  #CT Partially visualized small bilateral pleural effusions and airspace consolidation predominantly in the left    No clinical evidence to suggest PNA  #CT Cholelithiasis and unchanged choledocholithiasis within the common bile duct- elevated Alk Ph  # Pseudomonas bacteremia  -7/10      would recommend:    1. Repeat Blood cultures X 2 in AM to document clearing the blood stream  2. Monitor WBC count, stay elevated  3. Restart Meropenem with Amikacin to treat CRE, Pseudomonas  and Klebsiella,   4. Monitor Amikacin level closely in the setting of abnormal kidney function  5. If blood stream does not clear Then Add polymixin   6. Follow up final blood Cx from 7/10 for sensitivity of Pseudomonas        Attending Attestation:     45 minutes spent on total encounter; more than 50% of the visit was spent counseling and/or coordinating care by the attending physician.

## 2020-07-13 NOTE — PROGRESS NOTE ADULT - SUBJECTIVE AND OBJECTIVE BOX
INTERVAL HPI/OVERNIGHT EVENTS:    SUBJECTIVE: Patient seen and examined at bedside.     no cp, sob, abd pain, fever  no sob, orthopnea, pnd, cough    OBJECTIVE:    VITAL SIGNS:  Vital Signs Last 24 Hrs  T(C): 35.8 (13 Jul 2020 05:17), Max: 36.3 (12 Jul 2020 14:15)  T(F): 96.4 (13 Jul 2020 05:17), Max: 97.3 (12 Jul 2020 14:15)  HR: 55 (13 Jul 2020 05:17) (55 - 59)  BP: 159/72 (13 Jul 2020 05:17) (118/56 - 159/72)  BP(mean): --  RR: 14 (13 Jul 2020 05:17) (14 - 16)  SpO2: --      PHYSICAL EXAM:    General: NAD  HEENT: NC/AT; PERRL, clear conjunctiva  Neck: supple  Respiratory: CTA b/l  Cardiovascular: +S1/S2; RRR  Abdomen: soft, NT/ND; +BS x4  Extremities: WWP, 2+ peripheral pulses b/l; no LE edema  Skin: normal color and turgor; no rash  Neurological:    MEDICATIONS:  MEDICATIONS  (STANDING):  apixaban 2.5 milliGRAM(s) Oral two times a day  aspirin enteric coated 81 milliGRAM(s) Oral daily  atorvastatin 40 milliGRAM(s) Oral at bedtime  brimonidine 0.2% Ophthalmic Solution 1 Drop(s) Both EYES at bedtime  calcitriol   Capsule 0.5 MICROGram(s) Oral daily  calcium acetate 667 milliGRAM(s) Oral daily  chlorhexidine 4% Liquid 1 Application(s) Topical <User Schedule>  dextrose 5%. 1000 milliLiter(s) (50 mL/Hr) IV Continuous <Continuous>  dextrose 50% Injectable 12.5 Gram(s) IV Push once  dextrose 50% Injectable 25 Gram(s) IV Push once  dextrose 50% Injectable 25 Gram(s) IV Push once  hydrALAZINE 50 milliGRAM(s) Oral three times a day  insulin lispro (HumaLOG) corrective regimen sliding scale   SubCutaneous three times a day before meals  insulin lispro (HumaLOG) corrective regimen sliding scale   SubCutaneous at bedtime  latanoprost 0.005% Ophthalmic Solution 1 Drop(s) Both EYES at bedtime  meropenem  IVPB 500 milliGRAM(s) IV Intermittent every 24 hours  metoprolol tartrate 100 milliGRAM(s) Oral two times a day  NIFEdipine XL 90 milliGRAM(s) Oral daily  pantoprazole    Tablet 40 milliGRAM(s) Oral before breakfast  sodium chloride 0.45% 1000 milliLiter(s) (100 mL/Hr) IV Continuous <Continuous>    MEDICATIONS  (PRN):  acetaminophen   Tablet .. 650 milliGRAM(s) Oral every 6 hours PRN Mild Pain (1 - 3)  dextrose 40% Gel 15 Gram(s) Oral once PRN Blood Glucose LESS THAN 70 milliGRAM(s)/deciliter  glucagon  Injectable 1 milliGRAM(s) IntraMuscular once PRN Glucose LESS THAN 70 milligrams/deciliter  oxyCODONE    IR 5 milliGRAM(s) Oral four times a day PRN Severe Pain (7 - 10)      ALLERGIES:  Allergies    sulfa drugs (Other)    Intolerances        LABS:                        7.7    12.06 )-----------( 292      ( 13 Jul 2020 07:51 )             22.7     Hemoglobin: 7.7 g/dL (07-13 @ 07:51)  Hemoglobin: 7.5 g/dL (07-12 @ 07:11)  Hemoglobin: 8.0 g/dL (07-11 @ 06:45)  Hemoglobin: 8.3 g/dL (07-10 @ 22:53)    CBC Full  -  ( 13 Jul 2020 07:51 )  WBC Count : 12.06 K/uL  RBC Count : 2.50 M/uL  Hemoglobin : 7.7 g/dL  Hematocrit : 22.7 %  Platelet Count - Automated : 292 K/uL  Mean Cell Volume : 90.8 fL  Mean Cell Hemoglobin : 30.8 pg  Mean Cell Hemoglobin Concentration : 33.9 g/dL  Auto Neutrophil # : x  Auto Lymphocyte # : x  Auto Monocyte # : x  Auto Eosinophil # : x  Auto Basophil # : x  Auto Neutrophil % : x  Auto Lymphocyte % : x  Auto Monocyte % : x  Auto Eosinophil % : x  Auto Basophil % : x    07-13    126<L>  |  87<L>  |  102<HH>  ----------------------------<  131<H>  4.0   |  17  |  8.7<HH>    Ca    5.8<LL>      13 Jul 2020 07:51  Phos  6.6     07-13  Mg     1.9     07-13      Creatinine Trend: 8.7<--, 9.5<--, 9.5<--, 9.6<--        hs Troponin:              CSF:                      EKG:   MICROBIOLOGY:    Culture - Urine (collected 11 Jul 2020 00:08)  Source: .Urine Suprapubic  Preliminary Report (12 Jul 2020 20:45):    Numerous Pseudomonas aeruginosa    Few Klebsiella pneumoniae    Culture - Blood (collected 10 Jul 2020 22:53)  Source: .Blood Blood  Gram Stain (12 Jul 2020 18:28):    Growth in aerobic bottle: Gram Negative Rods  Preliminary Report (12 Jul 2020 18:28):    Growth in aerobic bottle: Gram Negative Rods    "Due to technical problems, Proteus sp. will Not be reported as part of    the BCID panel until further notice"    ***Blood Panel PCR results on this specimen are available    approximately 3 hours after the Gram stain result.***    Gram stain, PCR, and/or culture results may not always    correspond due to difference in methodologies.    ************************************************************    This PCR assay was performed using [employer].    The following targets are tested for: Enterococcus,    vancomycin resistant enterococci, Listeria monocytogenes,    coagulase negative staphylococci, S. aureus,    methicillin resistant S. aureus, Streptococcus agalactiae    (Group B), S. pneumoniae, S. pyogenes (Group A),    Acinetobacter baumannii, Enterobacter cloacae, E. coli,    Klebsiella oxytoca, K. pneumoniae, Proteus sp.,    Serratia marcescens, Haemophilus influenzae,    Neisseria meningitidis, Pseudomonas aeruginosa, Candida    albicans, C. glabrata, C krusei, C parapsilosis,    C. tropicalis and the KPC resistance gene.  Organism: Blood Culture PCR (12 Jul 2020 19:41)  Organism: Blood Culture PCR (12 Jul 2020 19:41)    Culture - Blood (collected 10 Jul 2020 22:53)  Source: .Blood Blood  Preliminary Report (12 Jul 2020 18:01):    No growth to date.      IMAGING:      Labs, imaging, EKG personally reviewed    RADIOLOGY & ADDITIONAL TESTS: Reviewed.

## 2020-07-13 NOTE — PROGRESS NOTE ADULT - ASSESSMENT
1)  ISRRAEL on CKD 3 / prerenal leading to ATN ?/ acidosis / hyponatremia   CT scan  and sono noted for chronic hydro   was on antibx as OP can not rule out AIN   would do work up for MM (check SPEP / UPEP / SFLC / IF)  continue IVF 1/2 NS with 75 meq bicarb at 75 cc per hour   Strict i and o   check UA and urine lytes   No need for RRT for now     2)  Hypocalcemia, corrected to ~ 6.9 to 7.0, which was present last admission.  Likely multifactorial, including 25-OH Vit D3 and  1,25-OH Vit D3 deficiency, plus possible hyperphosphatemia  sp calcium gluconate  please start Calcitriol 0.5 mcg po q24h   start Phoslo one tab po q24h       will follow 1)  ISRRAEL on CKD 3 / prerenal leading to ATN ?/ acidosis / hyponatremia   CT scan  and sono noted for chronic hydro   was on antibx as OP can not rule out AIN   would do work up for MM (check SPEP / UPEP / SFLC / IF)  continue IVF 1/2 NS with 75 meq bicarb at 75 cc per hour   Strict i and o   check UA and urine lytes urine eosinophils  on merrem for UTI  No need for RRT for now     2)  Hypocalcemia, corrected to ~ 6.9 to 7.0, which was present last admission.  Likely multifactorial, including 25-OH Vit D3 and  1,25-OH Vit D3 deficiency, plus possible hyperphosphatemia  sp calcium gluconate  please start Calcitriol 0.5 mcg po q24h   start Phoslo one tab po q24h       will follow

## 2020-07-13 NOTE — PROGRESS NOTE ADULT - SUBJECTIVE AND OBJECTIVE BOX
Patient is seen and examined at the bed side, is afebrile. One of the blood culture from 7/10 now reported to grow Pseudomonas and as per sensitivity of Pseudomonas in Urine culture its only sensitive to aminoglycosides and intermediate to Ceftazidime  and Zosyn.        REVIEW OF SYSTEMS: All other review systems are negative        Vital Signs Last 24 Hrs  T(C): 36.3 (2020 13:57), Max: 36.3 (2020 13:57)  T(F): 97.4 (2020 13:57), Max: 97.4 (2020 13:57)  HR: 62 (2020 17:05) (55 - 62)  BP: 160/73 (2020 17:05) (122/60 - 170/79)  BP(mean): --  RR: 16 (2020 17:05) (14 - 16)  SpO2: --      PHYSICAL EXAM:  GENERAL: Not in distress  CHEST/LUNG: Not using accessory muscles  HEART: s1 and s2 present   ABDOMEN: Nontender and  Nondistended  EXTREMITIES: No pedal edema  CNS: Awake and Alert          MEDICATIONS  (STANDING):    amiKACIN  IVPB 400 milliGRAM(s) IV Intermittent <User Schedule>  apixaban 2.5 milliGRAM(s) Oral two times a day  aspirin enteric coated 81 milliGRAM(s) Oral daily  atorvastatin 40 milliGRAM(s) Oral at bedtime  brimonidine 0.2% Ophthalmic Solution 1 Drop(s) Both EYES at bedtime  calcitriol   Capsule 0.5 MICROGram(s) Oral daily  calcium acetate 667 milliGRAM(s) Oral daily  chlorhexidine 4% Liquid 1 Application(s) Topical <User Schedule>  hydrALAZINE 50 milliGRAM(s) Oral three times a day  insulin lispro (HumaLOG) corrective regimen sliding scale   SubCutaneous three times a day before meals  insulin lispro (HumaLOG) corrective regimen sliding scale   SubCutaneous at bedtime  latanoprost 0.005% Ophthalmic Solution 1 Drop(s) Both EYES at bedtime  metoprolol tartrate 100 milliGRAM(s) Oral two times a day  NIFEdipine XL 90 milliGRAM(s) Oral daily  pantoprazole    Tablet 40 milliGRAM(s) Oral before breakfast  sodium chloride 0.45% 1000 milliLiter(s) (100 mL/Hr) IV Continuous <Continuous>  traMADol 25 milliGRAM(s) Oral once          Allergies    sulfa drugs (Other)          LABS:                        7.7    12.06 )-----------( 292      ( 2020 07:51 )             22.7                           7.5    12.34 )-----------( 276      ( 2020 07:11 )             22.5         07-13    126<L>  |  87<L>  |  102<HH>  ----------------------------<  131<H>  4.0   |  17  |  8.7<HH>    Ca    5.8<LL>      2020 07:51  Phos  6.6     07-13  Mg     1.9     07-13      07-12    131<L>  |  94<L>  |  116<HH>  ----------------------------<  136<H>  4.2   |  16<L>  |  9.5<HH>    Ca    6.0<L>      2020 07:11  Phos  6.7     07-12  Mg     1.0     -12    TPro  5.6<L>  /  Alb  3.2<L>  /  TBili  0.4  /  DBili  x   /  AST  11  /  ALT  26  /  AlkPhos  275<H>  07-11    PT/INR - ( 10 Jul 2020 22:53 )   PT: 14.70 sec;   INR: 1.28 ratio       PTT - ( 10 Jul 2020 22:53 )  PTT:29.5 sec        Urinalysis Basic - ( 2020 00:08 )  Color: Yellow / Appearance: Turbid / S.025 / pH: x  Gluc: x / Ketone: Trace  / Bili: Negative / Urobili: 0.2 mg/dL   Blood: x / Protein: >=300 mg/dL / Nitrite: Negative   Leuk Esterase: Large / RBC: 11-25 /HPF / WBC >50 /HPF   Sq Epi: x / Non Sq Epi: Few /HPF / Bacteria: Many        CAPILLARY BLOOD GLUCOSE  POCT Blood Glucose.: 207 mg/dL (2020 11:28)  POCT Blood Glucose.: 141 mg/dL (2020 07:44)  POCT Blood Glucose.: 215 mg/dL (2020 22:14)  POCT Blood Glucose.: 224 mg/dL (2020 20:40)  POCT Blood Glucose.: 190 mg/dL (2020 16:53)          RADIOLOGY & ADDITIONAL TESTS:    < from: US Kidney and Bladder (20 @ 09:44) >  Mild left hydronephrosis. No right hydronephrosis    < end of copied text >    < from: CT Abdomen and Pelvis No Cont (20 @ 02:59) >    Trace locules of air within the prostate and right seminal vesicle. This finding can represents prostatitis as well as sequela of recent intervention.    Unchanged appearance of the dilated left ureter with associated wall thickening. Superimposed infectious process is not excluded.    Partially visualized small bilateral pleural effusions and airspace consolidation predominantly in the left. Findings can represent early pneumonia in the appropriate clinical setting.    Cholelithiasis and unchanged choledocholithiasis within the common bile duct.          MICROBIOLOGY DATA:    Culture - Urine (20 @ 00:08)    -  Ceftriaxone: R >32 Enterobacter, Citrobacter, and Serratia may develop resistance during prolonged therapy    -  Ciprofloxacin: R >2    -  Ciprofloxacin: S <=0.25    -  Ertapenem: S <=0.5    -  Amikacin: S <=16    -  Amikacin: S <=16    -  Amoxicillin/Clavulanic Acid: I 16/8    -  Ampicillin: R >16 These ampicillin results predict results for amoxicillin    -  Ampicillin/Sulbactam: R >16/8 Enterobacter, Citrobacter, and Serratia may develop resistance during prolonged therapy (3-4 days)    -  Aztreonam: R >16    -  Aztreonam: R >16    -  Cefazolin: R >16 ([id number]) For uncomplicated UTI with K. pneumoniae, E. coli, or P. mirablis: SATHYA <=16 is sensitive and SATHYA >=32 is resistant. This also predicts results for oral agents cefaclor, cefdinir, cefpodoxime, cefprozil, cefuroxime axetil, cephalexin and locarbef for uncomplicated UTI. Note that some isolates may be susceptible to these agents while testing resistant to cefazolin.    -  Cefepime: R >16    -  Cefepime: R >16    -  Cefoxitin: S <=8    -  Ceftazidime: I 16    -  Gentamicin: S 4    -  Gentamicin: R >8    -  Imipenem: R 8    -  Imipenem: S <=1    -  Levofloxacin: R >4    -  Levofloxacin: S <=0.5    -  Meropenem: R >8    -  Meropenem: S <=1    -  Nitrofurantoin: I 64 Should not be used to treat pyelonephritis    -  Piperacillin/Tazobactam: I 32    -  Piperacillin/Tazobactam: R <=8    -  Tigecycline: S <=2    -  Tobramycin: S <=2    -  Tobramycin: R >8    -  Trimethoprim/Sulfamethoxazole: R >38    Specimen Source: .Urine Suprapubic    Culture Results:   Numerous Pseudomonas aeruginosa (Carbapenem Resistant)  Few Klebsiella pneumoniae ESBL    Organism Identification: Pseudomonas aeruginosa (Carbapenem Resistant)  Klebsiella pneumoniae ESBL    Organism: Pseudomonas aeruginosa (Carbapenem Resistant)    Organism: Klebsiella pneumoniae ESBL    Method Type: SATHYA    Method Type: SATHYA      Culture - Urine (20 @ 00:08)    Specimen Source: .Urine Suprapubic    Culture Results:   Numerous Pseudomonas aeruginosa  Few Klebsiella pneumoniae        Culture - Blood (07.10.20 @ 22:53)    Gram Stain:   Growth in aerobic bottle: Gram Negative Rods    -  Pseudomonas aeruginosa: Detec    Specimen Source: .Blood Blood    Organism: Blood Culture PCR    Culture Results:   Growth in aerobic bottle: Pseudomonas aeruginosa  "Due to technical problems, Proteus sp. will Not be reported as part of  the BCID panel until further notice"  ***Blood Panel PCR results on this specimen are available  approximately 3 hours after the Gram stain result.***  Gram stain, PCR, and/or culture results may not always  correspond due to difference in methodologies.  ************************************************************  This PCR assay was performed using [employer].  Thefollowing targets are tested for: Enterococcus,  vancomycin resistant enterococci, Listeria monocytogenes,  coagulase negative staphylococci, S. aureus,  methicillin resistant S. aureus, Streptococcus agalactiae  (Group B), S. pneumoniae, S. pyogenes(Group A),  Acinetobacter baumannii, Enterobacter cloacae, E. coli,  Klebsiella oxytoca, K. pneumoniae, Proteus sp.,  Serratia marcescens, Haemophilus influenzae,  Neisseria meningitidis, Pseudomonas aeruginosa, Candida  albicans, C. glabrata, C krusei, C parapsilosis,  C. tropicalis and the KPC resistance gene.    Organism Identification: Blood Culture PCR    Method Type: PCR

## 2020-07-13 NOTE — PROGRESS NOTE ADULT - SUBJECTIVE AND OBJECTIVE BOX
Nephrology progress note    THIS IS AN INCOMPLETE NOTE . FULL NOTE TO FOLLOW SHORTLY    Patient is seen and examined, events over the last 24 h noted .    Allergies:  sulfa drugs (Other)    Hospital Medications:   MEDICATIONS  (STANDING):  apixaban 2.5 milliGRAM(s) Oral two times a day  aspirin enteric coated 81 milliGRAM(s) Oral daily  atorvastatin 40 milliGRAM(s) Oral at bedtime  brimonidine 0.2% Ophthalmic Solution 1 Drop(s) Both EYES at bedtime  calcium gluconate IVPB 2 Gram(s) IV Intermittent once  chlorhexidine 4% Liquid 1 Application(s) Topical <User Schedule>  dextrose 5%. 1000 milliLiter(s) (50 mL/Hr) IV Continuous <Continuous>  dextrose 50% Injectable 12.5 Gram(s) IV Push once  dextrose 50% Injectable 25 Gram(s) IV Push once  dextrose 50% Injectable 25 Gram(s) IV Push once  hydrALAZINE 50 milliGRAM(s) Oral three times a day  insulin lispro (HumaLOG) corrective regimen sliding scale   SubCutaneous three times a day before meals  insulin lispro (HumaLOG) corrective regimen sliding scale   SubCutaneous at bedtime  latanoprost 0.005% Ophthalmic Solution 1 Drop(s) Both EYES at bedtime  meropenem  IVPB 500 milliGRAM(s) IV Intermittent every 24 hours  metoprolol tartrate 100 milliGRAM(s) Oral two times a day  NIFEdipine XL 90 milliGRAM(s) Oral daily  pantoprazole    Tablet 40 milliGRAM(s) Oral before breakfast  sodium chloride 0.45% 1000 milliLiter(s) (100 mL/Hr) IV Continuous <Continuous>        VITALS:  T(F): 96.4 (20 @ 05:17), Max: 97.3 (20 @ 14:15)  HR: 55 (20 @ 05:17)  BP: 159/72 (20 @ 05:17)  RR: 14 (20 @ 05:17)  SpO2: --  Wt(kg): --     @ 07:01  -   @ 07:00  --------------------------------------------------------  IN: 220 mL / OUT: 100 mL / NET: 120 mL     @ 07:01  -  13 @ 07:00  --------------------------------------------------------  IN: 690 mL / OUT: 250 mL / NET: 440 mL          PHYSICAL EXAM:  Constitutional: NAD  HEENT: anicteric sclera, oropharynx clear, MMM  Neck: No JVD  Respiratory: CTAB, no wheezes, rales or rhonchi  Cardiovascular: S1, S2, RRR  Gastrointestinal: BS+, soft, NT/ND  Extremities: No cyanosis or clubbing. No peripheral edema  :  No rondon.   Skin: No rashes    LABS:      131<L>  |  94<L>  |  116<HH>  ----------------------------<  136<H>  4.2   |  16<L>  |  9.5<HH>    Creatinine Trend: 9.5<--, 9.5<--, 9.6<--      Ca    6.0<L>      2020 07:11  Phos  6.7     12  Mg     1.0                                 7.5    12.34 )-----------( 276      ( 2020 07:11 )             22.5       Urine Studies:  Urinalysis Basic - ( 2020 00:08 )    Color: Yellow / Appearance: Turbid / S.025 / pH:   Gluc:  / Ketone: Trace  / Bili: Negative / Urobili: 0.2 mg/dL   Blood:  / Protein: >=300 mg/dL / Nitrite: Negative   Leuk Esterase: Large / RBC: 11-25 /HPF / WBC >50 /HPF   Sq Epi:  / Non Sq Epi: Few /HPF / Bacteria: Many        RADIOLOGY & ADDITIONAL STUDIES: Nephrology progress note  Patient is seen and examined, events over the last 24 h noted .  lying in bed comfortable  has SPC       Allergies:  sulfa drugs (Other)    Hospital Medications:   MEDICATIONS  (STANDING):    apixaban 2.5 milliGRAM(s) Oral two times a day  aspirin enteric coated 81 milliGRAM(s) Oral daily  atorvastatin 40 milliGRAM(s) Oral at bedtime  brimonidine 0.2% Ophthalmic Solution 1 Drop(s) Both EYES at bedtime  calcium gluconate IVPB 2 Gram(s) IV Intermittent once  dextrose 5%. 1000 milliLiter(s) (50 mL/Hr) IV Continuous <Continuous>  hydrALAZINE 50 milliGRAM(s) Oral three times a day  insulin lispro (HumaLOG) corrective regimen sliding scale   SubCutaneous three times a day before meals  insulin lispro (HumaLOG) corrective regimen sliding scale   SubCutaneous at bedtime  latanoprost 0.005% Ophthalmic Solution 1 Drop(s) Both EYES at bedtime  meropenem  IVPB 500 milliGRAM(s) IV Intermittent every 24 hours  metoprolol tartrate 100 milliGRAM(s) Oral two times a day  NIFEdipine XL 90 milliGRAM(s) Oral daily  pantoprazole    Tablet 40 milliGRAM(s) Oral before breakfast  sodium chloride 0.45% 1000 milliLiter(s) (100 mL/Hr) IV Continuous <Continuous>        VITALS:  T(F): 96.4 (20 @ 05:17), Max: 97.3 (20 @ 14:15)  HR: 55 (20 @ 05:17)  BP: 159/72 (20 @ 05:17)  RR: 14 (20 @ 05:17)       @ 07:  -   @ 07:00  --------------------------------------------------------  IN: 220 mL / OUT: 100 mL / NET: 120 mL     @ 07:01  -   @ 07:00  --------------------------------------------------------  IN: 690 mL / OUT: 250 mL / NET: 440 mL          PHYSICAL EXAM:  Constitutional: NAD  Neck: No JVD  Respiratory: Crackles at base   Cardiovascular: S1, S2, RRR  Gastrointestinal: positive SPC   Extremities: No cyanosis or clubbing. No peripheral edema  :  No rondon.   Skin: No rashes    LABS:      126<L>  |  87<L>  |  102<HH>  ----------------------------<  131<H>  4.0   |  17  |  8.7<HH>    Ca    5.8<LL>      2020 07:51  Phos  6.6       Mg     1.9         Creatinine Trend: 8.7<--, 9.5<--, 9.5<--, 9.6<--        131<L>  |  94<L>  |  116<HH>  ----------------------------<  136<H>  4.2   |  16<L>  |  9.5<HH>    SODIUM TREND:  Sodium 126 [ @ 07:51]  Sodium 131 [ @ 07:11]  Sodium 131 [ @ 06:45]  Sodium 131 [07-10 @ 22:53]    Creatinine Trend: 9.5<--, 9.5<--, 9.6<--      Ca    6.0<L>      2020 07:11  Phos  6.7       Mg     1.0                                 7.5    12.34 )-----------( 276      ( 2020 07:11 )             22.5       Urine Studies:  Urinalysis Basic - ( 2020 00:08 )    Color: Yellow / Appearance: Turbid / S.025 / pH:   Gluc:  / Ketone: Trace  / Bili: Negative / Urobili: 0.2 mg/dL   Blood:  / Protein: >=300 mg/dL / Nitrite: Negative   Leuk Esterase: Large / RBC: 11-25 /HPF / WBC >50 /HPF   Sq Epi:  / Non Sq Epi: Few /HPF / Bacteria: Many        RADIOLOGY & ADDITIONAL STUDIES:  < from: US Kidney and Bladder (20 @ 09:44) >    IMPRESSION:    Mild left hydronephrosis. No right hydronephrosis    < end of copied text >  < from: CT Abdomen and Pelvis No Cont (20 @ 02:59) >  IMPRESSION:      Trace locules of air within the prostate and right seminal vesicle. This finding can represents prostatitis as well as sequela of recent intervention.    Unchanged appearance of the dilated left ureter with associated wall thickening. Superimposed infectious process is not excluded.    Partially visualized small bilateral pleural effusions and airspace consolidation predominantly in the left. Findings can represent early pneumonia in the appropriate clinical setting.    Cholelithiasis and unchanged choledocholithiasis within the common bile duct.      < end of copied text >

## 2020-07-13 NOTE — PROGRESS NOTE ADULT - ASSESSMENT
80M PMHx BPH s/p spc, AFib, HTN, CAD s/p stent, HFrEF s/p AICD, CKD III, DM2, h/o TIA here with ISRRAEL c/b metabolic acidosis. UTI.    #ISRRAEL on CKD III  c/b significant acidosis; unclear etiology, suspect some component prerenal in setting of infection  bicarb gtt  trend scr, sl improvement today  L hydrouretonephrosis on ct, chronic  no plan for HD at this time  #UTI, pseudomonas, kleb  c/b pseudomonas bacteremia  repeat bcx  ucx pseudomonas, kleb  cotaviano  appreciate id  #BPH  s/p spc  #AFib  eliquis  lopressor as below  #HTN  hydralazine 50 tid  lopressor 100 bid  nifedipine 90  #CAD  asa  lipitor 40  #HFrEF  lopressor as above  #DM2  ssi  #H/o TIA  asa  lipitor 40  #DVT ppx  eliquis    #Progress Note Handoff:  Pending (specify):  Consults_________, Tests________, Test Results_______, Other___aki______  Family discussion: d/w pt at bedside re: treatment plan, primary dx  Disposition: Home_x__/SNF___/Other________/Unknown at this time________

## 2020-07-14 NOTE — PHYSICAL THERAPY INITIAL EVALUATION ADULT - ADDITIONAL COMMENTS
Patient reports he has rolling walker at home, but does not typically use it.  +3 steps to enter home , +11 steps inside home but has chair lift

## 2020-07-14 NOTE — PROGRESS NOTE ADULT - ASSESSMENT
ASSESSMENT  79yo male extensive PMH (based on EMR review) includes BPH (SPC 2 months ago),  AICD, atrial fibrillation (on DOAC), HTN, CAD (stents), combined systolic and diastolic CHF, CKD- stage 3b, DM, glaucoma DLD and TIA presents to the ER with urinary retention      IMPRESSION  #Rule out complicated cystitis in the setting of SPC and urinary retention    UA Blood: x / Protein: >=300 mg/dL / Nitrite: Negative Leuk Esterase: Large / RBC: 11-25 /HPF / WBC >50 /HPF Sq Epi: x / Non Sq Epi: Few /HPF / Bacteria: Many    5/2020 hx ESBL Ecoli UCX  - Urine Cx from 7/11 grew Pseudomonas, CRE and ESBL Klebsiella    CTAP Trace locules of air within the prostate and right seminal vesicle. This finding can represents prostatitis as well as sequela of recent intervention.  Unchanged appearance of the dilated left ureter with associated wall thickening. Superimposed infectious process is not excluded.  #ISRRAEL Cr 9  #CT Partially visualized small bilateral pleural effusions and airspace consolidation predominantly in the left    No clinical evidence to suggest PNA  #CT Cholelithiasis and unchanged choledocholithiasis within the common bile duct- elevated Alk Ph  # Pseudomonas bacteremia  -7/10    MEDICATIONS  (PRN):  amiKACIN  IVPB 400 milliGRAM(s) IV Intermittent <User Schedule>  meropenem  IVPB 500 milliGRAM(s) IV Intermittent every 24 hours      amiKACIN  IVPB   101.6 mL/Hr IV Intermittent (07-13-20 @ 18:34)    Amikacin Level, Trough (07.14.20 @ 07:12)    Amikacin Level, Trough: 7.9 ug/mL    Creatinine, Serum: 8.5: Critical value: mg/dL (07.14.20 @ 06:09)  Weight (kg): 79.1 (11 Jul 2020 03:11)  CrCl    - follow-up repeat blood cultures drawn 7/14 to document clearance  - continue meropenem 500 mg q 24 hours  - hold amikacin, please draw level tomorrow AM   - will call micro for avycaz, zerbaxa susceptibilities   - Monitor WBC count  - If blood stream does not clear Then Add polymixin   - Follow up final blood Cx from 7/10 for sensitivity of Pseudomonas    This is a preliminary incomplete pended note, all final recommendations to follow after interview and examination of the patient.    Please call if with any questions.  Spectre [phone] ASSESSMENT  81yo male extensive PMH (based on EMR review) includes BPH (SPC 2 months ago),  AICD, atrial fibrillation (on DOAC), HTN, CAD (stents), combined systolic and diastolic CHF, CKD- stage 3b, DM, glaucoma DLD and TIA presents to the ER with urinary retention      IMPRESSION  #Rule out complicated cystitis in the setting of SPC and urinary retention    UA Blood: x / Protein: >=300 mg/dL / Nitrite: Negative Leuk Esterase: Large / RBC: 11-25 /HPF / WBC >50 /HPF Sq Epi: x / Non Sq Epi: Few /HPF / Bacteria: Many    5/2020 hx ESBL Ecoli UCX  - Urine Cx from 7/11 grew Pseudomonas, CRE and ESBL Klebsiella    CTAP Trace locules of air within the prostate and right seminal vesicle. This finding can represents prostatitis as well as sequela of recent intervention.  Unchanged appearance of the dilated left ureter with associated wall thickening. Superimposed infectious process is not excluded.  #ISRRAEL Cr 9  #CT Partially visualized small bilateral pleural effusions and airspace consolidation predominantly in the left    No clinical evidence to suggest PNA  #CT Cholelithiasis and unchanged choledocholithiasis within the common bile duct- elevated Alk Ph  # Pseudomonas bacteremia  -7/10    MEDICATIONS  (PRN):  amiKACIN  IVPB 400 milliGRAM(s) IV Intermittent <User Schedule>  meropenem  IVPB 500 milliGRAM(s) IV Intermittent every 24 hours      amiKACIN  IVPB   101.6 mL/Hr IV Intermittent (07-13-20 @ 18:34)    Amikacin Level, Trough (07.14.20 @ 07:12)    Amikacin Level, Trough: 7.9 ug/mL    Creatinine, Serum: 8.5: Critical value: mg/dL (07.14.20 @ 06:09)  Weight (kg): 79.1 (11 Jul 2020 03:11)  CrCl    - follow-up repeat blood cultures drawn 7/14 to document clearance  - continue meropenem 500 mg q 24 hours  - hold amikacin, please draw level tomorrow AM   - will call micro for avycaz, zerbaxa susceptibilities   - Monitor WBC count  - If blood stream does not clear Then Add polymixin   - Follow up final blood Cx from 7/10 for sensitivity of Pseudomonas    Please call if with any questions.  Spectre [phone]

## 2020-07-14 NOTE — PROGRESS NOTE ADULT - SUBJECTIVE AND OBJECTIVE BOX
CHAPISROCIO  80y, Male  Allergy: sulfa drugs (Other)      LOS  3d    CHIEF COMPLAINT: ISRRAEL, UTI (14 Jul 2020 11:23)      INTERVAL EVENTS/HPI  - No acute events overnight  - T(F): , Max: 97.4 (07-13-20 @ 13:57)  - Denies any worsening symptoms  - Tolerating medication  - WBC Count: 11.66 (07-14-20 @ 06:09)  WBC Count: 12.06 (07-13-20 @ 07:51)     - Creatinine, Serum: 8.5 (07-14-20 @ 06:09)  Creatinine, Serum: 8.7 (07-13-20 @ 07:51)       ROS  General: Denies rigors, nightsweats  HEENT: Denies headache, rhinorrhea, sore throat, eye pain  CV: Denies CP, palpitations  PULM: Denies wheezing, hemoptysis  GI: Denies hematemesis, hematochezia, melena  : Denies discharge, hematuria  MSK: Denies arthralgias, myalgias  SKIN: Denies rash, lesions  NEURO: Denies paresthesias, weakness  PSYCH: Denies depression, anxiety    VITALS:  T(F): 97.1, Max: 97.4 (07-13-20 @ 13:57)  HR: 54  BP: 155/61  RR: 16Vital Signs Last 24 Hrs  T(C): 36.2 (14 Jul 2020 05:00), Max: 36.3 (13 Jul 2020 13:57)  T(F): 97.1 (14 Jul 2020 05:00), Max: 97.4 (13 Jul 2020 13:57)  HR: 54 (14 Jul 2020 06:27) (54 - 62)  BP: 155/61 (14 Jul 2020 06:27) (155/61 - 185/84)  BP(mean): --  RR: 16 (14 Jul 2020 05:00) (16 - 16)  SpO2: --    PHYSICAL EXAM:  Gen: NAD, resting in bed  HEENT: Normocephalic, atraumatic  Neck: supple, no lymphadenopathy  CV: Regular rate & regular rhythm  Lungs: decreased BS at bases, no fremitus  Abdomen: Soft, BS present  Ext: Warm, well perfused  Neuro: non focal, awake  Skin: no rash, no erythema  Lines: no phlebitis    FH: Non-contributory  Social Hx: Non-contributory    TESTS & MEASUREMENTS:                        8.4    11.66 )-----------( 311      ( 14 Jul 2020 06:09 )             25.0     07-14    126<L>  |  87<L>  |  102<HH>  ----------------------------<  118<H>  4.2   |  16<L>  |  8.5<HH>    Ca    6.3<L>      14 Jul 2020 06:09  Phos  7.0     07-14  Mg     1.8     07-14      eGFR if Non African American: 5 mL/min/1.73M2 (07-14-20 @ 06:09)  eGFR if African American: 6 mL/min/1.73M2 (07-14-20 @ 06:09)          Culture - Urine (collected 07-11-20 @ 00:08)  Source: .Urine Suprapubic  Final Report (07-13-20 @ 16:37):    Numerous Pseudomonas aeruginosa (Carbapenem Resistant)    Few Klebsiella pneumoniae ESBL  Organism: Pseudomonas aeruginosa (Carbapenem Resistant)  Klebsiella pneumoniae ESBL (07-13-20 @ 16:37)  Organism: Klebsiella pneumoniae ESBL (07-13-20 @ 16:37)      -  Amikacin: S <=16      -  Amoxicillin/Clavulanic Acid: I 16/8      -  Ampicillin: R >16 These ampicillin results predict results for amoxicillin      -  Ampicillin/Sulbactam: R >16/8 Enterobacter, Citrobacter, and Serratia may develop resistance during prolonged therapy (3-4 days)      -  Aztreonam: R >16      -  Cefazolin: R >16 (MIC_CL_COM_ENTERIC_CEFAZU) For uncomplicated UTI with K. pneumoniae, E. coli, or P. mirablis: SATHYA <=16 is sensitive and SATHYA >=32 is resistant. This also predicts results for oral agents cefaclor, cefdinir, cefpodoxime, cefprozil, cefuroxime axetil, cephalexin and locarbef for uncomplicated UTI. Note that some isolates may be susceptible to these agents while testing resistant to cefazolin.      -  Cefepime: R >16      -  Cefoxitin: S <=8      -  Ceftriaxone: R >32 Enterobacter, Citrobacter, and Serratia may develop resistance during prolonged therapy      -  Ciprofloxacin: S <=0.25      -  Ertapenem: S <=0.5      -  Gentamicin: R >8      -  Imipenem: S <=1      -  Levofloxacin: S <=0.5      -  Meropenem: S <=1      -  Nitrofurantoin: I 64 Should not be used to treat pyelonephritis      -  Piperacillin/Tazobactam: R <=8      -  Tigecycline: S <=2      -  Tobramycin: R >8      -  Trimethoprim/Sulfamethoxazole: R >2/38      Method Type: SATHYA  Organism: Pseudomonas aeruginosa (Carbapenem Resistant) (07-13-20 @ 16:37)      -  Amikacin: S <=16      -  Aztreonam: R >16      -  Cefepime: R >16      -  Ceftazidime: I 16      -  Ciprofloxacin: R >2      -  Gentamicin: S 4      -  Imipenem: R 8      -  Levofloxacin: R >4      -  Meropenem: R >8      -  Piperacillin/Tazobactam: I 32      -  Tobramycin: S <=2      Method Type: SATHYA    Culture - Blood (collected 07-10-20 @ 22:53)  Source: .Blood Blood  Gram Stain (07-12-20 @ 18:28):    Growth in aerobic bottle: Gram Negative Rods  Preliminary Report (07-13-20 @ 16:27):    Growth in aerobic bottle: Pseudomonas aeruginosa    "Due to technical problems, Proteus sp. will Not be reported as part of    the BCID panel until further notice"    ***Blood Panel PCR results on this specimen are available    approximately 3 hours after the Gram stain result.***    Gram stain, PCR, and/or culture results may not always    correspond due to difference in methodologies.    ************************************************************    This PCR assay was performed using [employer].    Thefollowing targets are tested for: Enterococcus,    vancomycin resistant enterococci, Listeria monocytogenes,    coagulase negative staphylococci, S. aureus,    methicillin resistant S. aureus, Streptococcus agalactiae    (Group B), S. pneumoniae, S. pyogenes(Group A),    Acinetobacter baumannii, Enterobacter cloacae, E. coli,    Klebsiella oxytoca, K. pneumoniae, Proteus sp.,    Serratia marcescens, Haemophilus influenzae,    Neisseria meningitidis, Pseudomonas aeruginosa, Candida    albicans, C. glabrata, C krusei, C parapsilosis,    C. tropicalis and the KPC resistance gene.  Organism: Blood Culture PCR (07-12-20 @ 19:41)  Organism: Blood Culture PCR (07-12-20 @ 19:41)      -  Pseudomonas aeruginosa: Detec      Method Type: PCR    Culture - Blood (collected 07-10-20 @ 22:53)  Source: .Blood Blood  Preliminary Report (07-12-20 @ 18:01):    No growth to date.        Blood Gas Venous - Lactate: 1.7 mmoL/L (07-11-20 @ 00:13)  Lactate, Blood: 1.5 mmol/L (07-10-20 @ 22:53)      INFECTIOUS DISEASES TESTING  COVID-19 PCR: NotDetec (05-25-20 @ 09:01)  COVID-19 PCR: NotDetec (05-19-20 @ 18:38)      INFLAMMATORY MARKERS      RADIOLOGY & ADDITIONAL TESTS:  I have personally reviewed the last available Chest xray  CXR      CT      CARDIOLOGY TESTING  12 Lead ECG:   Ventricular Rate 63 BPM    Atrial Rate 63 BPM    P-R Interval 98 ms    QRS Duration 170 ms    Q-T Interval 542 ms    QTC Calculation(Bezet) 554 ms    R Axis 247 degrees    T Axis 59 degrees    Diagnosis Line Atrial-sensed ventricular-paced rhythm  Abnormal ECG    Confirmed by DREA OLGUIN MD ([id number]) on 7/14/2020 10:23:29 AM (07-13-20 @ 09:47)  12 Lead ECG:   Ventricular Rate 68 BPM    Atrial Rate 68 BPM    QRS Duration 40 ms    Q-T Interval 450 ms    QTC Calculation(Bezet) 478 ms    P Axis 203 degrees    R Axis 265 degrees    T Axis 24 degrees    Diagnosis Line atrial-sensed ventricular-paced complexes    Confirmed by DREA OLGUIN MD ([id number]) on 7/11/2020 9:29:58 AM (07-11-20 @ 00:01)      MEDICATIONS  amiKACIN  IVPB 400 IV Intermittent <User Schedule>  apixaban 2.5 Oral two times a day  aspirin enteric coated 81 Oral daily  atorvastatin 40 Oral at bedtime  brimonidine 0.2% Ophthalmic Solution 1 Both EYES at bedtime  calcitriol   Capsule 0.5 Oral daily  calcium acetate 667 Oral daily  chlorhexidine 4% Liquid 1 Topical <User Schedule>  dextrose 5%. 1000 IV Continuous <Continuous>  dextrose 50% Injectable 12.5 IV Push once  dextrose 50% Injectable 25 IV Push once  dextrose 50% Injectable 25 IV Push once  hydrALAZINE 50 Oral three times a day  insulin lispro (HumaLOG) corrective regimen sliding scale  SubCutaneous three times a day before meals  insulin lispro (HumaLOG) corrective regimen sliding scale  SubCutaneous at bedtime  latanoprost 0.005% Ophthalmic Solution 1 Both EYES at bedtime  meropenem  IVPB 500 IV Intermittent every 24 hours  metoprolol tartrate 100 Oral two times a day  NIFEdipine XL 90 Oral daily  pantoprazole    Tablet 40 Oral before breakfast  sodium bicarbonate 650 Oral three times a day      WEIGHT  Weight (kg): 79.1 (07-11-20 @ 03:11)  Creatinine, Serum: 8.5 mg/dL (07-14-20 @ 06:09)      ANTIBIOTICS:  amiKACIN  IVPB 400 milliGRAM(s) IV Intermittent <User Schedule>  meropenem  IVPB 500 milliGRAM(s) IV Intermittent every 24 hours      All available historical records have been reviewed CHAPISROCIO  80y, Male  Allergy: sulfa drugs (Other)      LOS  3d    CHIEF COMPLAINT: ISRRAEL, UTI (14 Jul 2020 11:23)      INTERVAL EVENTS/HPI  - No acute events overnight; pain by SPT site and penis which is chronic; nausea, no vomiiting   - T(F): , Max: 97.4 (07-13-20 @ 13:57)  - Denies any worsening symptoms  - Tolerating medication  - WBC Count: 11.66 (07-14-20 @ 06:09)  WBC Count: 12.06 (07-13-20 @ 07:51)     - Creatinine, Serum: 8.5 (07-14-20 @ 06:09)  Creatinine, Serum: 8.7 (07-13-20 @ 07:51)       ROS  General: Denies rigors, nightsweats  HEENT: Denies headache, rhinorrhea, sore throat, eye pain  CV: Denies CP, palpitations  PULM: Denies wheezing, hemoptysis  GI: Denies hematemesis, hematochezia, melena  : Denies discharge, hematuria  MSK: Denies arthralgias, myalgias  SKIN: Denies rash, lesions  NEURO: Denies paresthesias, weakness  PSYCH: Denies depression, anxiety    VITALS:  T(F): 97.1, Max: 97.4 (07-13-20 @ 13:57)  HR: 54  BP: 155/61  RR: 16Vital Signs Last 24 Hrs  T(C): 36.2 (14 Jul 2020 05:00), Max: 36.3 (13 Jul 2020 13:57)  T(F): 97.1 (14 Jul 2020 05:00), Max: 97.4 (13 Jul 2020 13:57)  HR: 54 (14 Jul 2020 06:27) (54 - 62)  BP: 155/61 (14 Jul 2020 06:27) (155/61 - 185/84)  BP(mean): --  RR: 16 (14 Jul 2020 05:00) (16 - 16)  SpO2: --    PHYSICAL EXAM:  Gen: NAD, resting in bed  HEENT: Normocephalic, atraumatic  Neck: supple, no lymphadenopathy  CV: Regular rate & regular rhythm  Lungs: decreased BS at bases, no fremitus  Abdomen: Soft, BS present; SPT site with surrounding leakage   Ext: Warm, well perfused  Neuro: non focal, awake  Skin: no rash, no erythema  Lines: no phlebitis    FH: Non-contributory  Social Hx: Non-contributory    TESTS & MEASUREMENTS:                        8.4    11.66 )-----------( 311      ( 14 Jul 2020 06:09 )             25.0     07-14    126<L>  |  87<L>  |  102<HH>  ----------------------------<  118<H>  4.2   |  16<L>  |  8.5<HH>    Ca    6.3<L>      14 Jul 2020 06:09  Phos  7.0     07-14  Mg     1.8     07-14      eGFR if Non African American: 5 mL/min/1.73M2 (07-14-20 @ 06:09)  eGFR if African American: 6 mL/min/1.73M2 (07-14-20 @ 06:09)          Culture - Urine (collected 07-11-20 @ 00:08)  Source: .Urine Suprapubic  Final Report (07-13-20 @ 16:37):    Numerous Pseudomonas aeruginosa (Carbapenem Resistant)    Few Klebsiella pneumoniae ESBL  Organism: Pseudomonas aeruginosa (Carbapenem Resistant)  Klebsiella pneumoniae ESBL (07-13-20 @ 16:37)  Organism: Klebsiella pneumoniae ESBL (07-13-20 @ 16:37)      -  Amikacin: S <=16      -  Amoxicillin/Clavulanic Acid: I 16/8      -  Ampicillin: R >16 These ampicillin results predict results for amoxicillin      -  Ampicillin/Sulbactam: R >16/8 Enterobacter, Citrobacter, and Serratia may develop resistance during prolonged therapy (3-4 days)      -  Aztreonam: R >16      -  Cefazolin: R >16 (MIC_CL_COM_ENTERIC_CEFAZU) For uncomplicated UTI with K. pneumoniae, E. coli, or P. mirablis: SATHYA <=16 is sensitive and SATHYA >=32 is resistant. This also predicts results for oral agents cefaclor, cefdinir, cefpodoxime, cefprozil, cefuroxime axetil, cephalexin and locarbef for uncomplicated UTI. Note that some isolates may be susceptible to these agents while testing resistant to cefazolin.      -  Cefepime: R >16      -  Cefoxitin: S <=8      -  Ceftriaxone: R >32 Enterobacter, Citrobacter, and Serratia may develop resistance during prolonged therapy      -  Ciprofloxacin: S <=0.25      -  Ertapenem: S <=0.5      -  Gentamicin: R >8      -  Imipenem: S <=1      -  Levofloxacin: S <=0.5      -  Meropenem: S <=1      -  Nitrofurantoin: I 64 Should not be used to treat pyelonephritis      -  Piperacillin/Tazobactam: R <=8      -  Tigecycline: S <=2      -  Tobramycin: R >8      -  Trimethoprim/Sulfamethoxazole: R >2/38      Method Type: SATHYA  Organism: Pseudomonas aeruginosa (Carbapenem Resistant) (07-13-20 @ 16:37)      -  Amikacin: S <=16      -  Aztreonam: R >16      -  Cefepime: R >16      -  Ceftazidime: I 16      -  Ciprofloxacin: R >2      -  Gentamicin: S 4      -  Imipenem: R 8      -  Levofloxacin: R >4      -  Meropenem: R >8      -  Piperacillin/Tazobactam: I 32      -  Tobramycin: S <=2      Method Type: SATHYA    Culture - Blood (collected 07-10-20 @ 22:53)  Source: .Blood Blood  Gram Stain (07-12-20 @ 18:28):    Growth in aerobic bottle: Gram Negative Rods  Preliminary Report (07-13-20 @ 16:27):    Growth in aerobic bottle: Pseudomonas aeruginosa    "Due to technical problems, Proteus sp. will Not be reported as part of    the BCID panel until further notice"    ***Blood Panel PCR results on this specimen are available    approximately 3 hours after the Gram stain result.***    Gram stain, PCR, and/or culture results may not always    correspond due to difference in methodologies.    ************************************************************    This PCR assay was performed using [employer].    Thefollowing targets are tested for: Enterococcus,    vancomycin resistant enterococci, Listeria monocytogenes,    coagulase negative staphylococci, S. aureus,    methicillin resistant S. aureus, Streptococcus agalactiae    (Group B), S. pneumoniae, S. pyogenes(Group A),    Acinetobacter baumannii, Enterobacter cloacae, E. coli,    Klebsiella oxytoca, K. pneumoniae, Proteus sp.,    Serratia marcescens, Haemophilus influenzae,    Neisseria meningitidis, Pseudomonas aeruginosa, Candida    albicans, C. glabrata, C krusei, C parapsilosis,    C. tropicalis and the KPC resistance gene.  Organism: Blood Culture PCR (07-12-20 @ 19:41)  Organism: Blood Culture PCR (07-12-20 @ 19:41)      -  Pseudomonas aeruginosa: Detec      Method Type: PCR    Culture - Blood (collected 07-10-20 @ 22:53)  Source: .Blood Blood  Preliminary Report (07-12-20 @ 18:01):    No growth to date.        Blood Gas Venous - Lactate: 1.7 mmoL/L (07-11-20 @ 00:13)  Lactate, Blood: 1.5 mmol/L (07-10-20 @ 22:53)      INFECTIOUS DISEASES TESTING  COVID-19 PCR: NotDetec (05-25-20 @ 09:01)  COVID-19 PCR: NotDetec (05-19-20 @ 18:38)      INFLAMMATORY MARKERS      RADIOLOGY & ADDITIONAL TESTS:  I have personally reviewed the last available Chest xray  CXR      CT      CARDIOLOGY TESTING  12 Lead ECG:   Ventricular Rate 63 BPM    Atrial Rate 63 BPM    P-R Interval 98 ms    QRS Duration 170 ms    Q-T Interval 542 ms    QTC Calculation(Bezet) 554 ms    R Axis 247 degrees    T Axis 59 degrees    Diagnosis Line Atrial-sensed ventricular-paced rhythm  Abnormal ECG    Confirmed by DREA OLGUIN MD ([id number]) on 7/14/2020 10:23:29 AM (07-13-20 @ 09:47)  12 Lead ECG:   Ventricular Rate 68 BPM    Atrial Rate 68 BPM    QRS Duration 40 ms    Q-T Interval 450 ms    QTC Calculation(Bezet) 478 ms    P Axis 203 degrees    R Axis 265 degrees    T Axis 24 degrees    Diagnosis Line atrial-sensed ventricular-paced complexes    Confirmed by DREA OLGUIN MD ([id number]) on 7/11/2020 9:29:58 AM (07-11-20 @ 00:01)      MEDICATIONS  amiKACIN  IVPB 400 IV Intermittent <User Schedule>  apixaban 2.5 Oral two times a day  aspirin enteric coated 81 Oral daily  atorvastatin 40 Oral at bedtime  brimonidine 0.2% Ophthalmic Solution 1 Both EYES at bedtime  calcitriol   Capsule 0.5 Oral daily  calcium acetate 667 Oral daily  chlorhexidine 4% Liquid 1 Topical <User Schedule>  dextrose 5%. 1000 IV Continuous <Continuous>  dextrose 50% Injectable 12.5 IV Push once  dextrose 50% Injectable 25 IV Push once  dextrose 50% Injectable 25 IV Push once  hydrALAZINE 50 Oral three times a day  insulin lispro (HumaLOG) corrective regimen sliding scale  SubCutaneous three times a day before meals  insulin lispro (HumaLOG) corrective regimen sliding scale  SubCutaneous at bedtime  latanoprost 0.005% Ophthalmic Solution 1 Both EYES at bedtime  meropenem  IVPB 500 IV Intermittent every 24 hours  metoprolol tartrate 100 Oral two times a day  NIFEdipine XL 90 Oral daily  pantoprazole    Tablet 40 Oral before breakfast  sodium bicarbonate 650 Oral three times a day      WEIGHT  Weight (kg): 79.1 (07-11-20 @ 03:11)  Creatinine, Serum: 8.5 mg/dL (07-14-20 @ 06:09)      ANTIBIOTICS:  amiKACIN  IVPB 400 milliGRAM(s) IV Intermittent <User Schedule>  meropenem  IVPB 500 milliGRAM(s) IV Intermittent every 24 hours      All available historical records have been reviewed

## 2020-07-14 NOTE — PHYSICAL THERAPY INITIAL EVALUATION ADULT - GENERAL OBSERVATIONS, REHAB EVAL
13:10 - 13:38. Chart reviewed. Patient available to be seen for physical therapy, confirmed with nurse. Patient encountered semi-reclined in bed, +suprapubic tube. Denies pain at rest, agreeable for PT.

## 2020-07-14 NOTE — PHYSICAL THERAPY INITIAL EVALUATION ADULT - GAIT DEVIATIONS NOTED, PT EVAL
decreased marquis/decreased heel strike / push off , narrow base of support, stooped posture/increased time in double stance/decreased step length/decreased weight-shifting ability

## 2020-07-14 NOTE — PROGRESS NOTE ADULT - SUBJECTIVE AND OBJECTIVE BOX
Nephrology progress note  Patient is seen and examined, events over the last 24 h noted .  no complaints     Allergies:  sulfa drugs (Other)    Hospital Medications:   MEDICATIONS  (STANDING):    apixaban 2.5 milliGRAM(s) Oral two times a day  aspirin enteric coated 81 milliGRAM(s) Oral daily  atorvastatin 40 milliGRAM(s) Oral at bedtime  brimonidine 0.2% Ophthalmic Solution 1 Drop(s) Both EYES at bedtime  calcitriol   Capsule 0.5 MICROGram(s) Oral daily  calcium acetate 667 milliGRAM(s) Oral daily  chlorhexidine 4% Liquid 1 Application(s) Topical <User Schedule>  dextrose 5%. 1000 milliLiter(s) (50 mL/Hr) IV Continuous <Continuous>  ethacrynic acid 50 milliGRAM(s) Oral once  hydrALAZINE 50 milliGRAM(s) Oral three times a day  insulin lispro (HumaLOG) corrective regimen sliding scale   SubCutaneous three times a day before meals  insulin lispro (HumaLOG) corrective regimen sliding scale   SubCutaneous at bedtime  latanoprost 0.005% Ophthalmic Solution 1 Drop(s) Both EYES at bedtime  meropenem  IVPB 500 milliGRAM(s) IV Intermittent every 24 hours  metoprolol tartrate 100 milliGRAM(s) Oral two times a day  NIFEdipine XL 90 milliGRAM(s) Oral daily  pantoprazole    Tablet 40 milliGRAM(s) Oral before breakfast  sodium bicarbonate 650 milliGRAM(s) Oral three times a day        VITALS:  T(F): 97.4 (20 @ 14:57), Max: 97.4 (20 @ 14:57)  HR: 51 (20 @ 14:57)  BP: 164/72 (20 @ 14:57)  RR: 16 (20 @ 14:57)       @ 07:  -   @ 07:00  --------------------------------------------------------  IN: 690 mL / OUT: 250 mL / NET: 440 mL     @ 07:  -   @ 07:00  --------------------------------------------------------  IN: 1330 mL / OUT: 1000 mL / NET: 330 mL          PHYSICAL EXAM:  Constitutional: NAD  Neck: No JVD  Respiratory: CTAB, no wheezes, rales or rhonchi  Cardiovascular: S1, S2, RRR  Gastrointestinal: BS+, positive SPC   Extremities: No cyanosis or clubbing. No peripheral edema   Skin: No rashes    LABS:      126<L>  |  87<L>  |  102<HH>  ----------------------------<  118<H>  4.2   |  16<L>  |  8.5<HH>    Creatinine Trend: 8.5<--, 8.7<--, 9.5<--, 9.5<--, 9.6<--    SODIUM TREND:  Sodium 126 [ @ 06:09]  Sodium 126 [ @ 07:51]  Sodium 131 [ @ 07:11]  Sodium 131 [ @ 06:45]  Sodium 131 [07-10 @ 22:53]    Ca    6.3<L>      2020 06:09  Phos  7.0       Mg     1.8                                 8.4    11.66 )-----------( 311      ( 2020 06:09 )             25.0       Urine Studies:  Urinalysis Basic - ( 2020 00:08 )    Color: Yellow / Appearance: Turbid / S.025 / pH:   Gluc:  / Ketone: Trace  / Bili: Negative / Urobili: 0.2 mg/dL   Blood:  / Protein: >=300 mg/dL / Nitrite: Negative   Leuk Esterase: Large / RBC: 11-25 /HPF / WBC >50 /HPF   Sq Epi:  / Non Sq Epi: Few /HPF / Bacteria: Many        Sodium, Random Urine: 44.0 mmoL/L ( @ 10:35)  Creatinine, Random Urine: 43 mg/dL ( @ 10:35)  Potassium, Random Urine: 14 mmol/L ( @ 10:35)    RADIOLOGY & ADDITIONAL STUDIES:

## 2020-07-14 NOTE — PROGRESS NOTE ADULT - ASSESSMENT
80M PMHx BPH s/p spc, AFib, HTN, CAD s/p stent, HFrEF s/p AICD, CKD III, DM2, h/o TIA here with ISRRAEL c/b metabolic acidosis. UTI.    #ISRRAEL on CKD III  c/b acidosis; unclear etiology, suspect some component prerenal in setting of infection  check cxr  change bicarb gtt to po 650 tid  trend scr  L hydrouretonephrosis on ct, chronic  no plan for HD at this time  #UTI, pseudomonas, kleb  c/b pseudomonas bacteremia  bcx pseudomonas  ucx pseudomonas carbapenem resistant, kleb esbl  repeat bcx  cont amikacin; trough noted, c/w nomogram for 48 hours  octaviano  appreciate id  #BPH  s/p spc  #AFib  eliquis  lopressor as below  #HTN  hydralazine 50 tid  lopressor 100 bid  nifedipine 90  #CAD  asa  lipitor 40  #HFrEF  lopressor as above  #DM2  ssi  #H/o TIA  asa  lipitor 40  #DVT ppx  eliquis    #Progress Note Handoff:  Pending (specify):  Consults_________, Tests________, Test Results_______, Other___aki______  Family discussion: d/w pt at bedside re: treatment plan, primary dx  Disposition: Home_x__/SNF___/Other________/Unknown at this time________

## 2020-07-14 NOTE — PROGRESS NOTE ADULT - SUBJECTIVE AND OBJECTIVE BOX
INTERVAL HPI/OVERNIGHT EVENTS:    SUBJECTIVE: Patient seen and examined at bedside.     no cp, sob, abd pain, fever  no sob, orthopnea, pnd, cough    OBJECTIVE:    VITAL SIGNS:  Vital Signs Last 24 Hrs  T(C): 36.2 (14 Jul 2020 05:00), Max: 36.3 (13 Jul 2020 13:57)  T(F): 97.1 (14 Jul 2020 05:00), Max: 97.4 (13 Jul 2020 13:57)  HR: 54 (14 Jul 2020 06:27) (54 - 62)  BP: 155/61 (14 Jul 2020 06:27) (155/61 - 185/84)  BP(mean): --  RR: 16 (14 Jul 2020 05:00) (16 - 16)  SpO2: --      PHYSICAL EXAM:    General: NAD  HEENT: NC/AT; PERRL, clear conjunctiva  Neck: supple  Respiratory: CTA b/l  Cardiovascular: +S1/S2; RRR  Abdomen: soft, NT/ND; +BS x4  Extremities: WWP, 2+ peripheral pulses b/l; no LE edema  Skin: normal color and turgor; no rash  Neurological:    MEDICATIONS:  MEDICATIONS  (STANDING):  amiKACIN  IVPB 400 milliGRAM(s) IV Intermittent <User Schedule>  apixaban 2.5 milliGRAM(s) Oral two times a day  aspirin enteric coated 81 milliGRAM(s) Oral daily  atorvastatin 40 milliGRAM(s) Oral at bedtime  brimonidine 0.2% Ophthalmic Solution 1 Drop(s) Both EYES at bedtime  calcitriol   Capsule 0.5 MICROGram(s) Oral daily  calcium acetate 667 milliGRAM(s) Oral daily  chlorhexidine 4% Liquid 1 Application(s) Topical <User Schedule>  dextrose 5%. 1000 milliLiter(s) (50 mL/Hr) IV Continuous <Continuous>  dextrose 50% Injectable 12.5 Gram(s) IV Push once  dextrose 50% Injectable 25 Gram(s) IV Push once  dextrose 50% Injectable 25 Gram(s) IV Push once  hydrALAZINE 50 milliGRAM(s) Oral three times a day  insulin lispro (HumaLOG) corrective regimen sliding scale   SubCutaneous three times a day before meals  insulin lispro (HumaLOG) corrective regimen sliding scale   SubCutaneous at bedtime  latanoprost 0.005% Ophthalmic Solution 1 Drop(s) Both EYES at bedtime  meropenem  IVPB 500 milliGRAM(s) IV Intermittent every 24 hours  metoprolol tartrate 100 milliGRAM(s) Oral two times a day  NIFEdipine XL 90 milliGRAM(s) Oral daily  pantoprazole    Tablet 40 milliGRAM(s) Oral before breakfast  sodium chloride 0.9% 1000 milliLiter(s) (75 mL/Hr) IV Continuous <Continuous>    MEDICATIONS  (PRN):  acetaminophen   Tablet .. 650 milliGRAM(s) Oral every 6 hours PRN Mild Pain (1 - 3)  dextrose 40% Gel 15 Gram(s) Oral once PRN Blood Glucose LESS THAN 70 milliGRAM(s)/deciliter  glucagon  Injectable 1 milliGRAM(s) IntraMuscular once PRN Glucose LESS THAN 70 milligrams/deciliter  ondansetron Injectable 8 milliGRAM(s) IV Push three times a day PRN Nausea and/or Vomiting  oxyCODONE    IR 5 milliGRAM(s) Oral four times a day PRN Severe Pain (7 - 10)      ALLERGIES:  Allergies    sulfa drugs (Other)    Intolerances        LABS:                        8.4    11.66 )-----------( 311      ( 14 Jul 2020 06:09 )             25.0     Hemoglobin: 8.4 g/dL (07-14 @ 06:09)  Hemoglobin: 7.7 g/dL (07-13 @ 07:51)  Hemoglobin: 7.5 g/dL (07-12 @ 07:11)  Hemoglobin: 8.0 g/dL (07-11 @ 06:45)  Hemoglobin: 8.3 g/dL (07-10 @ 22:53)    CBC Full  -  ( 14 Jul 2020 06:09 )  WBC Count : 11.66 K/uL  RBC Count : 2.73 M/uL  Hemoglobin : 8.4 g/dL  Hematocrit : 25.0 %  Platelet Count - Automated : 311 K/uL  Mean Cell Volume : 91.6 fL  Mean Cell Hemoglobin : 30.8 pg  Mean Cell Hemoglobin Concentration : 33.6 g/dL  Auto Neutrophil # : x  Auto Lymphocyte # : x  Auto Monocyte # : x  Auto Eosinophil # : x  Auto Basophil # : x  Auto Neutrophil % : x  Auto Lymphocyte % : x  Auto Monocyte % : x  Auto Eosinophil % : x  Auto Basophil % : x    07-14    126<L>  |  87<L>  |  102<HH>  ----------------------------<  118<H>  4.2   |  16<L>  |  8.5<HH>    Ca    6.3<L>      14 Jul 2020 06:09  Phos  7.0     07-14  Mg     1.8     07-14      Creatinine Trend: 8.5<--, 8.7<--, 9.5<--, 9.5<--, 9.6<--        hs Troponin:              CSF:                      EKG:   MICROBIOLOGY:    IMAGING:      Labs, imaging, EKG personally reviewed    RADIOLOGY & ADDITIONAL TESTS: Reviewed.

## 2020-07-14 NOTE — PROGRESS NOTE ADULT - ASSESSMENT
1)  ISRRAEL on CKD 3 / prerenal leading to ATN ?/ acidosis / hyponatremia   CT scan  and sono noted for chronic hydro   was on antibx as OP can not rule out AIN   would do work up for MM (check SPEP / UPEP / Serum free light chains  / IF)  DC IVF for now   Strict i and o   check  urine eosinophils  on merrem for UTI pseudomonas bacteremia   No need for RRT for now     2)  Hypocalcemia, corrected to ~ 6.9 to 7.0, which was present last admission.  Likely multifactorial, including 25-OH Vit D3 and  1,25-OH Vit D3 deficiency, plus possible hyperphosphatemia  sp calcium gluconate  on calcitriol phoslo continue follow AM labs         will follow

## 2020-07-15 NOTE — PROGRESS NOTE ADULT - SUBJECTIVE AND OBJECTIVE BOX
Nephrology progress note    Patient was seen and examined, events over the last 24 h noted .  cr improving  today's pending    Allergies:  sulfa drugs (Other)    Hospital Medications:   MEDICATIONS  (STANDING):  apixaban 2.5 milliGRAM(s) Oral two times a day  aspirin enteric coated 81 milliGRAM(s) Oral daily  atorvastatin 40 milliGRAM(s) Oral at bedtime  brimonidine 0.2% Ophthalmic Solution 1 Drop(s) Both EYES at bedtime  calcitriol   Capsule 0.5 MICROGram(s) Oral daily  calcium acetate 667 milliGRAM(s) Oral daily  chlorhexidine 4% Liquid 1 Application(s) Topical <User Schedule>  dextrose 5%. 1000 milliLiter(s) (50 mL/Hr) IV Continuous <Continuous>  dextrose 50% Injectable 12.5 Gram(s) IV Push once  dextrose 50% Injectable 25 Gram(s) IV Push once  dextrose 50% Injectable 25 Gram(s) IV Push once  hydrALAZINE 50 milliGRAM(s) Oral three times a day  insulin lispro (HumaLOG) corrective regimen sliding scale   SubCutaneous three times a day before meals  insulin lispro (HumaLOG) corrective regimen sliding scale   SubCutaneous at bedtime  latanoprost 0.005% Ophthalmic Solution 1 Drop(s) Both EYES at bedtime  meropenem  IVPB 500 milliGRAM(s) IV Intermittent every 24 hours  metoprolol tartrate 100 milliGRAM(s) Oral two times a day  NIFEdipine XL 90 milliGRAM(s) Oral daily  pantoprazole    Tablet 40 milliGRAM(s) Oral before breakfast  sodium bicarbonate 650 milliGRAM(s) Oral three times a day        VITALS:  T(F): 96.8 (07-15-20 @ 05:51), Max: 97.4 (20 @ 14:57)  HR: 59 (07-15-20 @ 05:51)  BP: 168/80 (07-15-20 @ 05:51)  RR: 16 (07-15-20 @ 05:51)  SpO2: --  Wt(kg): --     @ 07:01  -   @ 07:00  --------------------------------------------------------  IN: 1330 mL / OUT: 1000 mL / NET: 330 mL     @ 07:01  -  15 @ 07:00  --------------------------------------------------------  IN: 0 mL / OUT: 1400 mL / NET: -1400 mL          PHYSICAL EXAM:  Constitutional: NAD  HEENT: anicteric sclera, oropharynx clear, MMM  Neck: No JVD  Respiratory: CTAB, no wheezes, rales or rhonchi  Cardiovascular: S1, S2, RRR  Gastrointestinal: BS+, soft, NT/ND  Extremities: No cyanosis or clubbing. No peripheral edema  :  No rondon.   Skin: No rashes    LABS:      126<L>  |  87<L>  |  102<HH>  ----------------------------<  118<H>  4.2   |  16<L>  |  8.5<HH>    Ca    6.3<L>      2020 06:09  Phos  7.0       Mg     1.8                                 7.8    9.37  )-----------( 331      ( 15 Jul 2020 09:45 )             23.4       Urine Studies:  Urinalysis Basic - ( 2020 00:08 )    Color: Yellow / Appearance: Turbid / S.025 / pH:   Gluc:  / Ketone: Trace  / Bili: Negative / Urobili: 0.2 mg/dL   Blood:  / Protein: >=300 mg/dL / Nitrite: Negative   Leuk Esterase: Large / RBC: 11-25 /HPF / WBC >50 /HPF   Sq Epi:  / Non Sq Epi: Few /HPF / Bacteria: Many      Sodium, Random Urine: 34.0 mmoL/L ( @ 16:43)  Creatinine, Random Urine: 53 mg/dL ( @ 16:43)  Sodium, Random Urine: 44.0 mmoL/L ( @ 10:35)  Creatinine, Random Urine: 43 mg/dL ( @ 10:35)  Potassium, Random Urine: 14 mmol/L ( @ 10:35)    RADIOLOGY & ADDITIONAL STUDIES:

## 2020-07-15 NOTE — PROGRESS NOTE ADULT - ASSESSMENT
1)  ISRRAEL on CKD 3 / prerenal leading to ATN ?/ acidosis / hyponatremia   CT scan  and sono noted for chronic hydro   was on antibx as OP can not rule out AIN   would do work up for MM (check SPEP / UPEP / Serum free light chains  / IF)  off IVF   Strict i and o     urine eosinophils +  on merrem for UTI pseudomonas bacteremia   No need for RRT for now , cr slowly improving    2)  Hypocalcemia, corrected to ~ 6.9 to 7.0, which was present last admission.  Likely multifactorial, including 25-OH Vit D3 and  1,25-OH Vit D3 deficiency, plus possible hyperphosphatemia  sp calcium gluconate  on calcitriol phoslo continue follow AM labs         will follow

## 2020-07-15 NOTE — PROGRESS NOTE ADULT - SUBJECTIVE AND OBJECTIVE BOX
ROCIO NATION  80y, Male  Allergy: sulfa drugs (Other)    Hospital Day: 4d    Patient seen and examined earlier today. No acute events overnight.    PMH/PSH:  PAST MEDICAL & SURGICAL HISTORY:  GERD (gastroesophageal reflux disease): intermittent  H/O ptosis of eyelid: right eye (sometimes wears patch)  Hematuria  Hyperlipidemia  Anemia  Diabetes  Renal insufficiency  Cardiomyopathy  Cholelithiasis  Hydrocele in adult  Glaucoma  BPH (benign prostatic hyperplasia)  CAD (coronary artery disease): CARD STENT X2 4/2018  TIA (transient ischemic attack): X2 JULY 2018  Dementia  CHF (congestive heart failure): COMBINED SYSTOLIC &amp; DIASTOLIC  Afib  Heart attack: 1/2018  Sepsis: 1/18 FROM INFECTED GALLBLADDER  Hypercholesteremia  HTN (hypertension)  DM (diabetes mellitus)  History of suprapubic catheter  H/O flexible sigmoidoscopy: 2015  H/O bilateral cataract extraction: LEFT- 1/18 RIGHT 6 YRS AGO  Encounter for biliary drainage tube placement: 1/2018  H/O coronary angioplasty: WITH STENT X2 (4/2018)  History of prostate surgery: 5/17  AICD (automatic cardioverter/defibrillator) present: [employer]    VITALS:  T(F): 96.8 (07-15-20 @ 05:51), Max: 97.4 (07-14-20 @ 14:57)  HR: 59 (07-15-20 @ 05:51)  BP: 168/80 (07-15-20 @ 05:51) (164/72 - 188/83)  RR: 16 (07-15-20 @ 05:51)  SpO2: --    TESTS & MEASUREMENTS:  Weight (Kg):   BMI (kg/m2): 25.7 (07-11)    07-13-20 @ 07:01  -  07-14-20 @ 07:00  --------------------------------------------------------  IN: 1330 mL / OUT: 1000 mL / NET: 330 mL    07-14-20 @ 07:01  -  07-15-20 @ 07:00  --------------------------------------------------------  IN: 0 mL / OUT: 1400 mL / NET: -1400 mL                        7.8    9.37  )-----------( 331      ( 15 Jul 2020 09:45 )             23.4       07-15    123<L>  |  84<L>  |  103<HH>  ----------------------------<  216<H>  4.1   |  20  |  8.4<HH>    Ca    6.5<L>      15 Jul 2020 09:45  Phos  7.3     07-15  Mg     1.8     07-15    Culture - Urine (collected 07-11-20 @ 00:08)  Source: .Urine Suprapubic  Final Report (07-13-20 @ 16:37):    Numerous Pseudomonas aeruginosa (Carbapenem Resistant)    Few Klebsiella pneumoniae ESBL  Organism: Pseudomonas aeruginosa (Carbapenem Resistant)  Klebsiella pneumoniae ESBL (07-14-20 @ 14:52)  Organism: Klebsiella pneumoniae ESBL (07-14-20 @ 14:52)      -  Amikacin: S <=16      -  Amoxicillin/Clavulanic Acid: I 16/8      -  Ampicillin: R >16 These ampicillin results predict results for amoxicillin      -  Ampicillin/Sulbactam: R >16/8 Enterobacter, Citrobacter, and Serratia may develop resistance during prolonged therapy (3-4 days)      -  Aztreonam: R >16      -  Cefazolin: R >16 ([id number]) For uncomplicated UTI with K. pneumoniae, E. coli, or P. mirablis: SATHYA <=16 is sensitive and SATHYA >=32 is resistant. This also predicts results for oral agents cefaclor, cefdinir, cefpodoxime, cefprozil, cefuroxime axetil, cephalexin and locarbef for uncomplicated UTI. Note that some isolates may be susceptible to these agents while testing resistant to cefazolin.      -  Cefepime: R >16      -  Cefoxitin: S <=8      -  Ceftriaxone: R >32 Enterobacter, Citrobacter, and Serratia may develop resistance during prolonged therapy      -  Ciprofloxacin: S <=0.25      -  Ertapenem: S <=0.5      -  Gentamicin: R >8      -  Imipenem: S <=1      -  Levofloxacin: S <=0.5      -  Meropenem: S <=1      -  Nitrofurantoin: I 64 Should not be used to treat pyelonephritis      -  Piperacillin/Tazobactam: R <=8      -  Tigecycline: S <=2      -  Tobramycin: R >8      -  Trimethoprim/Sulfamethoxazole: R >2/38      Method Type: SATHYA  Organism: Pseudomonas aeruginosa (Carbapenem Resistant) (07-14-20 @ 14:52)      -  Amikacin: S <=16      -  Aztreonam: R >16      -  Cefepime: R >16      -  Ceftazidime: I 16      -  Ceftazidime/Avibactam: R 16      -  Ceftolozane/tazobactam: S <=2      -  Ciprofloxacin: R >2      -  Gentamicin: S 4      -  Imipenem: R 8      -  Levofloxacin: R >4      -  Meropenem: R >8      -  Meropenem/Vaborbactam: 16      -  Piperacillin/Tazobactam: I 32      -  Tobramycin: S <=2      Method Type: SATHYA    Culture - Blood (collected 07-10-20 @ 22:53)  Source: .Blood Blood  Gram Stain (07-12-20 @ 18:28):    Growth in aerobic bottle: Gram Negative Rods  Final Report (07-15-20 @ 09:53):    Growth in aerobic bottle: Pseudomonas aeruginosa (Carbapenem Resistant)    "Due to technical problems, Proteus sp. will Not be reported as part of    the BCID panel until further notice"    ***Blood Panel PCR results on this specimen are available    approximately 3 hours after the Gram stain result.***    Gram stain, PCR, and/or culture results may not always    correspond due to difference in methodologies.    ************************************************************    This PCR assay was performed using [employer].    The following targets are tested for: Enterococcus,    vancomycin resistant enterococci, Listeria monocytogenes,    coagulase negative staphylococci, S. aureus,    methicillin resistant S. aureus, Streptococcus agalactiae    (Group B), S. pneumoniae, S. pyogenes (Group A),    Acinetobacter baumannii, Enterobacter cloacae, E. coli,    Klebsiella oxytoca, K. pneumoniae, Proteus sp.,    Serratia marcescens, Haemophilus influenzae,    Neisseria meningitidis, Pseudomonas aeruginosa, Candida    albicans, C. glabrata, C krusei, C parapsilosis,    C. tropicalis and the KPC resistance gene.  Organism: Blood Culture PCR  Pseudomonas aeruginosa (Carbapenem Resistant) (07-15-20 @ 09:53)  Organism: Pseudomonas aeruginosa (Carbapenem Resistant) (07-15-20 @ 09:53)      -  Amikacin: S <=16      -  Aztreonam: R >16      -  Cefepime: I 16      -  Ceftazidime: S 8      -  Ciprofloxacin: R 2      -  Gentamicin: I 8      -  Imipenem: R >8      -  Levofloxacin: R >4      -  Meropenem: R >8      -  Piperacillin/Tazobactam: I 32      -  Tobramycin: S <=2      Method Type: SATHYA  Organism: Blood Culture PCR (07-15-20 @ 09:53)      -  Pseudomonas aeruginosa: Detec      Method Type: PCR    Culture - Blood (collected 07-10-20 @ 22:53)  Source: .Blood Blood  Preliminary Report (07-12-20 @ 18:01):    No growth to date.    RECENT DIAGNOSTIC ORDERS:  Amikacin Level, Trough: AM Sched. Collection: 15-Jul-2020 04:30 (07-14-20 @ 12:33)    MEDICATIONS:  MEDICATIONS  (STANDING):  apixaban 2.5 milliGRAM(s) Oral two times a day  aspirin enteric coated 81 milliGRAM(s) Oral daily  atorvastatin 40 milliGRAM(s) Oral at bedtime  brimonidine 0.2% Ophthalmic Solution 1 Drop(s) Both EYES at bedtime  calcitriol   Capsule 0.5 MICROGram(s) Oral daily  calcium acetate 667 milliGRAM(s) Oral daily  chlorhexidine 4% Liquid 1 Application(s) Topical <User Schedule>  dextrose 5%. 1000 milliLiter(s) (50 mL/Hr) IV Continuous <Continuous>  dextrose 50% Injectable 12.5 Gram(s) IV Push once  dextrose 50% Injectable 25 Gram(s) IV Push once  dextrose 50% Injectable 25 Gram(s) IV Push once  hydrALAZINE 50 milliGRAM(s) Oral three times a day  insulin lispro (HumaLOG) corrective regimen sliding scale   SubCutaneous three times a day before meals  insulin lispro (HumaLOG) corrective regimen sliding scale   SubCutaneous at bedtime  latanoprost 0.005% Ophthalmic Solution 1 Drop(s) Both EYES at bedtime  meropenem  IVPB 500 milliGRAM(s) IV Intermittent every 24 hours  metoprolol tartrate 100 milliGRAM(s) Oral two times a day  NIFEdipine XL 90 milliGRAM(s) Oral daily  pantoprazole    Tablet 40 milliGRAM(s) Oral before breakfast  sodium bicarbonate 650 milliGRAM(s) Oral three times a day    MEDICATIONS  (PRN):  acetaminophen   Tablet .. 650 milliGRAM(s) Oral every 6 hours PRN Mild Pain (1 - 3)  dextrose 40% Gel 15 Gram(s) Oral once PRN Blood Glucose LESS THAN 70 milliGRAM(s)/deciliter  glucagon  Injectable 1 milliGRAM(s) IntraMuscular once PRN Glucose LESS THAN 70 milligrams/deciliter  ondansetron Injectable 8 milliGRAM(s) IV Push three times a day PRN Nausea and/or Vomiting  oxyCODONE    IR 5 milliGRAM(s) Oral four times a day PRN Severe Pain (7 - 10)    HOME MEDICATIONS:  Alphagan P 0.1% ophthalmic solution (05-19)  apixaban 2.5 mg oral tablet (05-23)  Aspir 81 oral delayed release tablet (05-19)  atorvastatin 40 mg oral tablet (05-19)  ciprofloxacin 250 mg oral tablet (05-27)  hydrALAZINE 50 mg oral tablet (05-27)  latanoprost 0.005% ophthalmic solution (05-19)  lidocaine 2% topical gel with applicator (05-27)  metoprolol tartrate 100 mg oral tablet (05-27)  NIFEdipine 90 mg oral tablet, extended release (05-27)  oxyCODONE 5 mg oral tablet (05-19)  pantoprazole 40 mg oral delayed release tablet (05-19)  sodium bicarbonate 650 mg oral tablet (05-19)      REVIEW OF SYSTEMS:  All other review of systems is negative unless indicated above.     PHYSICAL EXAM:  GENERAL: NAD  HEENT: No Swelling  CHEST/LUNG: Good air entry, No wheezing  HEART: RRR, No murmurs  ABDOMEN: Soft, Bowel sounds present  EXTREMITIES:  No clubbing

## 2020-07-15 NOTE — PROGRESS NOTE ADULT - ASSESSMENT
80M PMHx BPH s/p spc, AFib, HTN, CAD s/p stent, HFrEF s/p AICD, CKD III, DM2, h/o TIA here with ISRRAEL c/b metabolic acidosis. UTI.    #ISRRAEL on CKD III  c/b acidosis; unclear etiology, suspect some component prerenal in setting of infection vs AIN  Cont PO bicarb supplements  Cr slowly improving, no need for RRT for now per renal  trend scr  L hydrouretonephrosis on ct, chronic  MM workup requested per renal    #UTI due to Cabapenem resistant pseudomonas and ESBL kleb  c/b pseudomonas bacteremia  bcx pseudomonas carbapenem resistant  ucx pseudomonas carbapenem resistant, kleb esbl  repeat bcx  f/u amikacin trough today AM prior to dosing per ID  Cont octaviano 500mg q24h  appreciate id    #BPH  s/p spc    #AFib  eliquis  lopressor as below    #HTN  hydralazine 50 tid  lopressor 100 bid  nifedipine 90    #CAD  asa  lipitor 40    #HFrEF  lopressor as above    #DM2  ssi    #H/o TIA  asa  lipitor 40    #DVT ppx  eliquis    #Progress Note Handoff  Pending (specify): Improved renal function, ID f/u  Family discussion: d/w pt regarding treatment for UTI and monitoring renal function  Disposition: Home 80M PMHx BPH s/p spc, AFib, HTN, CAD s/p stent, HFrEF s/p AICD, CKD III, DM2, h/o TIA here with ISRRAEL c/b metabolic acidosis. UTI.    #ISRRAEL on CKD III  c/b acidosis; unclear etiology, suspect some component prerenal in setting of infection vs AIN  Cont PO bicarb supplements  Cr slowly improving, no need for RRT for now per renal  trend scr  L hydrouretonephrosis on ct, chronic  MM workup requested per renal    #UTI due to Cabapenem resistant pseudomonas and ESBL kleb  c/b pseudomonas bacteremia  bcx pseudomonas carbapenem resistant  ucx pseudomonas carbapenem resistant, kleb esbl  Pending repeat BCx drawn 07/14 --> If not cleared, administer polymyxin per ID recs   f/u amikacin trough today AM prior to dosing per ID  Cont octaviano 500mg q24h  appreciate id    #BPH  s/p spc    #AFib  eliquis  lopressor as below    #HTN  hydralazine 50 tid  lopressor 100 bid  nifedipine 90    #CAD  asa  lipitor 40    #HFrEF  lopressor as above    #DM2  ssi    #H/o TIA  asa  lipitor 40    #DVT ppx  eliquis    #Progress Note Handoff  Pending (specify): Improved renal function, ID f/u  Family discussion: d/w pt regarding treatment for UTI and monitoring renal function  Disposition: Home

## 2020-07-15 NOTE — PROGRESS NOTE ADULT - SUBJECTIVE AND OBJECTIVE BOX
CHAPISROCIO  80y, Male  Allergy: sulfa drugs (Other)      LOS  4d    CHIEF COMPLAINT: ISRRAEL, UTI (15 Jul 2020 11:27)      INTERVAL EVENTS/HPI  - No acute events overnight  - T(F): , Max: 97.1 (07-14-20 @ 21:10)  - Denies any worsening symptoms  - Tolerating medication  - WBC Count: 9.37 (07-15-20 @ 09:45)  WBC Count: 11.66 (07-14-20 @ 06:09)     - Creatinine, Serum: 8.4 (07-15-20 @ 09:45)  Creatinine, Serum: 8.5 (07-14-20 @ 06:09)       ROS  General: Denies rigors, nightsweats  HEENT: Denies headache, rhinorrhea, sore throat, eye pain  CV: Denies CP, palpitations  PULM: Denies wheezing, hemoptysis  GI: Denies hematemesis, hematochezia, melena  : Denies discharge, hematuria  MSK: Denies arthralgias, myalgias  SKIN: Denies rash, lesions  NEURO: Denies paresthesias, weakness  PSYCH: Denies depression, anxiety    VITALS:  T(F): 95, Max: 97.1 (07-14-20 @ 21:10)  HR: 54  BP: 110/55  RR: 16Vital Signs Last 24 Hrs  T(C): 35 (15 Jul 2020 14:13), Max: 36.2 (14 Jul 2020 21:10)  T(F): 95 (15 Jul 2020 14:13), Max: 97.1 (14 Jul 2020 21:10)  HR: 54 (15 Jul 2020 14:13) (54 - 63)  BP: 110/55 (15 Jul 2020 14:13) (110/55 - 188/83)  BP(mean): --  RR: 16 (15 Jul 2020 14:13) (16 - 16)  SpO2: --    PHYSICAL EXAM:  Gen: NAD, resting in bed  HEENT: Normocephalic, atraumatic  Neck: supple, no lymphadenopathy  CV: Regular rate & regular rhythm  Lungs: decreased BS at bases, no fremitus  Abdomen: Soft, BS present  Ext: Warm, well perfused  Neuro: non focal, awake  Skin: no rash, no erythema  Lines: no phlebitis    FH: Non-contributory  Social Hx: Non-contributory    TESTS & MEASUREMENTS:                        7.8    9.37  )-----------( 331      ( 15 Jul 2020 09:45 )             23.4     07-15    123<L>  |  84<L>  |  103<HH>  ----------------------------<  216<H>  4.1   |  20  |  8.4<HH>    Ca    6.5<L>      15 Jul 2020 09:45  Phos  7.3     07-15  Mg     1.8     07-15      eGFR if Non African American: 5 mL/min/1.73M2 (07-15-20 @ 09:45)  eGFR if : 6 mL/min/1.73M2 (07-15-20 @ 09:45)          Culture - Blood (collected 07-14-20 @ 06:09)  Source: .Blood None  Preliminary Report (07-15-20 @ 13:01):    No growth to date.    Culture - Urine (collected 07-11-20 @ 00:08)  Source: .Urine Suprapubic  Final Report (07-13-20 @ 16:37):    Numerous Pseudomonas aeruginosa (Carbapenem Resistant)    Few Klebsiella pneumoniae ESBL  Organism: Pseudomonas aeruginosa (Carbapenem Resistant)  Klebsiella pneumoniae ESBL (07-14-20 @ 14:52)  Organism: Klebsiella pneumoniae ESBL (07-14-20 @ 14:52)      -  Amikacin: S <=16      -  Amoxicillin/Clavulanic Acid: I 16/8      -  Ampicillin: R >16 These ampicillin results predict results for amoxicillin      -  Ampicillin/Sulbactam: R >16/8 Enterobacter, Citrobacter, and Serratia may develop resistance during prolonged therapy (3-4 days)      -  Aztreonam: R >16      -  Cefazolin: R >16 (MIC_CL_[id number]) For uncomplicated UTI with K. pneumoniae, E. coli, or P. mirablis: SATHYA <=16 is sensitive and SATHYA >=32 is resistant. This also predicts results for oral agents cefaclor, cefdinir, cefpodoxime, cefprozil, cefuroxime axetil, cephalexin and locarbef for uncomplicated UTI. Note that some isolates may be susceptible to these agents while testing resistant to cefazolin.      -  Cefepime: R >16      -  Cefoxitin: S <=8      -  Ceftriaxone: R >32 Enterobacter, Citrobacter, and Serratia may develop resistance during prolonged therapy      -  Ciprofloxacin: S <=0.25      -  Ertapenem: S <=0.5      -  Gentamicin: R >8      -  Imipenem: S <=1      -  Levofloxacin: S <=0.5      -  Meropenem: S <=1      -  Nitrofurantoin: I 64 Should not be used to treat pyelonephritis      -  Piperacillin/Tazobactam: R <=8      -  Tigecycline: S <=2      -  Tobramycin: R >8      -  Trimethoprim/Sulfamethoxazole: R >2/38      Method Type: SATHYA  Organism: Pseudomonas aeruginosa (Carbapenem Resistant) (07-14-20 @ 14:52)      -  Amikacin: S <=16      -  Aztreonam: R >16      -  Cefepime: R >16      -  Ceftazidime: I 16      -  Ceftazidime/Avibactam: R 16      -  Ceftolozane/tazobactam: S <=2      -  Ciprofloxacin: R >2      -  Gentamicin: S 4      -  Imipenem: R 8      -  Levofloxacin: R >4      -  Meropenem: R >8      -  Meropenem/Vaborbactam: 16      -  Piperacillin/Tazobactam: I 32      -  Tobramycin: S <=2      Method Type: SATHYA    Culture - Blood (collected 07-10-20 @ 22:53)  Source: .Blood Blood  Gram Stain (07-12-20 @ 18:28):    Growth in aerobic bottle: Gram Negative Rods  Final Report (07-15-20 @ 09:53):    Growth in aerobic bottle: Pseudomonas aeruginosa (Carbapenem Resistant)    "Due to technical problems, Proteus sp. will Not be reported as part of    the BCID panel until further notice"    ***Blood Panel PCR results on this specimen are available    approximately 3 hours after the Gram stain result.***    Gram stain, PCR, and/or culture results may not always    correspond due to difference in methodologies.    ************************************************************    This PCR assay was performed using [employer].    The following targets are tested for: Enterococcus,    vancomycin resistant enterococci, Listeria monocytogenes,    coagulase negative staphylococci, S. aureus,    methicillin resistant S. aureus, Streptococcus agalactiae    (Group B), S. pneumoniae, S. pyogenes (Group A),    Acinetobacter baumannii, Enterobacter cloacae, E. coli,    Klebsiella oxytoca, K. pneumoniae, Proteus sp.,    Serratia marcescens, Haemophilus influenzae,    Neisseria meningitidis, Pseudomonas aeruginosa, Candida    albicans, C. glabrata, C krusei, C parapsilosis,    C. tropicalis and the KPC resistance gene.  Organism: Blood Culture PCR  Pseudomonas aeruginosa (Carbapenem Resistant) (07-15-20 @ 09:53)  Organism: Pseudomonas aeruginosa (Carbapenem Resistant) (07-15-20 @ 09:53)      -  Amikacin: S <=16      -  Aztreonam: R >16      -  Cefepime: I 16      -  Ceftazidime: S 8      -  Ciprofloxacin: R 2      -  Gentamicin: I 8      -  Imipenem: R >8      -  Levofloxacin: R >4      -  Meropenem: R >8      -  Piperacillin/Tazobactam: I 32      -  Tobramycin: S <=2      Method Type: SATHYA  Organism: Blood Culture PCR (07-15-20 @ 09:53)      -  Pseudomonas aeruginosa: Detec      Method Type: PCR    Culture - Blood (collected 07-10-20 @ 22:53)  Source: .Blood Blood  Preliminary Report (07-12-20 @ 18:01):    No growth to date.        Blood Gas Venous - Lactate: 1.7 mmoL/L (07-11-20 @ 00:13)  Lactate, Blood: 1.5 mmol/L (07-10-20 @ 22:53)      INFECTIOUS DISEASES TESTING  COVID-19 PCR: NotDetec (05-25-20 @ 09:01)  COVID-19 PCR: NotDetec (05-19-20 @ 18:38)      INFLAMMATORY MARKERS      RADIOLOGY & ADDITIONAL TESTS:  I have personally reviewed the last available Chest xray  CXR      CT      CARDIOLOGY TESTING  12 Lead ECG:   Ventricular Rate 63 BPM    Atrial Rate 63 BPM    P-R Interval 98 ms    QRS Duration 170 ms    Q-T Interval 542 ms    QTC Calculation(Bezet) 554 ms    R Axis 247 degrees    T Axis 59 degrees    Diagnosis Line Atrial-sensed ventricular-paced rhythm  Abnormal ECG    Confirmed by DREA OLGUIN MD ([id number]) on 7/14/2020 10:23:29 AM (07-13-20 @ 09:47)  12 Lead ECG:   Ventricular Rate 68 BPM    Atrial Rate 68 BPM    QRS Duration 40 ms    Q-T Interval 450 ms    QTC Calculation(Bezet) 478 ms    P Axis 203 degrees    R Axis 265 degrees    T Axis 24 degrees    Diagnosis Line atrial-sensed ventricular-paced complexes    Confirmed by DREA OLGUIN MD [id number]) on 7/11/2020 9:29:58 AM (07-11-20 @ 00:01)      MEDICATIONS  apixaban 2.5 Oral two times a day  aspirin enteric coated 81 Oral daily  atorvastatin 40 Oral at bedtime  brimonidine 0.2% Ophthalmic Solution 1 Both EYES at bedtime  calcitriol   Capsule 0.5 Oral daily  calcium acetate 667 Oral daily  chlorhexidine 4% Liquid 1 Topical <User Schedule>  dextrose 5%. 1000 IV Continuous <Continuous>  dextrose 50% Injectable 12.5 IV Push once  dextrose 50% Injectable 25 IV Push once  dextrose 50% Injectable 25 IV Push once  hydrALAZINE 50 Oral three times a day  insulin lispro (HumaLOG) corrective regimen sliding scale  SubCutaneous three times a day before meals  insulin lispro (HumaLOG) corrective regimen sliding scale  SubCutaneous at bedtime  latanoprost 0.005% Ophthalmic Solution 1 Both EYES at bedtime  meropenem  IVPB 500 IV Intermittent every 24 hours  metoprolol tartrate 100 Oral two times a day  NIFEdipine XL 90 Oral daily  pantoprazole    Tablet 40 Oral before breakfast  sodium bicarbonate 650 Oral three times a day      WEIGHT  Weight (kg): 79.1 (07-11-20 @ 03:11)  Creatinine, Serum: 8.4 mg/dL (07-15-20 @ 09:45)      ANTIBIOTICS:  meropenem  IVPB 500 milliGRAM(s) IV Intermittent every 24 hours      All available historical records have been reviewed

## 2020-07-15 NOTE — PROGRESS NOTE ADULT - ASSESSMENT
ASSESSMENT  81yo male extensive PMH (based on EMR review) includes BPH (SPC 2 months ago),  AICD, atrial fibrillation (on DOAC), HTN, CAD (stents), combined systolic and diastolic CHF, CKD- stage 3b, DM, glaucoma DLD and TIA presents to the ER with urinary retention      IMPRESSION  #Rule out complicated cystitis in the setting of SPC and urinary retention    UA Blood: x / Protein: >=300 mg/dL / Nitrite: Negative Leuk Esterase: Large / RBC: 11-25 /HPF / WBC >50 /HPF Sq Epi: x / Non Sq Epi: Few /HPF / Bacteria: Many    5/2020 hx ESBL Ecoli UCX      Urine Cx from 7/11 grew Pseudomonas, CRE and ESBL Klebsiella    CTAP Trace locules of air within the prostate and right seminal vesicle. This finding can represents prostatitis as well as sequela of recent intervention.    Unchanged appearance of the dilated left ureter with associated wall thickening. Superimposed infectious process is not excluded.  #ISRRAEL Cr 9  #CT Partially visualized small bilateral pleural effusions and airspace consolidation predominantly in the left    No clinical evidence to suggest PNA  #CT Cholelithiasis and unchanged choledocholithiasis within the common bile duct- elevated Alk Ph  # Carbapenem resistant Pseudomonas bacteremia  -7/10  -    Creatinine, Serum: 8.4: Critical value: mg/dL (07.15.20 @ 09:45)  Weight (kg): 79.1 (11 Jul 2020 03:11)  CrCl 8    - stop meropenem as Pseudomonas in blood and urine is carbapenem resistant  - continue to hold amikacin as trough is still elevated; please draw level tomorrow AM   -   - Monitor WBC count    This is a preliminary incomplete pended note, all final recommendations to follow after interview and examination of the patient.    Please call if with any questions.  Spectre [phone] ASSESSMENT  79yo male extensive PMH (based on EMR review) includes BPH (SPC 2 months ago),  AICD, atrial fibrillation (on DOAC), HTN, CAD (stents), combined systolic and diastolic CHF, CKD- stage 3b, DM, glaucoma DLD and TIA presents to the ER with urinary retention      IMPRESSION  #Rule out complicated cystitis in the setting of SPC and urinary retention    UA Blood: x / Protein: >=300 mg/dL / Nitrite: Negative Leuk Esterase: Large / RBC: 11-25 /HPF / WBC >50 /HPF Sq Epi: x / Non Sq Epi: Few /HPF / Bacteria: Many    5/2020 hx ESBL Ecoli UCX      Urine Cx from 7/11 grew Pseudomonas, CRE and ESBL Klebsiella    CTAP Trace locules of air within the prostate and right seminal vesicle. This finding can represents prostatitis as well as sequela of recent intervention.    Unchanged appearance of the dilated left ureter with associated wall thickening. Superimposed infectious process is not excluded.  #ISRRAEL Cr 9  #CT Partially visualized small bilateral pleural effusions and airspace consolidation predominantly in the left    No clinical evidence to suggest PNA  #CT Cholelithiasis and unchanged choledocholithiasis within the common bile duct- elevated Alk Ph  # Carbapenem resistant Pseudomonas bacteremia  -7/10  - BLood cx 7/14 cleared    Creatinine, Serum: 8.4: Critical value: mg/dL (07.15.20 @ 09:45)  Weight (kg): 79.1 (11 Jul 2020 03:11)  CrCl 8    - will transition to zerbaxa with 1.5 mg load followed by 325 mg q 8 hours to avoid further nephrotoxicity with amikacin   - stop meropenem as Pseudomonas in blood and urine is carbapenem resistant  - continue to draw amikacin levels in AM   - Monitor WBC count      Please call if with any questions.  Spectre [phone]

## 2020-07-16 NOTE — PROGRESS NOTE ADULT - SUBJECTIVE AND OBJECTIVE BOX
Nephrology progress note    THIS IS AN INCOMPLETE NOTE . FULL NOTE TO FOLLOW SHORTLY    Patient is seen and examined, events over the last 24 h noted .    Allergies:  sulfa drugs (Other)    Hospital Medications:   MEDICATIONS  (STANDING):  apixaban 2.5 milliGRAM(s) Oral two times a day  aspirin enteric coated 81 milliGRAM(s) Oral daily  atorvastatin 40 milliGRAM(s) Oral at bedtime  brimonidine 0.2% Ophthalmic Solution 1 Drop(s) Both EYES at bedtime  calcitriol   Capsule 0.5 MICROGram(s) Oral daily  calcium acetate 667 milliGRAM(s) Oral daily  ceftolozane/tazobactam IVPB 375 milliGRAM(s) IV Intermittent <User Schedule>  dextrose 5%. 1000 milliLiter(s) (50 mL/Hr) IV Continuous <Continuous>  hydrALAZINE 50 milliGRAM(s) Oral three times a day  insulin lispro (HumaLOG) corrective regimen sliding scale   SubCutaneous three times a day before meals  insulin lispro (HumaLOG) corrective regimen sliding scale   SubCutaneous at bedtime  latanoprost 0.005% Ophthalmic Solution 1 Drop(s) Both EYES at bedtime  metoprolol tartrate 100 milliGRAM(s) Oral two times a day  NIFEdipine XL 90 milliGRAM(s) Oral daily  pantoprazole    Tablet 40 milliGRAM(s) Oral before breakfast  sodium bicarbonate 650 milliGRAM(s) Oral three times a day        VITALS:  T(F): 97.3 (20 @ 05:00), Max: 97.3 (20 @ 05:00)  HR: 63 (20 @ 05:00)  BP: 150/68 (20 @ 05:00)  RR: 16 (20 @ 05:00)  SpO2: --  Wt(kg): --     @ :  -  07-15 @ 07:00  --------------------------------------------------------  IN: 0 mL / OUT: 1400 mL / NET: -1400 mL    07-15 @ 07:  -   @ 07:00  --------------------------------------------------------  IN: 0 mL / OUT: 750 mL / NET: -750 mL          PHYSICAL EXAM:  Constitutional: NAD  HEENT: anicteric sclera, oropharynx clear, MMM  Neck: No JVD  Respiratory: CTAB, no wheezes, rales or rhonchi  Cardiovascular: S1, S2, RRR  Gastrointestinal: BS+, soft, NT/ND  Extremities: No cyanosis or clubbing. No peripheral edema  :  No rondon.   Skin: No rashes    LABS:  07-15    123<L>  |  84<L>  |  103<HH>  ----------------------------<  216<H>  4.1   |  20  |  8.4<HH>    SODIUM TREND:  Sodium 123 [07-15 @ 09:45]  Sodium 126 [ @ 06:09]  Sodium 126 [ @ 07:51]  Sodium 131 [ @ 07:11]  Sodium 131 [ @ 06:45]  Sodium 131 [07-10 @ 22:53]      Ca    6.5<L>      15 Jul 2020 09:45  Phos  7.3     07-15  Mg     1.8     07-15                            7.8    9.37  )-----------( 331      ( 15 Jul 2020 09:45 )             23.4       Urine Studies:  Urinalysis Basic - ( 2020 00:08 )    Color: Yellow / Appearance: Turbid / S.025 / pH:   Gluc:  / Ketone: Trace  / Bili: Negative / Urobili: 0.2 mg/dL   Blood:  / Protein: >=300 mg/dL / Nitrite: Negative   Leuk Esterase: Large / RBC: 11-25 /HPF / WBC >50 /HPF   Sq Epi:  / Non Sq Epi: Few /HPF / Bacteria: Many      Sodium, Random Urine: 34.0 mmoL/L ( @ 16:43)  Creatinine, Random Urine: 53 mg/dL ( @ 16:43)  Sodium, Random Urine: 44.0 mmoL/L ( @ 10:35)  Creatinine, Random Urine: 43 mg/dL ( @ 10:35)  Potassium, Random Urine: 14 mmol/L ( @ 10:35)    RADIOLOGY & ADDITIONAL STUDIES: Nephrology progress note    Patient is seen and examined, events over the last 24 h noted .  lying in bed comfortable   has suprapubic pain     Allergies:  sulfa drugs (Other)    Hospital Medications:   MEDICATIONS  (STANDING):    apixaban 2.5 milliGRAM(s) Oral two times a day  aspirin enteric coated 81 milliGRAM(s) Oral daily  atorvastatin 40 milliGRAM(s) Oral at bedtime  brimonidine 0.2% Ophthalmic Solution 1 Drop(s) Both EYES at bedtime  calcitriol   Capsule 0.5 MICROGram(s) Oral daily  calcium acetate 667 milliGRAM(s) Oral daily  ceftolozane/tazobactam IVPB 375 milliGRAM(s) IV Intermittent <User Schedule>  hydrALAZINE 50 milliGRAM(s) Oral three times a day  insulin lispro (HumaLOG) corrective regimen sliding scale   SubCutaneous three times a day before meals  insulin lispro (HumaLOG) corrective regimen sliding scale   SubCutaneous at bedtime  latanoprost 0.005% Ophthalmic Solution 1 Drop(s) Both EYES at bedtime  metoprolol tartrate 100 milliGRAM(s) Oral two times a day  NIFEdipine XL 90 milliGRAM(s) Oral daily  pantoprazole    Tablet 40 milliGRAM(s) Oral before breakfast  sodium bicarbonate 650 milliGRAM(s) Oral three times a day        VITALS:    T(F): 97.3 (20 @ 05:00), Max: 97.3 (20 @ 05:00)  HR: 63 (20 @ 05:00)  BP: 150/68 (20 @ 05:00)  RR: 16 (20 @ 05:00)       @ 07:  -  07-15 @ 07:00  --------------------------------------------------------  IN: 0 mL / OUT: 1400 mL / NET: -1400 mL    07-15 @ 07:01  -   @ 07:00  --------------------------------------------------------  IN: 0 mL / OUT: 750 mL / NET: -750 mL          PHYSICAL EXAM:  HEENT: anicteric sclera, oropharynx clear, MMM  Neck: No JVD  Respiratory: CTAB, no wheezes, rales or rhonchi  Cardiovascular: S1, S2, RRR  Gastrointestinal: pelvic fullness / suprapubic catheter   Extremities: No cyanosis or clubbing. No peripheral edema  :  No rondon.   Skin: No rashes    LABS:      126<L>  |  86<L>  |  106<HH>  ----------------------------<  122<H>  4.3   |  20  |  8.6<HH>    Ca    6.9<L>      2020 07:22  Phos  7.3     07-15  Mg     1.8     07-15      07-15    123<L>  |  84<L>  |  103<HH>  ----------------------------<  216<H>  4.1   |  20  |  8.4<HH>    SODIUM TREND:  Sodium 123 [07-15 @ 09:45]  Sodium 126 [ @ 06:09]  Sodium 126 [ @ 07:51]  Sodium 131 [ @ 07:11]  Sodium 131 [ @ 06:45]  Sodium 131 [07-10 @ 22:53]    Creatinine Trend: 8.6<--, 8.4<--, 8.5<--, 8.7<--, 9.5<--, 9.5<--    Ca    6.5<L>      15 Jul 2020 09:45  Phos  7.3     07-15  Mg     1.8     15                            7.8    9.37  )-----------( 331      ( 15 Jul 2020 09:45 )             23.4       Urine Studies:  Urinalysis Basic - ( 2020 00:08 )    Color: Yellow / Appearance: Turbid / S.025 / pH:   Gluc:  / Ketone: Trace  / Bili: Negative / Urobili: 0.2 mg/dL   Blood:  / Protein: >=300 mg/dL / Nitrite: Negative   Leuk Esterase: Large / RBC: 11-25 /HPF / WBC >50 /HPF   Sq Epi:  / Non Sq Epi: Few /HPF / Bacteria: Many      Sodium, Random Urine: 34.0 mmoL/L ( @ 16:43)  Creatinine, Random Urine: 53 mg/dL ( @ 16:43)  Sodium, Random Urine: 44.0 mmoL/L ( @ 10:35)  Creatinine, Random Urine: 43 mg/dL ( @ 10:35)  Potassium, Random Urine: 14 mmol/L ( @ 10:35)    Eosinophil Count, Urine: Positive (20 @ 10:36)      RADIOLOGY & ADDITIONAL STUDIES:

## 2020-07-16 NOTE — PROGRESS NOTE ADULT - ASSESSMENT
A 79yo male extensive PMH (based on EMR review) includes BPH (SPC 2 months ago),  AICD, atrial fibrillation (on DOAC), HTN, CAD (stents), combined systolic and diastolic CHF, CKD- stage 3b, DM, glaucoma DLD and TIA presents to the ER with urinary retention      IMPRESSION  #Rule out complicated cystitis in the setting of SPC and urinary retention    UA Blood: x / Protein: >=300 mg/dL / Nitrite: Negative Leuk Esterase: Large / RBC: 11-25 /HPF / WBC >50 /HPF Sq Epi: x / Non Sq Epi: Few /HPF / Bacteria: Many    5/2020 hx ESBL Ecoli UCX  - Urine Cx from 7/11 grew Pseudomonas, CRE and ESBL Klebsiella    CTAP Trace locules of air within the prostate and right seminal vesicle. This finding can represents prostatitis as well as sequela of recent intervention.  Unchanged appearance of the dilated left ureter with associated wall thickening. Superimposed infectious process is not excluded.  #ISRRAEL Cr 9  #CT Partially visualized small bilateral pleural effusions and airspace consolidation predominantly in the left    No clinical evidence to suggest PNA  #CT Cholelithiasis and unchanged choledocholithiasis within the common bile duct- elevated Alk Ph  # Pseudomonas bacteremia  -7/10 - repeat Blood Cx as of 7/14 NGTD      would recommend:    1. Continue  ceftolozane/tazobactam IVPB 375 milliGRAM(s)  2. Monitor kidney function  3. Aspiration  precaution      Attending Attestation:     45 minutes spent on total encounter; more than 50% of the visit was spent counseling and/or coordinating care by the attending physician. A 79yo male extensive PMH (based on EMR review) includes BPH (SPC 2 months ago),  AICD, atrial fibrillation (on DOAC), HTN, CAD (stents), combined systolic and diastolic CHF, CKD- stage 3b, DM, glaucoma DLD and TIA presents to the ER with urinary retention      IMPRESSION  #Rule out complicated cystitis in the setting of SPC and urinary retention    UA Blood: x / Protein: >=300 mg/dL / Nitrite: Negative Leuk Esterase: Large / RBC: 11-25 /HPF / WBC >50 /HPF Sq Epi: x / Non Sq Epi: Few /HPF / Bacteria: Many    5/2020 hx ESBL Ecoli UCX  - Urine Cx from 7/11 grew Pseudomonas, CRE and ESBL Klebsiella    CTAP Trace locules of air within the prostate and right seminal vesicle. This finding can represents prostatitis as well as sequela of recent intervention.  Unchanged appearance of the dilated left ureter with associated wall thickening. Superimposed infectious process is not excluded.  #ISRRAEL Cr 9  #CT Partially visualized small bilateral pleural effusions and airspace consolidation predominantly in the left    No clinical evidence to suggest PNA  #CT Cholelithiasis and unchanged choledocholithiasis within the common bile duct- elevated Alk Ph  # Pseudomonas bacteremia  -7/10 - repeat Blood Cx as of 7/14 NGTD      would recommend:    1. Continue  ceftolozane/tazobactam IVPB 375 milliGRAM(s)  2. Monitor kidney function  3. Aspiration  precaution  4. Isolation as per Infection Control  dept.       Attending Attestation:     45 minutes spent on total encounter; more than 50% of the visit was spent counseling and/or coordinating care by the attending physician.

## 2020-07-16 NOTE — PROGRESS NOTE ADULT - SUBJECTIVE AND OBJECTIVE BOX
Patient is seen and examined at the bed side, is afebrile. The repeat Blood culture from  remains negative.         REVIEW OF SYSTEMS: All other review systems are negative        Vital Signs Last 24 Hrs  T(C): 35.6 (2020 13:46), Max: 36.3 (2020 05:00)  T(F): 96 (2020 13:46), Max: 97.3 (2020 05:00)  HR: 56 (2020 13:46) (56 - 63)  BP: 113/51 (2020 13:46) (113/51 - 150/68)  BP(mean): --  RR: 16 (2020 13:46) (16 - 16)  SpO2: --        PHYSICAL EXAM:  GENERAL: Not in distress  CHEST/LUNG: Not using accessory muscles  HEART: s1 and s2 present   ABDOMEN: Nontender and  Nondistended  EXTREMITIES: No pedal edema  CNS: Awake and Alert          MEDICATIONS  (STANDING):    apixaban 2.5 milliGRAM(s) Oral two times a day  aspirin enteric coated 81 milliGRAM(s) Oral daily  atorvastatin 40 milliGRAM(s) Oral at bedtime  brimonidine 0.2% Ophthalmic Solution 1 Drop(s) Both EYES at bedtime  calcitriol   Capsule 0.5 MICROGram(s) Oral daily  calcium acetate 667 milliGRAM(s) Oral daily  calcium gluconate IVPB 1 Gram(s) IV Intermittent once  ceftolozane/tazobactam IVPB 375 milliGRAM(s) IV Intermittent <User Schedule>  chlorhexidine 4% Liquid 1 Application(s) Topical <User Schedule>  hydrALAZINE 50 milliGRAM(s) Oral three times a day  insulin lispro (HumaLOG) corrective regimen sliding scale   SubCutaneous three times a day before meals  insulin lispro (HumaLOG) corrective regimen sliding scale   SubCutaneous at bedtime  latanoprost 0.005% Ophthalmic Solution 1 Drop(s) Both EYES at bedtime  metoprolol tartrate 100 milliGRAM(s) Oral two times a day  NIFEdipine XL 90 milliGRAM(s) Oral daily  pantoprazole    Tablet 40 milliGRAM(s) Oral before breakfast  predniSONE   Tablet 60 milliGRAM(s) Oral daily  sodium bicarbonate 650 milliGRAM(s) Oral three times a day          Allergies    sulfa drugs (Other)          LABS:                        7.2    10.52 )-----------( 297      ( 2020 07:22 )             21.5                           7.7    12.06 )-----------( 292      ( 2020 07:51 )             22.7         07-16    126<L>  |  86<L>  |  106<HH>  ----------------------------<  122<H>  4.3   |  20  |  8.6<HH>    Ca    6.9<L>      2020 07:22  Phos  7.3     07-15  Mg     1.8     07-15      07-13    126<L>  |  87<L>  |  102<HH>  ----------------------------<  131<H>  4.0   |  17  |  8.7<HH>    Ca    5.8<LL>      2020 07:51  Phos  6.6     07-13  Mg     1.9     07-13      07-12    131<L>  |  94<L>  |  116<HH>  ----------------------------<  136<H>  4.2   |  16<L>  |  9.5<HH>    Ca    6.0<L>      2020 07:11  Phos  6.7     07-12  Mg     1.0     07-12    TPro  5.6<L>  /  Alb  3.2<L>  /  TBili  0.4  /  DBili  x   /  AST  11  /  ALT  26  /  AlkPhos  275<H>  07-11    PT/INR - ( 10 Jul 2020 22:53 )   PT: 14.70 sec;   INR: 1.28 ratio       PTT - ( 10 Jul 2020 22:53 )  PTT:29.5 sec        Urinalysis Basic - ( 2020 00:08 )  Color: Yellow / Appearance: Turbid / S.025 / pH: x  Gluc: x / Ketone: Trace  / Bili: Negative / Urobili: 0.2 mg/dL   Blood: x / Protein: >=300 mg/dL / Nitrite: Negative   Leuk Esterase: Large / RBC: 11-25 /HPF / WBC >50 /HPF   Sq Epi: x / Non Sq Epi: Few /HPF / Bacteria: Many        CAPILLARY BLOOD GLUCOSE  POCT Blood Glucose.: 207 mg/dL (2020 11:28)  POCT Blood Glucose.: 141 mg/dL (2020 07:44)  POCT Blood Glucose.: 215 mg/dL (2020 22:14)  POCT Blood Glucose.: 224 mg/dL (2020 20:40)  POCT Blood Glucose.: 190 mg/dL (2020 16:53)          RADIOLOGY & ADDITIONAL TESTS:    < from: US Kidney and Bladder (20 @ 09:44) >  Mild left hydronephrosis. No right hydronephrosis    < end of copied text >    < from: CT Abdomen and Pelvis No Cont (20 @ 02:59) >    Trace locules of air within the prostate and right seminal vesicle. This finding can represents prostatitis as well as sequela of recent intervention.    Unchanged appearance of the dilated left ureter with associated wall thickening. Superimposed infectious process is not excluded.    Partially visualized small bilateral pleural effusions and airspace consolidation predominantly in the left. Findings can represent early pneumonia in the appropriate clinical setting.    Cholelithiasis and unchanged choledocholithiasis within the common bile duct.          MICROBIOLOGY DATA:    Culture - Blood in AM (20 @ 06:09)    Specimen Source: .Blood None    Culture Results:   No growth to date.        Culture - Urine (20 @ 00:08)    -  Ceftriaxone: R >32 Enterobacter, Citrobacter, and Serratia may develop resistance during prolonged therapy    -  Ciprofloxacin: R >2    -  Ciprofloxacin: S <=0.25    -  Ertapenem: S <=0.5    -  Amikacin: S <=16    -  Amikacin: S <=16    -  Amoxicillin/Clavulanic Acid: I 16/8    -  Ampicillin: R >16 These ampicillin results predict results for amoxicillin    -  Ampicillin/Sulbactam: R >16/8 Enterobacter, Citrobacter, and Serratia may develop resistance during prolonged therapy (3-4 days)    -  Aztreonam: R >16    -  Aztreonam: R >16    -  Cefazolin: R >16 ([id number]) For uncomplicated UTI with K. pneumoniae, E. coli, or P. mirablis: SATHYA <=16 is sensitive and SATHYA >=32 is resistant. This also predicts results for oral agents cefaclor, cefdinir, cefpodoxime, cefprozil, cefuroxime axetil, cephalexin and locarbef for uncomplicated UTI. Note that some isolates may be susceptible to these agents while testing resistant to cefazolin.    -  Cefepime: R >16    -  Cefepime: R >16    -  Cefoxitin: S <=8    -  Ceftazidime: I 16    -  Gentamicin: S 4    -  Gentamicin: R >8    -  Imipenem: R 8    -  Imipenem: S <=1    -  Levofloxacin: R >4    -  Levofloxacin: S <=0.5    -  Meropenem: R >8    -  Meropenem: S <=1    -  Nitrofurantoin: I 64 Should not be used to treat pyelonephritis    -  Piperacillin/Tazobactam: I 32    -  Piperacillin/Tazobactam: R <=8    -  Tigecycline: S <=2    -  Tobramycin: S <=2    -  Tobramycin: R >8    -  Trimethoprim/Sulfamethoxazole: R >2/38    Specimen Source: .Urine Suprapubic    Culture Results:   Numerous Pseudomonas aeruginosa (Carbapenem Resistant)  Few Klebsiella pneumoniae ESBL    Organism Identification: Pseudomonas aeruginosa (Carbapenem Resistant)  Klebsiella pneumoniae ESBL    Organism: Pseudomonas aeruginosa (Carbapenem Resistant)    Organism: Klebsiella pneumoniae ESBL    Method Type: SATHYA    Method Type: SATHYA      Culture - Urine (20 @ 00:08)    Specimen Source: .Urine Suprapubic    Culture Results:   Numerous Pseudomonas aeruginosa  Few Klebsiella pneumoniae        Culture - Blood (07.10.20 @ 22:53)    Gram Stain:   Growth in aerobic bottle: Gram Negative Rods    -  Pseudomonas aeruginosa: Detec    Specimen Source: .Blood Blood    Organism: Blood Culture PCR    Culture Results:   Growth in aerobic bottle: Pseudomonas aeruginosa  "Due to technical problems, Proteus sp. will Not be reported as part of  the BCID panel until further notice"  ***Blood Panel PCR results on this specimen are available  approximately 3 hours after the Gram stain result.***  Gram stain, PCR, and/or culture results may not always  correspond due to difference in methodologies.  ************************************************************  This PCR assay was performed using [employer].  Thefollowing targets are tested for: Enterococcus,  vancomycin resistant enterococci, Listeria monocytogenes,  coagulase negative staphylococci, S. aureus,  methicillin resistant S. aureus, Streptococcus agalactiae  (Group B), S. pneumoniae, S. pyogenes(Group A),  Acinetobacter baumannii, Enterobacter cloacae, E. coli,  Klebsiella oxytoca, K. pneumoniae, Proteus sp.,  Serratia marcescens, Haemophilus influenzae,  Neisseria meningitidis, Pseudomonas aeruginosa, Candida  albicans, C. glabrata, C krusei, C parapsilosis,  C. tropicalis and the KPC resistance gene.    Organism Identification: Blood Culture PCR    Method Type: PCR Patient is seen and examined at the bed side, is afebrile. The repeat Blood culture from  remains negative.  The Leukocytosis trended down to normal.         REVIEW OF SYSTEMS: All other review systems are negative        Vital Signs Last 24 Hrs  T(C): 35.6 (2020 13:46), Max: 36.3 (2020 05:00)  T(F): 96 (2020 13:46), Max: 97.3 (2020 05:00)  HR: 56 (2020 13:46) (56 - 63)  BP: 113/51 (2020 13:46) (113/51 - 150/68)  BP(mean): --  RR: 16 (2020 13:46) (16 - 16)  SpO2: --        PHYSICAL EXAM:  GENERAL: Not in distress  CHEST/LUNG: Not using accessory muscles  HEART: s1 and s2 present   ABDOMEN: Nontender and  Nondistended  EXTREMITIES: No pedal edema  CNS: Awake and Alert          MEDICATIONS  (STANDING):    apixaban 2.5 milliGRAM(s) Oral two times a day  aspirin enteric coated 81 milliGRAM(s) Oral daily  atorvastatin 40 milliGRAM(s) Oral at bedtime  brimonidine 0.2% Ophthalmic Solution 1 Drop(s) Both EYES at bedtime  calcitriol   Capsule 0.5 MICROGram(s) Oral daily  calcium acetate 667 milliGRAM(s) Oral daily  calcium gluconate IVPB 1 Gram(s) IV Intermittent once  ceftolozane/tazobactam IVPB 375 milliGRAM(s) IV Intermittent <User Schedule>  chlorhexidine 4% Liquid 1 Application(s) Topical <User Schedule>  hydrALAZINE 50 milliGRAM(s) Oral three times a day  insulin lispro (HumaLOG) corrective regimen sliding scale   SubCutaneous three times a day before meals  insulin lispro (HumaLOG) corrective regimen sliding scale   SubCutaneous at bedtime  latanoprost 0.005% Ophthalmic Solution 1 Drop(s) Both EYES at bedtime  metoprolol tartrate 100 milliGRAM(s) Oral two times a day  NIFEdipine XL 90 milliGRAM(s) Oral daily  pantoprazole    Tablet 40 milliGRAM(s) Oral before breakfast  predniSONE   Tablet 60 milliGRAM(s) Oral daily  sodium bicarbonate 650 milliGRAM(s) Oral three times a day        Allergies    sulfa drugs (Other)          LABS:                        7.2    10.52 )-----------( 297      ( 2020 07:22 )             21.5                           7.7    12.06 )-----------( 292      ( 2020 07:51 )             22.7         07-16    126<L>  |  86<L>  |  106<HH>  ----------------------------<  122<H>  4.3   |  20  |  8.6<HH>    Ca    6.9<L>      2020 07:22  Phos  7.3     07-15  Mg     1.8     07-15      07-13    126<L>  |  87<L>  |  102<HH>  ----------------------------<  131<H>  4.0   |  17  |  8.7<HH>    Ca    5.8<LL>      2020 07:51  Phos  6.6     07-13  Mg     1.9     07-13      07-12    131<L>  |  94<L>  |  116<HH>  ----------------------------<  136<H>  4.2   |  16<L>  |  9.5<HH>    Ca    6.0<L>      2020 07:11  Phos  6.7     07-12  Mg     1.0     -12    TPro  5.6<L>  /  Alb  3.2<L>  /  TBili  0.4  /  DBili  x   /  AST  11  /  ALT  26  /  AlkPhos  275<H>  07-11    PT/INR - ( 10 Jul 2020 22:53 )   PT: 14.70 sec;   INR: 1.28 ratio       PTT - ( 10 Jul 2020 22:53 )  PTT:29.5 sec        Urinalysis Basic - ( 2020 00:08 )  Color: Yellow / Appearance: Turbid / S.025 / pH: x  Gluc: x / Ketone: Trace  / Bili: Negative / Urobili: 0.2 mg/dL   Blood: x / Protein: >=300 mg/dL / Nitrite: Negative   Leuk Esterase: Large / RBC: 11-25 /HPF / WBC >50 /HPF   Sq Epi: x / Non Sq Epi: Few /HPF / Bacteria: Many        CAPILLARY BLOOD GLUCOSE  POCT Blood Glucose.: 207 mg/dL (2020 11:28)  POCT Blood Glucose.: 141 mg/dL (2020 07:44)  POCT Blood Glucose.: 215 mg/dL (2020 22:14)  POCT Blood Glucose.: 224 mg/dL (2020 20:40)  POCT Blood Glucose.: 190 mg/dL (2020 16:53)          RADIOLOGY & ADDITIONAL TESTS:    < from: US Kidney and Bladder (20 @ 09:44) >  Mild left hydronephrosis. No right hydronephrosis    < end of copied text >    < from: CT Abdomen and Pelvis No Cont (20 @ 02:59) >    Trace locules of air within the prostate and right seminal vesicle. This finding can represents prostatitis as well as sequela of recent intervention.    Unchanged appearance of the dilated left ureter with associated wall thickening. Superimposed infectious process is not excluded.    Partially visualized small bilateral pleural effusions and airspace consolidation predominantly in the left. Findings can represent early pneumonia in the appropriate clinical setting.    Cholelithiasis and unchanged choledocholithiasis within the common bile duct.          MICROBIOLOGY DATA:    Culture - Blood in AM (20 @ 06:09)    Specimen Source: .Blood None    Culture Results:   No growth to date.        Culture - Urine (20 @ 00:08)    -  Ceftriaxone: R >32 Enterobacter, Citrobacter, and Serratia may develop resistance during prolonged therapy    -  Ciprofloxacin: R >2    -  Ciprofloxacin: S <=0.25    -  Ertapenem: S <=0.5    -  Amikacin: S <=16    -  Amikacin: S <=16    -  Amoxicillin/Clavulanic Acid: I 16/8    -  Ampicillin: R >16 These ampicillin results predict results for amoxicillin    -  Ampicillin/Sulbactam: R >16/8 Enterobacter, Citrobacter, and Serratia may develop resistance during prolonged therapy (3-4 days)    -  Aztreonam: R >16    -  Aztreonam: R >16    -  Cefazolin: R >16 (MIC_CL_COM_[email or web address]) For uncomplicated UTI with K. pneumoniae, E. coli, or P. mirablis: SATHYA <=16 is sensitive and SATHYA >=32 is resistant. This also predicts results for oral agents cefaclor, cefdinir, cefpodoxime, cefprozil, cefuroxime axetil, cephalexin and locarbef for uncomplicated UTI. Note that some isolates may be susceptible to these agents while testing resistant to cefazolin.    -  Cefepime: R >16    -  Cefepime: R >16    -  Cefoxitin: S <=8    -  Ceftazidime: I 16    -  Gentamicin: S 4    -  Gentamicin: R >8    -  Imipenem: R 8    -  Imipenem: S <=1    -  Levofloxacin: R >4    -  Levofloxacin: S <=0.5    -  Meropenem: R >8    -  Meropenem: S <=1    -  Nitrofurantoin: I 64 Should not be used to treat pyelonephritis    -  Piperacillin/Tazobactam: I 32    -  Piperacillin/Tazobactam: R <=8    -  Tigecycline: S <=2    -  Tobramycin: S <=2    -  Tobramycin: R >8    -  Trimethoprim/Sulfamethoxazole: R >2/38    Specimen Source: .Urine Suprapubic    Culture Results:   Numerous Pseudomonas aeruginosa (Carbapenem Resistant)  Few Klebsiella pneumoniae ESBL    Organism Identification: Pseudomonas aeruginosa (Carbapenem Resistant)  Klebsiella pneumoniae ESBL    Organism: Pseudomonas aeruginosa (Carbapenem Resistant)    Organism: Klebsiella pneumoniae ESBL    Method Type: SATHYA    Method Type: SATHYA      Culture - Urine (20 @ 00:08)    Specimen Source: .Urine Suprapubic    Culture Results:   Numerous Pseudomonas aeruginosa  Few Klebsiella pneumoniae        Culture - Blood (07.10.20 @ 22:53)    Gram Stain:   Growth in aerobic bottle: Gram Negative Rods    -  Pseudomonas aeruginosa: Detec    Specimen Source: .Blood Blood    Organism: Blood Culture PCR    Culture Results:   Growth in aerobic bottle: Pseudomonas aeruginosa  "Due to technical problems, Proteus sp. will Not be reported as part of  the BCID panel until further notice"  ***Blood Panel PCR results on this specimen are available  approximately 3 hours after the Gram stain result.***  Gram stain, PCR, and/or culture results may not always  correspond due to difference in methodologies.  ************************************************************  This PCR assay was performed using [employer].  Thefollowing targets are tested for: Enterococcus,  vancomycin resistant enterococci, Listeria monocytogenes,  coagulase negative staphylococci, S. aureus,  methicillin resistant S. aureus, Streptococcus agalactiae  (Group B), S. pneumoniae, S. pyogenes(Group A),  Acinetobacter baumannii, Enterobacter cloacae, E. coli,  Klebsiella oxytoca, K. pneumoniae, Proteus sp.,  Serratia marcescens, Haemophilus influenzae,  Neisseria meningitidis, Pseudomonas aeruginosa, Candida  albicans, C. glabrata, C krusei, C parapsilosis,  C. tropicalis and the KPC resistance gene.    Organism Identification: Blood Culture PCR    Method Type: PCR

## 2020-07-16 NOTE — PROGRESS NOTE ADULT - ASSESSMENT
1)  ISRRAEL on CKD 3 / prerenal leading to ATN ?/ acidosis / hyponatremia   CT scan  and sono noted for chronic hydro   was on antibx as OP can not rule out AIN   would do work up for MM (check SPEP / UPEP / Serum free light chains  / IF)  off IVF   Strict i and o     urine eosinophils +  on merrem for UTI pseudomonas bacteremia   No need for RRT for now , cr slowly improving    2)  Hypocalcemia, corrected to ~ 6.9 to 7.0, which was present last admission.  Likely multifactorial, including 25-OH Vit D3 and  1,25-OH Vit D3 deficiency, plus possible hyperphosphatemia  sp calcium gluconate  on calcitriol phoslo continue follow AM labs         will follow 1)  ISRRAEL on CKD 3 / prerenal leading to ATN ?/ acidosis / hyponatremia   CT scan  and sono noted for chronic hydro   was on antibx as OP can not rule out AIN   Creat noted still high   would do work up for MM (check SPEP / UPEP / Serum free light chains  / IF)  off IVF   Strict i and o   urine eosinophils +/ suggestive of AIN   on Zosyn  for UTI pseudomonas bacteremia   may need RRT if no improvement / discussed with wife in detail   start Prednisone 60 mg po q24h   repeat sono please bladder     2)  Hypocalcemia, corrected to ~ 7.3 , which was present last admission.  Likely multifactorial, including 25-OH Vit D3 and  1,25-OH Vit D3 deficiency, plus possible hyperphosphatemia  sp calcium gluconate  on calcitriol phoslo continue follow AM labs         will follow

## 2020-07-16 NOTE — PROGRESS NOTE ADULT - ASSESSMENT
80M PMHx BPH s/p spc, AFib, HTN, CAD s/p stent, HFrEF s/p AICD, CKD III, DM2, h/o TIA here with ISRRAEL c/b metabolic acidosis and UTI.    #ISRRAEL on CKD III  c/b acidosis; unclear etiology, suspect some component prerenal in setting of infection vs AIN  L hydrouretonephrosis on ct, chronic  Plan:   Cont PO bicarb supplements  MM workup pending per renal  - will likely need to start RRT per Nephrology   - will start Prednisone 60mg PO for AIN     #UTI due to Cabapenem resistant pseudomonas and ESBL klebsiella  c/b pseudomonas bacteremia  bcx pseudomonas carbapenem resistant  ucx pseudomonas carbapenem resistant, kleb esbl  Pending repeat BCx drawn 07/14 - NGTD   Plan:   d/c Amikacin, d/c Meropenem   Started Zerbaxa   - appreciate ID reccs     #BPH  s/p suprapubic catheter    #AFib  eliquis  lopressor as below    #HTN  hydralazine 50 tid  lopressor 100 bid  nifedipine 90    #CAD  asa  lipitor 40    #HFrEF  lopressor as above    #DM2  ssi    #H/o TIA  asa  lipitor 40    #DVT ppx  eliquis    #Progress Note Handoff  Pending (specify): will need to initiate RRT per renal reccs   Family discussion: d/w pt regarding treatment for UTI and monitoring renal function  Disposition: Home        Jeannie Li DO

## 2020-07-17 NOTE — DIETITIAN INITIAL EVALUATION ADULT. - PROBLEM SELECTOR PLAN 1
(Chart review lists CKD stage 3b)- For now bicarb infusion as started by ER (case discussed with renal on call)

## 2020-07-17 NOTE — DIETITIAN INITIAL EVALUATION ADULT. - PROBLEM SELECTOR PLAN 3
Along with concern for prostatitis, early pneumonia (see CAT scan), for now continue Meropenem at renal doses pending ID review

## 2020-07-17 NOTE — DIETITIAN INITIAL EVALUATION ADULT. - PERTINENT LABORATORY DATA
7/17: RBC-2.42, H/H-7.5/22.0, Na-124, Cl-85, BUN-113, creat-9.0, gluc-189, PO4-8.0, POCT gluc-311 (17:03) vs. 338 (11:25) vs. 233 (7:34) vs. 209 (5:42); per previous admit records: 5/20/2020 [id number]%

## 2020-07-17 NOTE — PROGRESS NOTE ADULT - ASSESSMENT
1)  ISRRAEL on CKD 3 / prerenal leading to ATN ?/ acidosis / hyponatremia   CT scan  and sono noted for chronic hydro   was on antibx as OP with high eosinophils could be c/w AIN   Creat noted still high   would do work up for MM (check SPEP / UPEP / Serum free light chains  / IF)  off IVF   Strict i and o   on Zosyn  for UTI pseudomonas bacteremia   may need RRT if no improvement will follow BMP from today / discussed with wife in detail   on Prednisone now. Will need taper by 10 mg down every 5 days until a dose of 10 mg for 5 days then will stop  repeat sono please bladder  Hold ELICEO in the context of possible HD catheter insertion        2)  Hypocalcemia, corrected to ~ 7.3 , which was present last admission.  Likely multifactorial, including 25-OH Vit D3 and  1,25-OH Vit D3 deficiency, plus possible hyperphosphatemia  sp calcium gluconate  on calcitriol phoslo continue follow labs from today  check albumin with next labs        will follow

## 2020-07-17 NOTE — PROGRESS NOTE ADULT - SUBJECTIVE AND OBJECTIVE BOX
ROCIO NATION  80y, Male  Allergy: sulfa drugs (Other)    Hospital Day: 6d    Patient seen and examined. Discussed with patient that he will need to undergo dialysis starting tomorrow, plan for an HD catheter placement ASAP. Patient is agreeable to this.  FAHAD.     PMH/PSH:  PAST MEDICAL & SURGICAL HISTORY:  GERD (gastroesophageal reflux disease): intermittent  H/O ptosis of eyelid: right eye (sometimes wears patch)  Hematuria  Hyperlipidemia  Anemia  Diabetes  Renal insufficiency  Cardiomyopathy  Cholelithiasis  Hydrocele in adult  Glaucoma  BPH (benign prostatic hyperplasia)  CAD (coronary artery disease): CARD STENT X2 4/2018  TIA (transient ischemic attack): X2 JULY 2018  Dementia  CHF (congestive heart failure): COMBINED SYSTOLIC &amp; DIASTOLIC  Afib  Heart attack: 1/2018  Sepsis: 1/18 FROM INFECTED GALLBLADDER  Hypercholesteremia  HTN (hypertension)  DM (diabetes mellitus)  History of suprapubic catheter  H/O flexible sigmoidoscopy: 2015  H/O bilateral cataract extraction: LEFT- 1/18 RIGHT 6 YRS AGO  Encounter for biliary drainage tube placement: 1/2018  H/O coronary angioplasty: WITH STENT X2 (4/2018)  History of prostate surgery: 5/17  AICD (automatic cardioverter/defibrillator) present: [employer]      LAST 24-Hr EVENTS:    VITALS:  T(F): 97.6 (07-17-20 @ 05:03), Max: 97.7 (07-16-20 @ 21:01)  HR: 65 (07-17-20 @ 05:03)  BP: 160/73 (07-17-20 @ 05:03) (113/51 - 160/73)  RR: 16 (07-17-20 @ 05:03)  SpO2: --        TESTS & MEASUREMENTS:  Weight (Kg):       07-15-20 @ 07:01  -  07-16-20 @ 07:00  --------------------------------------------------------  IN: 0 mL / OUT: 750 mL / NET: -750 mL    07-16-20 @ 07:01  -  07-17-20 @ 07:00  --------------------------------------------------------  IN: 0 mL / OUT: 1000 mL / NET: -1000 mL                            7.5    5.62  )-----------( 284      ( 17 Jul 2020 05:39 )             22.0       07-17    124<L>  |  85<L>  |  113<HH>  ----------------------------<  189<H>  4.9   |  17  |  9.0<HH>    Ca    6.7<L>      17 Jul 2020 05:39  Phos  8.0     07-17  Mg     1.8     07-17    TPro  5.4<L>  /  Alb  x   /  TBili  x   /  DBili  x   /  AST  x   /  ALT  x   /  AlkPhos  x   07-16    LIVER FUNCTIONS - ( 16 Jul 2020 07:22 )  Alb: x     / Pro: 5.4 g/dL / ALK PHOS: x     / ALT: x     / AST: x     / GGT: x                 Culture - Blood (collected 07-14-20 @ 06:09)  Source: .Blood None  Preliminary Report (07-15-20 @ 13:01):    No growth to date.    Culture - Urine (collected 07-11-20 @ 00:08)  Source: .Urine Suprapubic  Final Report (07-13-20 @ 16:37):    Numerous Pseudomonas aeruginosa (Carbapenem Resistant)    Few Klebsiella pneumoniae ESBL  Organism: Pseudomonas aeruginosa (Carbapenem Resistant)  Klebsiella pneumoniae ESBL (07-14-20 @ 14:52)  Organism: Klebsiella pneumoniae ESBL (07-14-20 @ 14:52)      -  Amikacin: S <=16      -  Amoxicillin/Clavulanic Acid: I 16/8      -  Ampicillin: R >16 These ampicillin results predict results for amoxicillin      -  Ampicillin/Sulbactam: R >16/8 Enterobacter, Citrobacter, and Serratia may develop resistance during prolonged therapy (3-4 days)      -  Aztreonam: R >16      -  Cefazolin: R >16 ([id number]) For uncomplicated UTI with K. pneumoniae, E. coli, or P. mirablis: SATHYA <=16 is sensitive and SATHYA >=32 is resistant. This also predicts results for oral agents cefaclor, cefdinir, cefpodoxime, cefprozil, cefuroxime axetil, cephalexin and locarbef for uncomplicated UTI. Note that some isolates may be susceptible to these agents while testing resistant to cefazolin.      -  Cefepime: R >16      -  Cefoxitin: S <=8      -  Ceftriaxone: R >32 Enterobacter, Citrobacter, and Serratia may develop resistance during prolonged therapy      -  Ciprofloxacin: S <=0.25      -  Ertapenem: S <=0.5      -  Gentamicin: R >8      -  Imipenem: S <=1      -  Levofloxacin: S <=0.5      -  Meropenem: S <=1      -  Nitrofurantoin: I 64 Should not be used to treat pyelonephritis      -  Piperacillin/Tazobactam: R <=8      -  Tigecycline: S <=2      -  Tobramycin: R >8      -  Trimethoprim/Sulfamethoxazole: R >2/38      Method Type: SATHYA  Organism: Pseudomonas aeruginosa (Carbapenem Resistant) (07-14-20 @ 14:52)      -  Amikacin: S <=16      -  Aztreonam: R >16      -  Cefepime: R >16      -  Ceftazidime: I 16      -  Ceftazidime/Avibactam: R 16      -  Ceftolozane/tazobactam: S <=2      -  Ciprofloxacin: R >2      -  Gentamicin: S 4      -  Imipenem: R 8      -  Levofloxacin: R >4      -  Meropenem: R >8      -  Meropenem/Vaborbactam: 16      -  Piperacillin/Tazobactam: I 32      -  Tobramycin: S <=2      Method Type: SATHYA    Culture - Blood (collected 07-10-20 @ 22:53)  Source: .Blood Blood  Gram Stain (07-12-20 @ 18:28):    Growth in aerobic bottle: Gram Negative Rods  Final Report (07-15-20 @ 09:53):    Growth in aerobic bottle: Pseudomonas aeruginosa (Carbapenem Resistant)    "Due to technical problems, Proteus sp. will Not be reported as part of    the BCID panel until further notice"    ***Blood Panel PCR results on this specimen are available    approximately 3 hours after the Gram stain result.***    Gram stain, PCR, and/or culture results may not always    correspond due to difference in methodologies.    ************************************************************    This PCR assay was performed using [employer].    The following targets are tested for: Enterococcus,    vancomycin resistant enterococci, Listeria monocytogenes,    coagulase negative staphylococci, S. aureus,    methicillin resistant S. aureus, Streptococcus agalactiae    (Group B), S. pneumoniae, S. pyogenes (Group A),    Acinetobacter baumannii, Enterobacter cloacae, E. coli,    Klebsiella oxytoca, K. pneumoniae, Proteus sp.,    Serratia marcescens, Haemophilus influenzae,    Neisseria meningitidis, Pseudomonas aeruginosa, Candida    albicans, C. glabrata, C krusei, C parapsilosis,    C. tropicalis and the KPC resistance gene.  Organism: Blood Culture PCR  Pseudomonas aeruginosa (Carbapenem Resistant) (07-15-20 @ 09:53)  Organism: Pseudomonas aeruginosa (Carbapenem Resistant) (07-15-20 @ 09:53)      -  Amikacin: S <=16      -  Aztreonam: R >16      -  Cefepime: I 16      -  Ceftazidime: S 8      -  Ciprofloxacin: R 2      -  Gentamicin: I 8      -  Imipenem: R >8      -  Levofloxacin: R >4      -  Meropenem: R >8      -  Piperacillin/Tazobactam: I 32      -  Tobramycin: S <=2      Method Type: SATHYA  Organism: Blood Culture PCR (07-15-20 @ 09:53)      -  Pseudomonas aeruginosa: Detec      Method Type: PCR    Culture - Blood (collected 07-10-20 @ 22:53)  Source: .Blood Blood  Final Report (07-16-20 @ 18:01):    No Growth Final                    RADIOLOGY, ECG, & ADDITIONAL TESTS:      RECENT DIAGNOSTIC ORDERS:  Protein Electrophoresis, Urine: Routine (07-16-20 @ 15:14)  Hepatitis B Surface Antigen: Routine  Addl Info: To be drawn pre-dialysis (07-17-20 @ 11:17)  Hepatitis B Surface Antibody: Routine  Addl Info: To be drawn pre-dialysis (07-17-20 @ 11:17)  Hepatitis B Core Antibody, Total: Routine  Addl Info: To be drawn pre-dialysis (07-17-20 @ 11:17)  Hepatitis C Antibody Screen: Routine  Addl Info: To be drawn pre-dialysis (07-17-20 @ 11:17)  Hepatitis B Core IgM Antibody: Routine (07-17-20 @ 11:17)  Complete Blood Count: AM Sched. Collection: 18-Jul-2020 04:30 (07-17-20 @ 11:33)  Comprehensive Metabolic Panel: AM Sched. Collection: 18-Jul-2020 04:30 (07-17-20 @ 11:33)  Magnesium, Serum: AM Sched. Collection: 18-Jul-2020 04:30 (07-17-20 @ 11:33)  Phosphorus Level, Serum: AM Sched. Collection: 18-Jul-2020 04:30 (07-17-20 @ 11:33)  Prothrombin Time and INR, Plasma:  Start Date:17-Jul-2020. AM Sched. Collection:18-Jul-2020 04:30 (07-17-20 @ 11:33)  Activated Partial Thromboplastin Time:  Start Date:17-Jul-2020. AM Sched. Collection:18-Jul-2020 04:30 (07-17-20 @ 11:33)      MEDICATIONS:  MEDICATIONS  (STANDING):  aspirin enteric coated 81 milliGRAM(s) Oral daily  atorvastatin 40 milliGRAM(s) Oral at bedtime  brimonidine 0.2% Ophthalmic Solution 1 Drop(s) Both EYES at bedtime  calcitriol   Capsule 0.5 MICROGram(s) Oral daily  calcium acetate 667 milliGRAM(s) Oral daily  calcium gluconate IVPB 2 Gram(s) IV Intermittent once  ceftolozane/tazobactam IVPB 375 milliGRAM(s) IV Intermittent <User Schedule>  chlorhexidine 4% Liquid 1 Application(s) Topical <User Schedule>  dextrose 5%. 1000 milliLiter(s) (50 mL/Hr) IV Continuous <Continuous>  dextrose 50% Injectable 12.5 Gram(s) IV Push once  dextrose 50% Injectable 25 Gram(s) IV Push once  dextrose 50% Injectable 25 Gram(s) IV Push once  famotidine    Tablet 20 milliGRAM(s) Oral daily  hydrALAZINE 50 milliGRAM(s) Oral three times a day  insulin lispro (HumaLOG) corrective regimen sliding scale   SubCutaneous three times a day before meals  insulin lispro (HumaLOG) corrective regimen sliding scale   SubCutaneous at bedtime  latanoprost 0.005% Ophthalmic Solution 1 Drop(s) Both EYES at bedtime  metoprolol tartrate 100 milliGRAM(s) Oral two times a day  NIFEdipine XL 90 milliGRAM(s) Oral daily  predniSONE   Tablet 60 milliGRAM(s) Oral daily  sodium bicarbonate 650 milliGRAM(s) Oral three times a day    MEDICATIONS  (PRN):  acetaminophen   Tablet .. 650 milliGRAM(s) Oral every 6 hours PRN Mild Pain (1 - 3)  dextrose 40% Gel 15 Gram(s) Oral once PRN Blood Glucose LESS THAN 70 milliGRAM(s)/deciliter  glucagon  Injectable 1 milliGRAM(s) IntraMuscular once PRN Glucose LESS THAN 70 milligrams/deciliter  ondansetron Injectable 8 milliGRAM(s) IV Push three times a day PRN Nausea and/or Vomiting  oxyCODONE    IR 5 milliGRAM(s) Oral four times a day PRN Severe Pain (7 - 10)      HOME MEDICATIONS:  Alphagan P 0.1% ophthalmic solution (05-19)  apixaban 2.5 mg oral tablet (05-23)  Aspir 81 oral delayed release tablet (05-19)  atorvastatin 40 mg oral tablet (05-19)  ciprofloxacin 250 mg oral tablet (05-27)  hydrALAZINE 50 mg oral tablet (05-27)  latanoprost 0.005% ophthalmic solution (05-19)  lidocaine 2% topical gel with applicator (05-27)  metoprolol tartrate 100 mg oral tablet (05-27)  NIFEdipine 90 mg oral tablet, extended release (05-27)  oxyCODONE 5 mg oral tablet (05-19)  pantoprazole 40 mg oral delayed release tablet (05-19)  sodium bicarbonate 650 mg oral tablet (05-19)      PHYSICAL EXAM:  GENERAL: A&O x3,  in NAD  HNENT: EOMI, PERRLA    NECK: No LAD/swelling  CHEST/LUNG:  CTAB no wheezes/rales/ronchi  HEART: RRR, No murmurs  ABDOMEN: Soft, mild ttp in suprapubic region, macerated skin and possible pus near SPC site  EXTREMITIES:  Warm well perfused, no edema

## 2020-07-17 NOTE — DIETITIAN INITIAL EVALUATION ADULT. - PROBLEM SELECTOR PLAN 5
Permanent?, metoprolol ordered, concerned about continuing apixaban in view of current ISRRAEL values.

## 2020-07-17 NOTE — PROGRESS NOTE ADULT - ASSESSMENT
A 81yo male extensive PMH (based on EMR review) includes BPH (SPC 2 months ago),  AICD, atrial fibrillation (on DOAC), HTN, CAD (stents), combined systolic and diastolic CHF, CKD- stage 3b, DM, glaucoma DLD and TIA presents to the ER with urinary retention      IMPRESSION  #Rule out complicated cystitis in the setting of SPC and urinary retention    UA Blood: x / Protein: >=300 mg/dL / Nitrite: Negative Leuk Esterase: Large / RBC: 11-25 /HPF / WBC >50 /HPF Sq Epi: x / Non Sq Epi: Few /HPF / Bacteria: Many    5/2020 hx ESBL Ecoli UCX  - Urine Cx from 7/11 grew Pseudomonas, CRE and ESBL Klebsiella    CTAP Trace locules of air within the prostate and right seminal vesicle. This finding can represents prostatitis as well as sequela of recent intervention.  Unchanged appearance of the dilated left ureter with associated wall thickening. Superimposed infectious process is not excluded.  #ISRRAEL Cr 9  #CT Partially visualized small bilateral pleural effusions and airspace consolidation predominantly in the left    No clinical evidence to suggest PNA  #CT Cholelithiasis and unchanged choledocholithiasis within the common bile duct- elevated Alk Ph  # Pseudomonas bacteremia  -7/10 - repeat Blood Cx as of 7/14 NGTD    - Continue  ceftolozane/tazobactam IVPB 375 milliGRAM(s)  - trend Abs Eosinophil   - Monitor kidney function; appreciate renal recs, possibly may need RRT  - Aspiration  precaution  - Isolation as per Infection Control  dept.     This is a preliminary incomplete pended note, all final recommendations to follow after interview and examination of the patient.    Please call if with any questions.  Spectra [phone] A 79yo male extensive PMH (based on EMR review) includes BPH (SPC 2 months ago),  AICD, atrial fibrillation (on DOAC), HTN, CAD (stents), combined systolic and diastolic CHF, CKD- stage 3b, DM, glaucoma DLD and TIA presents to the ER with urinary retention      IMPRESSION  #Rule out complicated cystitis in the setting of SPC and urinary retention    UA Blood: x / Protein: >=300 mg/dL / Nitrite: Negative Leuk Esterase: Large / RBC: 11-25 /HPF / WBC >50 /HPF Sq Epi: x / Non Sq Epi: Few /HPF / Bacteria: Many    5/2020 hx ESBL Ecoli UCX  - Urine Cx from 7/11 grew Pseudomonas, CRE and ESBL Klebsiella    CTAP Trace locules of air within the prostate and right seminal vesicle. This finding can represents prostatitis as well as sequela of recent intervention.  Unchanged appearance of the dilated left ureter with associated wall thickening. Superimposed infectious process is not excluded.  #ISRRAEL Cr 9  #CT Partially visualized small bilateral pleural effusions and airspace consolidation predominantly in the left    No clinical evidence to suggest PNA  #CT Cholelithiasis and unchanged choledocholithiasis within the common bile duct- elevated Alk Ph  # Pseudomonas bacteremia  -7/10 - repeat Blood Cx as of 7/14 NGTD    - Continue ceftolozane/tazobactam IVPB 375mg q 8 hours  - will ask lab to ensure Pseudomonas in blood is susceptible   - trend Abs Eosinophil   - Monitor kidney function; appreciate renal recs, possibly may need RRT  - Aspiration  precaution  - Isolation as per Infection Control  dept.       Please call if with any questions.  Spectra [phone]

## 2020-07-17 NOTE — DIETITIAN INITIAL EVALUATION ADULT. - DIET TYPE
Renal Replacement - no protein restriction. Reduced appetite reported per RN d/t confusion; consuming 25-50% of most meals at this time.

## 2020-07-17 NOTE — DIETITIAN INITIAL EVALUATION ADULT. - OTHER INFO
Pertinent Medical Information: p/w ISRRAEL & UTI. Noted plan to undergo dialysis starting tomorrow, plan for an HD catheter placement ASAP. Hyponatremia, hypocalcemia & hyperphosphatemia noted. Normocytic Anemia noted likely related to CKD now with progression to renal failure.    Pertinent Subjective Information: Unable to obtain nutrition hx at this time - pt unable to provide nutrition hx at this time. No response from contact numbers in chart. NKFA per chart. Reviewed previous admit records - noted with stated wt 68.1 kg during May 2020 admission.

## 2020-07-17 NOTE — DIETITIAN INITIAL EVALUATION ADULT. - PERTINENT MEDS FT
insulin lispro, famotidine, prednisone, sodium bicarbonate, calcitriol, calcium acetate, zofran, atorvastatin, metoprolol

## 2020-07-17 NOTE — PROGRESS NOTE ADULT - SUBJECTIVE AND OBJECTIVE BOX
CHAPISROCIO  80y, Male  Allergy: sulfa drugs (Other)      LOS  6d    CHIEF COMPLAINT: ISRRAEL, UTI (17 Jul 2020 08:09)      INTERVAL EVENTS/HPI  - No acute events overnight  - T(F): , Max: 97.7 (07-16-20 @ 21:01)  - Denies any worsening symptoms  - Tolerating medication  - WBC Count: 5.62 (07-17-20 @ 05:39)  WBC Count: 10.52 (07-16-20 @ 07:22)     - Creatinine, Serum: 9.0 (07-17-20 @ 05:39)  Creatinine, Serum: 8.6 (07-16-20 @ 07:22)       ROS  General: Denies rigors, nightsweats  HEENT: Denies headache, rhinorrhea, sore throat, eye pain  CV: Denies CP, palpitations  PULM: Denies wheezing, hemoptysis  GI: Denies hematemesis, hematochezia, melena  : Denies discharge, hematuria  MSK: Denies arthralgias, myalgias  SKIN: Denies rash, lesions  NEURO: Denies paresthesias, weakness  PSYCH: Denies depression, anxiety    VITALS:  T(F): 97.6, Max: 97.7 (07-16-20 @ 21:01)  HR: 65  BP: 160/73  RR: 16Vital Signs Last 24 Hrs  T(C): 36.4 (17 Jul 2020 05:03), Max: 36.5 (16 Jul 2020 21:01)  T(F): 97.6 (17 Jul 2020 05:03), Max: 97.7 (16 Jul 2020 21:01)  HR: 65 (17 Jul 2020 05:03) (56 - 65)  BP: 160/73 (17 Jul 2020 05:03) (113/51 - 160/73)  BP(mean): --  RR: 16 (17 Jul 2020 05:03) (16 - 16)  SpO2: --    PHYSICAL EXAM:  Gen: NAD, resting in bed  HEENT: Normocephalic, atraumatic  Neck: supple, no lymphadenopathy  CV: Regular rate & regular rhythm  Lungs: decreased BS at bases, no fremitus  Abdomen: Soft, BS present  Ext: Warm, well perfused  Neuro: non focal, awake  Skin: no rash, no erythema  Lines: no phlebitis    FH: Non-contributory  Social Hx: Non-contributory    TESTS & MEASUREMENTS:                        7.5    5.62  )-----------( 284      ( 17 Jul 2020 05:39 )             22.0     07-17    124<L>  |  85<L>  |  113<HH>  ----------------------------<  189<H>  4.9   |  17  |  9.0<HH>    Ca    6.7<L>      17 Jul 2020 05:39  Phos  8.0     07-17  Mg     1.8     07-17    TPro  5.4<L>  /  Alb  x   /  TBili  x   /  DBili  x   /  AST  x   /  ALT  x   /  AlkPhos  x   07-16    eGFR if Non African American: 5 mL/min/1.73M2 (07-17-20 @ 05:39)  eGFR if African American: 6 mL/min/1.73M2 (07-17-20 @ 05:39)    LIVER FUNCTIONS - ( 16 Jul 2020 07:22 )  Alb: x     / Pro: 5.4 g/dL / ALK PHOS: x     / ALT: x     / AST: x     / GGT: x               Culture - Blood (collected 07-14-20 @ 06:09)  Source: .Blood None  Preliminary Report (07-15-20 @ 13:01):    No growth to date.    Culture - Urine (collected 07-11-20 @ 00:08)  Source: .Urine Suprapubic  Final Report (07-13-20 @ 16:37):    Numerous Pseudomonas aeruginosa (Carbapenem Resistant)    Few Klebsiella pneumoniae ESBL  Organism: Pseudomonas aeruginosa (Carbapenem Resistant)  Klebsiella pneumoniae ESBL (07-14-20 @ 14:52)  Organism: Klebsiella pneumoniae ESBL (07-14-20 @ 14:52)      -  Amikacin: S <=16      -  Amoxicillin/Clavulanic Acid: I 16/8      -  Ampicillin: R >16 These ampicillin results predict results for amoxicillin      -  Ampicillin/Sulbactam: R >16/8 Enterobacter, Citrobacter, and Serratia may develop resistance during prolonged therapy (3-4 days)      -  Aztreonam: R >16      -  Cefazolin: R >16 (MIC_CL_COM_ENTERIC_CEFAZU) For uncomplicated UTI with K. pneumoniae, E. coli, or P. mirablis: SATHYA <=16 is sensitive and SATHYA >=32 is resistant. This also predicts results for oral agents cefaclor, cefdinir, cefpodoxime, cefprozil, cefuroxime axetil, cephalexin and locarbef for uncomplicated UTI. Note that some isolates may be susceptible to these agents while testing resistant to cefazolin.      -  Cefepime: R >16      -  Cefoxitin: S <=8      -  Ceftriaxone: R >32 Enterobacter, Citrobacter, and Serratia may develop resistance during prolonged therapy      -  Ciprofloxacin: S <=0.25      -  Ertapenem: S <=0.5      -  Gentamicin: R >8      -  Imipenem: S <=1      -  Levofloxacin: S <=0.5      -  Meropenem: S <=1      -  Nitrofurantoin: I 64 Should not be used to treat pyelonephritis      -  Piperacillin/Tazobactam: R <=8      -  Tigecycline: S <=2      -  Tobramycin: R >8      -  Trimethoprim/Sulfamethoxazole: R >2/38      Method Type: SATHYA  Organism: Pseudomonas aeruginosa (Carbapenem Resistant) (07-14-20 @ 14:52)      -  Amikacin: S <=16      -  Aztreonam: R >16      -  Cefepime: R >16      -  Ceftazidime: I 16      -  Ceftazidime/Avibactam: R 16      -  Ceftolozane/tazobactam: S <=2      -  Ciprofloxacin: R >2      -  Gentamicin: S 4      -  Imipenem: R 8      -  Levofloxacin: R >4      -  Meropenem: R >8      -  Meropenem/Vaborbactam: 16      -  Piperacillin/Tazobactam: I 32      -  Tobramycin: S <=2      Method Type: SATHYA    Culture - Blood (collected 07-10-20 @ 22:53)  Source: .Blood Blood  Gram Stain (07-12-20 @ 18:28):    Growth in aerobic bottle: Gram Negative Rods  Final Report (07-15-20 @ 09:53):    Growth in aerobic bottle: Pseudomonas aeruginosa (Carbapenem Resistant)    "Due to technical problems, Proteus sp. will Not be reported as part of    the BCID panel until further notice"    ***Blood Panel PCR results on this specimen are available    approximately 3 hours after the Gram stain result.***    Gram stain, PCR, and/or culture results may not always    correspond due to difference in methodologies.    ************************************************************    This PCR assay was performed using [employer].    The following targets are tested for: Enterococcus,    vancomycin resistant enterococci, Listeria monocytogenes,    coagulase negative staphylococci, S. aureus,    methicillin resistant S. aureus, Streptococcus agalactiae    (Group B), S. pneumoniae, S. pyogenes (Group A),    Acinetobacter baumannii, Enterobacter cloacae, E. coli,    Klebsiella oxytoca, K. pneumoniae, Proteus sp.,    Serratia marcescens, Haemophilus influenzae,    Neisseria meningitidis, Pseudomonas aeruginosa, Candida    albicans, C. glabrata, C krusei, C parapsilosis,    C. tropicalis and the KPC resistance gene.  Organism: Blood Culture PCR  Pseudomonas aeruginosa (Carbapenem Resistant) (07-15-20 @ 09:53)  Organism: Pseudomonas aeruginosa (Carbapenem Resistant) (07-15-20 @ 09:53)      -  Amikacin: S <=16      -  Aztreonam: R >16      -  Cefepime: I 16      -  Ceftazidime: S 8      -  Ciprofloxacin: R 2      -  Gentamicin: I 8      -  Imipenem: R >8      -  Levofloxacin: R >4      -  Meropenem: R >8      -  Piperacillin/Tazobactam: I 32      -  Tobramycin: S <=2      Method Type: SATHYA  Organism: Blood Culture PCR (07-15-20 @ 09:53)      -  Pseudomonas aeruginosa: Detec      Method Type: PCR    Culture - Blood (collected 07-10-20 @ 22:53)  Source: .Blood Blood  Final Report (07-16-20 @ 18:01):    No Growth Final            INFECTIOUS DISEASES TESTING  COVID-19 PCR: NotDetec (05-25-20 @ 09:01)  COVID-19 PCR: NotDetec (05-19-20 @ 18:38)      INFLAMMATORY MARKERS      RADIOLOGY & ADDITIONAL TESTS:  I have personally reviewed the last available Chest xray  CXR      CT      CARDIOLOGY TESTING  12 Lead ECG:   Ventricular Rate 63 BPM    Atrial Rate 63 BPM    P-R Interval 98 ms    QRS Duration 170 ms    Q-T Interval 542 ms    QTC Calculation(Bezet) 554 ms    R Axis 247 degrees    T Axis 59 degrees    Diagnosis Line Atrial-sensed ventricular-paced rhythm  Abnormal ECG    Confirmed by DREA OLGUIN MD ([id number]) on 7/14/2020 10:23:29 AM (07-13-20 @ 09:47)  12 Lead ECG:   Ventricular Rate 68 BPM    Atrial Rate 68 BPM    QRS Duration 40 ms    Q-T Interval 450 ms    QTC Calculation(Bezet) 478 ms    P Axis 203 degrees    R Axis 265 degrees    T Axis 24 degrees    Diagnosis Line atrial-sensed ventricular-paced complexes    Confirmed by DREA OLGUIN MD ([id number]) on 7/11/2020 9:29:58 AM (07-11-20 @ 00:01)      MEDICATIONS  aspirin enteric coated 81 Oral daily  atorvastatin 40 Oral at bedtime  brimonidine 0.2% Ophthalmic Solution 1 Both EYES at bedtime  calcitriol   Capsule 0.5 Oral daily  calcium acetate 667 Oral daily  ceftolozane/tazobactam IVPB 375 IV Intermittent <User Schedule>  chlorhexidine 4% Liquid 1 Topical <User Schedule>  dextrose 5%. 1000 IV Continuous <Continuous>  dextrose 50% Injectable 12.5 IV Push once  dextrose 50% Injectable 25 IV Push once  dextrose 50% Injectable 25 IV Push once  hydrALAZINE 50 Oral three times a day  insulin lispro (HumaLOG) corrective regimen sliding scale  SubCutaneous three times a day before meals  insulin lispro (HumaLOG) corrective regimen sliding scale  SubCutaneous at bedtime  latanoprost 0.005% Ophthalmic Solution 1 Both EYES at bedtime  metoprolol tartrate 100 Oral two times a day  NIFEdipine XL 90 Oral daily  pantoprazole    Tablet 40 Oral before breakfast  predniSONE   Tablet 60 Oral daily  sodium bicarbonate 650 Oral three times a day      WEIGHT  Weight (kg): 79.1 (07-11-20 @ 03:11)  Creatinine, Serum: 9.0 mg/dL (07-17-20 @ 05:39)      ANTIBIOTICS:  ceftolozane/tazobactam IVPB 375 milliGRAM(s) IV Intermittent <User Schedule>      All available historical records have been reviewed NATIONROCIO  80y, Male  Allergy: sulfa drugs (Other)      LOS  6d    CHIEF COMPLAINT: ISRRAEL, UTI (17 Jul 2020 08:09)      INTERVAL EVENTS/HPI  - No acute events overnight; still with pelvic pain  - Abs Eos in 700; denies any rash or itching   - T(F): , Max: 97.7 (07-16-20 @ 21:01)  - Denies any worsening symptoms  - Tolerating medication  - WBC Count: 5.62 (07-17-20 @ 05:39)  WBC Count: 10.52 (07-16-20 @ 07:22)     - Creatinine, Serum: 9.0 (07-17-20 @ 05:39)  Creatinine, Serum: 8.6 (07-16-20 @ 07:22)       ROS  General: Denies rigors, nightsweats  HEENT: Denies headache, rhinorrhea, sore throat, eye pain  CV: Denies CP, palpitations  PULM: Denies wheezing, hemoptysis  GI: Denies hematemesis, hematochezia, melena  : Denies discharge, hematuria  MSK: Denies arthralgias, myalgias  SKIN: Denies rash, lesions  NEURO: Denies paresthesias, weakness  PSYCH: Denies depression, anxiety    VITALS:  T(F): 97.6, Max: 97.7 (07-16-20 @ 21:01)  HR: 65  BP: 160/73  RR: 16Vital Signs Last 24 Hrs  T(C): 36.4 (17 Jul 2020 05:03), Max: 36.5 (16 Jul 2020 21:01)  T(F): 97.6 (17 Jul 2020 05:03), Max: 97.7 (16 Jul 2020 21:01)  HR: 65 (17 Jul 2020 05:03) (56 - 65)  BP: 160/73 (17 Jul 2020 05:03) (113/51 - 160/73)  BP(mean): --  RR: 16 (17 Jul 2020 05:03) (16 - 16)  SpO2: --    PHYSICAL EXAM:  Gen: NAD, resting in bed  HEENT: Normocephalic, atraumatic  Neck: supple, no lymphadenopathy  CV: Regular rate & regular rhythm  Lungs: decreased BS at bases, no fremitus  Abdomen: Soft, BS present  Ext: Warm, well perfused  Neuro: non focal, awake  Skin: no rash, no erythema  Lines: no phlebitis    FH: Non-contributory  Social Hx: Non-contributory    TESTS & MEASUREMENTS:                        7.5    5.62  )-----------( 284      ( 17 Jul 2020 05:39 )             22.0     07-17    124<L>  |  85<L>  |  113<HH>  ----------------------------<  189<H>  4.9   |  17  |  9.0<HH>    Ca    6.7<L>      17 Jul 2020 05:39  Phos  8.0     07-17  Mg     1.8     07-17    TPro  5.4<L>  /  Alb  x   /  TBili  x   /  DBili  x   /  AST  x   /  ALT  x   /  AlkPhos  x   07-16    eGFR if Non African American: 5 mL/min/1.73M2 (07-17-20 @ 05:39)  eGFR if African American: 6 mL/min/1.73M2 (07-17-20 @ 05:39)    LIVER FUNCTIONS - ( 16 Jul 2020 07:22 )  Alb: x     / Pro: 5.4 g/dL / ALK PHOS: x     / ALT: x     / AST: x     / GGT: x               Culture - Blood (collected 07-14-20 @ 06:09)  Source: .Blood None  Preliminary Report (07-15-20 @ 13:01):    No growth to date.    Culture - Urine (collected 07-11-20 @ 00:08)  Source: .Urine Suprapubic  Final Report (07-13-20 @ 16:37):    Numerous Pseudomonas aeruginosa (Carbapenem Resistant)    Few Klebsiella pneumoniae ESBL  Organism: Pseudomonas aeruginosa (Carbapenem Resistant)  Klebsiella pneumoniae ESBL (07-14-20 @ 14:52)  Organism: Klebsiella pneumoniae ESBL (07-14-20 @ 14:52)      -  Amikacin: S <=16      -  Amoxicillin/Clavulanic Acid: I 16/8      -  Ampicillin: R >16 These ampicillin results predict results for amoxicillin      -  Ampicillin/Sulbactam: R >16/8 Enterobacter, Citrobacter, and Serratia may develop resistance during prolonged therapy (3-4 days)      -  Aztreonam: R >16      -  Cefazolin: R >16 (MIC_CL_COM_ENTERIC_CEFAZU) For uncomplicated UTI with K. pneumoniae, E. coli, or P. mirablis: SATHYA <=16 is sensitive and SATHYA >=32 is resistant. This also predicts results for oral agents cefaclor, cefdinir, cefpodoxime, cefprozil, cefuroxime axetil, cephalexin and locarbef for uncomplicated UTI. Note that some isolates may be susceptible to these agents while testing resistant to cefazolin.      -  Cefepime: R >16      -  Cefoxitin: S <=8      -  Ceftriaxone: R >32 Enterobacter, Citrobacter, and Serratia may develop resistance during prolonged therapy      -  Ciprofloxacin: S <=0.25      -  Ertapenem: S <=0.5      -  Gentamicin: R >8      -  Imipenem: S <=1      -  Levofloxacin: S <=0.5      -  Meropenem: S <=1      -  Nitrofurantoin: I 64 Should not be used to treat pyelonephritis      -  Piperacillin/Tazobactam: R <=8      -  Tigecycline: S <=2      -  Tobramycin: R >8      -  Trimethoprim/Sulfamethoxazole: R >2/38      Method Type: SATHYA  Organism: Pseudomonas aeruginosa (Carbapenem Resistant) (07-14-20 @ 14:52)      -  Amikacin: S <=16      -  Aztreonam: R >16      -  Cefepime: R >16      -  Ceftazidime: I 16      -  Ceftazidime/Avibactam: R 16      -  Ceftolozane/tazobactam: S <=2      -  Ciprofloxacin: R >2      -  Gentamicin: S 4      -  Imipenem: R 8      -  Levofloxacin: R >4      -  Meropenem: R >8      -  Meropenem/Vaborbactam: 16      -  Piperacillin/Tazobactam: I 32      -  Tobramycin: S <=2      Method Type: SATHYA    Culture - Blood (collected 07-10-20 @ 22:53)  Source: .Blood Blood  Gram Stain (07-12-20 @ 18:28):    Growth in aerobic bottle: Gram Negative Rods  Final Report (07-15-20 @ 09:53):    Growth in aerobic bottle: Pseudomonas aeruginosa (Carbapenem Resistant)    "Due to technical problems, Proteus sp. will Not be reported as part of    the BCID panel until further notice"    ***Blood Panel PCR results on this specimen are available    approximately 3 hours after the Gram stain result.***    Gram stain, PCR, and/or culture results may not always    correspond due to difference in methodologies.    ************************************************************    This PCR assay was performed using [employer].    The following targets are tested for: Enterococcus,    vancomycin resistant enterococci, Listeria monocytogenes,    coagulase negative staphylococci, S. aureus,    methicillin resistant S. aureus, Streptococcus agalactiae    (Group B), S. pneumoniae, S. pyogenes (Group A),    Acinetobacter baumannii, Enterobacter cloacae, E. coli,    Klebsiella oxytoca, K. pneumoniae, Proteus sp.,    Serratia marcescens, Haemophilus influenzae,    Neisseria meningitidis, Pseudomonas aeruginosa, Candida    albicans, C. glabrata, C krusei, C parapsilosis,    C. tropicalis and the KPC resistance gene.  Organism: Blood Culture PCR  Pseudomonas aeruginosa (Carbapenem Resistant) (07-15-20 @ 09:53)  Organism: Pseudomonas aeruginosa (Carbapenem Resistant) (07-15-20 @ 09:53)      -  Amikacin: S <=16      -  Aztreonam: R >16      -  Cefepime: I 16      -  Ceftazidime: S 8      -  Ciprofloxacin: R 2      -  Gentamicin: I 8      -  Imipenem: R >8      -  Levofloxacin: R >4      -  Meropenem: R >8      -  Piperacillin/Tazobactam: I 32      -  Tobramycin: S <=2      Method Type: SATHYA  Organism: Blood Culture PCR (07-15-20 @ 09:53)      -  Pseudomonas aeruginosa: Detec      Method Type: PCR    Culture - Blood (collected 07-10-20 @ 22:53)  Source: .Blood Blood  Final Report (07-16-20 @ 18:01):    No Growth Final            INFECTIOUS DISEASES TESTING  COVID-19 PCR: NotDetec (05-25-20 @ 09:01)  COVID-19 PCR: NotDetec (05-19-20 @ 18:38)      INFLAMMATORY MARKERS      RADIOLOGY & ADDITIONAL TESTS:  I have personally reviewed the last available Chest xray  CXR      CT      CARDIOLOGY TESTING  12 Lead ECG:   Ventricular Rate 63 BPM    Atrial Rate 63 BPM    P-R Interval 98 ms    QRS Duration 170 ms    Q-T Interval 542 ms    QTC Calculation(Bezet) 554 ms    R Axis 247 degrees    T Axis 59 degrees    Diagnosis Line Atrial-sensed ventricular-paced rhythm  Abnormal ECG    Confirmed by DREA OLGUIN MD ([id number]) on 7/14/2020 10:23:29 AM (07-13-20 @ 09:47)  12 Lead ECG:   Ventricular Rate 68 BPM    Atrial Rate 68 BPM    QRS Duration 40 ms    Q-T Interval 450 ms    QTC Calculation(Bezet) 478 ms    P Axis 203 degrees    R Axis 265 degrees    T Axis 24 degrees    Diagnosis Line atrial-sensed ventricular-paced complexes    Confirmed by DREA OLGUIN MD ([id number]) on 7/11/2020 9:29:58 AM (07-11-20 @ 00:01)      MEDICATIONS  aspirin enteric coated 81 Oral daily  atorvastatin 40 Oral at bedtime  brimonidine 0.2% Ophthalmic Solution 1 Both EYES at bedtime  calcitriol   Capsule 0.5 Oral daily  calcium acetate 667 Oral daily  ceftolozane/tazobactam IVPB 375 IV Intermittent <User Schedule>  chlorhexidine 4% Liquid 1 Topical <User Schedule>  dextrose 5%. 1000 IV Continuous <Continuous>  dextrose 50% Injectable 12.5 IV Push once  dextrose 50% Injectable 25 IV Push once  dextrose 50% Injectable 25 IV Push once  hydrALAZINE 50 Oral three times a day  insulin lispro (HumaLOG) corrective regimen sliding scale  SubCutaneous three times a day before meals  insulin lispro (HumaLOG) corrective regimen sliding scale  SubCutaneous at bedtime  latanoprost 0.005% Ophthalmic Solution 1 Both EYES at bedtime  metoprolol tartrate 100 Oral two times a day  NIFEdipine XL 90 Oral daily  pantoprazole    Tablet 40 Oral before breakfast  predniSONE   Tablet 60 Oral daily  sodium bicarbonate 650 Oral three times a day      WEIGHT  Weight (kg): 79.1 (07-11-20 @ 03:11)  Creatinine, Serum: 9.0 mg/dL (07-17-20 @ 05:39)      ANTIBIOTICS:  ceftolozane/tazobactam IVPB 375 milliGRAM(s) IV Intermittent <User Schedule>      All available historical records have been reviewed

## 2020-07-17 NOTE — CONSULT NOTE ADULT - ASSESSMENT
80M PMHx BPH s/p spc, AFib, HTN, CAD s/p stent, HFrEF s/p AICD, CKD III, DM2, h/o TIA here with ISRRAEL c/b metabolic acidosis and UTI.    Plan:   -Right IJ Hamel placed   -F/U Post-placement CXR   -Once confirmed, may be used for HD tomorrow  -Monitor for hematoma/bleeding   -Keep area clean and dry

## 2020-07-17 NOTE — PROCEDURE NOTE - NSPROCDETAILS_GEN_ALL_CORE
ultrasound guidance/lumen(s) aspirated and flushed/sterile dressing applied/sterile technique, catheter placed/guidewire recovered

## 2020-07-17 NOTE — CONSULT NOTE ADULT - SUBJECTIVE AND OBJECTIVE BOX
ROCIO NATION [id number]  80y Male  6d    HPI:  79yo male whose extensive PMH (based on EMR review) includes BPH (SPC 2 months ago), presence of an AICD, atrial fibrillation (has been on DOAC), HTN, CAD (stents), combined systolic and diastolic CHF, CKD- stage 3b, DM, glaucoma DLD and TIA presents to the ER due to lack of urine output all day along with not eating and weakness so bad, he couldn't get out of bed. Adds that he has pain to SPC site present since surgery) not improving with Tylenol (says oxycodone is effective). Says that he is now hungry (11 Jul 2020 02:36)    Reason for Consult: Placement of UDall dialysis catheter for HD tomorrow     PAST MEDICAL & SURGICAL HISTORY:  GERD (gastroesophageal reflux disease): intermittent  H/O ptosis of eyelid: right eye (sometimes wears patch)  Hematuria  Hyperlipidemia  Anemia  Diabetes  Renal insufficiency  Cardiomyopathy  Cholelithiasis  Hydrocele in adult  Glaucoma  BPH (benign prostatic hyperplasia)  CAD (coronary artery disease): CARD STENT X2 4/2018  TIA (transient ischemic attack): X2 JULY 2018  Dementia  CHF (congestive heart failure): COMBINED SYSTOLIC &amp; DIASTOLIC  Afib  Heart attack: 1/2018  Sepsis: 1/18 FROM INFECTED GALLBLADDER  Hypercholesteremia  HTN (hypertension)  DM (diabetes mellitus)  History of suprapubic catheter  H/O flexible sigmoidoscopy: 2015  H/O bilateral cataract extraction: LEFT- 1/18 RIGHT 6 YRS AGO  Encounter for biliary drainage tube placement: 1/2018  H/O coronary angioplasty: WITH STENT X2 (4/2018)  History of prostate surgery: 5/17  AICD (automatic cardioverter/defibrillator) present: [employer]        MEDICATIONS  (STANDING):  aspirin enteric coated 81 milliGRAM(s) Oral daily  atorvastatin 40 milliGRAM(s) Oral at bedtime  brimonidine 0.2% Ophthalmic Solution 1 Drop(s) Both EYES at bedtime  calcitriol   Capsule 0.5 MICROGram(s) Oral daily  calcium acetate 667 milliGRAM(s) Oral daily  ceftolozane/tazobactam IVPB 375 milliGRAM(s) IV Intermittent <User Schedule>  chlorhexidine 4% Liquid 1 Application(s) Topical <User Schedule>  dextrose 5%. 1000 milliLiter(s) (50 mL/Hr) IV Continuous <Continuous>  dextrose 50% Injectable 12.5 Gram(s) IV Push once  dextrose 50% Injectable 25 Gram(s) IV Push once  dextrose 50% Injectable 25 Gram(s) IV Push once  famotidine    Tablet 20 milliGRAM(s) Oral daily  hydrALAZINE 50 milliGRAM(s) Oral three times a day  insulin lispro (HumaLOG) corrective regimen sliding scale   SubCutaneous three times a day before meals  insulin lispro (HumaLOG) corrective regimen sliding scale   SubCutaneous at bedtime  latanoprost 0.005% Ophthalmic Solution 1 Drop(s) Both EYES at bedtime  metoprolol tartrate 100 milliGRAM(s) Oral two times a day  NIFEdipine XL 90 milliGRAM(s) Oral daily  predniSONE   Tablet 60 milliGRAM(s) Oral daily  sodium bicarbonate 650 milliGRAM(s) Oral three times a day    MEDICATIONS  (PRN):  acetaminophen   Tablet .. 650 milliGRAM(s) Oral every 6 hours PRN Mild Pain (1 - 3)  dextrose 40% Gel 15 Gram(s) Oral once PRN Blood Glucose LESS THAN 70 milliGRAM(s)/deciliter  glucagon  Injectable 1 milliGRAM(s) IntraMuscular once PRN Glucose LESS THAN 70 milligrams/deciliter  HYDROmorphone   Tablet 2 milliGRAM(s) Oral every 6 hours PRN abdominal pain  ondansetron Injectable 8 milliGRAM(s) IV Push three times a day PRN Nausea and/or Vomiting    Allergies  sulfa drugs (Other)      REVIEW OF SYSTEMS    [ X ] A ten-point review of systems was otherwise negative except as noted.  [ ] Due to altered mental status/intubation, subjective information were not able to be obtained from the patient. History was obtained, to the extent possible, from review of the chart and collateral sources of information.    Vital Signs Last 24 Hrs  T(C): 35.6 (17 Jul 2020 14:10), Max: 36.5 (16 Jul 2020 21:01)  T(F): 96 (17 Jul 2020 14:10), Max: 97.7 (16 Jul 2020 21:01)  HR: 66 (17 Jul 2020 17:12) (58 - 66)  BP: 129/63 (17 Jul 2020 17:12) (119/56 - 160/73)  RR: 16 (17 Jul 2020 14:10) (16 - 16)    PHYSICAL EXAM:  GENERAL: NAD, well-appearing  CHEST/LUNG: Clear to auscultation bilaterally  HEART: Regular rate and rhythm  ABDOMEN: Soft, Nontender, Nondistended;   EXTREMITIES:  No clubbing, cyanosis, or edema    LABS:    POCT Blood Glucose.: 311 mg/dL (17 Jul 2020 17:03)  POCT Blood Glucose.: 338 mg/dL (17 Jul 2020 11:25)  POCT Blood Glucose.: 233 mg/dL (17 Jul 2020 07:34)  POCT Blood Glucose.: 209 mg/dL (17 Jul 2020 05:42)  POCT Blood Glucose.: 175 mg/dL (16 Jul 2020 21:31)                          7.5    5.62  )-----------( 284      ( 17 Jul 2020 05:39 )             22.0         07-17    124<L>  |  85<L>  |  113<HH>  ----------------------------<  189<H>  4.9   |  17  |  9.0<HH>      Calcium, Total Serum: 6.7 mg/dL (07-17-20 @ 05:39)      LFTs:             5.4  | x    | x        ------------------[x       ( 16 Jul 2020 07:22 )  x    | x    | x             RADIOLOGY & ADDITIONAL STUDIES:  **F/U Post-placement CXR**

## 2020-07-17 NOTE — CHART NOTE - NSCHARTNOTEFT_GEN_A_CORE
Asked to evaluate SPT area. SPT in place (changed by ER 2 days ago as per pt). No evidence of active infection. Some exudate seen and is normal.    Recall if any changes

## 2020-07-17 NOTE — DIETITIAN INITIAL EVALUATION ADULT. - PHYSICAL APPEARANCE
BMI: 25.7. Confused. Last BM 7/12 reported. No N/V/D/C reported at this time. No chewing/swallowing difficulty reported. Skin: Excoriation noted.

## 2020-07-17 NOTE — PROGRESS NOTE ADULT - SUBJECTIVE AND OBJECTIVE BOX
Nephrology progress note    THIS IS AN INCOMPLETE NOTE . FULL NOTE TO FOLLOW SHORTLY    Patient is seen and examined, events over the last 24 h noted .    Allergies:  sulfa drugs (Other)    Hospital Medications:   MEDICATIONS  (STANDING):  apixaban 2.5 milliGRAM(s) Oral two times a day  aspirin enteric coated 81 milliGRAM(s) Oral daily  atorvastatin 40 milliGRAM(s) Oral at bedtime  brimonidine 0.2% Ophthalmic Solution 1 Drop(s) Both EYES at bedtime  calcitriol   Capsule 0.5 MICROGram(s) Oral daily  calcium acetate 667 milliGRAM(s) Oral daily  ceftolozane/tazobactam IVPB 375 milliGRAM(s) IV Intermittent <User Schedule>  chlorhexidine 4% Liquid 1 Application(s) Topical <User Schedule>  dextrose 5%. 1000 milliLiter(s) (50 mL/Hr) IV Continuous <Continuous>  dextrose 50% Injectable 12.5 Gram(s) IV Push once  dextrose 50% Injectable 25 Gram(s) IV Push once  dextrose 50% Injectable 25 Gram(s) IV Push once  hydrALAZINE 50 milliGRAM(s) Oral three times a day  insulin lispro (HumaLOG) corrective regimen sliding scale   SubCutaneous three times a day before meals  insulin lispro (HumaLOG) corrective regimen sliding scale   SubCutaneous at bedtime  latanoprost 0.005% Ophthalmic Solution 1 Drop(s) Both EYES at bedtime  metoprolol tartrate 100 milliGRAM(s) Oral two times a day  NIFEdipine XL 90 milliGRAM(s) Oral daily  pantoprazole    Tablet 40 milliGRAM(s) Oral before breakfast  predniSONE   Tablet 60 milliGRAM(s) Oral daily  sodium bicarbonate 650 milliGRAM(s) Oral three times a day        VITALS:  T(F): 97.6 (20 @ 05:03), Max: 97.7 (20 @ 21:01)  HR: 65 (20 @ 05:03)  BP: 160/73 (20 @ 05:03)  RR: 16 (20 @ 05:03)  SpO2: --  Wt(kg): --    07-15 @ 07:01  -   @ 07:00  --------------------------------------------------------  IN: 0 mL / OUT: 750 mL / NET: -750 mL     @ 07:01  -   @ 07:00  --------------------------------------------------------  IN: 0 mL / OUT: 1000 mL / NET: -1000 mL      2020 07:01  -  2020 07:00  --------------------------------------------------------  IN:  Total IN: 0 mL    OUT:    Indwelling Catheter - Suprapubic: 1000 mL  Total OUT: 1000 mL    Total NET: -1000 mL              PHYSICAL EXAM:  Constitutional: NAD  HEENT: anicteric sclera, oropharynx clear, MMM  Neck: No JVD  Respiratory: CTAB, no wheezes, rales or rhonchi  Cardiovascular: S1, S2, RRR  Gastrointestinal: BS+, soft, NT/ND  Extremities: No cyanosis or clubbing. No peripheral edema  :  No rondon.   Skin: No rashes    LABS:      126<L>  |  86<L>  |  106<HH>  ----------------------------<  122<H>  4.3   |  20  |  8.6<HH>    Ca    6.9<L>      2020 07:22  Phos  7.3     07-15  Mg     1.8     07-15    TPro  5.4<L>  /  Alb      /  TBili      /  DBili      /  AST      /  ALT      /  AlkPhos      -                          7.2    10.52 )-----------( 297      ( 2020 07:22 )             21.5       Urine Studies:  Urinalysis Basic - ( 2020 00:08 )    Color: Yellow / Appearance: Turbid / S.025 / pH:   Gluc:  / Ketone: Trace  / Bili: Negative / Urobili: 0.2 mg/dL   Blood:  / Protein: >=300 mg/dL / Nitrite: Negative   Leuk Esterase: Large / RBC: 11-25 /HPF / WBC >50 /HPF   Sq Epi:  / Non Sq Epi: Few /HPF / Bacteria: Many      Sodium, Random Urine: 34.0 mmoL/L ( @ 16:43)  Creatinine, Random Urine: 53 mg/dL ( @ 16:43)  Sodium, Random Urine: 44.0 mmoL/L ( @ 10:35)  Creatinine, Random Urine: 43 mg/dL ( @ 10:35)  Potassium, Random Urine: 14 mmol/L ( @ 10:35)    RADIOLOGY & ADDITIONAL STUDIES: Nephrology progress note  Patient is seen and examined, events over the last 24 h noted .  complained of pelvic pain   no dysuria no hematuria   UO noted     Allergies:  sulfa drugs (Other)    Hospital Medications:   MEDICATIONS  (STANDING):    apixaban 2.5 milliGRAM(s) Oral two times a day  aspirin enteric coated 81 milliGRAM(s) Oral daily  atorvastatin 40 milliGRAM(s) Oral at bedtime  brimonidine 0.2% Ophthalmic Solution 1 Drop(s) Both EYES at bedtime  calcitriol   Capsule 0.5 MICROGram(s) Oral daily  calcium acetate 667 milliGRAM(s) Oral daily  ceftolozane/tazobactam IVPB 375 milliGRAM(s) IV Intermittent <User Schedule>  dextrose 5%. 1000 milliLiter(s) (50 mL/Hr) IV Continuous <Continuous>  hydrALAZINE 50 milliGRAM(s) Oral three times a day  insulin lispro (HumaLOG) corrective regimen sliding scale   SubCutaneous three times a day before meals  insulin lispro (HumaLOG) corrective regimen sliding scale   SubCutaneous at bedtime  latanoprost 0.005% Ophthalmic Solution 1 Drop(s) Both EYES at bedtime  metoprolol tartrate 100 milliGRAM(s) Oral two times a day  NIFEdipine XL 90 milliGRAM(s) Oral daily  pantoprazole    Tablet 40 milliGRAM(s) Oral before breakfast  predniSONE   Tablet 60 milliGRAM(s) Oral daily  sodium bicarbonate 650 milliGRAM(s) Oral three times a day        VITALS:  T(F): 97.6 (20 @ 05:03), Max: 97.7 (20 @ 21:01)  HR: 65 (20 @ 05:03)  BP: 160/73 (20 @ 05:03)  RR: 16 (20 @ 05:03)      07-15 @ 07:  -   @ 07:00  --------------------------------------------------------  IN: 0 mL / OUT: 750 mL / NET: -750 mL     @ 07:  -   @ 07:00  --------------------------------------------------------  IN: 0 mL / OUT: 1000 mL / NET: -1000 mL      2020 07:01  -  2020 07:00  --------------------------------------------------------  IN:  Total IN: 0 mL    OUT:    Indwelling Catheter - Suprapubic: 1000 mL  Total OUT: 1000 mL    Total NET: -1000 mL              PHYSICAL EXAM:  Constitutional: NAD  HEENT: anicteric sclera, oropharynx clear, MMM  Neck: No JVD  Respiratory: CTAB, no wheezes, rales or rhonchi  Cardiovascular: S1, S2, RRR  Gastrointestinal: positive SPC   Extremities: No cyanosis or clubbing. No peripheral edema  :  No rondon.   Skin: No rashes    LABS:          126<L>  |  86<L>  |  106<HH>  ----------------------------<  122<H>  4.3   |  20  |  8.6<HH>    Ca    6.9<L>      2020 07:22  Phos  7.3     07-15  Mg     1.8     07-15    TPro  5.4<L>  /  Alb      /  TBili      /  DBili      /  AST      /  ALT      /  AlkPhos      -                          7.2    10.52 )-----------( 297      ( 2020 07:22 )             21.5       Urine Studies:  Urinalysis Basic - ( 2020 00:08 )    Color: Yellow / Appearance: Turbid / S.025 / pH:   Gluc:  / Ketone: Trace  / Bili: Negative / Urobili: 0.2 mg/dL   Blood:  / Protein: >=300 mg/dL / Nitrite: Negative   Leuk Esterase: Large / RBC: 11-25 /HPF / WBC >50 /HPF   Sq Epi:  / Non Sq Epi: Few /HPF / Bacteria: Many      Sodium, Random Urine: 34.0 mmoL/L ( @ 16:43)  Creatinine, Random Urine: 53 mg/dL ( @ 16:43)  Sodium, Random Urine: 44.0 mmoL/L ( @ 10:35)  Creatinine, Random Urine: 43 mg/dL ( @ 10:35)  Potassium, Random Urine: 14 mmol/L ( @ 10:35)

## 2020-07-17 NOTE — PROGRESS NOTE ADULT - ASSESSMENT
80M PMHx BPH s/p spc, AFib, HTN, CAD s/p stent, HFrEF s/p AICD, CKD III, DM2, h/o TIA here with ISRRAEL c/b metabolic acidosis and UTI.    #ISRRAEL on CKD III  c/b acidosis; unclear etiology, suspect some component prerenal in setting of infection vs AIN  L hydrouretonephrosis on ct, chronic  Plan:   Cont PO bicarb supplements  Will d/c Pantoprazole and switch to Famotidine   MM workup pending per renal  - plan to start RRT tomorrow per Nephrology , HD catheter placement ASAP- discussed w/ surgery   - will start Prednisone 60mg PO for AIN     #Hyponatremia   # Hypocalcemia   #Hyperphosphatemia   - all 2/2 to renal failure/ volume overload  - replete calcium prn   - plan for dialysis tomorrow     #UTI due to Cabapenem resistant pseudomonas and ESBL klebsiella  c/b pseudomonas bacteremia  bcx pseudomonas carbapenem resistant  ucx pseudomonas carbapenem resistant, kleb esbl  Pending repeat BCx drawn 07/14 - NGTD   Plan:   d/c Amikacin, d/c Meropenem   Started Zerbaxa   - appreciate ID reccs     #BPH  s/p suprapubic catheter    #AFib  eliquis- holding for HD catheter placement   lopressor as below    #HTN  hydralazine 50 tid  lopressor 100 bid  nifedipine 90    #CAD  asa  lipitor 40    #HFrEF  lopressor as above    #DM2  ssi    #H/o TIA  asa  lipitor 40    #DVT ppx  eliquis    #Progress Note Handoff  Pending (specify): will need to initiate RRT per renal reccs   Family discussion: d/w pt regarding treatment for UTI and monitoring renal function  Disposition: Home        Jeannie Li DO 80M PMHx BPH s/p spc, AFib, HTN, CAD s/p stent, HFrEF s/p AICD, CKD III, DM2, h/o TIA here with ISRRAEL c/b metabolic acidosis and UTI.    #ISRRAEL on CKD III  c/b acidosis; unclear etiology, suspect some component prerenal in setting of infection vs AIN  L hydrouretonephrosis on ct, chronic  Plan:   Cont PO bicarb supplements  Will d/c Pantoprazole and switch to Famotidine   MM workup pending per renal  - plan to start RRT tomorrow per Nephrology , HD catheter placement ASAP- discussed w/ surgery   - will start Prednisone 60mg PO for AIN     #Hyponatremia   # Hypocalcemia   #Hyperphosphatemia   - all 2/2 to renal failure/ volume overload  - replete calcium prn   - plan for dialysis tomorrow     #Normocytic Anemia ( chronic ) ( POA )   - likely related to CKD now with progression to renal failure   - CTM     #UTI due to Cabapenem resistant pseudomonas and ESBL klebsiella  c/b pseudomonas bacteremia  bcx pseudomonas carbapenem resistant  ucx pseudomonas carbapenem resistant, kleb esbl  Pending repeat BCx drawn 07/14 - NGTD   Plan:   d/c Amikacin, d/c Meropenem   Started Zerbaxa   - appreciate ID reccs     #BPH  s/p suprapubic catheter    #AFib  eliquis- holding for HD catheter placement   lopressor as below    #HTN  hydralazine 50 tid  lopressor 100 bid  nifedipine 90    #CAD  asa  lipitor 40    #HFrEF  lopressor as above    #DM2  ssi    #H/o TIA  asa  lipitor 40    #DVT ppx  eliquis- holding     #Progress Note Handoff  Pending (specify): will need to initiate RRT tomorrow  Family discussion: d/w pt regarding initiation of HD   Disposition: Home        Jeannie Li DO

## 2020-07-17 NOTE — DIETITIAN INITIAL EVALUATION ADULT. - ENERGY NEEDS
Energy: 7121-8752 kcal/day (MSJx1.2-1.5 AF) - increased d/t plan for HD. Used this equation instead of 30-35 kcal/kg ABW to avoid overestimating kcal needs.    Protein:  g/day (1.2-1.3 g/kg ABW) - increased needs d/t plan to initiate HD, however until HD is initiated, protein restriction is warranted (63-79 g/day using 0.8-1 g/kg ABW)    Fluids: 1 L + urine output or per LIP

## 2020-07-17 NOTE — DIETITIAN INITIAL EVALUATION ADULT. - ADD RECOMMEND
Recommendation: Order Nepro q12hrs (provides total 480 mL/day if both are consumed). If po intake improves (>50% po intake achieved), add Consistent Carbohydrate diet modifier.

## 2020-07-18 NOTE — PROGRESS NOTE ADULT - SUBJECTIVE AND OBJECTIVE BOX
DIAGNOSIS:   HOSPITAL DAY #:    STATUS POST:    POST OPERATIVE DAY #:     Vital Signs Last 24 Hrs  T(C): 35.7 (18 Jul 2020 14:04), Max: 36.2 (18 Jul 2020 05:38)  T(F): 96.3 (18 Jul 2020 14:04), Max: 97.2 (18 Jul 2020 05:38)  HR: 70 (18 Jul 2020 17:11) (70 - 86)  BP: 170/81 (18 Jul 2020 17:11) (170/81 - 195/94)  BP(mean): --  RR: 25 (18 Jul 2020 14:04) (16 - 25)  SpO2: 96% (18 Jul 2020 15:04) (96% - 98%)    SUBJECTIVE: Pt seen    Pain: YES  [ ]   NO [ ]   Nausea: [ ] YES [ ] NO           Vomiting: [ ] YES [ ] NO  Flatus: [ ] YES [ ] NO             Bowel Movement: [ ] YES [ ] NO     Void: [ ]YES [ ]No      PHILIPP DRAINAGE: SIGNIFICANT [ ]   NOT SIGNIFICANT [ ]   NOT APPLICABLE [ ]  YES [ ] NO    General Appearance: Appears well, NAD  Neck: Supple udall site ok no hematoma  Chest: Equal expansion bilaterally, equal breath sounds  CV: Pulse regular presently  Abdomen: Soft [x ] YES [ ]NO  DISTENDED [ ] YES [x ] NO TENDERNESS [ ]YES [ ]NO  INCISIONS: HEALING WELL [ ] YES  [ ] NO ERYTHEMA [ ] YES [ ] NO DRAINAGE [ ] YES  [ ] NO  Extremities: Grossly symmetric, CALF TENDERNESS [ ] YES  [ ] NO      LABS:                        7.0    14.52 )-----------( 478      ( 18 Jul 2020 09:54 )             20.3     07-18    122<L>  |  83<L>  |  117<HH>  ----------------------------<  289<H>  4.2   |  18  |  8.1<HH>    Ca    7.5<L>      18 Jul 2020 09:54  Phos  7.7     07-18  Mg     1.6     07-18    TPro  5.7<L>  /  Alb  3.1<L>  /  TBili  0.3  /  DBili  x   /  AST  15  /  ALT  10  /  AlkPhos  182<H>  07-18    PT/INR - ( 18 Jul 2020 09:54 )   PT: 14.50 sec;   INR: 1.26 ratio         PTT - ( 18 Jul 2020 09:54 )  PTT:30.4 sec        ASSESSMENT:     GOOD POST OP COURSE [ ]  YES  [ ] NO  CONDITION IMPROVING  []  YES  [ ]  NO          PLAN:    CONTINUE PRESENT MANAGEMENT  [ ] YES  [ ] NO

## 2020-07-18 NOTE — GOALS OF CARE CONVERSATION - ADVANCED CARE PLANNING - CONVERSATION DETAILS
D/w pt at bedside re: resuscitation as well as HD. He does not have living will, needs more time to think about decision. Encouraged to d/w wife. Full code for now.
Discussed extensively with patient and wife that in the setting of heart failure and renal failure it is possible that patient may decompensate. Wife states that they have discussed this in the past and Mr. Cutler would not want any extreme measures to be taken including CPR and Intubation. Mr. Cutler agrees that he does not want any extreme intervention.       DNR/ DNI

## 2020-07-18 NOTE — PROGRESS NOTE ADULT - ATTENDING COMMENTS
CXR with evidence of worsening volume overload, Troponin 0.91, EKG with ventricularly paced rhythm no ST changes. Will diurese with ethacrynic acid due to sulfa allergy, and redose as needed. BIPAP as tolerated for pulmonary edema and WOB. Discussed code status with patient and wife, who agree that patient should be DNR DNI in case of decompensation.  Troponinemia is likely 2/2 decompensated heart failure and acute renal failure, will trend.       Jeannie Li, DO

## 2020-07-18 NOTE — CHART NOTE - NSCHARTNOTEFT_GEN_A_CORE
Was alerted, pt is refusing bipap and desaturating. Pt was placed on venturi mask and O2 sat is holding steady at 96% although pt is using accessory muscles and in mild respiratory distress. Pt was seen and examined at bedside, pt is attempting to remove his mask. Pt is A&O x 3 and is adamant and requesting no further intervention to be done including supplemental oxygen. Risks were explained to patient at length of refusing treatment. Pt is lucid and understands risks. Pt is DNR/DNI, spoke to patient's wife at length and updated her on patient's condition. It was agreed that pt is to be made comfortable at this time. Morphine 2mg Q4H prn tachypnea ordered. Will continue to monitor the patient.

## 2020-07-18 NOTE — PROGRESS NOTE ADULT - ASSESSMENT
1)  ISRRAEL on CKD 3 / prerenal leading to ATN vs AIN/ acidosis / hyponatremia   Kidney sono mild left hydro  was on antibx as OP with high eosinophils could be c/w AIN   creat was 1.8 in Jan 2020 with proteinuria 300, suggestive of diabetic nephropathy  - obtain UA and urine protein/creat ratio, UPEP, Urine immunofixation  - SPEP FLC neg  Uremic now - nausea, SOB   HD 1st tx today  2 hrs 2 K bath UF 1 L     FLUID overload/ [id number] - needs diuretics - Ethacrynic acid po if available, otherwise fluid restrict 1 L daily  Nausea - Reglan 5 mg IV qac   on Prednisone now 60 mg qd for 2 weeks.   Will need taper by 10 mg down every 5 days until a dose of 10 mg for 5 days then will stop    High phos - Phoslo increased to 2 caps tid with meals  f/u iPTH, on Calcitriol may cont    ID - on ZOSYN - PLEASE REDUCE DOSE TO 2.25 g q8hrs for HD pt    will follow

## 2020-07-18 NOTE — PROGRESS NOTE ADULT - SUBJECTIVE AND OBJECTIVE BOX
Nephrology progress note    Patient is seen and examined, events over the last 24 h noted .  c/o nausea, SOB, first HD in the am  Allergies:  sulfa drugs (Other)    Hospital Medications:   MEDICATIONS  (STANDING):  apixaban 2.5 milliGRAM(s) Oral every 12 hours  aspirin enteric coated 81 milliGRAM(s) Oral daily  atorvastatin 40 milliGRAM(s) Oral at bedtime  brimonidine 0.2% Ophthalmic Solution 1 Drop(s) Both EYES at bedtime  calcitriol   Capsule 0.5 MICROGram(s) Oral daily  calcium acetate 667 milliGRAM(s) Oral daily  ceftolozane/tazobactam IVPB 375 milliGRAM(s) IV Intermittent <User Schedule>  chlorhexidine 4% Liquid 1 Application(s) Topical <User Schedule>  dextrose 5%. 1000 milliLiter(s) (50 mL/Hr) IV Continuous <Continuous>  dextrose 50% Injectable 12.5 Gram(s) IV Push once  dextrose 50% Injectable 25 Gram(s) IV Push once  dextrose 50% Injectable 25 Gram(s) IV Push once  famotidine    Tablet 20 milliGRAM(s) Oral daily  hydrALAZINE 50 milliGRAM(s) Oral three times a day  insulin lispro (HumaLOG) corrective regimen sliding scale   SubCutaneous three times a day before meals  insulin lispro (HumaLOG) corrective regimen sliding scale   SubCutaneous at bedtime  latanoprost 0.005% Ophthalmic Solution 1 Drop(s) Both EYES at bedtime  metoprolol tartrate 100 milliGRAM(s) Oral two times a day  NIFEdipine XL 90 milliGRAM(s) Oral daily  predniSONE   Tablet 60 milliGRAM(s) Oral daily  sodium bicarbonate 650 milliGRAM(s) Oral three times a day        VITALS:  T(F): 97.2 (07-18-20 @ 05:38), Max: 97.2 (07-18-20 @ 05:38)  HR: 71 (07-18-20 @ 05:38)  BP: 193/90 (07-18-20 @ 08:42)  RR: 18 (07-18-20 @ 08:42)  SpO2: --  Wt(kg): --    07-16 @ 07:01  -  07-17 @ 07:00  --------------------------------------------------------  IN: 0 mL / OUT: 1000 mL / NET: -1000 mL    07-17 @ 07:01  -  07-18 @ 07:00  --------------------------------------------------------  IN: 0 mL / OUT: 200 mL / NET: -200 mL      Height (cm): 175.3 (07-17 @ 17:06)    PHYSICAL EXAM:  Constitutional: NAD  HEENT: anicteric sclera  Neck: No JVD  Respiratory: CTAB, no wheezes, rales or rhonchi  Cardiovascular: S1, S2, RRR  Gastrointestinal: BS+, soft, NT/ND  Extremities: 1+ peripheral edema  Neurological: A/O x 3  : No CVA tenderness. No rondon.   Skin: No rashes  Vascular Access: Rt IJ UDALL    LABS:  07-17    124<L>  |  85<L>  |  113<HH>  ----------------------------<  189<H>  4.9   |  17  |  9.0<HH>    Ca    6.7<L>      17 Jul 2020 05:39  Phos  8.0     07-17  Mg     1.8     07-17                            7.5    5.62  )-----------( 284      ( 17 Jul 2020 05:39 )             22.0       Urine Studies:    Sodium, Random Urine: 34.0 mmoL/L (07-14 @ 16:43)  Creatinine, Random Urine: 53 mg/dL (07-14 @ 16:43)  Sodium, Random Urine: 44.0 mmoL/L (07-13 @ 10:35)  Creatinine, Random Urine: 43 mg/dL (07-13 @ 10:35)  Potassium, Random Urine: 14 mmol/L (07-13 @ 10:35)    RADIOLOGY & ADDITIONAL STUDIES:  < from: Xray Chest 1 View-PORTABLE IMMEDIATE (07.17.20 @ 18:19) >  Cardiomegaly, unchanged.    Opacities overlie both lower lung fields, unchanged.    Left-sided permanent pacemaker and a right IJ line as above.    < end of copied text >  < from: US Kidney and Bladder (07.12.20 @ 09:44) >  RIGHT KIDNEY: Measures 14.5 cm in length. No evidence of hydronephrosis. Vascular flow is demonstrated.    LEFT KIDNEY: Measures 12.2 cm in length. Mild left hydronephrosis. Vascular flow is demonstrated.     URINARY BLADDER: Collapsed around a Rondon catheter    IMPRESSION:    Mild left hydronephrosis. No right hydronephrosis    < end of copied text >

## 2020-07-18 NOTE — PROGRESS NOTE ADULT - SUBJECTIVE AND OBJECTIVE BOX
ROCIO NATION  80y, Male  Allergy: sulfa drugs (Other)    Hospital Day: 7d    Patient seen and examined this morning. This am, patient with significant nausea prior to dialysis, and appeared dyspneic due to discomfort. Nursing reports that patient had significant abdominal pain overnight, and subsequently had a very large bowel movement with improvement in pain. Patient was seen again after HD session, and reported improvement in nausea and abdominal pain after receiving anti-emetic, but continues to endorse shortness of breath. Placed on 4L O2 for comfort, but continuing to breathe through mouth instead of nose, so switched to Venti mask to improve compliance with supplemental O2. Saturating 95% per nursing report.    PMH/PSH:  PAST MEDICAL & SURGICAL HISTORY:  GERD (gastroesophageal reflux disease): intermittent  H/O ptosis of eyelid: right eye (sometimes wears patch)  Hematuria  Hyperlipidemia  Anemia  Diabetes  Renal insufficiency  Cardiomyopathy  Cholelithiasis  Hydrocele in adult  Glaucoma  BPH (benign prostatic hyperplasia)  CAD (coronary artery disease): CARD STENT X2 4/2018  TIA (transient ischemic attack): X2 JULY 2018  Dementia  CHF (congestive heart failure): COMBINED SYSTOLIC &amp; DIASTOLIC  Afib  Heart attack: 1/2018  Sepsis: 1/18 FROM INFECTED GALLBLADDER  Hypercholesteremia  HTN (hypertension)  DM (diabetes mellitus)  History of suprapubic catheter  H/O flexible sigmoidoscopy: 2015  H/O bilateral cataract extraction: LEFT- 1/18 RIGHT 6 YRS AGO  Encounter for biliary drainage tube placement: 1/2018  H/O coronary angioplasty: WITH STENT X2 (4/2018)  History of prostate surgery: 5/17  AICD (automatic cardioverter/defibrillator) present: [employer]      LAST 24-Hr EVENTS:    VITALS:  T(F): 96.6 (07-18-20 @ 11:50), Max: 97.2 (07-18-20 @ 05:38)  HR: 86 (07-18-20 @ 11:50)  BP: 188/95 (07-18-20 @ 11:50) (119/56 - 193/90)  RR: 20 (07-18-20 @ 11:50)  SpO2: 98% (07-18-20 @ 11:17)        TESTS & MEASUREMENTS:  Weight (Kg):       07-16-20 @ 07:01  -  07-17-20 @ 07:00  --------------------------------------------------------  IN: 0 mL / OUT: 1000 mL / NET: -1000 mL    07-17-20 @ 07:01  -  07-18-20 @ 07:00  --------------------------------------------------------  IN: 0 mL / OUT: 200 mL / NET: -200 mL    07-18-20 @ 07:01  -  07-18-20 @ 12:34  --------------------------------------------------------  IN: 0 mL / OUT: 1000 mL / NET: -1000 mL                            7.0    14.52 )-----------( 478      ( 18 Jul 2020 09:54 )             20.3     PT/INR - ( 18 Jul 2020 09:54 )   PT: 14.50 sec;   INR: 1.26 ratio         PTT - ( 18 Jul 2020 09:54 )  PTT:30.4 sec  07-18    122<L>  |  83<L>  |  117<HH>  ----------------------------<  289<H>  4.2   |  18  |  8.1<HH>    Ca    7.5<L>      18 Jul 2020 09:54  Phos  7.7     07-18  Mg     1.6     07-18    TPro  5.7<L>  /  Alb  3.1<L>  /  TBili  0.3  /  DBili  x   /  AST  15  /  ALT  10  /  AlkPhos  182<H>  07-18    LIVER FUNCTIONS - ( 18 Jul 2020 09:54 )  Alb: 3.1 g/dL / Pro: 5.7 g/dL / ALK PHOS: 182 U/L / ALT: 10 U/L / AST: 15 U/L / GGT: x                 Culture - Blood (collected 07-14-20 @ 06:09)  Source: .Blood None  Preliminary Report (07-15-20 @ 13:01):    No growth to date.                    RADIOLOGY, ECG, & ADDITIONAL TESTS:      RECENT DIAGNOSTIC ORDERS:  Iron with Total Binding Capacity: Routine  Addl Info: To be drawn pre-dialysis (07-18-20 @ 08:33)  Ferritin, Serum: Routine  Addl Info: To be drawn pre-dialysis (07-18-20 @ 08:33)  Parathyroid Hormone Intact, Serum: Routine  Addl Info: To be drawn pre-dialysis (07-18-20 @ 08:33)  Hepatitis C Antibody Screen: Routine  Addl Info: To be drawn pre-dialysis (07-18-20 @ 08:33)  Xray Chest 1 View-PORTABLE IMMEDIATE: IMMEDIATE   Indication: shortness of breath  Transport: Portable (07-18-20 @ 11:45)  12 Lead ECG (07-18-20 @ 12:03)  Troponin T, Serum: STAT (07-18-20 @ 12:12)  12 Lead ECG:   Addl Info: PLS FAX TO [phone]  Provider's Contact #: [phone] (07-18-20 @ 12:22)  Creatine Kinase, Serum: STAT (07-18-20 @ 12:22)  CKMB Units: STAT (07-18-20 @ 12:22)  Troponin T, Serum: STAT (07-18-20 @ 12:22)  Diet, Renal Restrictions:   For patients receiving Renal Replacement - No Protein Restr, No Conc K, No Conc Phos, Low Sodium  1000mL Fluid Restriction ([id number]) (07-18-20 @ 12:25)  Complete Blood Count: AM Sched. Collection: 19-Jul-2020 04:30 (07-18-20 @ 12:29)  Basic Metabolic Panel: AM Sched. Collection: 19-Jul-2020 04:30 (07-18-20 @ 12:29)  Magnesium, Serum: AM Sched. Collection: 19-Jul-2020 04:30 (07-18-20 @ 12:29)  Phosphorus Level, Serum: AM Sched. Collection: 19-Jul-2020 04:30 (07-18-20 @ 12:29)  Urinalysis: Routine (07-18-20 @ 12:32)  Protein/Creatinine Ratio, Urine: Routine (07-18-20 @ 12:32)  Immunofixation, Urine: Routine (07-18-20 @ 12:32)      MEDICATIONS:  MEDICATIONS  (STANDING):  ALBUTerol    0.083%. 2.5 milliGRAM(s) Nebulizer once  apixaban 2.5 milliGRAM(s) Oral every 12 hours  aspirin enteric coated 81 milliGRAM(s) Oral daily  atorvastatin 40 milliGRAM(s) Oral at bedtime  brimonidine 0.2% Ophthalmic Solution 1 Drop(s) Both EYES at bedtime  calcitriol   Capsule 0.5 MICROGram(s) Oral daily  calcium acetate 1334 milliGRAM(s) Oral three times a day with meals  calcium gluconate IVPB 1 Gram(s) IV Intermittent once  ceftolozane/tazobactam IVPB 375 milliGRAM(s) IV Intermittent <User Schedule>  chlorhexidine 4% Liquid 1 Application(s) Topical <User Schedule>  dextrose 5%. 1000 milliLiter(s) (50 mL/Hr) IV Continuous <Continuous>  dextrose 50% Injectable 12.5 Gram(s) IV Push once  dextrose 50% Injectable 25 Gram(s) IV Push once  dextrose 50% Injectable 25 Gram(s) IV Push once  ethacrynic acid 50 milliGRAM(s) Oral once  famotidine    Tablet 20 milliGRAM(s) Oral daily  hydrALAZINE 50 milliGRAM(s) Oral three times a day  insulin lispro (HumaLOG) corrective regimen sliding scale   SubCutaneous three times a day before meals  insulin lispro (HumaLOG) corrective regimen sliding scale   SubCutaneous at bedtime  latanoprost 0.005% Ophthalmic Solution 1 Drop(s) Both EYES at bedtime  magnesium sulfate  IVPB 1 Gram(s) IV Intermittent once  metoprolol tartrate 100 milliGRAM(s) Oral two times a day  NIFEdipine XL 90 milliGRAM(s) Oral daily  predniSONE   Tablet 60 milliGRAM(s) Oral daily  sodium bicarbonate 650 milliGRAM(s) Oral three times a day    MEDICATIONS  (PRN):  acetaminophen   Tablet .. 650 milliGRAM(s) Oral every 6 hours PRN Mild Pain (1 - 3)  dextrose 40% Gel 15 Gram(s) Oral once PRN Blood Glucose LESS THAN 70 milliGRAM(s)/deciliter  glucagon  Injectable 1 milliGRAM(s) IntraMuscular once PRN Glucose LESS THAN 70 milligrams/deciliter  HYDROmorphone   Tablet 2 milliGRAM(s) Oral every 6 hours PRN abdominal pain  ondansetron Injectable 8 milliGRAM(s) IV Push three times a day PRN Nausea and/or Vomiting      HOME MEDICATIONS:  Alphagan P 0.1% ophthalmic solution (05-19)  apixaban 2.5 mg oral tablet (05-23)  Aspir 81 oral delayed release tablet (05-19)  atorvastatin 40 mg oral tablet (05-19)  ciprofloxacin 250 mg oral tablet (05-27)  hydrALAZINE 50 mg oral tablet (05-27)  latanoprost 0.005% ophthalmic solution (05-19)  lidocaine 2% topical gel with applicator (05-27)  metoprolol tartrate 100 mg oral tablet (05-27)  NIFEdipine 90 mg oral tablet, extended release (05-27)  oxyCODONE 5 mg oral tablet (05-19)  pantoprazole 40 mg oral delayed release tablet (05-19)  sodium bicarbonate 650 mg oral tablet (05-19)      PHYSICAL EXAM:  GENERAL: A&O x3, in mild respiratory distress  HNENT: EOMI, PERRLA    NECK: No LAD/swelling  CHEST/LUNG:  no appreciable crackles, slight wheeze in left lateral lung field, breath sounds appreciated throughout. Trachea not deviated, no inspiratory stridor.   HEART: RRR, No murmurs  ABDOMEN: Soft, mild ttp in suprapubic region, macerated skin near SPC site  EXTREMITIES:  Warm well perfused, 1+ peripheral edema in b/l LE

## 2020-07-18 NOTE — PROGRESS NOTE ADULT - ASSESSMENT
80M PMHx BPH s/p spc, AFib, HTN, CAD s/p stent, HFrEF s/p AICD, CKD III, DM2, h/o TIA here with ISRRAEL c/b metabolic acidosis and UTI.    #ISRRAEL on CKD III  c/b acidosis; unclear etiology, suspect some component prerenal in setting of infection vs AIN  L hydrouretonephrosis on ct, chronic  SPEP FLC negative   UPEP Urine Immunofixation, Urine protein/creatinine pending   Plan:   Cont PO bicarb supplements  Will d/c Pantoprazole and switch to Famotidine   MM workup pending per renal  - started RRT on 7/18, will need Tesio catheter placed if no signs of renal recovery on prednisone  - Prednisone 60mg PO qd for 2 weeks, Will need taper by 10 mg down every 5 days until a dose of 10 mg for 5 days then will stop    #Dyspnea   # Volume Overload   - dyspnea is likely 2/2 volume overload in the setting of progressive renal failure w/ hx of HFrEF   - will get 3d dialysis initiation   - additional 1x dose of PO ethacrynic acid today per nephrology reccs   - CXR pending  - Venti Mask for comfort, but can consider Bipap if evidence of pulmonary edema on CXR   - will order trop/ ekg to rule out cardiac etiology for dyspnea  - low suspicion for thromboembolism, nontachycardic, and pt on Eliquis   - Anemia ( hgb 7 ) may also be contributing     #New Leukocytosis   - likely in the setting of prednisone   - afebrile but does have new O2 requirement   - CXR pending   - cont Zerbaxa     #Hyponatremia   # Hypocalcemia   #Hyperphosphatemia   - all 2/2 to renal failure/ volume overload  - replete calcium prn   - dialysis initiation x3d     #Normocytic Anemia ( chronic ) ( POA )   - likely related to CKD now with progression to renal failure   - Hgb 7 today, may require transfusion soon   - CTM     #UTI due to Cabapenem resistant pseudomonas and ESBL klebsiella  c/b pseudomonas bacteremia  bcx pseudomonas carbapenem resistant  ucx pseudomonas carbapenem resistant, kleb esbl  Pending repeat BCx drawn 07/14 - NGTD   Plan:   d/c Amikacin, d/c Meropenem   Started Zerbaxa   - appreciate ID reccs     #BPH  s/p suprapubic catheter  - SPC evaluated by Urology PA, no evidence of infection    #AFib  - held 1 dose of eliquis for HD cath placement   - restarted today   lopressor as below    #HTN  hydralazine 50 tid  lopressor 100 bid  nifedipine 90    #CAD  asa  lipitor 40    #HFrEF  lopressor as above  - consider repeat echo if symptoms progress    #DM2  ssi    #H/o TIA  asa  lipitor 40    #DVT ppx  eliquis    #Progress Note Handoff  Pending (specify): initiation of HD   Family discussion: d/w pt regarding initiation of HD   Disposition: Lukasz Li, DO

## 2020-07-19 NOTE — PROGRESS NOTE ADULT - SUBJECTIVE AND OBJECTIVE BOX
ROCIO NATION  80y, Male  Allergy: sulfa drugs (Other)    Hospital Day: 8d    Patient seen and examined. Overnight patient with worsening respiratory decompensation eventually requiring 100% BiPAP, and continuing to saturate in the low 80's. Patient continues to endorse that he wants to be DNI.  Discussed with family extensively that despite further interventions there is no guarantee that patient will improve, and that interventions such as pursuing hemodialysis for volume removal may be successful but will likely be uncomfortable for the patient. Discussed with patient and family together, they all agreed that they would not like to pursue any more aggressive measures if there is no certainty that patient will recover and/or have a good quality of life. Patient preferred to be taken off BiPAP with the understanding that it would hasten his death. After patient was taken off BiPAP mask, oxygen saturation dropped to 50 %. Family and patient agreed that they wanted to pursue comfort measures only. All vital sign monitoring will be discontinued, in addition to unnecessary medications and labs. Patient will be provided Morphine gtt for pain control and air hunger.     PMH/PSH:  PAST MEDICAL & SURGICAL HISTORY:  GERD (gastroesophageal reflux disease): intermittent  H/O ptosis of eyelid: right eye (sometimes wears patch)  Hematuria  Hyperlipidemia  Anemia  Diabetes  Renal insufficiency  Cardiomyopathy  Cholelithiasis  Hydrocele in adult  Glaucoma  BPH (benign prostatic hyperplasia)  CAD (coronary artery disease): CARD STENT X2 2018  TIA (transient ischemic attack): X2 2018  Dementia  CHF (congestive heart failure): COMBINED SYSTOLIC &amp; DIASTOLIC  Afib  Heart attack: 2018  Sepsis:  FROM INFECTED GALLBLADDER  Hypercholesteremia  HTN (hypertension)  DM (diabetes mellitus)  History of suprapubic catheter  H/O flexible sigmoidoscopy:   H/O bilateral cataract extraction: LEFT-  RIGHT 6 YRS AGO  Encounter for biliary drainage tube placement: 2018  H/O coronary angioplasty: WITH STENT X2 (2018)  History of prostate surgery:   AICD (automatic cardioverter/defibrillator) present: [employer]      LAST 24-Hr EVENTS:    VITALS:  T(F): 97.1 (20 @ 05:15), Max: 97.4 (20 @ 22:15)  HR: 76 (20 @ 08:57)  BP: 168/78 (20 @ 05:15) (168/78 - 184/88)  RR: 20 (20 @ 05:15)  SpO2: 83% (20 @ 08:57)        TESTS & MEASUREMENTS:  Weight (Kg):       20 @ 07:01  -  20 @ 07:00  --------------------------------------------------------  IN: 0 mL / OUT: 200 mL / NET: -200 mL    20 @ 07:01  -  20 @ 07:00  --------------------------------------------------------  IN: 0 mL / OUT: 1500 mL / NET: -1500 mL                            7.3    13.19 )-----------( 306      ( 2020 11:19 )             21.5     PT/INR - ( 2020 09:54 )   PT: 14.50 sec;   INR: 1.26 ratio         PTT - ( 2020 09:54 )  PTT:30.4 sec      x   |  x   |  103<HH>  ----------------------------<  x   x    |  x   |  x     Ca    8.0<L>      2020 07:26  Phos  8.4       Mg     2.0         TPro  5.7<L>  /  Alb  3.1<L>  /  TBili  0.3  /  DBili  x   /  AST  15  /  ALT  10  /  AlkPhos  182<H>  18    LIVER FUNCTIONS - ( 2020 09:54 )  Alb: 3.1 g/dL / Pro: 5.7 g/dL / ALK PHOS: 182 U/L / ALT: 10 U/L / AST: 15 U/L / GGT: x           CARDIAC MARKERS ( 2020 07:26 )  x     / 0.87 ng/mL / 96 U/L / x     / 6.8 ng/mL  CARDIAC MARKERS ( 2020 19:17 )  x     / 0.86 ng/mL / 112 U/L / x     / 6.8 ng/mL  CARDIAC MARKERS ( 2020 12:39 )  x     / 0.91 ng/mL / 104 U/L / x     / 5.4 ng/mL        Culture - Blood (collected 20 @ 06:09)  Source: .Blood None  Preliminary Report (07-15-20 @ 13:01):    No growth to date.      Urinalysis Basic - ( 2020 12:32 )    Color: Yellow / Appearance: Slightly Cloudy / S.020 / pH: x  Gluc: x / Ketone: Negative  / Bili: Negative / Urobili: 0.2 mg/dL   Blood: x / Protein: 100 mg/dL / Nitrite: Negative   Leuk Esterase: Large / RBC: 6-10 /HPF / WBC 26-50 /HPF   Sq Epi: x / Non Sq Epi: x / Bacteria: Many          Ferritin, Serum: 635 ng/mL (20 @ 09:55)          RADIOLOGY, ECG, & ADDITIONAL TESTS:  12 Lead ECG:   Ventricular Rate 144 BPM    Atrial Rate 84 BPM    QRS Duration 186 ms    Q-T Interval 480 ms    QTC Calculation(Bezet) 743 ms    R Axis 246 degrees    T Axis 80 degrees    Diagnosis Line Ventricular-paced rhythm  Confirmed by DREA OLGUIN MD ([id number]) on 2020 7:39:53 AM (20 @ 14:12)  12 Lead ECG:   Ventricular Rate 106 BPM    Atrial Rate 93 BPM    QRS Duration 186 ms    Q-T Interval 464 ms    QTC Calculation(Bezet) 616 ms    P Axis 122 degrees    R Axis 246 degrees    T Axis 70 degrees    Diagnosis Line Ventricular-paced rhythm  Confirmed by DREA OLGUNI MD [id number]) on 2020 7:39:29 AM (20 @ 14:10)      RECENT DIAGNOSTIC ORDERS:      MEDICATIONS:  MEDICATIONS  (STANDING):  chlorhexidine 4% Liquid 1 Application(s) Topical <User Schedule>  dextrose 5%. 1000 milliLiter(s) (50 mL/Hr) IV Continuous <Continuous>  dextrose 50% Injectable 12.5 Gram(s) IV Push once  dextrose 50% Injectable 25 Gram(s) IV Push once  dextrose 50% Injectable 25 Gram(s) IV Push once  insulin lispro (HumaLOG) corrective regimen sliding scale   SubCutaneous three times a day before meals  insulin lispro (HumaLOG) corrective regimen sliding scale   SubCutaneous at bedtime    MEDICATIONS  (PRN):  dextrose 40% Gel 15 Gram(s) Oral once PRN Blood Glucose LESS THAN 70 milliGRAM(s)/deciliter  glucagon  Injectable 1 milliGRAM(s) IntraMuscular once PRN Glucose LESS THAN 70 milligrams/deciliter  HYDROmorphone  Injectable 0.5 milliGRAM(s) IV Push every 30 minutes PRN dyspnea/pain  ondansetron Injectable 8 milliGRAM(s) IV Push three times a day PRN Nausea and/or Vomiting      HOME MEDICATIONS:  Alphagan P 0.1% ophthalmic solution ()  apixaban 2.5 mg oral tablet ()  Aspir 81 oral delayed release tablet ()  atorvastatin 40 mg oral tablet ()  ciprofloxacin 250 mg oral tablet ()  hydrALAZINE 50 mg oral tablet ()  latanoprost 0.005% ophthalmic solution ()  lidocaine 2% topical gel with applicator ()  metoprolol tartrate 100 mg oral tablet ()  NIFEdipine 90 mg oral tablet, extended release ()  oxyCODONE 5 mg oral tablet ()  pantoprazole 40 mg oral delayed release tablet ()  sodium bicarbonate 650 mg oral tablet ()      PHYSICAL EXAM:  GENERAL: A&O x3, moderate respiratory distress   HNENT: EOMI, PERRLA    NECK: No LAD/swelling  CHEST/LUNG: Ronchi, crackles throughout lung fields, wheezing noted anteriorally.  HEART: RRR, No murmurs  ABDOMEN: Soft, mild ttp in suprapubic region, macerated skin near SPC site  EXTREMITIES:  Warm well perfused, 1-2+ peripheral edema

## 2020-07-19 NOTE — PROGRESS NOTE ADULT - ASSESSMENT
1)  ISRRAEL on CKD 3 - due to ATN vs AIN/ with  acidosis / hyponatremia   Pt was dialyzed yesterday 1 L UF, made 500 cc of urine  Still very SOB, pulmonary edema on CXR, on BiPAP, O2Sat 83 pO2 51 - pt is on BiPAP, DNI  Pt NEEDS ANOTHER HD today for fluid overload, but family wants to wait until they see the pt and decide on further HD  - I explained to them that this is potentially reversible ISRRAEL and fluid overload but will take a few days to get under control  - they asked me NOT to proceed with HD today until they see the pt  W/U showed :Kidney sono mild left hydro  was on antibx as OP with high eosinophils could be c/w AIN   creat was 1.8 in Jan 2020 with proteinuria 300, suggestive of diabetic nephropathy  - obtain UA and urine protein/creat ratio, UPEP, Urine immunofixation  - SPEP FLC neg    FLUID overload/ [id number] - needs diuretics - ? allergy to LAsix, as per wife pt was taking LAsix  - if so, start Lasix 80 mg IV q12 hr   Nausea - Reglan 5 mg IV qac   on Prednisone now 60 mg qd for 2 weeks.   Will need taper by 10 mg down every 5 days until a dose of 10 mg for 5 days then will stop    High phos - on Phoslo 2 caps tid with meals  f/u iPTH, on Calcitriol may cont    ID - On Ceftolozane dose reduced (not studied in CKD5)    D/W Dr Pillai and Wife at length.  Risks and benefits of HD explained, they understand the risk of withholding HD and worsening respiratory status.  Will follow

## 2020-07-19 NOTE — PROGRESS NOTE ADULT - SUBJECTIVE AND OBJECTIVE BOX
Patient is seen and examined at the bed side, is afebrile. The repeat Blood culture from  remains negative.  The Leukocytosis trended down to normal.         REVIEW OF SYSTEMS: All other review systems are negative      Vital Signs Last 24 Hrs  T(C): 36.2 (2020 05:15), Max: 36.3 (2020 22:15)  T(F): 97.1 (2020 05:15), Max: 97.4 (2020 22:15)  HR: 76 (2020 08:57) (63 - 88)  BP: 168/78 (2020 05:15) (168/78 - 195/94)  BP(mean): --  RR: 20 (2020 05:15) (20 - 25)  SpO2: 83% (2020 08:57) (83% - 98%)      PHYSICAL EXAM:  GENERAL: Not in distress  CHEST/LUNG: Not using accessory muscles  HEART: s1 and s2 present   ABDOMEN: Nontender and  Nondistended  EXTREMITIES: No pedal edema  CNS: Awake and Alert          MEDICATIONS  (STANDING):    apixaban 2.5 milliGRAM(s) Oral every 12 hours  aspirin enteric coated 81 milliGRAM(s) Oral daily  atorvastatin 40 milliGRAM(s) Oral at bedtime  brimonidine 0.2% Ophthalmic Solution 1 Drop(s) Both EYES at bedtime  calcitriol   Capsule 0.5 MICROGram(s) Oral daily  calcium acetate 1334 milliGRAM(s) Oral three times a day with meals  ceftolozane/tazobactam IVPB 375 milliGRAM(s) IV Intermittent <User Schedule>  chlorhexidine 4% Liquid 1 Application(s) Topical <User Schedule>  dextrose 5%. 1000 milliLiter(s) (50 mL/Hr) IV Continuous <Continuous>  dextrose 50% Injectable 12.5 Gram(s) IV Push once  dextrose 50% Injectable 25 Gram(s) IV Push once  dextrose 50% Injectable 25 Gram(s) IV Push once  famotidine    Tablet 20 milliGRAM(s) Oral daily  hydrALAZINE 50 milliGRAM(s) Oral three times a day  insulin lispro (HumaLOG) corrective regimen sliding scale   SubCutaneous three times a day before meals  insulin lispro (HumaLOG) corrective regimen sliding scale   SubCutaneous at bedtime  latanoprost 0.005% Ophthalmic Solution 1 Drop(s) Both EYES at bedtime  metoprolol tartrate 100 milliGRAM(s) Oral two times a day  NIFEdipine XL 90 milliGRAM(s) Oral daily  predniSONE   Tablet 60 milliGRAM(s) Oral daily  sodium bicarbonate 650 milliGRAM(s) Oral three times a day        Allergies    sulfa drugs (Other)          LABS:                        7.5    15.97 )-----------( 367      ( 2020 07:26 )             21.9                           7.2    10.52 )-----------( 297      ( 2020 07:22 )             21.5         07-18    122<L>  |  83<L>  |  117<HH>  ----------------------------<  289<H>  4.2   |  18  |  8.1<HH>    Ca    7.5<L>      2020 09:54  Phos  7.7     07-18  Mg     1.6     0718    TPro  5.7<L>  /  Alb  3.1<L>  /  TBili  0.3  /  DBili  x   /  AST  15  /  ALT  10  /  AlkPhos  182<H>      07-16    126<L>  |  86<L>  |  106<HH>  ----------------------------<  122<H>  4.3   |  20  |  8.6<HH>    Ca    6.9<L>      2020 07:22  Phos  7.3     07-15  Mg     1.8     07-15    07-12    131<L>  |  94<L>  |  116<HH>  ----------------------------<  136<H>  4.2   |  16<L>  |  9.5<HH>    Ca    6.0<L>      2020 07:11  Phos  6.7     07-12  Mg     1.0     07-12    TPro  5.6<L>  /  Alb  3.2<L>  /  TBili  0.4  /  DBili  x   /  AST  11  /  ALT  26  /  AlkPhos  275<H>  0711    PT/INR - ( 10 Jul 2020 22:53 )   PT: 14.70 sec;   INR: 1.28 ratio       PTT - ( 10 Jul 2020 22:53 )  PTT:29.5 sec        Urinalysis Basic - ( 2020 00:08 )  Color: Yellow / Appearance: Turbid / S.025 / pH: x  Gluc: x / Ketone: Trace  / Bili: Negative / Urobili: 0.2 mg/dL   Blood: x / Protein: >=300 mg/dL / Nitrite: Negative   Leuk Esterase: Large / RBC: 11-25 /HPF / WBC >50 /HPF   Sq Epi: x / Non Sq Epi: Few /HPF / Bacteria: Many        CAPILLARY BLOOD GLUCOSE  POCT Blood Glucose.: 207 mg/dL (2020 11:28)  POCT Blood Glucose.: 141 mg/dL (2020 07:44)  POCT Blood Glucose.: 215 mg/dL (2020 22:14)  POCT Blood Glucose.: 224 mg/dL (2020 20:40)  POCT Blood Glucose.: 190 mg/dL (2020 16:53)          RADIOLOGY & ADDITIONAL TESTS:    < from: Xray Chest 1 View-PORTABLE IMMEDIATE (20 @ 13:08) >  Cardiomegaly, unchanged.    Opacities overlie both lower lung fields, increased    Left-sided permanent pacemaker and a right IJ line as above.    < end of copied text >      < from: US Kidney and Bladder (20 @ 09:44) >Mild left hydronephrosis. No right hydronephrosis    < from: CT Abdomen and Pelvis No Cont (20 @ 02:59) >    Trace locules of air within the prostate and right seminal vesicle. This finding can represents prostatitis as well as sequela of recent intervention.    Unchanged appearance of the dilated left ureter with associated wall thickening. Superimposed infectious process is not excluded.    Partially visualized small bilateral pleural effusions and airspace consolidation predominantly in the left. Findings can represent early pneumonia in the appropriate clinical setting.    Cholelithiasis and unchanged choledocholithiasis within the common bile duct.          MICROBIOLOGY DATA:    Urine Microscopic-Add On (NC) (20 @ 12:32)    Bacteria: Many    Comment - Urine: Yeast - Loaded    Red Blood Cell - Urine: 6-10 /HPF    White Blood Cell - Urine: 26-50 /HPF        Culture - Blood in AM (20 @ 06:09)    Specimen Source: .Blood None    Culture Results:   No growth to date.        Culture - Urine (20 @ 00:08)    -  Ceftriaxone: R >32 Enterobacter, Citrobacter, and Serratia may develop resistance during prolonged therapy    -  Ciprofloxacin: R >2    -  Ciprofloxacin: S <=0.25    -  Ertapenem: S <=0.5    -  Amikacin: S <=16    -  Amikacin: S <=16    -  Amoxicillin/Clavulanic Acid: I 16/8    -  Ampicillin: R >16 These ampicillin results predict results for amoxicillin    -  Ampicillin/Sulbactam: R >16/8 Enterobacter, Citrobacter, and Serratia may develop resistance during prolonged therapy (3-4 days)    -  Aztreonam: R >16    -  Aztreonam: R >16    -  Cefazolin: R >16 ([email or web address]) For uncomplicated UTI with K. pneumoniae, E. coli, or P. mirablis: SATHYA <=16 is sensitive and SATHYA >=32 is resistant. This also predicts results for oral agents cefaclor, cefdinir, cefpodoxime, cefprozil, cefuroxime axetil, cephalexin and locarbef for uncomplicated UTI. Note that some isolates may be susceptible to these agents while testing resistant to cefazolin.    -  Cefepime: R >16    -  Cefepime: R >16    -  Cefoxitin: S <=8    -  Ceftazidime: I 16    -  Gentamicin: S 4    -  Gentamicin: R >8    -  Imipenem: R 8    -  Imipenem: S <=1    -  Levofloxacin: R >4    -  Levofloxacin: S <=0.5    -  Meropenem: R >8    -  Meropenem: S <=1    -  Nitrofurantoin: I 64 Should not be used to treat pyelonephritis    -  Piperacillin/Tazobactam: I 32    -  Piperacillin/Tazobactam: R <=8    -  Tigecycline: S <=2    -  Tobramycin: S <=2    -  Tobramycin: R >8    -  Trimethoprim/Sulfamethoxazole: R >2/38    Specimen Source: .Urine Suprapubic    Culture Results:   Numerous Pseudomonas aeruginosa (Carbapenem Resistant)  Few Klebsiella pneumoniae ESBL    Organism Identification: Pseudomonas aeruginosa (Carbapenem Resistant)  Klebsiella pneumoniae ESBL    Organism: Pseudomonas aeruginosa (Carbapenem Resistant)    Organism: Klebsiella pneumoniae ESBL    Method Type: SATHYA    Method Type: SATHYA      Culture - Urine (20 @ 00:08)    Specimen Source: .Urine Suprapubic    Culture Results:   Numerous Pseudomonas aeruginosa  Few Klebsiella pneumoniae        Culture - Blood (07.10.20 @ 22:53)    Gram Stain:   Growth in aerobic bottle: Gram Negative Rods    -  Pseudomonas aeruginosa: Detec    Specimen Source: .Blood Blood    Organism: Blood Culture PCR    Culture Results:   Growth in aerobic bottle: Pseudomonas aeruginosa  "Due to technical problems, Proteus sp. will Not be reported as part of  the BCID panel until further notice"  ***Blood Panel PCR results on this specimen are available  approximately 3 hours after the Gram stain result.***  Gram stain, PCR, and/or culture results may not always  correspond due to difference in methodologies.  ************************************************************  This PCR assay was performed using [employer].  Thefollowing targets are tested for: Enterococcus,  vancomycin resistant enterococci, Listeria monocytogenes,  coagulase negative staphylococci, S. aureus,  methicillin resistant S. aureus, Streptococcus agalactiae  (Group B), S. pneumoniae, S. pyogenes(Group A),  Acinetobacter baumannii, Enterobacter cloacae, E. coli,  Klebsiella oxytoca, K. pneumoniae, Proteus sp.,  Serratia marcescens, Haemophilus influenzae,  Neisseria meningitidis, Pseudomonas aeruginosa, Candida  albicans, C. glabrata, C krusei, C parapsilosis,  C. tropicalis and the KPC resistance gene.    Organism Identification: Blood Culture PCR    Method Type: PCR Patient is seen and examined at the bed side, is afebrile. The  events noted regarding hypoxia after HD, currently on BIPAP.  The repeat Blood culture from  remains negative.  The WBC count is elevated.  The Family at the bed side.         REVIEW OF SYSTEMS: All other review systems are negative        Vital Signs Last 24 Hrs  T(C): 36.2 (2020 05:15), Max: 36.3 (2020 22:15)  T(F): 97.1 (2020 05:15), Max: 97.4 (2020 22:15)  HR: 76 (2020 08:57) (63 - 88)  BP: 168/78 (2020 05:15) (168/78 - 195/94)  BP(mean): --  RR: 20 (2020 05:15) (20 - 25)  SpO2: 83% (2020 08:57) (83% - 98%)        PHYSICAL EXAM:  GENERAL: Not in acute distress, on BIPAP  CHEST/LUNG: Not using accessory muscles  HEART: s1 and s2 present   ABDOMEN: Nontender and  Nondistended  EXTREMITIES: No pedal edema  CNS: Awake and Alert          MEDICATIONS  (STANDING):    apixaban 2.5 milliGRAM(s) Oral every 12 hours  aspirin enteric coated 81 milliGRAM(s) Oral daily  atorvastatin 40 milliGRAM(s) Oral at bedtime  brimonidine 0.2% Ophthalmic Solution 1 Drop(s) Both EYES at bedtime  calcitriol   Capsule 0.5 MICROGram(s) Oral daily  calcium acetate 1334 milliGRAM(s) Oral three times a day with meals  ceftolozane/tazobactam IVPB 375 milliGRAM(s) IV Intermittent <User Schedule>  chlorhexidine 4% Liquid 1 Application(s) Topical <User Schedule>  dextrose 5%. 1000 milliLiter(s) (50 mL/Hr) IV Continuous <Continuous>  dextrose 50% Injectable 12.5 Gram(s) IV Push once  dextrose 50% Injectable 25 Gram(s) IV Push once  dextrose 50% Injectable 25 Gram(s) IV Push once  famotidine    Tablet 20 milliGRAM(s) Oral daily  hydrALAZINE 50 milliGRAM(s) Oral three times a day  insulin lispro (HumaLOG) corrective regimen sliding scale   SubCutaneous three times a day before meals  insulin lispro (HumaLOG) corrective regimen sliding scale   SubCutaneous at bedtime  latanoprost 0.005% Ophthalmic Solution 1 Drop(s) Both EYES at bedtime  metoprolol tartrate 100 milliGRAM(s) Oral two times a day  NIFEdipine XL 90 milliGRAM(s) Oral daily  predniSONE   Tablet 60 milliGRAM(s) Oral daily  sodium bicarbonate 650 milliGRAM(s) Oral three times a day        Allergies    sulfa drugs (Other)          LABS:                        7.5    15.97 )-----------( 367      ( 2020 07:26 )             21.9                           7.2    10.52 )-----------( 297      ( 2020 07:22 )             21.5         07-18    122<L>  |  83<L>  |  117<HH>  ----------------------------<  289<H>  4.2   |  18  |  8.1<HH>    Ca    7.5<L>      2020 09:54  Phos  7.7     07-18  Mg     1.6     07-18    TPro  5.7<L>  /  Alb  3.1<L>  /  TBili  0.3  /  DBili  x   /  AST  15  /  ALT  10  /  AlkPhos  182<H>  18    07-16    126<L>  |  86<L>  |  106<HH>  ----------------------------<  122<H>  4.3   |  20  |  8.6<HH>    Ca    6.9<L>      2020 07:22  Phos  7.3     07-15  Mg     1.8     07-15    07-12    131<L>  |  94<L>  |  116<HH>  ----------------------------<  136<H>  4.2   |  16<L>  |  9.5<HH>    Ca    6.0<L>      2020 07:11  Phos  6.7     07-12  Mg     1.0     07-12    TPro  5.6<L>  /  Alb  3.2<L>  /  TBili  0.4  /  DBili  x   /  AST  11  /  ALT  26  /  AlkPhos  275<H>  07-11    PT/INR - ( 10 Jul 2020 22:53 )   PT: 14.70 sec;   INR: 1.28 ratio       PTT - ( 10 Jul 2020 22:53 )  PTT:29.5 sec        Urinalysis Basic - ( 2020 00:08 )  Color: Yellow / Appearance: Turbid / S.025 / pH: x  Gluc: x / Ketone: Trace  / Bili: Negative / Urobili: 0.2 mg/dL   Blood: x / Protein: >=300 mg/dL / Nitrite: Negative   Leuk Esterase: Large / RBC: 11-25 /HPF / WBC >50 /HPF   Sq Epi: x / Non Sq Epi: Few /HPF / Bacteria: Many        CAPILLARY BLOOD GLUCOSE  POCT Blood Glucose.: 207 mg/dL (2020 11:28)  POCT Blood Glucose.: 141 mg/dL (2020 07:44)  POCT Blood Glucose.: 215 mg/dL (2020 22:14)  POCT Blood Glucose.: 224 mg/dL (2020 20:40)  POCT Blood Glucose.: 190 mg/dL (2020 16:53)          RADIOLOGY & ADDITIONAL TESTS:    < from: Xray Chest 1 View-PORTABLE IMMEDIATE (20 @ 13:08) >  Cardiomegaly, unchanged.    Opacities overlie both lower lung fields, increased    Left-sided permanent pacemaker and a right IJ line as above.    < end of copied text >      < from: US Kidney and Bladder (20 @ 09:44) >Mild left hydronephrosis. No right hydronephrosis    < from: CT Abdomen and Pelvis No Cont (20 @ 02:59) >    Trace locules of air within the prostate and right seminal vesicle. This finding can represents prostatitis as well as sequela of recent intervention.    Unchanged appearance of the dilated left ureter with associated wall thickening. Superimposed infectious process is not excluded.    Partially visualized small bilateral pleural effusions and airspace consolidation predominantly in the left. Findings can represent early pneumonia in the appropriate clinical setting.    Cholelithiasis and unchanged choledocholithiasis within the common bile duct.          MICROBIOLOGY DATA:    Urine Microscopic-Add On (NC) (20 @ 12:32)    Bacteria: Many    Comment - Urine: Yeast - Loaded    Red Blood Cell - Urine: 6-10 /HPF    White Blood Cell - Urine: 26-50 /HPF        Culture - Blood in AM (20 @ 06:09)    Specimen Source: .Blood None    Culture Results:   No growth to date.        Culture - Urine (20 @ 00:08)    -  Ceftriaxone: R >32 Enterobacter, Citrobacter, and Serratia may develop resistance during prolonged therapy    -  Ciprofloxacin: R >2    -  Ciprofloxacin: S <=0.25    -  Ertapenem: S <=0.5    -  Amikacin: S <=16    -  Amikacin: S <=16    -  Amoxicillin/Clavulanic Acid: I 16/8    -  Ampicillin: R >16 These ampicillin results predict results for amoxicillin    -  Ampicillin/Sulbactam: R >16/8 Enterobacter, Citrobacter, and Serratia may develop resistance during prolonged therapy (3-4 days)    -  Aztreonam: R >16    -  Aztreonam: R >16    -  Cefazolin: R >16 ([email or web address]) For uncomplicated UTI with K. pneumoniae, E. coli, or P. mirablis: SATHYA <=16 is sensitive and SATHYA >=32 is resistant. This also predicts results for oral agents cefaclor, cefdinir, cefpodoxime, cefprozil, cefuroxime axetil, cephalexin and locarbef for uncomplicated UTI. Note that some isolates may be susceptible to these agents while testing resistant to cefazolin.    -  Cefepime: R >16    -  Cefepime: R >16    -  Cefoxitin: S <=8    -  Ceftazidime: I 16    -  Gentamicin: S 4    -  Gentamicin: R >8    -  Imipenem: R 8    -  Imipenem: S <=1    -  Levofloxacin: R >4    -  Levofloxacin: S <=0.5    -  Meropenem: R >8    -  Meropenem: S <=1    -  Nitrofurantoin: I 64 Should not be used to treat pyelonephritis    -  Piperacillin/Tazobactam: I 32    -  Piperacillin/Tazobactam: R <=8    -  Tigecycline: S <=2    -  Tobramycin: S <=2    -  Tobramycin: R >8    -  Trimethoprim/Sulfamethoxazole: R >2/38    Specimen Source: .Urine Suprapubic    Culture Results:   Numerous Pseudomonas aeruginosa (Carbapenem Resistant)  Few Klebsiella pneumoniae ESBL    Organism Identification: Pseudomonas aeruginosa (Carbapenem Resistant)  Klebsiella pneumoniae ESBL    Organism: Pseudomonas aeruginosa (Carbapenem Resistant)    Organism: Klebsiella pneumoniae ESBL    Method Type: SATHYA    Method Type: SATHYA      Culture - Urine (20 @ 00:08)    Specimen Source: .Urine Suprapubic    Culture Results:   Numerous Pseudomonas aeruginosa  Few Klebsiella pneumoniae        Culture - Blood (07.10.20 @ 22:53)    Gram Stain:   Growth in aerobic bottle: Gram Negative Rods    -  Pseudomonas aeruginosa: Detec    Specimen Source: .Blood Blood    Organism: Blood Culture PCR    Culture Results:   Growth in aerobic bottle: Pseudomonas aeruginosa  "Due to technical problems, Proteus sp. will Not be reported as part of  the BCID panel until further notice"  ***Blood Panel PCR results on this specimen are available  approximately 3 hours after the Gram stain result.***  Gram stain, PCR, and/or culture results may not always  correspond due to difference in methodologies.  ************************************************************  This PCR assay was performed using [employer].  Thefollowing targets are tested for: Enterococcus,  vancomycin resistant enterococci, Listeria monocytogenes,  coagulase negative staphylococci, S. aureus,  methicillin resistant S. aureus, Streptococcus agalactiae  (Group B), S. pneumoniae, S. pyogenes(Group A),  Acinetobacter baumannii, Enterobacter cloacae, E. coli,  Klebsiella oxytoca, K. pneumoniae, Proteus sp.,  Serratia marcescens, Haemophilus influenzae,  Neisseria meningitidis, Pseudomonas aeruginosa, Candida  albicans, C. glabrata, C krusei, C parapsilosis,  C. tropicalis and the KPC resistance gene.    Organism Identification: Blood Culture PCR    Method Type: PCR

## 2020-07-19 NOTE — PROGRESS NOTE ADULT - SUBJECTIVE AND OBJECTIVE BOX
DIAGNOSIS:   HOSPITAL DAY #:    STATUS POST:    POST OPERATIVE DAY #:     Vital Signs Last 24 Hrs  T(C): 36.2 (19 Jul 2020 05:15), Max: 36.3 (18 Jul 2020 22:15)  T(F): 97.1 (19 Jul 2020 05:15), Max: 97.4 (18 Jul 2020 22:15)  HR: 76 (19 Jul 2020 08:57) (63 - 88)  BP: 168/78 (19 Jul 2020 05:15) (168/78 - 184/88)  BP(mean): --  RR: 20 (19 Jul 2020 05:15) (20 - 22)  SpO2: 83% (19 Jul 2020 08:57) (83% - 97%)    SUBJECTIVE: Pt seen    Pain: YES  [ ]   NO [ ]   Nausea: [ ] YES [ ] NO           Vomiting: [ ] YES [ ] NO  Flatus: [ ] YES [ ] NO             Bowel Movement: [ ] YES [ ] NO     Void: [ ]YES [ ]No      PHILIPP DRAINAGE: SIGNIFICANT [ ]   NOT SIGNIFICANT [ ]   NOT APPLICABLE [ ]  YES [ ] NO    General Appearance: Appears well, NAD  Neck: Supple no hematoma  Chest: Equal expansion bilaterally, equal breath sounds  CV: Pulse regular presently  Abdomen: Soft [x ] YES [ ]NO  DISTENDED [ ] YES [ ] NO TENDERNESS [ ]YES [ ]NO  INCISIONS: HEALING WELL [ ] YES  [ ] NO ERYTHEMA [ ] YES [ ] NO DRAINAGE [ ] YES  [ ] NO  Extremities: Grossly symmetric, CALF TENDERNESS [ ] YES  [ ] NO      LABS:                        7.3    13.19 )-----------( 306      ( 19 Jul 2020 11:19 )             21.5     07-19    x   |  x   |  103<HH>  ----------------------------<  x   x    |  x   |  x     Ca    8.0<L>      19 Jul 2020 07:26  Phos  8.4     07-19  Mg     2.0     07-19    TPro  5.7<L>  /  Alb  3.1<L>  /  TBili  0.3  /  DBili  x   /  AST  15  /  ALT  10  /  AlkPhos  182<H>  07-18    PT/INR - ( 18 Jul 2020 09:54 )   PT: 14.50 sec;   INR: 1.26 ratio         PTT - ( 18 Jul 2020 09:54 )  PTT:30.4 sec        ASSESSMENT:     GOOD POST OP COURSE [ ]  YES  [ ] NO  CONDITION IMPROVING  []  YES  [ ]  NO          PLAN: will discuss case with renal    CONTINUE PRESENT MANAGEMENT  [ ] YES  [ ] NO

## 2020-07-19 NOTE — PROGRESS NOTE ADULT - ASSESSMENT
A 79yo male extensive PMH (based on EMR review) includes BPH (SPC 2 months ago),  AICD, atrial fibrillation (on DOAC), HTN, CAD (stents), combined systolic and diastolic CHF, CKD- stage 3b, DM, glaucoma DLD and TIA presents to the ER with urinary retention      IMPRESSION  #Rule out complicated cystitis in the setting of SPC and urinary retention    UA Blood: x / Protein: >=300 mg/dL / Nitrite: Negative Leuk Esterase: Large / RBC: 11-25 /HPF / WBC >50 /HPF Sq Epi: x / Non Sq Epi: Few /HPF / Bacteria: Many    5/2020 hx ESBL Ecoli UCX  - Urine Cx from 7/11 grew Pseudomonas, CRE and ESBL Klebsiella    CTAP Trace locules of air within the prostate and right seminal vesicle. This finding can represents prostatitis as well as sequela of recent intervention.  Unchanged appearance of the dilated left ureter with associated wall thickening. Superimposed infectious process is not excluded.  #ISRRAEL Cr 9  #CT Partially visualized small bilateral pleural effusions and airspace consolidation predominantly in the left    No clinical evidence to suggest PNA  #CT Cholelithiasis and unchanged choledocholithiasis within the common bile duct- elevated Alk Ph  # Pseudomonas bacteremia  -7/10 - repeat Blood Cx as of 7/14 NGTD      would recommend:    1. Continue  ceftolozane/tazobactam IVPB 375 milliGRAM(s)  2. Monitor kidney function  3. Aspiration  precaution  4. Isolation as per Infection Control  dept.       Attending Attestation:     45 minutes spent on total encounter; more than 50% of the visit was spent counseling and/or coordinating care by the attending physician. A 81yo male extensive PMH (based on EMR review) includes BPH (SPC 2 months ago),  AICD, atrial fibrillation (on DOAC), HTN, CAD (stents), combined systolic and diastolic CHF, CKD- stage 3b, DM, glaucoma DLD and TIA presents to the ER with urinary retention      IMPRESSION  #Rule out complicated cystitis in the setting of SPC and urinary retention    UA Blood: x / Protein: >=300 mg/dL / Nitrite: Negative Leuk Esterase: Large / RBC: 11-25 /HPF / WBC >50 /HPF Sq Epi: x / Non Sq Epi: Few /HPF / Bacteria: Many    5/2020 hx ESBL Ecoli UCX  - Urine Cx from 7/11 grew Pseudomonas, CRE and ESBL Klebsiella    CTAP Trace locules of air within the prostate and right seminal vesicle. This finding can represents prostatitis as well as sequela of recent intervention.  Unchanged appearance of the dilated left ureter with associated wall thickening. Superimposed infectious process is not excluded.  #ISRRAEL Cr 9  #CT Partially visualized small bilateral pleural effusions and airspace consolidation predominantly in the left    No clinical evidence to suggest PNA  #CT Cholelithiasis and unchanged choledocholithiasis within the common bile duct- elevated Alk Ph  # Pseudomonas bacteremia  -7/10 - repeat Blood Cx as of 7/14 NGTD      would recommend:    1. Continue BIPAP and Aspiration precaution   2. Continue  Ceftolozane/tazobactam IVPB 375 milliGRAM(s)  3. Continue Dialysis as tolerated   4. Isolation as per Infection Control  dept-     - Prognosis guarded       Attending Attestation:     45 minutes spent on total encounter; more than 50% of the visit was spent counseling and/or coordinating care by the attending physician.

## 2020-07-19 NOTE — PROGRESS NOTE ADULT - ASSESSMENT
80M PMHx BPH s/p spc, AFib, HTN, CAD s/p stent, HFrEF s/p AICD, CKD III, DM2, h/o TIA here with ISRRAEL c/b metabolic acidosis and UTI. On 7/19 patient with profound clinical decompensation saturating 80% on 100% fio2 BIPAP. Decision made to pursue comfort care measures. The plan as described below was prior to the decision to pursue comfort care measures.     #ISRRAEL on CKD III  c/b acidosis; unclear etiology, suspect some component prerenal in setting of infection vs AIN  L hydrouretonephrosis on ct, chronic  SPEP FLC negative   UPEP Urine Immunofixation, Urine protein/creatinine pending   Plan:   Cont PO bicarb supplements  Will d/c Pantoprazole and switch to Famotidine   MM workup pending per renal  - started RRT on 7/18, will need Tesio catheter placed if no signs of renal recovery on prednisone  - Prednisone 60mg PO qd for 2 weeks, Will need taper by 10 mg down every 5 days until a dose of 10 mg for 5 days then will stop    #Dyspnea   # Volume Overload   - dyspnea is likely 2/2 volume overload in the setting of progressive renal failure w/ hx of HFrEF   - will get 3d dialysis initiation   - additional 1x dose of PO ethacrynic acid today per nephrology reccs   - CXR pending  - Venti Mask for comfort, but can consider Bipap if evidence of pulmonary edema on CXR   - will order trop/ ekg to rule out cardiac etiology for dyspnea  - low suspicion for thromboembolism, nontachycardic, and pt on Eliquis   - Anemia ( hgb 7 ) may also be contributing     #New Leukocytosis   - likely in the setting of prednisone   - afebrile but does have new O2 requirement   - CXR pending   - cont Zerbaxa     #Hyponatremia   # Hypocalcemia   #Hyperphosphatemia   - all 2/2 to renal failure/ volume overload  - replete calcium prn   - dialysis initiation x3d     #Normocytic Anemia ( chronic ) ( POA )   - likely related to CKD now with progression to renal failure   - Hgb 7 today, may require transfusion soon   - CTM     #UTI due to Cabapenem resistant pseudomonas and ESBL klebsiella  c/b pseudomonas bacteremia  bcx pseudomonas carbapenem resistant  ucx pseudomonas carbapenem resistant, kleb esbl  Pending repeat BCx drawn 07/14 - NGTD   Plan:   d/c Amikacin, d/c Meropenem   Started Zerbaxa   - appreciate ID reccs     #BPH  s/p suprapubic catheter  - SPC evaluated by Urology PA, no evidence of infection    #AFib  - held 1 dose of eliquis for HD cath placement   - restarted today   lopressor as below    #HTN  hydralazine 50 tid  lopressor 100 bid  nifedipine 90    #CAD  asa  lipitor 40    #HFrEF  lopressor as above  - consider repeat echo if symptoms progress    #DM2  ssi    #H/o TIA  asa  lipitor 40    #DVT ppx  eliquis      Disposition:     #Comfort Care   - Morphine Infusion started for patient   - IV Ondansetron available for nausea   - no lab draws, no PO medications, no vital signs         Jeannie Li DO 80M PMHx BPH s/p spc, AFib, HTN, CAD s/p stent, HFrEF s/p AICD, CKD III, DM2, h/o TIA here with ISRRAEL c/b metabolic acidosis and UTI. ISRRAEL likely 2/2 AIN with progression and need for HD started on 7/18. From 7/18 -7/19 patient noted to have significant clinical decompensation likely related to volume overload as evidenced on CXR 7/18. Acute Renal Failure c/b volume overload likely precipitated Acute Decompensated Heart Failure and patient with worsening oxygen saturation throughout the past 24 hours. This morning patient saturating 80% on 100% fio2 BIPAP. Decision made to pursue comfort care measures. The plan as described below was prior to the decision to pursue comfort care measures.     #ISRRAEL on CKD III  c/b acidosis; unclear etiology, suspect some component prerenal in setting of infection vs AIN  L hydrouretonephrosis on ct, chronic  SPEP FLC negative   UPEP Urine Immunofixation, Urine protein/creatinine pending   Plan:   Cont PO bicarb supplements  Will d/c Pantoprazole and switch to Famotidine   MM workup pending per renal  - started RRT on 7/18, will need Tesio catheter placed if no signs of renal recovery on prednisone  - Prednisone 60mg PO qd for 2 weeks, Will need taper by 10 mg down every 5 days until a dose of 10 mg for 5 days then will stop    #Acute Decompensated Heart Failure   # Volume Overload   - dyspnea is likely 2/2 volume overload in the setting of progressive renal failure w/ hx of HFrEF   - will get 3d dialysis initiation   - additional 1x dose of PO ethacrynic acid today per nephrology reccs   - CXR pending  - Venti Mask for comfort, but can consider Bipap if evidence of pulmonary edema on CXR   - will order trop/ ekg to rule out cardiac etiology for dyspnea  - low suspicion for thromboembolism, nontachycardic, and pt on Eliquis   - Anemia ( hgb 7 ) may also be contributing     #New Leukocytosis   - likely in the setting of prednisone   - afebrile but does have new O2 requirement   - CXR pending   - cont Zerbaxa     #Hyponatremia   # Hypocalcemia   #Hyperphosphatemia   - all 2/2 to renal failure/ volume overload  - replete calcium prn   - dialysis initiation x3d     #Normocytic Anemia ( chronic ) ( POA )   - likely related to CKD now with progression to renal failure   - Hgb 7 today, may require transfusion soon   - CTM     #UTI due to Cabapenem resistant pseudomonas and ESBL klebsiella  c/b pseudomonas bacteremia  bcx pseudomonas carbapenem resistant  ucx pseudomonas carbapenem resistant, kleb esbl  Pending repeat BCx drawn 07/14 - NGTD   Plan:   d/c Amikacin, d/c Meropenem   Started Zerbaxa   - appreciate ID reccs     #BPH  s/p suprapubic catheter  - SPC evaluated by Urology PA, no evidence of infection    #AFib  - held 1 dose of eliquis for HD cath placement   - restarted today   lopressor as below    #HTN  hydralazine 50 tid  lopressor 100 bid  nifedipine 90    #CAD  asa  lipitor 40    #HFrEF  lopressor as above  - consider repeat echo if symptoms progress    #DM2  ssi    #H/o TIA  asa  lipitor 40    #DVT ppx  eliquis      Disposition:     #Comfort Care   - Morphine Infusion started for patient   - IV Ondansetron available for nausea   - no lab draws, no PO medications, no vital signs         Jeannie Li, DO

## 2020-07-20 NOTE — PROGRESS NOTE ADULT - PROVIDER SPECIALTY LIST ADULT
Hospitalist
Infectious Disease
Internal Medicine
Nephrology
Surgery
Infectious Disease

## 2020-07-20 NOTE — PROGRESS NOTE ADULT - SUBJECTIVE AND OBJECTIVE BOX
DIAGNOSIS:   HOSPITAL DAY #:    STATUS POST:    POST OPERATIVE DAY #:     Vital Signs Last 24 Hrs  T(C): 35.6 (20 Jul 2020 05:04), Max: 36.2 (19 Jul 2020 22:16)  T(F): 96 (20 Jul 2020 05:04), Max: 97.1 (19 Jul 2020 22:16)  HR: 76 (20 Jul 2020 08:08) (70 - 76)  BP: 159/77 (20 Jul 2020 05:04) (149/72 - 159/77)  BP(mean): --  RR: 20 (20 Jul 2020 08:08) (19 - 20)  SpO2: 93% (20 Jul 2020 09:26) (93% - 93%)    SUBJECTIVE: Pt seen    Pain: YES  [ ]   NO [ ]   Nausea: [ ] YES [ ] NO           Vomiting: [ ] YES [ ] NO  Flatus: [ ] YES [ ] NO             Bowel Movement: [ ] YES [ ] NO     Void: [ ]YES [ ]No      PHILIPP DRAINAGE: SIGNIFICANT [ ]   NOT SIGNIFICANT [ ]   NOT APPLICABLE [ ]  YES [ ] NO    General Appearance: Appears well, NAD  Neck: Supple no hematoma   Chest: Equal expansion bilaterally, equal breath sounds  CV: Pulse regular presently  Abdomen: Soft [x ] YES [ ]NO  DISTENDED [ ] YES [x ] NO TENDERNESS [ ]YES [ ]NO  INCISIONS: HEALING WELL [ ] YES  [ ] NO ERYTHEMA [ ] YES [ ] NO DRAINAGE [ ] YES  [ ] NO  Extremities: Grossly symmetric, CALF TENDERNESS [ ] YES  [ ] NO      LABS:                        7.3    13.19 )-----------( 306      ( 19 Jul 2020 11:19 )             21.5     07-19    x   |  x   |  103<HH>  ----------------------------<  x   x    |  x   |  x     Ca    8.0<L>      19 Jul 2020 07:26  Phos  8.4     07-19  Mg     2.0     07-19              ASSESSMENT:     GOOD POST OP COURSE [ ]  YES  [ ] NO  CONDITION IMPROVING  []  YES  [ ]  NO          PLAN: will discuss case with renal    CONTINUE PRESENT MANAGEMENT  [ ] YES  [ ] NO

## 2020-07-20 NOTE — PROGRESS NOTE ADULT - REASON FOR ADMISSION
ISRRAEL, UTI

## 2020-07-20 NOTE — DISCHARGE NOTE FOR THE EXPIRED PATIENT - HOSPITAL COURSE
81yo male who had extensive PMH including DM, anemia, A Fib, HFrEF, CAD (with stents), and CKD was admitted on July 11 with diagnosis of acute renal failure with UTI along with suspicion for prostatitis and pneumonia. Treatments included antibiotics for above mentioned infections along with interventions to stabilize and improve his kidney dysfunction. Several adjustments made as his renal function didn't improve (in fact developed acute CHF, hemodialysis was instituted) and cultures revealed highly resistant organisms. On July 18, decision was made to make patient DNR with plan to place him on hospice upon discharge, however he was found not to have vital signs on July 20. 2020 and I pronounced him at [id number]

## 2020-07-20 NOTE — PROGRESS NOTE ADULT - SUBJECTIVE AND OBJECTIVE BOX
ROCIO NATION  80y, Male  Allergy: sulfa drugs (Other)    Hospital Day: 9d    Patient seen and examined. Patient placed on venti mask this morning with improvement in saturation, asked patient if he wants to have the mask on his face, and he said that would be ok. Patient is more confused today, and possibly hallucinating, but otherwise seems comfortable. Continuing Morphine gtt. Discussed case with hospice, unlikely to safely transport patient due to oxygen requirement, but family is understanding of this and would prefer for care to be continued in house. No other acute issues at this time.     PMH/PSH:  PAST MEDICAL & SURGICAL HISTORY:  GERD (gastroesophageal reflux disease): intermittent  H/O ptosis of eyelid: right eye (sometimes wears patch)  Hematuria  Hyperlipidemia  Anemia  Diabetes  Renal insufficiency  Cardiomyopathy  Cholelithiasis  Hydrocele in adult  Glaucoma  BPH (benign prostatic hyperplasia)  CAD (coronary artery disease): CARD STENT X2 4/2018  TIA (transient ischemic attack): X2 JULY 2018  Dementia  CHF (congestive heart failure): COMBINED SYSTOLIC &amp; DIASTOLIC  Afib  Heart attack: 1/2018  Sepsis: 1/18 FROM INFECTED GALLBLADDER  Hypercholesteremia  HTN (hypertension)  DM (diabetes mellitus)  History of suprapubic catheter  H/O flexible sigmoidoscopy: 2015  H/O bilateral cataract extraction: LEFT- 1/18 RIGHT 6 YRS AGO  Encounter for biliary drainage tube placement: 1/2018  H/O coronary angioplasty: WITH STENT X2 (4/2018)  History of prostate surgery: 5/17  AICD (automatic cardioverter/defibrillator) present: [employer]      LAST 24-Hr EVENTS:    VITALS:  T(F): 96 (07-20-20 @ 05:04), Max: 97.1 (07-19-20 @ 22:16)  HR: 76 (07-20-20 @ 08:08)  BP: 159/77 (07-20-20 @ 05:04) (149/72 - 159/77)  RR: 20 (07-20-20 @ 08:08)  SpO2: 93% (07-20-20 @ 09:26)        TESTS & MEASUREMENTS:  Weight (Kg):       07-18-20 @ 07:01  -  07-19-20 @ 07:00  --------------------------------------------------------  IN: 0 mL / OUT: 1500 mL / NET: -1500 mL    07-19-20 @ 07:01  -  07-20-20 @ 07:00  --------------------------------------------------------  IN: 0 mL / OUT: 150 mL / NET: -150 mL                            7.3    13.19 )-----------( 306      ( 19 Jul 2020 11:19 )             21.5       07-19    x   |  x   |  103<HH>  ----------------------------<  x   x    |  x   |  x     Ca    8.0<L>      19 Jul 2020 07:26  Phos  8.4     07-19  Mg     2.0     07-19        CARDIAC MARKERS ( 19 Jul 2020 07:26 )  x     / 0.87 ng/mL / 96 U/L / x     / 6.8 ng/mL  CARDIAC MARKERS ( 18 Jul 2020 19:17 )  x     / 0.86 ng/mL / 112 U/L / x     / 6.8 ng/mL        Culture - Blood (collected 07-14-20 @ 06:09)  Source: .Blood None  Preliminary Report (07-15-20 @ 13:01):    No growth to date.            Ferritin, Serum: 635 ng/mL (07-18-20 @ 09:55)          RADIOLOGY, ECG, & ADDITIONAL TESTS:  12 Lead ECG:   Ventricular Rate 144 BPM    Atrial Rate 84 BPM    QRS Duration 186 ms    Q-T Interval 480 ms    QTC Calculation(Bezet) 743 ms    R Axis 246 degrees    T Axis 80 degrees    Diagnosis Line Ventricular-paced rhythm  Confirmed by DREA OLGUIN MD ([id number]) on 7/19/2020 7:39:53 AM (07-18-20 @ 14:12)  12 Lead ECG:   Ventricular Rate 106 BPM    Atrial Rate 93 BPM    QRS Duration 186 ms    Q-T Interval 464 ms    QTC Calculation(Bezet) 616 ms    P Axis 122 degrees    R Axis 246 degrees    T Axis 70 degrees    Diagnosis Line Ventricular-paced rhythm  Confirmed by DREA OLGUIN MD ([id number]) on 7/19/2020 7:39:29 AM (07-18-20 @ 14:10)      RECENT DIAGNOSTIC ORDERS:      MEDICATIONS:  MEDICATIONS  (STANDING):  chlorhexidine 4% Liquid 1 Application(s) Topical <User Schedule>  dextrose 5%. 1000 milliLiter(s) (50 mL/Hr) IV Continuous <Continuous>  dextrose 50% Injectable 12.5 Gram(s) IV Push once  dextrose 50% Injectable 25 Gram(s) IV Push once  dextrose 50% Injectable 25 Gram(s) IV Push once  insulin lispro (HumaLOG) corrective regimen sliding scale   SubCutaneous three times a day before meals  insulin lispro (HumaLOG) corrective regimen sliding scale   SubCutaneous at bedtime  morphine  Infusion 2 mG/Hr (2 mL/Hr) IV Continuous <Continuous>    MEDICATIONS  (PRN):  dextrose 40% Gel 15 Gram(s) Oral once PRN Blood Glucose LESS THAN 70 milliGRAM(s)/deciliter  glucagon  Injectable 1 milliGRAM(s) IntraMuscular once PRN Glucose LESS THAN 70 milligrams/deciliter  ondansetron Injectable 8 milliGRAM(s) IV Push three times a day PRN Nausea and/or Vomiting      HOME MEDICATIONS:  Alphagan P 0.1% ophthalmic solution (05-19)  apixaban 2.5 mg oral tablet (05-23)  Aspir 81 oral delayed release tablet (05-19)  atorvastatin 40 mg oral tablet (05-19)  ciprofloxacin 250 mg oral tablet (05-27)  hydrALAZINE 50 mg oral tablet (05-27)  latanoprost 0.005% ophthalmic solution (05-19)  lidocaine 2% topical gel with applicator (05-27)  metoprolol tartrate 100 mg oral tablet (05-27)  NIFEdipine 90 mg oral tablet, extended release (05-27)  oxyCODONE 5 mg oral tablet (05-19)  pantoprazole 40 mg oral delayed release tablet (05-19)  sodium bicarbonate 650 mg oral tablet (05-19)      PHYSICAL EXAM:  GENERAL: alert not oriented today, appears comfortable   HNENT: EOMI, PERRLA    NECK: No LAD/swelling  CHEST/LUNG: coarse Ronchi, crackles throughout lung fields, wheezing noted anteriorally.  HEART: RRR, No murmurs  ABDOMEN: Soft, mild ttp in suprapubic region, macerated skin near SPC site  EXTREMITIES:  Warm well perfused, 1-2+ peripheral edema

## 2020-07-20 NOTE — PROGRESS NOTE ADULT - SUBJECTIVE AND OBJECTIVE BOX
Nephrology Progress Note    ROCIO NATION  [id number]  80y  Male    S:  Patient is seen and examined, events over the last 24h noted.  Pt now for comfort care.      O:  Allergies:  sulfa drugs (Other)    Hospital Medications:   MaleMEDICATIONS  (STANDING):  chlorhexidine 4% Liquid 1 Application(s) Topical <User Schedule>  dextrose 5%. 1000 milliLiter(s) (50 mL/Hr) IV Continuous <Continuous>  dextrose 50% Injectable 12.5 Gram(s) IV Push once  dextrose 50% Injectable 25 Gram(s) IV Push once  dextrose 50% Injectable 25 Gram(s) IV Push once  insulin lispro (HumaLOG) corrective regimen sliding scale   SubCutaneous three times a day before meals  insulin lispro (HumaLOG) corrective regimen sliding scale   SubCutaneous at bedtime  morphine  Infusion 2 mG/Hr (2 mL/Hr) IV Continuous <Continuous>    MEDICATIONS  (PRN):  dextrose 40% Gel 15 Gram(s) Oral once PRN Blood Glucose LESS THAN 70 milliGRAM(s)/deciliter  glucagon  Injectable 1 milliGRAM(s) IntraMuscular once PRN Glucose LESS THAN 70 milligrams/deciliter  ondansetron Injectable 8 milliGRAM(s) IV Push three times a day PRN Nausea and/or Vomiting    Home Medications:  Home Medications:  Alphagan P 0.1% ophthalmic solution: 1 drop(s) to each affected eye once a day (at bedtime) (19 May 2020 17:22)  apixaban 2.5 mg oral tablet: 1 tab(s) orally every 12 hours (23 May 2020 08:59)  Aspir 81 oral delayed release tablet: 1 tab(s) orally once a day (19 May 2020 17:22)  atorvastatin 40 mg oral tablet: 1 tab(s) orally once a day (at bedtime) (19 May 2020 17:22)  ciprofloxacin 250 mg oral tablet: 1 tab(s) orally every 12 hours TO END  (27 May 2020 14:49)  hydrALAZINE 50 mg oral tablet: 1 tab(s) orally every 8 hours (27 May 2020 14:49)  latanoprost 0.005% ophthalmic solution: 1 drop(s) to each affected eye once a day (in the evening) ou (19 May 2020 17:22)  lidocaine 2% topical gel with applicator: 1 application topically once a day (27 May 2020 14:49)  metoprolol tartrate 100 mg oral tablet: 1 tab(s) orally 2 times a day (27 May 2020 14:49)  NIFEdipine 90 mg oral tablet, extended release: 1 tab(s) orally once a day (27 May 2020 14:49)  oxyCODONE 5 mg oral tablet: 1 tab(s) orally every 12 hours, As Needed - 10) (19 May 2020 17:22)  pantoprazole 40 mg oral delayed release tablet: 1 tab(s) orally once a day (before a meal) (19 May 2020 17:22)  sodium bicarbonate 650 mg oral tablet: 1 tab(s) orally 3 times a day (19 May 2020 17:22)      VITALS:  Daily     Daily   T(F): 96 (20 @ 05:04), Max: 97.1 (20 @ 22:16)  HR: 76 (20 @ 08:08)  BP: 159/77 (20 @ 05:04)  RR: 20 (20 @ 08:08)  SpO2: 93% (20 @ 09:26)  Wt(kg): --  I&O's Detail    2020 07:01  -  2020 07:00  --------------------------------------------------------  IN:  Total IN: 0 mL    OUT:    Indwelling Catheter - Suprapubic: 150 mL  Total OUT: 150 mL    Total NET: -150 mL        I&O's Summary    2020 07:01  -  2020 07:00  --------------------------------------------------------  IN: 0 mL / OUT: 150 mL / NET: -150 mL          PHYSICAL EXAM:  Gen: NAD  Resp: CTA  Card: regular, no rub  Gastro: soft  Extremities: no edema      LABS:  ABG - ( 2020 08:43 )  pH, Arterial: 7.40  pH, Blood: x     /  pCO2: 33    /  pO2: 51    / HCO3: 20    / Base Excess: -3.6  /  SaO2: 83                    x   |  x   |  103<HH>  ----------------------------<  x   x    |  x   |  x     Ca    8.0<L>      2020 07:26  Phos  8.4       Mg     2.0           Phosphorus Level, Serum: 8.4 mg/dL (20 @ 11:19)  Phosphorus Level, Serum: 8.3 mg/dL (20 @ 07:26)  Phosphorus Level, Serum: 7.7 mg/dL (20 @ 09:54)                          7.3    .19 )-----------( 306      ( 2020 11:19 )             21.5     % Saturation, Iron: 57 % (20 @ 09:55)  Ferritin, Serum: 635 ng/mL (20 @ 09:55)    CBC Full  -  ( 2020 11:19 )  WBC Count : 13.19 K/uL  RBC Count : 2.37 M/uL  Hemoglobin : 7.3 g/dL  Hematocrit : 21.5 %  Platelet Count - Automated : 306 K/uL  Mean Cell Volume : 90.7 fL  Mean Cell Hemoglobin : 30.8 pg  Mean Cell Hemoglobin Concentration : 34.0 g/dL  Auto Neutrophil # : 11.99 K/uL  Auto Lymphocyte # : 0.58 K/uL  Auto Monocyte # : 0.40 K/uL  Auto Eosinophil # : 0.00 K/uL  Auto Basophil # : 0.00 K/uL  Auto Neutrophil % : 90.9 %  Auto Lymphocyte % : 4.4 %  Auto Monocyte % : 3.0 %  Auto Eosinophil % : 0.0 %  Auto Basophil % : 0.0 %      Urine Studies:  Urinalysis Basic - ( 2020 12:32 )    Color: Yellow / Appearance: Slightly Cloudy / S.020 / pH:   Gluc:  / Ketone: Negative  / Bili: Negative / Urobili: 0.2 mg/dL   Blood:  / Protein: 100 mg/dL / Nitrite: Negative   Leuk Esterase: Large / RBC: 6-10 /HPF / WBC 26-50 /HPF   Sq Epi:  / Non Sq Epi:  / Bacteria: Many      Protein/Creatinine Ratio Calculation: 2.0 Ratio ( @ 12:32)  Creatinine, Random Urine: 62 mg/dL ( @ 12:32)  Sodium, Random Urine: 34.0 mmoL/L ( @ 16:43)  Creatinine, Random Urine: 53 mg/dL ( @ 16:43)      Creatinine trend:  Creatinine, Serum: 7.1 mg/dL (20 @ 07:26)  Creatinine, Serum: 8.1 mg/dL (20 @ 09:54)  Creatinine, Serum: 9.0 mg/dL (20 @ 05:39)  Creatinine, Serum: 8.6 mg/dL (20 @ 07:22)  Creatinine, Serum: 8.4 mg/dL (07-15-20 @ 09:45)  Creatinine, Serum: 8.5 mg/dL (20 @ 06:09)  Creatinine, Serum: 8.7 mg/dL (20 @ 07:51)  Creatinine, Serum: 9.5 mg/dL (20 @ 07:11)  Creatinine, Serum: 9.5 mg/dL (20 @ 06:45)  Creatinine, Serum: 9.6 mg/dL (07-10-20 @ 22:53)  Creatinine, Serum: 2.9 mg/dL (20 @ 05:45)  Creatinine, Serum: 2.8 mg/dL (20 @ 06:16)  Creatinine, Serum: 2.8 mg/dL (20 @ 06:54)  Creatinine, Serum: 3.0 mg/dL (20 @ 07:11)  Creatinine, Serum: 3.1 mg/dL (20 @ 11:58)  Creatinine, Serum: 3.5 mg/dL (20 @ 05:45)  Creatinine, Serum: 3.9 mg/dL (20 @ 06:43)  Creatinine, Serum: 4.5 mg/dL (20 @ 06:44)  Creatinine, Serum: 4.6 mg/dL (20 @ 18:00)  Creatinine, Serum: 1.5 mg/dL (20 @ 06:17)    Hemoglobin A1C, Whole Blood: 5.9 % (20 @ 05:25)  Hemoglobin A1C, Whole Blood: 5.6 % (19 @ 19:00)  Hemoglobin A1C, Whole Blood: 6.4 % (06-10-19 @ 09:14)

## 2020-07-20 NOTE — PROGRESS NOTE ADULT - ASSESSMENT
80M PMHx BPH s/p spc, AFib, HTN, CAD s/p stent, HFrEF s/p AICD, CKD III, DM2, h/o TIA here with ISRRAEL c/b metabolic acidosis and UTI. ISRRAEL likely 2/2 AIN with progression and need for HD started on 7/18. From 7/18 -7/19 patient noted to have significant clinical decompensation likely related to volume overload as evidenced on CXR 7/18. Acute Renal Failure c/b volume overload likely precipitated Acute Decompensated Heart Failure and patient with worsening oxygen saturation. Decision made to pursue comfort care measures.       #ISRRAEL on CKD III  c/b acidosis; unclear etiology, suspect some component prerenal in setting of infection vs AIN  L hydrouretonephrosis on ct, chronic  SPEP FLC negative   UPEP Urine Immunofixation, Urine protein/creatinine pending   Plan:  d/c PO bicarb supplements  d/c famotidine  d/c dialysis   d/c prednisone     #Acute Decompensated Heart Failure   # Volume Overload   - dyspnea is likely 2/2 volume overload in the setting of progressive renal failure w/ hx of HFrEF   - CXR with worsening overload   - Venti Mask for comfort    #New Leukocytosis   - likely in the setting of prednisone   - no longer trending labs    #Hyponatremia   # Hypocalcemia   #Hyperphosphatemia   - all 2/2 to renal failure/ volume overload      #Normocytic Anemia ( chronic ) ( POA )   - likely related to CKD now with progression to renal failure       #UTI due to Cabapenem resistant pseudomonas and ESBL klebsiella  c/b pseudomonas bacteremia  bcx pseudomonas carbapenem resistant  ucx pseudomonas carbapenem resistant, kleb esbl  Pending repeat BCx drawn 07/14 - NGTD   Plan:   d/c Amikacin, d/c Meropenem   d/c  Zerbaxa     #BPH  s/p suprapubic catheter  - SPC evaluated by Urology PA, no evidence of infection    #AFib  -d/c lopressor and eliquis    #HTN  d/c bp meds    #CAD  d/c asa and lipitor     #HFrEF  - d/c cardiac meds    #DM2  d/c accuchecks     #H/o TIA  d/c asa statin    #DVT ppx  none      Disposition:     #Comfort Care   - Morphine Infusion started for patient   - IV Ondansetron available for nausea   - no lab draws, no PO medications, no vital signs   - hospice on consult         Jeannie Li,

## 2020-07-22 LAB
INTERPRETATION 24H UR IFE-IMP: SIGNIFICANT CHANGE UP
INTERPRETATION 24H UR IFE-IMP: SIGNIFICANT CHANGE UP

## 2020-07-29 DIAGNOSIS — I42.9 CARDIOMYOPATHY, UNSPECIFIED: ICD-10-CM

## 2020-07-29 DIAGNOSIS — Z86.73 PERSONAL HISTORY OF TRANSIENT ISCHEMIC ATTACK (TIA), AND CEREBRAL INFARCTION WITHOUT RESIDUAL DEFICITS: ICD-10-CM

## 2020-07-29 DIAGNOSIS — I25.10 ATHEROSCLEROTIC HEART DISEASE OF NATIVE CORONARY ARTERY WITHOUT ANGINA PECTORIS: ICD-10-CM

## 2020-07-29 DIAGNOSIS — E78.00 PURE HYPERCHOLESTEROLEMIA, UNSPECIFIED: ICD-10-CM

## 2020-07-29 DIAGNOSIS — K80.70 CALCULUS OF GALLBLADDER AND BILE DUCT WITHOUT CHOLECYSTITIS WITHOUT OBSTRUCTION: ICD-10-CM

## 2020-07-29 DIAGNOSIS — Z51.5 ENCOUNTER FOR PALLIATIVE CARE: ICD-10-CM

## 2020-07-29 DIAGNOSIS — A41.9 SEPSIS, UNSPECIFIED ORGANISM: ICD-10-CM

## 2020-07-29 DIAGNOSIS — E83.51 HYPOCALCEMIA: ICD-10-CM

## 2020-07-29 DIAGNOSIS — Z95.5 PRESENCE OF CORONARY ANGIOPLASTY IMPLANT AND GRAFT: ICD-10-CM

## 2020-07-29 DIAGNOSIS — E11.22 TYPE 2 DIABETES MELLITUS WITH DIABETIC CHRONIC KIDNEY DISEASE: ICD-10-CM

## 2020-07-29 DIAGNOSIS — Z66 DO NOT RESUSCITATE: ICD-10-CM

## 2020-07-29 DIAGNOSIS — E87.1 HYPO-OSMOLALITY AND HYPONATREMIA: ICD-10-CM

## 2020-07-29 DIAGNOSIS — I48.91 UNSPECIFIED ATRIAL FIBRILLATION: ICD-10-CM

## 2020-07-29 DIAGNOSIS — I50.43 ACUTE ON CHRONIC COMBINED SYSTOLIC (CONGESTIVE) AND DIASTOLIC (CONGESTIVE) HEART FAILURE: ICD-10-CM

## 2020-07-29 DIAGNOSIS — E83.39 OTHER DISORDERS OF PHOSPHORUS METABOLISM: ICD-10-CM

## 2020-07-29 DIAGNOSIS — Z16.19 RESISTANCE TO OTHER SPECIFIED BETA LACTAM ANTIBIOTICS: ICD-10-CM

## 2020-07-29 DIAGNOSIS — N17.9 ACUTE KIDNEY FAILURE, UNSPECIFIED: ICD-10-CM

## 2020-07-29 DIAGNOSIS — F03.90 UNSPECIFIED DEMENTIA, UNSPECIFIED SEVERITY, WITHOUT BEHAVIORAL DISTURBANCE, PSYCHOTIC DISTURBANCE, MOOD DISTURBANCE, AND ANXIETY: ICD-10-CM

## 2020-07-29 DIAGNOSIS — Z95.810 PRESENCE OF AUTOMATIC (IMPLANTABLE) CARDIAC DEFIBRILLATOR: ICD-10-CM

## 2020-07-29 DIAGNOSIS — N39.0 URINARY TRACT INFECTION, SITE NOT SPECIFIED: ICD-10-CM

## 2020-07-29 DIAGNOSIS — E87.2 ACIDOSIS: ICD-10-CM

## 2020-07-29 DIAGNOSIS — H40.9 UNSPECIFIED GLAUCOMA: ICD-10-CM
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
## 2020-07-29 DIAGNOSIS — I13.0 HYPERTENSIVE HEART AND CHRONIC KIDNEY DISEASE WITH HEART FAILURE AND STAGE 1 THROUGH STAGE 4 CHRONIC KIDNEY DISEASE, OR UNSPECIFIED CHRONIC KIDNEY DISEASE: ICD-10-CM

## 2020-07-29 DIAGNOSIS — N40.1 BENIGN PROSTATIC HYPERPLASIA WITH LOWER URINARY TRACT SYMPTOMS: ICD-10-CM

## 2020-07-29 DIAGNOSIS — N13.30 UNSPECIFIED HYDRONEPHROSIS: ICD-10-CM

## 2020-07-29 DIAGNOSIS — T83.510A INFECTION AND INFLAMMATORY REACTION DUE TO CYSTOSTOMY CATHETER, INITIAL ENCOUNTER: ICD-10-CM

## 2020-07-29 DIAGNOSIS — K21.9 GASTRO-ESOPHAGEAL REFLUX DISEASE WITHOUT ESOPHAGITIS: ICD-10-CM

## 2020-07-29 DIAGNOSIS — R33.8 OTHER RETENTION OF URINE: ICD-10-CM

## 2020-07-29 DIAGNOSIS — N18.3 CHRONIC KIDNEY DISEASE, STAGE 3 (MODERATE): ICD-10-CM

## 2021-07-08 NOTE — H&P ADULT - MENTAL STATUS
FUTURE VISIT INFORMATION      FUTURE VISIT INFORMATION:    Date: 8/3/2021    Time: 12:20PM    Location: [facility] [place]  REFERRAL INFORMATION:    Referring provider:  Taras Marina M.D.    Referring providers clinic:  [facility], [facility],    Reason for visit/diagnosis  ongoing right ear pain/pressure - sched per pt    RECORDS REQUESTED FROM:       Clinic name Comments Records Status Imaging Status   [facility], 5/7/2021 note from Taras Marina M.D. Care everywhere     Department of Otorhinolaryngology in [place], [place]   2/1/2021 note from Jona Vergara M.D.  Care everywhere     [facility]  2/1/2021 audiogram with Milena Cason AUD, Au.D.    05/18/2020 audiogram with Milena Cason AUD, Au.D.  req 7/8/21. req 7/22/21 - received     [facility]   03/05/2020 ED note from Ninoska Batista P.A.-C Care everywhere     [facility]  5/21/2020 MR Brain   3/10/2020 CT Head  Care everywhere  req 7/8/21, rq 7/22/21 - PACS           7/8/2021 8:35AM sent a fax to [facility] for audios and images - Amay   7/22/2021 2:13PM sent a 2nd fax to [facility] for recs - Amay   7/26/2021 9:14AM called [facility] medical records, asked that they fax recs and push images. [facility] rep stated they are 5 days behind and missed the 7/8 fax req, will work on it today. - Amay   *audios and images received from [facility] - Amay    wnl

## 2021-08-31 NOTE — PATIENT PROFILE ADULT - ..
Patient presented after waiting 30 minutes with no reaction to  injections. Discharged from clinic.  Liv Bruce RN    
29-Jan-2020 02:37:07

## 2021-10-16 NOTE — CONSULT NOTE ADULT - CONSULT REASON
Transaminitis PAST MEDICAL HISTORY:  Diabetes     HLD (hyperlipidemia)     HTN (hypertension)     HTN (hypertension)

## 2022-02-03 NOTE — ED PROVIDER NOTE - PHYSICAL EXAMINATION
CONSTITUTIONAL: Well-developed; well-nourished; in no acute distress, nontoxic appearing  SKIN: skin exam is warm and dry,  HEAD: Normocephalic; atraumatic.  EYES: PERRL, 3 mm bilateral, no nystagmus, EOM intact; conjunctiva and sclera clear.  ENT: MMM, no nasal congestion  NECK: Supple; non tender.  ROM intact.  CARD: S1, S2 normal, no murmur  RESP: No wheezes, rales or rhonchi. Good air movement bilaterally  ABD: soft; non-distended; non-tender. No Rebound, No guarding  EXT: Normal ROM. No cyanosis or edema. Dp Pulses intact.   NEURO: awake, alert, following commands, oriented, grossly unremarkable. No Focal deficits. GCS 15.   PSYCH: Cooperative, appropriate.   SKIN: small skin tear to left wrist. Nasal Turnover Hinge Flap Text: The defect edges were debeveled with a #15 scalpel blade.  Given the size, depth, location of the defect and the defect being full thickness a nasal turnover hinge flap was deemed most appropriate.  Using a sterile surgical marker, an appropriate hinge flap was drawn incorporating the defect. The area thus outlined was incised with a #15 scalpel blade. The flap was designed to recreate the nasal mucosal lining and the alar rim. The skin margins were undermined to an appropriate distance in all directions utilizing iris scissors.

## 2022-02-27 NOTE — PATIENT PROFILE ADULT. - TYPE OF ADMISSION, PATIENT PROFILE
Peripheral artery disease Peripheral artery disease Peripheral artery disease Peripheral artery disease Peripheral artery disease Peripheral artery disease Scheduled/Direct

## 2022-09-02 NOTE — CONSULT NOTE ADULT - ASSESSMENT
Patient hx as above. No chest pain SOB now. No overt CHF. Resume meds po when able. F/U out patient Dr [place] Patient hx as above. No chest pain SOB now. No overt CHF. Resume meds po when able. F/U out patient Dr [place]. Follow CBC as needed [facility]. [address]

## 2022-09-19 NOTE — PATIENT PROFILE ADULT. - FUNCTIONAL LEVEL PRIOR: COMMUNICATION
(0) understands/communicates without difficulty Complex Repair And Skin Substitute Graft Text: The defect edges were debeveled with a #15 scalpel blade.  The primary defect was closed partially with a complex linear closure.  Given the location of the remaining defect, shape of the defect and the proximity to free margins a skin substitute graft was deemed most appropriate to repair the remaining defect.  The graft was trimmed to fit the size of the remaining defect.  The graft was then placed in the primary defect, oriented appropriately, and sutured into place.

## 2022-12-16 NOTE — PHYSICAL THERAPY INITIAL EVALUATION ADULT - MD/RN NOTIFIED
Patients nurse called for a refill.  He should be due for both fentanyl and Oxy IR tomorrow 12/17/22    Last Appt:  11/17/2022  Next Appt:   2/17/2023  Med verified in [employer]
yes

## 2023-03-08 NOTE — H&P PST ADULT - NECK DETAILS
[facility] PREOPERATIVE PATIENT INSTRUCTIONS     PARKING:     [facility]: Parking is available I [address] on [address] and [facility]. [address] Walkway bridge is located on the 2nd floor of [address].  is also available at main entrance of Outpatient [facility] on [facility].     GENERAL INSTRUCTIONS:    • TWO family members/friend who is over the age of 18 may accompany you. A responsible person MUST accompany you home and stay with you the first 24 hours after surgery. Your surgery will be cancelled if these arrangements have not been made.   • If you become ill prior to surgery (I.e. fever, vomiting), please notify your surgeon immediately.   • Children under 12 years old MUST have a parent with them at all times.   • ON DAY OF SURGERY: Do not wear contact lenses, make up, nail polish or jewelry. Leave all valuables at home except what you will need or are instructed to bring.   • For your convenience, [facility] is available to fill medications. Please bring a form of payments accepted at [facility] locations.       BRING WITH YOU ON DAY OF SURGERY:  1. Insurance Card and referral (if required), 's license or photo ID  2. All your medications in their original pharmacy bottles  3. Advance directive (living will, Healthcare Power of )  4. All information on your pacemaker or AICD, if appropriate  5. Any other forms, x-rays or films the doctor may have given you  6. Any medical devices (I.e. crutches, braces, sling)  7. Loose fitting clothing, slippers, walking shoes if applicable  8. For comfort measures, you may bring headphones/music device/magazines that can be given to family when you go into the operating room  9. Favorite toy of blanket for children. Formula for feeding after surgery or favorite sippy cup.   10. Please be aware that there are 2 different locations. Hospital patients please arrive at Entrance F on [address] &  [facility] and check in with .         [facility] patients please check in with  Desk right off the 2nd floor Pedestrian walkway entrance.     NPO/Eating/Drinking Restrictions: Please note: If these guidelines are not followed, your procedure will be delayed and possibly cancelled.  For cases starting on or after 3 pm, please contact the anesthesia department regarding NPO status.    Do NOT eat or consume any food or dairy after midnight.  This includes candy or gum.    Acceptable clear liquids can be given up until 1 hour prior to arrival time given.  **Acceptable clear liquids: water, high-carbohydrate drink (Ensure Clear Pre-Surgical Drink, Propel; or Clear non-colored Gatorade), apple juice without fiber (non-organic), Pedialyte, 7-up/Sprite, black coffee or tea without milk products or lemon  Unacceptable clear liquids: Coffee or tea with milk products, orange juice, alcohol, soup broth    Do NOT smoke, drink alcohol, or use recreational drugs prior to your surgery.    Please call the location of surgery with questions: Pavilion (up to  6 PM) [phone]; Hospital (up to 7 PM) [phone].   After hours for both locations: [phone]    This information has been reviewed with the patient on 03/08/23 9:00 AM.      + kyphosis, able to hyperextend 30 degrees

## 2023-03-09 NOTE — ED ADULT TRIAGE NOTE - IDEAL BODY WEIGHT(KG)
73 PAST MEDICAL HISTORY:  Afib     Atrial fibrillation     Degenerative disc disease, cervical     Degenerative disc disease, lumbar     GERD (gastroesophageal reflux disease)     GERD (gastroesophageal reflux disease)     HTN (hypertension)     Hypothyroidism     Hypothyroidism, adult     OA (osteoarthritis)     Sarcoma of omentum

## 2023-10-05 NOTE — ED PROVIDER NOTE - ADMIT DISPOSITION PRESENT ON ADMISSION SEPSIS Q1 - RE-EVALUATED PATIENT FLUID AND VITAL SIGNS
10 (severe pain)
I have re-evaluated the patient's fluid status and reviewed vital signs. Clinical perfusion assessment was performed.

## 2024-01-01 NOTE — PROGRESS NOTE ADULT - REASON FOR ADMISSION
generalized  weakness---progressive  > 48 hrs duration
general  weakness---progressive  > 48 hrs duration
negative

## 2024-09-03 NOTE — PATIENT PROFILE ADULT. - ARE SIGNIFICANT INDICATORS COMPLETE.
Patient resting in bed. Respirations easy and unlabored. No distress noted. Call light within reach.  Girlfriend at bedside. Pt denies needs.    Yes

## 2024-11-22 NOTE — ED PROVIDER NOTE - PSH
AICD (automatic cardioverter/defibrillator) present  [place]  Encounter for biliary drainage tube placement  1/2018  H/O bilateral cataract extraction  LEFT- 1/18 RIGHT 6 YRS AGO  H/O coronary angioplasty  WITH STENT X2 (4/2018)  H/O flexible sigmoidoscopy  2015  History of prostate surgery  5/17
Metabolic acidosis

## 2024-12-05 NOTE — DISCHARGE NOTE ADULT - PAIN PRESENT
With symptoms of urinary retention noted during [facility] stay exacerbated by constipation- continue senna-s twice daily, miralax PRN, encourage PO hydration  Continue tamsulosin  Monitor urine output and renal function closely   No

## 2025-05-19 NOTE — H&P PST ADULT - FUNCTIONAL LEVEL PRIOR: TRANSFERRING
- Difficulty sleeping, especially on days off, tried melatonin without success  - Discussed benefits of hydroxyzine and trazodone  - Prescribed trazodone 25 mg PRN for sleep, take 1 hour before bedtime, ensure 8 hours available for sleep to avoid drowsiness  - Provided resources for therapists specializing in insomnia   1 = assistive equipment

## 2025-06-12 NOTE — H&P PST ADULT - OCCUPATION
Pt called in and asked if we could re-send forms as his job reported they hadn't yet received them. Emailed forms again.   retired